# Patient Record
Sex: FEMALE | Race: WHITE | NOT HISPANIC OR LATINO | Employment: OTHER | URBAN - NONMETROPOLITAN AREA
[De-identification: names, ages, dates, MRNs, and addresses within clinical notes are randomized per-mention and may not be internally consistent; named-entity substitution may affect disease eponyms.]

---

## 2018-02-13 ENCOUNTER — OFFICE VISIT (OUTPATIENT)
Dept: OBSTETRICS AND GYNECOLOGY | Facility: CLINIC | Age: 73
End: 2018-02-13

## 2018-02-13 VITALS
HEIGHT: 64 IN | DIASTOLIC BLOOD PRESSURE: 62 MMHG | BODY MASS INDEX: 22.53 KG/M2 | WEIGHT: 132 LBS | SYSTOLIC BLOOD PRESSURE: 119 MMHG

## 2018-02-13 DIAGNOSIS — R10.31 RIGHT LOWER QUADRANT PAIN: ICD-10-CM

## 2018-02-13 DIAGNOSIS — Z85.3 HISTORY OF BREAST CANCER: ICD-10-CM

## 2018-02-13 DIAGNOSIS — R93.5 ABNORMAL ULTRASOUND OF ENDOMETRIUM: ICD-10-CM

## 2018-02-13 DIAGNOSIS — R10.2 PELVIC PAIN: Primary | ICD-10-CM

## 2018-02-13 PROCEDURE — 99204 OFFICE O/P NEW MOD 45 MIN: CPT | Performed by: OBSTETRICS & GYNECOLOGY

## 2018-02-13 RX ORDER — METOPROLOL SUCCINATE 50 MG/1
50 TABLET, EXTENDED RELEASE ORAL DAILY
COMMUNITY

## 2018-02-13 RX ORDER — ASPIRIN 325 MG
325 TABLET ORAL DAILY
COMMUNITY
End: 2022-02-18

## 2018-02-13 RX ORDER — IBUPROFEN 800 MG/1
800 TABLET ORAL EVERY 6 HOURS PRN
COMMUNITY
End: 2022-02-18

## 2018-02-13 RX ORDER — CETIRIZINE HYDROCHLORIDE 10 MG/1
10 TABLET ORAL DAILY
COMMUNITY
End: 2022-02-18

## 2018-02-13 RX ORDER — EZETIMIBE 10 MG/1
10 TABLET ORAL DAILY
COMMUNITY

## 2018-02-13 RX ORDER — TRAVOPROST OPHTHALMIC SOLUTION 0.04 MG/ML
1 SOLUTION OPHTHALMIC EVERY EVENING
COMMUNITY

## 2018-02-13 RX ORDER — SPIRONOLACTONE 25 MG/1
25 TABLET ORAL DAILY
COMMUNITY
End: 2022-02-18

## 2018-02-13 RX ORDER — CLOBETASOL PROPIONATE 0.5 MG/G
OINTMENT TOPICAL 2 TIMES DAILY
COMMUNITY
End: 2019-09-13 | Stop reason: SDUPTHER

## 2018-02-13 NOTE — PROGRESS NOTES
Chief Complaint   Patient presents with   • Pelvic Pain     PATIENT ADVISED HAVING PELVIC PAIN ON RIGHT SIDE X 1 MONTH.      Patient is 72 y.o.  here for evaluation of pelvic pain, right lower quadrant pain for the last month.  Pt reports a sharp, stabbing pain intermittently present over the last month.  Pt reports worse when raises leg up.  Pt reports no other association with the pain.  Pt denies any palpable masses.  Pt denies any nausea or emesis.  Pt with no fever or chills.  Pt reports pain is not present all the time; comes and goes but persistent over the last month.  Pt has not taken any medication;  Not aware of anything that helps the pain.  Pt with maternal aunt with ovarian cancer.  Pt has personal had breast ca x 2; first dx right breast ; took Tamoxifen.  Pt dx with 2nd breast ca  left breast.  Pt does report history of abnormal pap smears in past.  Pt has had CKC many years ago with D&C.  Pt reports last pap approximately 3 years ago and normal.  Pt denies any  bleeding but does report having an episode of spotting x 2 1 year ago but told from bladder; pt received IV antibiotics with resolution but blood was visible.  Pt with no further problems since then.    History  Past Medical History:   Diagnosis Date   • Breast cancer     RIGHT BREAST STAGE 3   • Diabetes mellitus    • History of cancer chemotherapy    • Lichen sclerosus    • Shingles      No current outpatient prescriptions on file prior to visit.     No current facility-administered medications on file prior to visit.      No Known Allergies  Past Surgical History:   Procedure Laterality Date   • CHOLECYSTECTOMY  2010   • DILATATION AND CURETTAGE     • MASTECTOMY Right 10/1998   • MASTECTOMY Left 2013     Family History   Problem Relation Age of Onset   • Breast cancer Mother    • Diabetes Mother    • Ovarian cancer Maternal Aunt      Social History     Social History   • Marital status:      Spouse name:  "N/A   • Number of children: N/A   • Years of education: N/A     Social History Main Topics   • Smoking status: Never Smoker   • Smokeless tobacco: Never Used   • Alcohol use No   • Drug use: No   • Sexual activity: No     Other Topics Concern   • None     Social History Narrative   • None     Review of Systems  All systems were reviewed and negative except for:  ENT:  positive for ear ringing and nasal congestion  Respiratory: positive for  cough, dry  Genitourinary: postivie for  pelvic pain     Objective  Vitals:    02/13/18 1545   BP: 119/62   Weight: 59.9 kg (132 lb)   Height: 161.3 cm (63.5\")     Physical Exam:  General Appearance: alert, appears stated age and cooperative  Head: normocephalic, without obvious abnormality and atraumatic  Eyes: lids and lashes normal, conjunctivae and sclerae normal, no icterus, no pallor, corneas clear and PERRLA  Ears: ears appear intact with no abnormalities noted  Nose: nares normal, septum midline, mucosa normal and no drainage  Neck: suppple, trachea midline and no thyromegaly  Lungs: clear to auscultation, respirations regular, respirations even and respirations unlabored  Heart: regular rhythm and normal rate, normal S1, S2, no murmur, gallop, or rubs and no click  Breasts: Not performed.  Abdomen: normal bowel sounds, no masses, no hepatomegaly, no splenomegaly, soft non-tender, no guarding and no rebound tenderness; no palpable masses noted with valsalva.  No evidence of hernia.  Pelvic: Not performed.  Extremities: moves extremities well, no edema, no cyanosis and no redness  Skin: no bleeding, bruising or rash and no lesions noted  Lymph Nodes: no palpable adenopathy  Psych: normal mood and affect, oriented to person, time and place, thought content organized and appropriate judgment    Lab Review   No data reviewed    Imaging  Pelvic ultrasound images independantly reviewed - TVS today shows uterus normal size; ET 10 mm with heterogenous changes; bilateral ovaries " normal with no free fluid seen.    Assessment/Plan    Problem List Items Addressed This Visit     None      Visit Diagnoses     Pelvic pain    -  Primary  Pt with pelvic pain/right lower quadrant pain.  TVS today shows no explanation for pain; ovaries normal in appearance.  Pt with thickened endometrium as noted but do not feel this is etiology for pain.  Pt may need CT scan for further evaluation.    Relevant Orders    US Non-ob Transvaginal    Right lower quadrant pain      See plan above.    History of breast cancer      See plan below.    Abnormal ultrasound of endometrium      TVS today shows thickened endometrium in patient with known breast cancer x 2.  Various options discussed with patient including repeat TVS, embx, or D&C for further evaluation.  Pt has not had recent pap.  Pt to return for pap with embx.  Plan pending results.  Instructions and precautions given.  All questions answered.            Follow up as scheduled    This note was electronically signed.  Antonietta Sauceda M.D.

## 2018-03-23 ENCOUNTER — OFFICE VISIT (OUTPATIENT)
Dept: OBSTETRICS AND GYNECOLOGY | Facility: CLINIC | Age: 73
End: 2018-03-23

## 2018-03-23 VITALS
DIASTOLIC BLOOD PRESSURE: 62 MMHG | SYSTOLIC BLOOD PRESSURE: 118 MMHG | HEIGHT: 64 IN | BODY MASS INDEX: 22.36 KG/M2 | WEIGHT: 131 LBS

## 2018-03-23 DIAGNOSIS — R93.5 ABNORMAL ULTRASOUND OF ENDOMETRIUM: Primary | ICD-10-CM

## 2018-03-23 DIAGNOSIS — R10.31 RIGHT LOWER QUADRANT PAIN: ICD-10-CM

## 2018-03-23 PROBLEM — R93.89 THICKENED ENDOMETRIUM: Status: ACTIVE | Noted: 2018-01-01

## 2018-03-23 PROCEDURE — 58100 BIOPSY OF UTERUS LINING: CPT | Performed by: OBSTETRICS & GYNECOLOGY

## 2018-03-23 PROCEDURE — 99212 OFFICE O/P EST SF 10 MIN: CPT | Performed by: OBSTETRICS & GYNECOLOGY

## 2018-03-23 NOTE — PROGRESS NOTES
Subjective  Chief Complaint   Patient presents with   • Procedure     ENDOMETRIAL BIOPSY, THICKENED ENDOMETRIUM     Patient is 72 y.o.  here for endometrial bx for recent thickening noted on TVS.  Pt was initial seen for right lower quadrant pain; pt reports the pain has persisted with no changes.  Pt denies any vaginal bleeding or spotting.  Pt does give history of previous CKC with D&C and reports having significant complications.  Pt had seen Dr. Bell in past and had procedure.  Pt also had D&C done in [place] years later and reports having complications and being in hospital x 1 week.    History  Past Medical History:   Diagnosis Date   • Breast cancer     RIGHT BREAST STAGE 3   • Diabetes mellitus    • History of cancer chemotherapy    • Lichen sclerosus    • Shingles    • Thickened endometrium      Current Outpatient Prescriptions on File Prior to Visit   Medication Sig Dispense Refill   • aspirin 325 MG tablet Take 325 mg by mouth Daily.     • cetirizine (zyrTEC) 10 MG tablet Take 10 mg by mouth Daily.     • clobetasol (TEMOVATE) 0.05 % ointment Apply  topically 2 (Two) Times a Day.     • ezetimibe (ZETIA) 10 MG tablet Take 10 mg by mouth Daily.     • ibuprofen (ADVIL,MOTRIN) 800 MG tablet Take 800 mg by mouth Every 6 (Six) Hours As Needed for Mild Pain .     • metoprolol succinate XL (TOPROL-XL) 50 MG 24 hr tablet Take 50 mg by mouth Daily.     • spironolactone (ALDACTONE) 25 MG tablet Take 25 mg by mouth Daily.     • travoprost, BAK free, (TRAVATAN) 0.004 % solution ophthalmic solution 1 drop Every Evening. in affected eye(s)       No current facility-administered medications on file prior to visit.      No Known Allergies  Past Surgical History:   Procedure Laterality Date   • CERVICAL CONE BIOPSY     • CHOLECYSTECTOMY  2010   • DILATATION AND CURETTAGE     • MASTECTOMY Right 10/1998   • MASTECTOMY Left 2013     Family History   Problem Relation Age of Onset   • Breast cancer Mother   "  • Diabetes Mother    • Ovarian cancer Maternal Aunt      Social History     Social History   • Marital status:      Social History Main Topics   • Smoking status: Never Smoker   • Smokeless tobacco: Never Used   • Alcohol use No   • Drug use: No   • Sexual activity: No     Other Topics Concern   • Not on file     Review of Systems  The following systems were reviewed and negative:  constitution, eyes, ENT, respiratory, cardiovascular, gastrointestinal, genitourinary, integument, breast, hematologic / lymphatic, musculoskeletal, neurological, behavioral/psych, endocrine and allergies / immunologic     Objective  Vitals:    03/23/18 1426   BP: 118/62   Weight: 59.4 kg (131 lb)   Height: 161.3 cm (63.5\")     Physical Exam:  General Appearance: alert, appears stated age and cooperative  Head: normocephalic, without obvious abnormality and atraumatic  Eyes: lids and lashes normal, conjunctivae and sclerae normal, no icterus, no pallor, corneas clear and PERRLA  Ears: ears appear intact with no abnormalities noted  Nose: nares normal, septum midline, mucosa normal and no drainage  Neck: suppple, trachea midline and no thyromegaly  Lungs: clear to auscultation, respirations regular, respirations even and respirations unlabored  Heart: regular rhythm and normal rate, normal S1, S2, no murmur, gallop, or rubs and no click  Breasts: Not performed.  Abdomen: normal bowel sounds, no masses, no hepatomegaly, no splenomegaly, soft non-tender, no guarding and no rebound tenderness  Pelvic: Clinical staff was present for exam  External genitalia:  normal appearance of the external genitalia including Bartholin's and [place]'s glands.  :  urethral meatus normal;  Vaginal:  atrophic mucosal changes are present;  Cervix:  stenotic; cervix stenotic and flushed with vagina  Uterus:  normal size, shape and consistency.  Adnexa:  normal bimanual exam of the adnexa.  Extremities: moves extremities well, no edema, no cyanosis and " no redness  Skin: no bleeding, bruising or rash and no lesions noted  Lymph Nodes: no palpable adenopathy  Neuro: CN II-X grossly intact; sensation intact  Psych: normal mood and affect, oriented to person, time and place, thought content organized and appropriate judgment    Endometrial Biopsy    Date of procedure:  3/24/2018    Indication:  abnormal endometrium on ultrasound             Informed Consent Obtained    Procedure documentation:    The patient was placed in the dorsal lithotomy position.  A speculum was placed in the vagina.  The cervix was prepped. The cervix was noted to be stenotic and flushed with vagina.  Unable to perform endometrial bx.  The patient tolerated the procedure without any complications.    Lab Review   No data reviewed    Imaging   No data reviewed    Assessment/Plan  Problem List Items Addressed This Visit     None      Visit Diagnoses     Abnormal ultrasound of endometrium    -  Primary  Attempt at endometrial bx today but unsuccessful secondary to stenotic, flushed cervix.  Need to obtain copy of previous medical records as noted.  Options discussed with patient.  Plan repeat TVS and if continued thickening then will need D&C.    Relevant Orders    US Non-ob Transvaginal    Right lower quadrant pain      Various options discussed with patient but patient informed no etiology noted on TVS or examination to account for the pain.  If continued pain the patient needs to consider CT scan and possible GI work up.        Follow up as scheduled   This note was electronically signed.  Antonietta Sauceda M.D.

## 2018-03-26 ENCOUNTER — TELEPHONE (OUTPATIENT)
Dept: OBSTETRICS AND GYNECOLOGY | Facility: CLINIC | Age: 73
End: 2018-03-26

## 2018-06-13 ENCOUNTER — APPOINTMENT (OUTPATIENT)
Dept: LAB | Facility: HOSPITAL | Age: 73
End: 2018-06-13
Attending: OBSTETRICS & GYNECOLOGY

## 2018-06-13 ENCOUNTER — OFFICE VISIT (OUTPATIENT)
Dept: OBSTETRICS AND GYNECOLOGY | Facility: CLINIC | Age: 73
End: 2018-06-13

## 2018-06-13 VITALS
SYSTOLIC BLOOD PRESSURE: 118 MMHG | WEIGHT: 131 LBS | HEIGHT: 64 IN | BODY MASS INDEX: 22.36 KG/M2 | DIASTOLIC BLOOD PRESSURE: 62 MMHG

## 2018-06-13 DIAGNOSIS — R35.0 URINARY FREQUENCY: Primary | ICD-10-CM

## 2018-06-13 DIAGNOSIS — Z85.3 HISTORY OF BREAST CANCER: ICD-10-CM

## 2018-06-13 DIAGNOSIS — R10.31 RIGHT LOWER QUADRANT PAIN: ICD-10-CM

## 2018-06-13 DIAGNOSIS — R93.89 THICKENED ENDOMETRIUM: ICD-10-CM

## 2018-06-13 DIAGNOSIS — N88.2 CERVICAL STENOSIS (UTERINE CERVIX): ICD-10-CM

## 2018-06-13 LAB
BACTERIA UR QL AUTO: ABNORMAL /HPF
BILIRUB UR QL STRIP: NEGATIVE
CLARITY UR: CLEAR
COLOR UR: YELLOW
GLUCOSE UR STRIP-MCNC: NEGATIVE MG/DL
HGB UR QL STRIP.AUTO: NEGATIVE
HYALINE CASTS UR QL AUTO: ABNORMAL /LPF
KETONES UR QL STRIP: NEGATIVE
LEUKOCYTE ESTERASE UR QL STRIP.AUTO: NEGATIVE
NITRITE UR QL STRIP: NEGATIVE
PH UR STRIP.AUTO: 5.5 [PH] (ref 5–8)
PROT UR QL STRIP: NEGATIVE
RBC # UR: ABNORMAL /HPF
REF LAB TEST METHOD: ABNORMAL
SP GR UR STRIP: 1.01 (ref 1–1.03)
SQUAMOUS #/AREA URNS HPF: ABNORMAL /HPF
UROBILINOGEN UR QL STRIP: NORMAL
WBC UR QL AUTO: ABNORMAL /HPF

## 2018-06-13 PROCEDURE — 99214 OFFICE O/P EST MOD 30 MIN: CPT | Performed by: OBSTETRICS & GYNECOLOGY

## 2018-06-13 PROCEDURE — 87086 URINE CULTURE/COLONY COUNT: CPT

## 2018-06-13 PROCEDURE — 81001 URINALYSIS AUTO W/SCOPE: CPT

## 2018-06-13 NOTE — PROGRESS NOTES
Subjective  Chief Complaint   Patient presents with   • Follow-up     TRANSVAGINAL ULTRASOUND, THICKENED ENDOMETRIUM.    • Urinary Frequency     Patient is 73 y.o.  here for f/u TVS which had previously showed thickened endometrium of 8.5-10 mm on .  Pt's main complaint is predominately right lower quadrant pain which has continued.  Pt denies any vaginal bleeding or spotting.  Pt has had multiple D&C's in the past as well as CKC.  Medical records have been obtained and reviewed which showed patient with cervical stenosis.  Pt had multiple D&Cs showing possible perforation of uterus.  Pt was observed for further observation at times being evaluated overnight.  Pt here for f/u TVS.  An endometrial bx was attempted here as well but unable to do secondary to cervical stenosis.  History  Past Medical History:   Diagnosis Date   • Breast cancer     RIGHT BREAST STAGE 3   • Diabetes mellitus    • History of cancer chemotherapy    • Lichen sclerosus    • Shingles    • Thickened endometrium      Current Outpatient Prescriptions on File Prior to Visit   Medication Sig Dispense Refill   • aspirin 325 MG tablet Take 325 mg by mouth Daily.     • cetirizine (zyrTEC) 10 MG tablet Take 10 mg by mouth Daily.     • clobetasol (TEMOVATE) 0.05 % ointment Apply  topically 2 (Two) Times a Day.     • ezetimibe (ZETIA) 10 MG tablet Take 10 mg by mouth Daily.     • ibuprofen (ADVIL,MOTRIN) 800 MG tablet Take 800 mg by mouth Every 6 (Six) Hours As Needed for Mild Pain .     • metoprolol succinate XL (TOPROL-XL) 50 MG 24 hr tablet Take 50 mg by mouth Daily.     • spironolactone (ALDACTONE) 25 MG tablet Take 25 mg by mouth Daily.     • travoprost, BAK free, (TRAVATAN) 0.004 % solution ophthalmic solution 1 drop Every Evening. in affected eye(s)       No current facility-administered medications on file prior to visit.      No Known Allergies  Past Surgical History:   Procedure Laterality Date   • CERVICAL CONE BIOPSY     •  "CHOLECYSTECTOMY  03/2010   • DILATATION AND CURETTAGE     • MASTECTOMY Right 10/1998   • MASTECTOMY Left 07/2013     Family History   Problem Relation Age of Onset   • Breast cancer Mother    • Diabetes Mother    • Ovarian cancer Maternal Aunt      Social History     Social History   • Marital status:      Social History Main Topics   • Smoking status: Never Smoker   • Smokeless tobacco: Never Used   • Alcohol use No   • Drug use: No   • Sexual activity: No     Other Topics Concern   • Not on file     Review of Systems  All systems were reviewed and negative except for:  Genitourinary: postivie for  frequency and urinary urgency     Objective  Vitals:    06/13/18 1425   BP: 118/62   Weight: 59.4 kg (131 lb)   Height: 161.3 cm (63.5\")     Physical Exam:  General Appearance: alert, appears stated age and cooperative  Head: normocephalic, without obvious abnormality and atraumatic  Eyes: lids and lashes normal, conjunctivae and sclerae normal, no icterus, no pallor, corneas clear and PERRLA  Ears: ears appear intact with no abnormalities noted  Nose: nares normal, septum midline, mucosa normal and no drainage  Neck: suppple, trachea midline and no thyromegaly  Lungs: clear to auscultation, respirations regular, respirations even and respirations unlabored  Heart: regular rhythm and normal rate, normal S1, S2, no murmur, gallop, or rubs and no click  Breasts: Not performed.  Abdomen: normal bowel sounds, no masses, no hepatomegaly, no splenomegaly, soft non-tender, no guarding and no rebound tenderness  Pelvic: Not performed.  Extremities: moves extremities well, no edema, no cyanosis and no redness  Skin: no bleeding, bruising or rash and no lesions noted  Lymph Nodes: no palpable adenopathy  Neuro: CN II-X grossly intact; sensation intact  Psych: normal mood and affect, oriented to person, time and place, thought content organized and appropriate judgment  Lab Review   No data reviewed    Imaging   Pelvic " ultrasound report  Pelvic ultrasound images independantly reviewed - TVS today shows RF uterus, ET 7.86 mm; No adnexal masses seen; no free fluid noted; +tenderness noted right adnexa.    Assessment/Plan  Problem List Items Addressed This Visit        Genitourinary    Thickened endometrium Unchanged  Long discussion with patient regarding findings.  Previous records obtained and reviewed as noted.  Pt with no bleeding.  Plan expectant management at present with repeat TVS in 3-4 months.  If patient with any bleeding and/or worsening of thickening then attempt D&C vs definitive, diagnostic hysterectomy.  All questions answered.  Pt in agreement with plan.      Other Visit Diagnoses     Urinary frequency    -  Primary  See plan below.    Right lower quadrant pain    Worsening  Pt with worsening of right lower quadrant exam and tenderness noted on exam.  CT as noted for further evaluation and treatment.  Plan pending results.    Relevant Orders    CT Abdomen Pelvis With Contrast    History of breast cancer      See plan above    Cervical stenosis (uterine cervix)      See plan above          Follow up as discussed  This note was electronically signed.  Antonietta Sauceda M.D.

## 2018-06-15 ENCOUNTER — HOSPITAL ENCOUNTER (OUTPATIENT)
Dept: CT IMAGING | Facility: HOSPITAL | Age: 73
Discharge: HOME | End: 2018-06-15
Attending: OBSTETRICS & GYNECOLOGY | Admitting: OBSTETRICS & GYNECOLOGY

## 2018-06-15 DIAGNOSIS — R10.31 RIGHT LOWER QUADRANT PAIN: ICD-10-CM

## 2018-06-15 LAB
APPEARANCE UR: CLEAR
BACTERIA #/AREA URNS HPF: ABNORMAL /HPF
BACTERIA SPEC AEROBE CULT: NO GROWTH
BACTERIA UR CULT: NORMAL
BACTERIA UR CULT: NORMAL
BILIRUB UR QL STRIP: NEGATIVE
COLOR UR: YELLOW
CREAT BLD-MCNC: 0.4 MG/DL (ref 0.6–1.3)
EPI CELLS #/AREA URNS HPF: ABNORMAL /HPF
GFR SERPL CREATININE-BSD FRML MDRD: >150 ML/MIN/1.73
GLUCOSE UR QL: NEGATIVE
HGB UR QL STRIP: NEGATIVE
KETONES UR QL STRIP: NEGATIVE
LEUKOCYTE ESTERASE UR QL STRIP: NEGATIVE
NITRITE UR QL STRIP: NEGATIVE
PH UR STRIP: 6 [PH] (ref 5–8)
PROT UR QL STRIP: NEGATIVE
RBC #/AREA URNS HPF: ABNORMAL /HPF
SP GR UR: 1.02 (ref 1–1.03)
UROBILINOGEN UR STRIP-MCNC: (no result) MG/DL
WBC #/AREA URNS HPF: ABNORMAL /HPF

## 2018-06-15 PROCEDURE — 82565 ASSAY OF CREATININE: CPT | Performed by: OBSTETRICS & GYNECOLOGY

## 2018-06-15 PROCEDURE — 25010000002 IOPAMIDOL 61 % SOLUTION: Performed by: OBSTETRICS & GYNECOLOGY

## 2018-06-15 PROCEDURE — 0 DIATRIZOATE MEGLUMINE & SODIUM PER 1 ML: Performed by: OBSTETRICS & GYNECOLOGY

## 2018-06-15 PROCEDURE — 74177 CT ABD & PELVIS W/CONTRAST: CPT

## 2018-06-15 RX ADMIN — DIATRIZOATE MEGLUMINE AND DIATRIZOATE SODIUM 30 ML: 660; 100 LIQUID ORAL; RECTAL at 11:15

## 2018-06-15 RX ADMIN — IOPAMIDOL 100 ML: 612 INJECTION, SOLUTION INTRAVENOUS at 11:15

## 2018-06-18 ENCOUNTER — TELEPHONE (OUTPATIENT)
Dept: OBSTETRICS AND GYNECOLOGY | Facility: CLINIC | Age: 73
End: 2018-06-18

## 2018-06-18 DIAGNOSIS — R10.31 RIGHT LOWER QUADRANT PAIN: Primary | ICD-10-CM

## 2018-06-18 NOTE — TELEPHONE ENCOUNTER
----- Message from Antonietta Sauceda MD sent at 6/18/2018  9:40 AM EDT -----  Okay to inform pt CT showed probable fatty liver.  Biliary ductal prominence.  Appendix was normal.  Pt also had moderate stool.  Recommend GI evaluation given above findings and patient's symptoms.  Pt can see GI or general surgeon.

## 2018-06-18 NOTE — TELEPHONE ENCOUNTER
SPOKE WITH PATIENT AND INFORMED OF RESULTS. PATIENT REQUESTED DR MINDA JAQUEZ PATEL FOR REFERRAL. ORDER CREATED.

## 2018-06-28 ENCOUNTER — OFFICE VISIT (OUTPATIENT)
Dept: SURGERY | Facility: CLINIC | Age: 73
End: 2018-06-28

## 2018-06-28 VITALS
HEIGHT: 64 IN | BODY MASS INDEX: 21.51 KG/M2 | RESPIRATION RATE: 17 BRPM | DIASTOLIC BLOOD PRESSURE: 91 MMHG | TEMPERATURE: 98.4 F | SYSTOLIC BLOOD PRESSURE: 161 MMHG | WEIGHT: 126 LBS | HEART RATE: 80 BPM

## 2018-06-28 DIAGNOSIS — K76.0 FATTY (CHANGE OF) LIVER, NOT ELSEWHERE CLASSIFIED: ICD-10-CM

## 2018-06-28 DIAGNOSIS — R10.31 RIGHT LOWER QUADRANT ABDOMINAL PAIN: Primary | ICD-10-CM

## 2018-06-28 PROCEDURE — 99203 OFFICE O/P NEW LOW 30 MIN: CPT | Performed by: SURGERY

## 2018-06-28 RX ORDER — LEVOCETIRIZINE DIHYDROCHLORIDE 5 MG/1
5 TABLET, FILM COATED ORAL EVERY EVENING
COMMUNITY
End: 2022-02-18

## 2018-06-28 RX ORDER — ONDANSETRON 4 MG/1
4 TABLET, FILM COATED ORAL DAILY PRN
Qty: 10 TABLET | Refills: 0 | Status: SHIPPED | OUTPATIENT
Start: 2018-06-28 | End: 2019-06-28

## 2018-06-28 RX ORDER — AZELASTINE HYDROCHLORIDE 137 UG/1
SPRAY, METERED NASAL
COMMUNITY
End: 2022-02-18

## 2018-06-28 NOTE — ADDENDUM NOTE
Addended by: HALEY JEREZ on: 6/28/2018 12:54 PM     Modules accepted: Orders     Skin normal color for race, warm, dry and intact. No evidence of rash.

## 2018-06-28 NOTE — PROGRESS NOTES
6/28/2018    Patient Information  Aster Craft  [address]  [place] [address]  1945  [phone] (home)     Chief Complaint   Patient presents with   • Consult     Referred for Fatty Liver       HPI  Patient is a 73-year-old white female referred because of abdominal pain and CT evidence of a fatty liver.  Patient is referred by Dr. jones, gynecologist in [place].  Pain patient having is located mostly in the right lower quadrant.  It's been going on for over a year but has recently gotten worse after Dr. Clay evaluators felt she should be evaluated with gastrointestinal point of view.  Patient has no real symptoms except discomfort and pain in the right lower quadrant.  Last colonoscopy was over 5 years ago.  I originally saw this patient in 1998 for breast cancer.  I have performed by a left and right mastectomy on her in the past.  Patient's gallbladder has been removed.  CT scan suggested fatty liver and dilated common bile duct thought to be related to her previous cholecystectomy.    Review of Systems:  The Past medical history, family history, social history, medication list, allergies, and Review of Systems has been reviewed and confirmed.    General: negative  Integumentary: negative  Eyes: eyesight problems, discharge from eyes  ENT: negative  Respiratory: negative  Gastrointestinal: negative  Cardiovascular: negative  Neurological: dizziness  Psychiatric: negative  Hematologic/Lymphatic: negative  Genitourinary: frequent urination  Musculoskeletal: back pain  Endocrine: negative  Breasts: negative      Patient Active Problem List   Diagnosis   • Thickened endometrium         Past Medical History:   Diagnosis Date   • Breast cancer     RIGHT BREAST STAGE 3   • Diabetes mellitus    • History of cancer chemotherapy    • Lichen sclerosus    • Shingles    • Thickened endometrium 2018         Past Surgical History:   Procedure Laterality Date   • CERVICAL CONE BIOPSY     • CHOLECYSTECTOMY  03/2010  "  • DILATATION AND CURETTAGE     • MASTECTOMY Right 10/1998   • MASTECTOMY Left 07/2013         Family History   Problem Relation Age of Onset   • Breast cancer Mother    • Diabetes Mother    • Cancer Mother    • Ovarian cancer Maternal Aunt    • Cancer Maternal Aunt          Social History   Substance Use Topics   • Smoking status: Never Smoker   • Smokeless tobacco: Never Used   • Alcohol use No       Current Outpatient Prescriptions   Medication Sig Dispense Refill   • Azelastine HCl 137 MCG/SPRAY solution into each nostril.     • levocetirizine (XYZAL) 5 MG tablet Take 5 mg by mouth Every Evening.     • aspirin 325 MG tablet Take 325 mg by mouth Daily.     • cetirizine (zyrTEC) 10 MG tablet Take 10 mg by mouth Daily.     • clobetasol (TEMOVATE) 0.05 % ointment Apply  topically 2 (Two) Times a Day.     • ezetimibe (ZETIA) 10 MG tablet Take 10 mg by mouth Daily.     • ibuprofen (ADVIL,MOTRIN) 800 MG tablet Take 800 mg by mouth Every 6 (Six) Hours As Needed for Mild Pain .     • metoprolol succinate XL (TOPROL-XL) 50 MG 24 hr tablet Take 50 mg by mouth Daily.     • spironolactone (ALDACTONE) 25 MG tablet Take 25 mg by mouth Daily.     • travoprost, BAK free, (TRAVATAN) 0.004 % solution ophthalmic solution 1 drop Every Evening. in affected eye(s)       No current facility-administered medications for this visit.          Allergies  Patient has no known allergies.    /91   Pulse 80   Temp 98.4 °F (36.9 °C)   Resp 17   Ht 161.3 cm (63.5\")   Wt 57.2 kg (126 lb)   BMI 21.97 kg/m²      Physical Exam   Constitutional: She is oriented to person, place, and time. She appears well-developed and well-nourished. No distress.   HENT:   Head: Normocephalic.   Right Ear: External ear normal.   Left Ear: External ear normal.   Nose: Nose normal.   Mouth/Throat: Oropharynx is clear and moist.   Eyes: Conjunctivae and EOM are normal. Right eye exhibits no discharge. Left eye exhibits no discharge.   Neck: Normal range of " motion. No JVD present. No tracheal deviation present. No thyromegaly present.   Cardiovascular: Normal rate, regular rhythm, normal heart sounds and intact distal pulses.  Exam reveals no gallop and no friction rub.    No murmur heard.  Pulmonary/Chest: Effort normal and breath sounds normal. No stridor. No respiratory distress. She has no wheezes. She has no rales. She exhibits no tenderness.   Abdominal: Soft. Bowel sounds are normal. She exhibits no distension and no mass. There is no tenderness. There is no rebound and no guarding. No hernia.   Genitourinary: Rectal exam shows guaiac negative stool.   Musculoskeletal: Normal range of motion. She exhibits no edema, tenderness or deformity.   Lymphadenopathy:     She has no cervical adenopathy.   Neurological: She is alert and oriented to person, place, and time. She has normal reflexes. She displays normal reflexes. No cranial nerve deficit. She exhibits normal muscle tone. Coordination normal.   Skin: Skin is warm and dry. No rash noted. She is not diaphoretic. No erythema. No pallor.   Psychiatric: She has a normal mood and affect. Her behavior is normal. Judgment and thought content normal.                 ASSESSMENT  Abdominal pain mostly in right lower quadrant and questionable fatty liver        PLAN    We'll get lab work and perform EGD and colonoscopy           Patient's Body mass index is 21.97 kg/m². BMI is within normal parameters. No follow-up required.           This document signed by Trenton Lyons MD June 28, 2018 12:21 PM

## 2018-06-29 PROBLEM — R10.31 RIGHT LOWER QUADRANT ABDOMINAL PAIN: Status: ACTIVE | Noted: 2018-06-29

## 2018-06-29 PROBLEM — K76.0 FATTY (CHANGE OF) LIVER, NOT ELSEWHERE CLASSIFIED: Status: ACTIVE | Noted: 2018-06-29

## 2018-07-06 ENCOUNTER — ANESTHESIA EVENT (OUTPATIENT)
Dept: PERIOP | Facility: HOSPITAL | Age: 73
End: 2018-07-06

## 2018-07-06 ENCOUNTER — HOSPITAL ENCOUNTER (OUTPATIENT)
Facility: HOSPITAL | Age: 73
Setting detail: HOSPITAL OUTPATIENT SURGERY
Discharge: HOME | End: 2018-07-06
Attending: SURGERY | Admitting: ANESTHESIOLOGY

## 2018-07-06 ENCOUNTER — ANESTHESIA (OUTPATIENT)
Dept: PERIOP | Facility: HOSPITAL | Age: 73
End: 2018-07-06

## 2018-07-06 VITALS
WEIGHT: 126 LBS | TEMPERATURE: 97 F | BODY MASS INDEX: 22.32 KG/M2 | HEIGHT: 63 IN | RESPIRATION RATE: 20 BRPM | DIASTOLIC BLOOD PRESSURE: 73 MMHG | HEART RATE: 73 BPM | OXYGEN SATURATION: 95 % | SYSTOLIC BLOOD PRESSURE: 133 MMHG

## 2018-07-06 DIAGNOSIS — K76.0 FATTY (CHANGE OF) LIVER, NOT ELSEWHERE CLASSIFIED: ICD-10-CM

## 2018-07-06 DIAGNOSIS — R10.31 RIGHT LOWER QUADRANT ABDOMINAL PAIN: ICD-10-CM

## 2018-07-06 PROCEDURE — 88342 IMHCHEM/IMCYTCHM 1ST ANTB: CPT | Performed by: SURGERY

## 2018-07-06 PROCEDURE — 45378 DIAGNOSTIC COLONOSCOPY: CPT | Performed by: SURGERY

## 2018-07-06 PROCEDURE — 25010000002 PROPOFOL 1000 MG/ML EMULSION: Performed by: NURSE ANESTHETIST, CERTIFIED REGISTERED

## 2018-07-06 PROCEDURE — 25010000002 PROPOFOL 10 MG/ML EMULSION: Performed by: NURSE ANESTHETIST, CERTIFIED REGISTERED

## 2018-07-06 PROCEDURE — 43239 EGD BIOPSY SINGLE/MULTIPLE: CPT | Performed by: SURGERY

## 2018-07-06 PROCEDURE — 88305 TISSUE EXAM BY PATHOLOGIST: CPT | Performed by: SURGERY

## 2018-07-06 RX ORDER — SODIUM CHLORIDE, SODIUM LACTATE, POTASSIUM CHLORIDE, CALCIUM CHLORIDE 600; 310; 30; 20 MG/100ML; MG/100ML; MG/100ML; MG/100ML
125 INJECTION, SOLUTION INTRAVENOUS CONTINUOUS
Status: DISCONTINUED | OUTPATIENT
Start: 2018-07-06 | End: 2018-07-06 | Stop reason: HOSPADM

## 2018-07-06 RX ORDER — IPRATROPIUM BROMIDE AND ALBUTEROL SULFATE 2.5; .5 MG/3ML; MG/3ML
3 SOLUTION RESPIRATORY (INHALATION) ONCE AS NEEDED
Status: DISCONTINUED | OUTPATIENT
Start: 2018-07-06 | End: 2018-07-06 | Stop reason: HOSPADM

## 2018-07-06 RX ORDER — ONDANSETRON 2 MG/ML
4 INJECTION INTRAMUSCULAR; INTRAVENOUS ONCE AS NEEDED
Status: DISCONTINUED | OUTPATIENT
Start: 2018-07-06 | End: 2018-07-06 | Stop reason: HOSPADM

## 2018-07-06 RX ORDER — LIDOCAINE HYDROCHLORIDE 20 MG/ML
INJECTION, SOLUTION INFILTRATION; PERINEURAL AS NEEDED
Status: DISCONTINUED | OUTPATIENT
Start: 2018-07-06 | End: 2018-07-06 | Stop reason: SURG

## 2018-07-06 RX ORDER — SODIUM CHLORIDE 0.9 % (FLUSH) 0.9 %
1-10 SYRINGE (ML) INJECTION AS NEEDED
Status: DISCONTINUED | OUTPATIENT
Start: 2018-07-06 | End: 2018-07-06 | Stop reason: HOSPADM

## 2018-07-06 RX ORDER — MEPERIDINE HYDROCHLORIDE 50 MG/ML
12.5 INJECTION INTRAMUSCULAR; INTRAVENOUS; SUBCUTANEOUS
Status: DISCONTINUED | OUTPATIENT
Start: 2018-07-06 | End: 2018-07-06 | Stop reason: HOSPADM

## 2018-07-06 RX ORDER — FENTANYL CITRATE 50 UG/ML
50 INJECTION, SOLUTION INTRAMUSCULAR; INTRAVENOUS
Status: DISCONTINUED | OUTPATIENT
Start: 2018-07-06 | End: 2018-07-06 | Stop reason: HOSPADM

## 2018-07-06 RX ORDER — MIDAZOLAM HYDROCHLORIDE 1 MG/ML
1 INJECTION INTRAMUSCULAR; INTRAVENOUS
Status: DISCONTINUED | OUTPATIENT
Start: 2018-07-06 | End: 2018-07-06 | Stop reason: HOSPADM

## 2018-07-06 RX ORDER — MIDAZOLAM HYDROCHLORIDE 1 MG/ML
2 INJECTION INTRAMUSCULAR; INTRAVENOUS
Status: DISCONTINUED | OUTPATIENT
Start: 2018-07-06 | End: 2018-07-06 | Stop reason: HOSPADM

## 2018-07-06 RX ORDER — PROPOFOL 10 MG/ML
VIAL (ML) INTRAVENOUS AS NEEDED
Status: DISCONTINUED | OUTPATIENT
Start: 2018-07-06 | End: 2018-07-06 | Stop reason: SURG

## 2018-07-06 RX ORDER — OXYCODONE HYDROCHLORIDE AND ACETAMINOPHEN 5; 325 MG/1; MG/1
1 TABLET ORAL ONCE AS NEEDED
Status: DISCONTINUED | OUTPATIENT
Start: 2018-07-06 | End: 2018-07-06 | Stop reason: HOSPADM

## 2018-07-06 RX ADMIN — PROPOFOL 20 MG: 10 INJECTION, EMULSION INTRAVENOUS at 07:38

## 2018-07-06 RX ADMIN — LIDOCAINE HYDROCHLORIDE 40 MG: 20 INJECTION, SOLUTION INFILTRATION; PERINEURAL at 07:38

## 2018-07-06 RX ADMIN — PROPOFOL 150 MCG/KG/MIN: 10 INJECTION, EMULSION INTRAVENOUS at 07:38

## 2018-07-06 RX ADMIN — SODIUM CHLORIDE, POTASSIUM CHLORIDE, SODIUM LACTATE AND CALCIUM CHLORIDE: 600; 310; 30; 20 INJECTION, SOLUTION INTRAVENOUS at 07:38

## 2018-07-06 NOTE — ANESTHESIA POSTPROCEDURE EVALUATION
Patient: Aster Craft    Procedure Summary     Date:  07/06/18 Room / Location:  [facility] OR  /  [facility] OR    Anesthesia Start:  0738 Anesthesia Stop:  0804    Procedures:       ESOPHAGOGASTRODUODENOSCOPY (N/A Esophagus)      COLONOSCOPY (N/A ) Diagnosis:       Right lower quadrant abdominal pain      Fatty (change of) liver, not elsewhere classified      (Right lower quadrant abdominal pain [R10.31])      (Fatty (change of) liver, not elsewhere classified [K76.0])    Surgeon:  Trenton Lyons MD Provider:  Yuan Lester MD    Anesthesia Type:  general ASA Status:  3          Anesthesia Type: general  Last vitals  BP   104/69 (07/06/18 0820)   Temp   97 °F (36.1 °C) (07/06/18 0805)   Pulse   74 (07/06/18 0820)   Resp   18 (07/06/18 0820)     SpO2   100 % (07/06/18 0820)     Post Anesthesia Care and Evaluation    Patient location during evaluation: PHASE II  Patient participation: complete - patient participated  Level of consciousness: awake and alert  Pain score: 1  Pain management: adequate  Airway patency: patent  Anesthetic complications: No anesthetic complications  PONV Status: controlled  Cardiovascular status: acceptable  Respiratory status: acceptable  Hydration status: acceptable

## 2018-07-06 NOTE — OP NOTE
Aster SINGH Venancio  7/6/2018      Operative Progress Note:    Surgeon and Assistant: Dr. Lyons    Pre-Operative Diagnosis: abdominal pain    Post-Operative Diagnosis: abdominal pain with normal EGD and internal and external hemorrhoids otherwise normal colonoscopy    Procedure(s):  EGD with biopsies and colonoscopy to    Type of Anesthesia Administered: IV general    Estimated Blood Loss: Minimal    Blood Products: None    Specimen Obtained/Removed: adrenal, antrum, esophageal biopsies    Complication(s):  None    Graft/Implant/Prosthetics/Implanted Device/Transplants: None    Indication: patient is a 73-year-old white female    Findings: duodenum normal, stomach normal, esophagus normal.  Colon normal in its entirety except for findings of internal and external hemorrhoids    Operative Report:  Patient was taken operating room and placed in a left lateral decubitus position.  IV sedation was given per anesthesia.  Gastroscope was introduced and passed into the duodenum.  The scope was slowly withdrawn.  I examined the duodenum, stomach and esophagus thoroughly.  Biopsies were taken as indicated above.  Findings are listed as above.    Patient was taken to the operating room and placed in a left lateral decubitus position.  IV sedation was given.  Colonoscope was introduced and passed all the way to cecum.  The scope was slowly withdrawn.  Cecum, ascending colon, hepatic flexure, transverse colon, splenic flexure, descending colon, sigmoid colon, and rectum were all examined.  Findings are as listed above Digital rectal exam was unremarkable.  The procedures and terminated.  Patient tolerated procedure very well and was returned recovery room in satisfactory condition    Patient will need a repeat colonoscopy in 10 years or less.    Patient was discharged home at recovery and be seen back in the office in one week.       Electronically Signed by: Trenton Lyons MD        Dictated Utilizing [employer]

## 2018-07-06 NOTE — H&P (VIEW-ONLY)
6/28/2018    Patient Information  Aster Craft  [address]  [place] [address]  1945  [phone] (home)     Chief Complaint   Patient presents with   • Consult     Referred for Fatty Liver       HPI  Patient is a 73-year-old white female referred because of abdominal pain and CT evidence of a fatty liver.  Patient is referred by Dr. jones, gynecologist in [place].  Pain patient having is located mostly in the right lower quadrant.  It's been going on for over a year but has recently gotten worse after Dr. Clay evaluators felt she should be evaluated with gastrointestinal point of view.  Patient has no real symptoms except discomfort and pain in the right lower quadrant.  Last colonoscopy was over 5 years ago.  I originally saw this patient in 1998 for breast cancer.  I have performed by a left and right mastectomy on her in the past.  Patient's gallbladder has been removed.  CT scan suggested fatty liver and dilated common bile duct thought to be related to her previous cholecystectomy.    Review of Systems:  The Past medical history, family history, social history, medication list, allergies, and Review of Systems has been reviewed and confirmed.    General: negative  Integumentary: negative  Eyes: eyesight problems, discharge from eyes  ENT: negative  Respiratory: negative  Gastrointestinal: negative  Cardiovascular: negative  Neurological: dizziness  Psychiatric: negative  Hematologic/Lymphatic: negative  Genitourinary: frequent urination  Musculoskeletal: back pain  Endocrine: negative  Breasts: negative      Patient Active Problem List   Diagnosis   • Thickened endometrium         Past Medical History:   Diagnosis Date   • Breast cancer     RIGHT BREAST STAGE 3   • Diabetes mellitus    • History of cancer chemotherapy    • Lichen sclerosus    • Shingles    • Thickened endometrium 2018         Past Surgical History:   Procedure Laterality Date   • CERVICAL CONE BIOPSY     • CHOLECYSTECTOMY  03/2010  "  • DILATATION AND CURETTAGE     • MASTECTOMY Right 10/1998   • MASTECTOMY Left 07/2013         Family History   Problem Relation Age of Onset   • Breast cancer Mother    • Diabetes Mother    • Cancer Mother    • Ovarian cancer Maternal Aunt    • Cancer Maternal Aunt          Social History   Substance Use Topics   • Smoking status: Never Smoker   • Smokeless tobacco: Never Used   • Alcohol use No       Current Outpatient Prescriptions   Medication Sig Dispense Refill   • Azelastine HCl 137 MCG/SPRAY solution into each nostril.     • levocetirizine (XYZAL) 5 MG tablet Take 5 mg by mouth Every Evening.     • aspirin 325 MG tablet Take 325 mg by mouth Daily.     • cetirizine (zyrTEC) 10 MG tablet Take 10 mg by mouth Daily.     • clobetasol (TEMOVATE) 0.05 % ointment Apply  topically 2 (Two) Times a Day.     • ezetimibe (ZETIA) 10 MG tablet Take 10 mg by mouth Daily.     • ibuprofen (ADVIL,MOTRIN) 800 MG tablet Take 800 mg by mouth Every 6 (Six) Hours As Needed for Mild Pain .     • metoprolol succinate XL (TOPROL-XL) 50 MG 24 hr tablet Take 50 mg by mouth Daily.     • spironolactone (ALDACTONE) 25 MG tablet Take 25 mg by mouth Daily.     • travoprost, BAK free, (TRAVATAN) 0.004 % solution ophthalmic solution 1 drop Every Evening. in affected eye(s)       No current facility-administered medications for this visit.          Allergies  Patient has no known allergies.    /91   Pulse 80   Temp 98.4 °F (36.9 °C)   Resp 17   Ht 161.3 cm (63.5\")   Wt 57.2 kg (126 lb)   BMI 21.97 kg/m²      Physical Exam   Constitutional: She is oriented to person, place, and time. She appears well-developed and well-nourished. No distress.   HENT:   Head: Normocephalic.   Right Ear: External ear normal.   Left Ear: External ear normal.   Nose: Nose normal.   Mouth/Throat: Oropharynx is clear and moist.   Eyes: Conjunctivae and EOM are normal. Right eye exhibits no discharge. Left eye exhibits no discharge.   Neck: Normal range of " motion. No JVD present. No tracheal deviation present. No thyromegaly present.   Cardiovascular: Normal rate, regular rhythm, normal heart sounds and intact distal pulses.  Exam reveals no gallop and no friction rub.    No murmur heard.  Pulmonary/Chest: Effort normal and breath sounds normal. No stridor. No respiratory distress. She has no wheezes. She has no rales. She exhibits no tenderness.   Abdominal: Soft. Bowel sounds are normal. She exhibits no distension and no mass. There is no tenderness. There is no rebound and no guarding. No hernia.   Genitourinary: Rectal exam shows guaiac negative stool.   Musculoskeletal: Normal range of motion. She exhibits no edema, tenderness or deformity.   Lymphadenopathy:     She has no cervical adenopathy.   Neurological: She is alert and oriented to person, place, and time. She has normal reflexes. She displays normal reflexes. No cranial nerve deficit. She exhibits normal muscle tone. Coordination normal.   Skin: Skin is warm and dry. No rash noted. She is not diaphoretic. No erythema. No pallor.   Psychiatric: She has a normal mood and affect. Her behavior is normal. Judgment and thought content normal.                 ASSESSMENT  Abdominal pain mostly in right lower quadrant and questionable fatty liver        PLAN    We'll get lab work and perform EGD and colonoscopy           Patient's Body mass index is 21.97 kg/m². BMI is within normal parameters. No follow-up required.           This document signed by Trenton Lyons MD June 28, 2018 12:21 PM

## 2018-07-06 NOTE — ANESTHESIA PREPROCEDURE EVALUATION
Anesthesia Evaluation     no history of anesthetic complications:  NPO Solid Status: > 8 hours  NPO Liquid Status: > 8 hours           Airway   Mallampati: II  TM distance: >3 FB  Neck ROM: full  No difficulty expected  Dental - normal exam     Pulmonary - normal exam   Cardiovascular - normal exam    (+) hypertension, hyperlipidemia,       Neuro/Psych  GI/Hepatic/Renal/Endo      Musculoskeletal     Abdominal  - normal exam   Substance History      OB/GYN          Other      history of cancer remission                    Anesthesia Plan    ASA 3     general     intravenous induction   Anesthetic plan and risks discussed with patient.

## 2018-07-10 LAB
LAB AP CASE REPORT: NORMAL
PATH REPORT.FINAL DX SPEC: NORMAL

## 2018-07-12 ENCOUNTER — OFFICE VISIT (OUTPATIENT)
Dept: SURGERY | Facility: CLINIC | Age: 73
End: 2018-07-12

## 2018-07-12 VITALS
HEART RATE: 80 BPM | DIASTOLIC BLOOD PRESSURE: 98 MMHG | OXYGEN SATURATION: 96 % | BODY MASS INDEX: 22.32 KG/M2 | WEIGHT: 126 LBS | HEIGHT: 63 IN | SYSTOLIC BLOOD PRESSURE: 146 MMHG | TEMPERATURE: 98.1 F | RESPIRATION RATE: 17 BRPM

## 2018-07-12 DIAGNOSIS — R10.31 RIGHT LOWER QUADRANT ABDOMINAL PAIN: Primary | ICD-10-CM

## 2018-07-12 DIAGNOSIS — K76.0 FATTY (CHANGE OF) LIVER, NOT ELSEWHERE CLASSIFIED: ICD-10-CM

## 2018-07-12 PROCEDURE — 99212 OFFICE O/P EST SF 10 MIN: CPT | Performed by: SURGERY

## 2018-07-12 NOTE — PROGRESS NOTES
7/12/2018    Patient Information  Aster Craft  [address]  [place] [address]  1945  [phone] (home)     Chief Complaint   Patient presents with   • Follow-up     F/U EGD/COLON       HPI  Patient is a 73-year-old white female here for follow-up on EGD and colonoscopy..  Colonoscopy was normal except for internal and external hemorrhoids.  Histology of the esophagus showed some mild inflammation with some pancreatic metaplasia which is of no clinical significance.  There is nothing found at endoscopy that would explain left lower quadrant pain and discomfort    Review of Systems  The Review of Systems has been reviewed and confirmed.    Patient Active Problem List   Diagnosis   • Thickened endometrium   • Right lower quadrant abdominal pain   • Fatty (change of) liver, not elsewhere classified         Past Medical History:   Diagnosis Date   • Arthritis    • Breast cancer (CMS/HCC)     RIGHT BREAST STAGE 3   • Diabetes mellitus (CMS/HCC)    • Elevated cholesterol    • History of cancer chemotherapy    • Hypertension    • Lichen sclerosus    • Shingles    • Thickened endometrium 2018         Past Surgical History:   Procedure Laterality Date   • CERVICAL CONE BIOPSY     • CHOLECYSTECTOMY  03/2010   • COLONOSCOPY N/A 7/6/2018    Procedure: COLONOSCOPY;  Surgeon: Trenton Lyons MD;  Location: [facility] OR;  Service: Gastroenterology   • DILATATION AND CURETTAGE     • ENDOSCOPY N/A 7/6/2018    Procedure: ESOPHAGOGASTRODUODENOSCOPY;  Surgeon: Trenton Lyons MD;  Location: [facility] OR;  Service: Gastroenterology   • MASTECTOMY Right 10/1998   • MASTECTOMY Left 07/2013         Family History   Problem Relation Age of Onset   • Breast cancer Mother    • Diabetes Mother    • Cancer Mother    • Ovarian cancer Maternal Aunt    • Cancer Maternal Aunt          Social History   Substance Use Topics   • Smoking status: Never Smoker   • Smokeless tobacco: Never Used   • Alcohol use No       Current Outpatient  "Prescriptions   Medication Sig Dispense Refill   • aspirin 325 MG tablet Take 325 mg by mouth Daily.     • Azelastine HCl 137 MCG/SPRAY solution into each nostril.     • cetirizine (zyrTEC) 10 MG tablet Take 10 mg by mouth Daily.     • clobetasol (TEMOVATE) 0.05 % ointment Apply  topically 2 (Two) Times a Day.     • ezetimibe (ZETIA) 10 MG tablet Take 10 mg by mouth Daily.     • ibuprofen (ADVIL,MOTRIN) 800 MG tablet Take 800 mg by mouth Every 6 (Six) Hours As Needed for Mild Pain .     • levocetirizine (XYZAL) 5 MG tablet Take 5 mg by mouth Every Evening.     • metoprolol succinate XL (TOPROL-XL) 50 MG 24 hr tablet Take 50 mg by mouth Daily.     • ondansetron (ZOFRAN) 4 MG tablet Take 1 tablet by mouth Daily As Needed for Nausea or Vomiting. 10 tablet 0   • spironolactone (ALDACTONE) 25 MG tablet Take 25 mg by mouth Daily.     • travoprost, BAK free, (TRAVATAN) 0.004 % solution ophthalmic solution 1 drop Every Evening. in affected eye(s)       No current facility-administered medications for this visit.          Allergies  Patient has no known allergies.    /98   Pulse 80   Temp 98.1 °F (36.7 °C)   Resp 17   Ht 160 cm (62.99\")   Wt 57.2 kg (126 lb)   SpO2 96%   BMI 22.33 kg/m²      Physical Exam  Gen. 73-year-old white female no distress  Abdomen soft        Assessment   Left lower quadrant discomfort, etiology unclear  Internal/external hemorrhoids        Plan     Return when necessary  Patient's Body mass index is 22.33 kg/m². BMI is within normal parameters. No follow-up required.      Trenton Lyons MD        "

## 2019-09-13 ENCOUNTER — OFFICE VISIT (OUTPATIENT)
Dept: OBSTETRICS AND GYNECOLOGY | Facility: CLINIC | Age: 74
End: 2019-09-13

## 2019-09-13 VITALS
BODY MASS INDEX: 29.39 KG/M2 | SYSTOLIC BLOOD PRESSURE: 132 MMHG | WEIGHT: 127 LBS | DIASTOLIC BLOOD PRESSURE: 70 MMHG | HEIGHT: 55 IN

## 2019-09-13 DIAGNOSIS — Z01.411 ENCOUNTER FOR GYNECOLOGICAL EXAMINATION WITH ABNORMAL FINDING: Primary | ICD-10-CM

## 2019-09-13 DIAGNOSIS — N76.0 ACUTE VAGINITIS: ICD-10-CM

## 2019-09-13 DIAGNOSIS — Z85.3 PERSONAL HISTORY OF BREAST CANCER: ICD-10-CM

## 2019-09-13 DIAGNOSIS — L90.0 LICHEN SCLEROSUS ET ATROPHICUS: ICD-10-CM

## 2019-09-13 DIAGNOSIS — R30.9 PAIN WITH URINATION: ICD-10-CM

## 2019-09-13 PROCEDURE — 99213 OFFICE O/P EST LOW 20 MIN: CPT | Performed by: OBSTETRICS & GYNECOLOGY

## 2019-09-13 PROCEDURE — G0101 CA SCREEN;PELVIC/BREAST EXAM: HCPCS | Performed by: OBSTETRICS & GYNECOLOGY

## 2019-09-13 RX ORDER — SULFAMETHOXAZOLE AND TRIMETHOPRIM 800; 160 MG/1; MG/1
1 TABLET ORAL 2 TIMES DAILY
Qty: 14 TABLET | Refills: 0 | Status: SHIPPED | OUTPATIENT
Start: 2019-09-13 | End: 2019-09-20

## 2019-09-13 RX ORDER — FLUCONAZOLE 150 MG/1
TABLET ORAL
Qty: 2 TABLET | Refills: 0 | Status: SHIPPED | OUTPATIENT
Start: 2019-09-13 | End: 2021-05-27

## 2019-09-13 RX ORDER — CLOBETASOL PROPIONATE 0.5 MG/G
OINTMENT TOPICAL 2 TIMES DAILY
Qty: 60 G | Refills: 6 | Status: SHIPPED | OUTPATIENT
Start: 2019-09-13 | End: 2022-02-18 | Stop reason: SDUPTHER

## 2019-09-13 NOTE — PROGRESS NOTES
Subjective  Chief Complaint   Patient presents with   • Gynecologic Exam     Patient complains of pressure with urination.      Patient is 74 y.o.  here for her annual examination.  Patient sees Dr. Brady for her primary care.  Patient has complaints today of pressure and pain with urination.  Patient reports pain and burning when she urinates.  Patient also has urinary frequency.  Patient has had urinary tract infections in the past and reports similar symptoms.  Patient denies any flank pain.  Patient denies any fever or chills.  Patient has not been aware of any vaginal discharge.  Patient does have a history of lichen sclerosis.  Patient is currently using her clobetasol ointment as needed.  Patient denies any recent flareups.  Patient has a history of breast cancer x2.  Patient does have bilateral mastectomies.  Patient no longer has mammograms.  Patient had a colonoscopy last year.    History  Past Medical History:   Diagnosis Date   • Arthritis    • Breast cancer (CMS/HCC)     RIGHT BREAST STAGE 3   • Diabetes mellitus (CMS/HCC)    • Elevated cholesterol    • History of cancer chemotherapy    • Hypertension    • Lichen sclerosus    • Shingles    • Thickened endometrium      Current Outpatient Medications on File Prior to Visit   Medication Sig Dispense Refill   • aspirin 325 MG tablet Take 325 mg by mouth Daily.     • Azelastine HCl 137 MCG/SPRAY solution into each nostril.     • cetirizine (zyrTEC) 10 MG tablet Take 10 mg by mouth Daily.     • ezetimibe (ZETIA) 10 MG tablet Take 10 mg by mouth Daily.     • ibuprofen (ADVIL,MOTRIN) 800 MG tablet Take 800 mg by mouth Every 6 (Six) Hours As Needed for Mild Pain .     • levocetirizine (XYZAL) 5 MG tablet Take 5 mg by mouth Every Evening.     • metoprolol succinate XL (TOPROL-XL) 50 MG 24 hr tablet Take 50 mg by mouth Daily.     • spironolactone (ALDACTONE) 25 MG tablet Take 25 mg by mouth Daily.     • travoprost, BAK free, (TRAVATAN) 0.004 % solution  "ophthalmic solution 1 drop Every Evening. in affected eye(s)       No current facility-administered medications on file prior to visit.      No Known Allergies  Past Surgical History:   Procedure Laterality Date   • CERVICAL CONE BIOPSY     • CHOLECYSTECTOMY  03/2010   • COLONOSCOPY N/A 7/6/2018    Procedure: COLONOSCOPY;  Surgeon: Trenton Lyons MD;  Location: [facility];  Service: Gastroenterology   • DILATATION AND CURETTAGE     • ENDOSCOPY N/A 7/6/2018    Procedure: ESOPHAGOGASTRODUODENOSCOPY;  Surgeon: Trenton Lyons MD;  Location: [facility];  Service: Gastroenterology   • MASTECTOMY Right 10/1998   • MASTECTOMY Left 07/2013     Family History   Problem Relation Age of Onset   • Breast cancer Mother    • Diabetes Mother    • Cancer Mother    • Ovarian cancer Maternal Aunt    • Cancer Maternal Aunt      Social History     Socioeconomic History   • Marital status:      Spouse name: Not on file   • Number of children: Not on file   • Years of education: Not on file   • Highest education level: Not on file   Tobacco Use   • Smoking status: Never Smoker   • Smokeless tobacco: Never Used   Substance and Sexual Activity   • Alcohol use: No   • Drug use: No   • Sexual activity: No     Review of Systems  All systems were reviewed and negative except for:  Genitourinary: postivie for  painful urination     Objective  Vitals:    09/13/19 1403   BP: 132/70   Weight: 57.6 kg (127 lb)   Height: 137.2 cm (54\")     Physical Exam:  General Appearance: alert, appears stated age and cooperative  Head: normocephalic, without obvious abnormality and atraumatic  Eyes: lids and lashes normal, conjunctivae and sclerae normal, no icterus, no pallor, corneas clear and PERRLA  Ears: ears appear intact with no abnormalities noted  Nose: nares normal, septum midline, mucosa normal and no drainage  Neck: suppple, trachea midline and no thyromegaly  Lungs: clear to auscultation, respirations regular, respirations even and " respirations unlabored  Heart: regular rhythm and normal rate, normal S1, S2, no murmur, gallop, or rubs and no click  Breasts: Examined in supine position  Mastectomy site well healed without palpable abnormalities  Abdomen: normal bowel sounds, no masses, no hepatomegaly, no splenomegaly, soft non-tender, no guarding and no rebound tenderness  Pelvic: Clinical staff was present for exam  External genitalia:  +mild parchment of the vulva  :  urethral meatus normal;  Vaginal:  atrophic mucosal changes are present; discharge present -  white and thick;  Cervix:  normal appearance.  Uterus:  normal size, shape and consistency.  Adnexa:  non palpable bilaterally.  Pap smear done and specimen sent using Thin-Prep technique  Extremities: moves extremities well, no edema, no cyanosis and no redness  Skin: no bleeding, bruising or rash and no lesions noted  Lymph Nodes: no palpable adenopathy  Neuro: CN II-X grossly intact; sensation intact  Psych: normal mood and affect, oriented to person, time and place, thought content organized and appropriate judgment  Lab Review   No data reviewed    Imaging   No data reviewed    Decision to Obtain Medical Records  No    Summary of Medical Records  No    Assessment/Plan  Problem List Items Addressed This Visit     None      Visit Diagnoses     Encounter for gynecological examination with abnormal finding    -  Primary  I explained to Aster that Pap smears are no longer recommended in patients after 65 years of age who have had adequate negative screening with three consecutive negative pap smears within the previous 10 years and the most recent test performed within the past 5 years. Women who have a history of TRACI 2, TRACI 3, or ACIS should continue routine screening for a total of 20 years after regression or treatment.   I stressed to Aster that she still should be seen yearly for a full physical including breast and pelvic exam.  I informed her that women aged 65 and older still  do get cervical cancer representing 14.1% of the [place] female population and 19.6% of new cases of cervical cancer.  I also informed the patient regarding medicare guidelines on coverage for pap smear screening.  For this reason, Aster has elected pap smear screening this year.    Relevant Orders    Pap IG, Rfx HPV ASCU    Pain with urination    New  Will send clean-catch UA culture and sensitivity today as noted.  Patient is to increase her p.o. fluids.  Prescription for Bactrim is given as noted.  Patient is to call for her urine results.    Relevant Medications    sulfamethoxazole-trimethoprim (BACTRIM DS) 800-160 MG per tablet    Other Relevant Orders    Urinalysis With Microscopic - Urine, Clean Catch (Completed)    Urine Culture - Urine, Urine, Clean Catch (Completed)    Lichen sclerosus et atrophicus    Unchanged  Patient with continued changes consistent with lichen sclerosus.  Patient is currently doing well with her clobetasol.  Prescriptions given as noted.  Instructions and precautions have been given.    Relevant Medications    clobetasol (TEMOVATE) 0.05 % ointment    Acute vaginitis    New  Patient with vaginal discharge as noted above.  Prescriptions given for Diflucan as noted.  Instructions and precautions have been given.    Relevant Medications    fluconazole (DIFLUCAN) 150 MG tablet    Personal history of breast cancer            Follow up as discussed/scheduled  Note: Speech recognition transcription software may have been used to dictate portions of this document.  An attempt at proofreading has been made though minor errors in transcription may still be present.  This note was electronically signed.  Antonietta Sauceda M.D.

## 2019-09-14 LAB
APPEARANCE UR: CLEAR
BACTERIA #/AREA URNS HPF: NORMAL /HPF
BACTERIA UR CULT: NORMAL
BACTERIA UR CULT: NORMAL
BILIRUB UR QL STRIP: NEGATIVE
COLOR UR: YELLOW
CRYSTALS URNS MICRO: NORMAL
EPI CELLS #/AREA URNS HPF: NORMAL /HPF
GLUCOSE UR QL: NEGATIVE
HGB UR QL STRIP: NEGATIVE
KETONES UR QL STRIP: NEGATIVE
LEUKOCYTE ESTERASE UR QL STRIP: NEGATIVE
NITRITE UR QL STRIP: NEGATIVE
PH UR STRIP: <=5 [PH] (ref 5–8)
PROT UR QL STRIP: NEGATIVE
RBC #/AREA URNS HPF: NORMAL /HPF
SP GR UR: 1.02 (ref 1–1.03)
UROBILINOGEN UR STRIP-MCNC: (no result) MG/DL
WBC #/AREA URNS HPF: NORMAL /HPF

## 2019-09-17 DIAGNOSIS — R30.9 PAIN WITH URINATION: Primary | ICD-10-CM

## 2019-09-19 LAB
APPEARANCE UR: CLEAR
BACTERIA #/AREA URNS HPF: NORMAL /HPF
BACTERIA UR CULT: NORMAL
BACTERIA UR CULT: NORMAL
BILIRUB UR QL STRIP: NEGATIVE
CASTS URNS MICRO: NORMAL
COLOR UR: YELLOW
EPI CELLS #/AREA URNS HPF: NORMAL /HPF
GLUCOSE UR QL: NEGATIVE
HGB UR QL STRIP: NEGATIVE
KETONES UR QL STRIP: NEGATIVE
LEUKOCYTE ESTERASE UR QL STRIP: (no result)
NITRITE UR QL STRIP: NEGATIVE
PH UR STRIP: 8 [PH] (ref 5–8)
PROT UR QL STRIP: NEGATIVE
RBC #/AREA URNS HPF: NORMAL /HPF
SP GR UR: 1.02 (ref 1–1.03)
UROBILINOGEN UR STRIP-MCNC: (no result) MG/DL
WBC #/AREA URNS HPF: NORMAL /HPF

## 2019-09-25 DIAGNOSIS — Z01.411 ENCOUNTER FOR GYNECOLOGICAL EXAMINATION WITH ABNORMAL FINDING: ICD-10-CM

## 2021-05-27 ENCOUNTER — OFFICE VISIT (OUTPATIENT)
Dept: OBSTETRICS AND GYNECOLOGY | Facility: CLINIC | Age: 76
End: 2021-05-27

## 2021-05-27 VITALS — BODY MASS INDEX: 22.33 KG/M2 | WEIGHT: 134 LBS | HEIGHT: 65 IN

## 2021-05-27 DIAGNOSIS — N76.0 ACUTE VAGINITIS: ICD-10-CM

## 2021-05-27 DIAGNOSIS — L90.0 LICHEN SCLEROSUS ET ATROPHICUS: ICD-10-CM

## 2021-05-27 DIAGNOSIS — N76.2 ACUTE VULVITIS: Primary | ICD-10-CM

## 2021-05-27 DIAGNOSIS — Z85.3 PERSONAL HISTORY OF BREAST CANCER: ICD-10-CM

## 2021-05-27 DIAGNOSIS — N95.2 POSTMENOPAUSAL ATROPHIC VAGINITIS: ICD-10-CM

## 2021-05-27 PROCEDURE — 99214 OFFICE O/P EST MOD 30 MIN: CPT | Performed by: OBSTETRICS & GYNECOLOGY

## 2021-05-27 RX ORDER — MELOXICAM 7.5 MG/1
TABLET ORAL
COMMUNITY
Start: 2021-05-11 | End: 2022-02-18

## 2021-05-27 RX ORDER — MONTELUKAST SODIUM 10 MG/1
TABLET ORAL
COMMUNITY
Start: 2021-05-03 | End: 2022-02-18

## 2021-05-27 RX ORDER — MECLIZINE HYDROCHLORIDE 25 MG/1
TABLET ORAL
COMMUNITY
Start: 2021-05-11 | End: 2022-02-18

## 2021-05-27 RX ORDER — ZAFIRLUKAST 20 MG/1
TABLET, FILM COATED ORAL
COMMUNITY
Start: 2021-05-03 | End: 2022-11-28 | Stop reason: ALTCHOICE

## 2021-05-27 RX ORDER — NETARSUDIL 0.2 MG/ML
1 SOLUTION/ DROPS OPHTHALMIC; TOPICAL NIGHTLY
COMMUNITY
Start: 2021-05-18

## 2021-05-27 RX ORDER — CLOTRIMAZOLE AND BETAMETHASONE DIPROPIONATE 10; .64 MG/G; MG/G
CREAM TOPICAL 2 TIMES DAILY
Qty: 45 G | Refills: 0 | Status: SHIPPED | OUTPATIENT
Start: 2021-05-27 | End: 2022-02-18 | Stop reason: SDUPTHER

## 2021-05-27 RX ORDER — IPRATROPIUM BROMIDE 42 UG/1
SPRAY, METERED NASAL
COMMUNITY
Start: 2021-04-13 | End: 2022-02-18

## 2021-05-27 RX ORDER — FLUTICASONE PROPIONATE 50 MCG
SPRAY, SUSPENSION (ML) NASAL
COMMUNITY
Start: 2021-05-11 | End: 2022-02-18

## 2021-05-27 RX ORDER — OMEPRAZOLE 40 MG/1
CAPSULE, DELAYED RELEASE ORAL
COMMUNITY
Start: 2021-05-03 | End: 2022-02-18

## 2021-05-27 RX ORDER — CLOBETASOL PROPIONATE 0.5 MG/G
OINTMENT TOPICAL
Qty: 60 G | Refills: 6 | Status: SHIPPED | OUTPATIENT
Start: 2021-05-27 | End: 2022-12-27 | Stop reason: SDUPTHER

## 2021-05-27 NOTE — PROGRESS NOTES
"Chief Complaint  Vaginal Itching (Patient complains of vaginal itching, swelling and odor x 2 months. Patient advised took two doses of Diflucan x 1 week ago. )     History of Present Illness:  Patient is 76 y.o.  who presents to [facility] here for evaluation of recent episode of intense vaginal and vulvar itching with swelling and discharge with odor.  Patient reports she recently took 2 doses of Diflucan with improvement in her symptoms.  Patient is a diabetic and is unsure of her glucose levels have been under control.  Patient sees Dr. Brady for primary care.  Patient previously had been diagnosed with lichen sclerosis.  She had been using clobetasol.  Patient reports she has been out of this now for several months.  She does have intense itching as well as swelling.  She denies any vaginal discharge at present.  Patient denies any dysuria.  She denies any other symptoms.  The patient does have a personal history of breast cancer.    Physical Examination:  Vital Signs: Ht 165.1 cm (65\")   Wt 60.8 kg (134 lb)   BMI 22.30 kg/m²     General Appearance: alert, appears stated age, and cooperative  Breasts: Not performed.  Abdomen: no masses, no hepatomegaly, no splenomegaly, soft non-tender, no guarding and no rebound tenderness  Pelvic: Clinical staff was present for exam  External genitalia:  Mild parchment of the vulva anteriorly.  Erythema is noted of the vulva bilaterally.  :  urethral meatus normal;  Vaginal:  atrophic mucosal changes are present;  Cervix:  stenotic;  Uterus:  normal size, shape and consistency.  Adnexa:  non palpable bilaterally.  Cultures obtained    Data Review:  The following data was reviewed by: Antonietta Sauceda MD on 2021:     Labs:    Imaging:    Medical Records:  None    Assessment and Plan   Problem List Items Addressed This Visit     None      Visit Diagnoses     Acute vulvitis    -  Primary  Prescription is given for Lotrisone.  Cultures have " been obtained as well.  Patient is instructed to then use the clobetasol as previously given.  Instructions precautions are given.  Patient is to follow-up as discussed.    Relevant Medications    clotrimazole-betamethasone (Lotrisone) 1-0.05 % cream    Other Relevant Orders    NuSwab VG+ - Swab, Vagina    Lichen sclerosus et atrophicus      Patient with lichen sclerosis as noted.  Prescription is given for clobetasol.  I have stressed to the patient the need for maintenance dosing of her clobetasol.  Instructions and precautions have been given.    Relevant Medications    clotrimazole-betamethasone (Lotrisone) 1-0.05 % cream    clobetasol (TEMOVATE) 0.05 % ointment    Personal history of breast cancer        Postmenopausal atrophic vaginitis      Patient with atrophic changes on examination.  Patient has a personal history of breast cancer.  I discussed with the patient the various treatment options.    Acute vaginitis      Vaginal cultures are obtained.  Instructions precautions are given.  Patient is to call for the results.  Plan pending results.  Patient may need suppression with Diflucan as discussed.  Patient also needs to follow-up with Dr. Brady regarding her glucose levels.    Relevant Orders    NuSwab VG+ - Swab, Vagina          Follow Up/Instructions:  Follow up as noted.  Patient was given instructions and counseling regarding her condition or for health maintenance advice. Please see specific information pulled into the AVS if appropriate.     Note: Speech recognition transcription software may have been used to dictate portions of this document.  An attempt at proofreading has been made though minor errors in transcription may still be present.    This note was electronically signed.  Antonietta Sauceda M.D.

## 2021-05-30 LAB
A VAGINAE DNA VAG QL NAA+PROBE: NORMAL SCORE
BVAB2 DNA VAG QL NAA+PROBE: NORMAL SCORE
C ALBICANS DNA VAG QL NAA+PROBE: NEGATIVE
C GLABRATA DNA VAG QL NAA+PROBE: NEGATIVE
C TRACH DNA VAG QL NAA+PROBE: NEGATIVE
MEGA1 DNA VAG QL NAA+PROBE: NORMAL SCORE
N GONORRHOEA DNA VAG QL NAA+PROBE: NEGATIVE
T VAGINALIS DNA VAG QL NAA+PROBE: NEGATIVE

## 2022-02-18 ENCOUNTER — OFFICE VISIT (OUTPATIENT)
Dept: OBSTETRICS AND GYNECOLOGY | Facility: CLINIC | Age: 77
End: 2022-02-18

## 2022-02-18 VITALS — WEIGHT: 123.2 LBS | BODY MASS INDEX: 20.53 KG/M2 | HEIGHT: 65 IN

## 2022-02-18 DIAGNOSIS — N94.9 VAGINAL BURNING: Primary | ICD-10-CM

## 2022-02-18 DIAGNOSIS — R30.9 URINARY PAIN: ICD-10-CM

## 2022-02-18 DIAGNOSIS — L90.0 LICHEN SCLEROSUS ET ATROPHICUS: ICD-10-CM

## 2022-02-18 LAB
BILIRUB BLD-MCNC: NEGATIVE MG/DL
CLARITY, POC: ABNORMAL
COLOR UR: YELLOW
EXPIRATION DATE: ABNORMAL
GLUCOSE UR STRIP-MCNC: NEGATIVE MG/DL
KETONES UR QL: NEGATIVE
LEUKOCYTE EST, POC: ABNORMAL
Lab: ABNORMAL
NITRITE UR-MCNC: POSITIVE MG/ML
PH UR: 6.5 [PH] (ref 5–8)
PROT UR STRIP-MCNC: ABNORMAL MG/DL
RBC # UR STRIP: NEGATIVE /UL
SP GR UR: 1.01 (ref 1–1.03)
UROBILINOGEN UR QL: NORMAL

## 2022-02-18 PROCEDURE — 81003 URINALYSIS AUTO W/O SCOPE: CPT | Performed by: PHYSICIAN ASSISTANT

## 2022-02-18 PROCEDURE — 99213 OFFICE O/P EST LOW 20 MIN: CPT | Performed by: PHYSICIAN ASSISTANT

## 2022-02-18 RX ORDER — NITROFURANTOIN 25; 75 MG/1; MG/1
100 CAPSULE ORAL 2 TIMES DAILY
Qty: 10 CAPSULE | Refills: 0 | Status: SHIPPED | OUTPATIENT
Start: 2022-02-18 | End: 2022-02-23

## 2022-02-18 RX ORDER — CHLORAL HYDRATE 500 MG
CAPSULE ORAL
COMMUNITY

## 2022-02-18 RX ORDER — PANTOPRAZOLE SODIUM 40 MG/1
40 TABLET, DELAYED RELEASE ORAL DAILY
COMMUNITY
Start: 2022-01-21

## 2022-02-18 NOTE — PROGRESS NOTES
Subjective   Chief Complaint   Patient presents with   • Vaginal Itching     Patient is C/O vaginal itching and irritation, pain with urination       Aster Craft is a 76 y.o. year old  presenting to be seen for vaginal and vulvar itching and irritation and pressure with urination  She is not experiencing dysuria.  Urinalysis today is noted to be positive for nitrites and leukocytes.  She has a history of lichen sclerosis.  She does use clobetasol cream once or twice weekly and reports that she continues to have flareups of intense itching on occasion.  When she uses clobetasol twice weekly and then tapers down to once weekly symptoms do resolve.  She is diabetic and last hemoglobin A1c was 6.8    Past Medical History:   Diagnosis Date   • Arthritis    • Breast cancer (HCC)     RIGHT BREAST STAGE 3   • Diabetes mellitus (HCC)    • Elevated cholesterol    • History of cancer chemotherapy    • Hypertension    • Lichen sclerosus    • Shingles    • Thickened endometrium         Current Outpatient Medications:   •  calcium carbonate-cholecalciferol 500-400 MG-UNIT tablet tablet, Take  by mouth Daily., Disp: , Rfl:   •  CINNAMON PO, Take  by mouth., Disp: , Rfl:   •  clobetasol (TEMOVATE) 0.05 % ointment, Apply to affected area BID x 2 wks then daily x 2 wks then 2-3x/wk, Disp: 60 g, Rfl: 6  •  ezetimibe (ZETIA) 10 MG tablet, Take 10 mg by mouth Daily., Disp: , Rfl:   •  metoprolol succinate XL (TOPROL-XL) 50 MG 24 hr tablet, Take 50 mg by mouth Daily., Disp: , Rfl:   •  Omega-3 Fatty Acids (fish oil) 1000 MG capsule capsule, Take  by mouth Daily With Breakfast., Disp: , Rfl:   •  pantoprazole (PROTONIX) 40 MG EC tablet, Take 40 mg by mouth Daily., Disp: , Rfl:   •  Rhopressa 0.02 % solution, , Disp: , Rfl:   •  travoprost, SHANNON free, (TRAVATAN) 0.004 % solution ophthalmic solution, 1 drop Every Evening. in affected eye(s), Disp: , Rfl:   •  zafirlukast (ACCOLATE) 20 MG tablet, , Disp: , Rfl:   •   "nitrofurantoin, macrocrystal-monohydrate, (Macrobid) 100 MG capsule, Take 1 capsule by mouth 2 (Two) Times a Day for 5 days., Disp: 10 capsule, Rfl: 0   No Known Allergies   Past Surgical History:   Procedure Laterality Date   • CERVICAL CONE BIOPSY     • CHOLECYSTECTOMY  03/2010   • COLONOSCOPY N/A 7/6/2018    Procedure: COLONOSCOPY;  Surgeon: Trenton Lyons MD;  Location: [facility] OR;  Service: Gastroenterology   • DILATATION AND CURETTAGE     • ENDOSCOPY N/A 7/6/2018    Procedure: ESOPHAGOGASTRODUODENOSCOPY;  Surgeon: Trenton Lyons MD;  Location: [facility] OR;  Service: Gastroenterology   • MASTECTOMY Right 10/1998   • MASTECTOMY Left 07/2013      Social History     Socioeconomic History   • Marital status:    Tobacco Use   • Smoking status: Never Smoker   • Smokeless tobacco: Never Used   Vaping Use   • Vaping Use: Never used   Substance and Sexual Activity   • Alcohol use: No   • Drug use: No   • Sexual activity: Not Currently      Family History   Problem Relation Age of Onset   • Breast cancer Mother    • Diabetes Mother    • Cancer Mother    • Ovarian cancer Maternal Aunt    • Cancer Maternal Aunt        Review of Systems   Constitutional: Negative for chills, diaphoresis and fever.   Gastrointestinal: Negative for constipation, diarrhea, nausea and vomiting.   Genitourinary: Positive for dysuria and vaginal pain. Negative for difficulty urinating, menstrual problem, pelvic pain, vaginal bleeding and vaginal discharge.           Objective   Ht 165.1 cm (65\")   Wt 55.9 kg (123 lb 3.2 oz)   Breastfeeding No   BMI 20.50 kg/m²     Physical Exam  Constitutional:       Appearance: Normal appearance. She is well-developed and well-groomed.   Eyes:      General: Lids are normal.      Extraocular Movements: Extraocular movements intact.      Conjunctiva/sclera: Conjunctivae normal.   Genitourinary:     Urethra: No prolapse, urethral pain, urethral swelling or urethral lesion.      Vagina: No vaginal " discharge, erythema, bleeding or lesions.      Comments: Posterior fourchette and perineum with parchment appearing tissue. No lesions  Tissue also very atrophic and vaginal tissue thin and atrophic   Neurological:      Mental Status: She is alert.   Psychiatric:         Behavior: Behavior is cooperative.            Result Review :                   Assessment and Plan  Diagnoses and all orders for this visit:    1. Vaginal burning (Primary)  -     POC Urinalysis Dipstick, Automated  -     Urine Culture - Urine, Urine, Clean Catch    2. Urinary pain   -     Urine Culture - Urine, Urine, Clean Catch    3. Lichen sclerosus et atrophicus    Other orders  -     nitrofurantoin, macrocrystal-monohydrate, (Macrobid) 100 MG capsule; Take 1 capsule by mouth 2 (Two) Times a Day for 5 days.  Dispense: 10 capsule; Refill: 0      Patient Instructions   Patient advised to repeat taper dose of clobetasol using bid for 1 week the q day for one week then 2-3 x weekly sparingly                This note was electronically signed.    Shameka Suarez PA-C   February 18, 2022

## 2022-02-18 NOTE — PATIENT INSTRUCTIONS
Patient advised to repeat taper dose of clobetasol using bid for 1 week the q day for one week then 2-3 x weekly sparingly

## 2022-02-21 LAB
C ALBICANS DNA VAG QL NAA+PROBE: NEGATIVE
C GLABRATA DNA VAG QL NAA+PROBE: NEGATIVE
C KRUSEI DNA VAG QL NAA+PROBE: NEGATIVE
C LUSITANIAE DNA VAG QL NAA+PROBE: NEGATIVE
CANDIDA DNA VAG QL NAA+PROBE: NEGATIVE

## 2022-02-23 LAB
BACTERIA UR CULT: ABNORMAL
BACTERIA UR CULT: ABNORMAL
OTHER ANTIBIOTIC SUSC ISLT: ABNORMAL

## 2022-02-24 NOTE — PROGRESS NOTES
Please inform patient her urine culture is positive but is susceptible to the Macrobid she was given at office visit.  She needs to finish all medication.

## 2022-02-25 ENCOUNTER — TELEPHONE (OUTPATIENT)
Dept: OBSTETRICS AND GYNECOLOGY | Facility: CLINIC | Age: 77
End: 2022-02-25

## 2022-02-25 NOTE — TELEPHONE ENCOUNTER
----- Message from Nataliia Ramos MA sent at 2/25/2022  2:17 PM EST -----  Patient is requesting a call from Ashlie. Would not give a reason.    Ashlie's Patient       Thank You

## 2022-02-28 NOTE — TELEPHONE ENCOUNTER
Returned call to patient.  She had been confused why she was initially told that her test was negative and then told it was positive.  I explained to her that her initial vaginal swab was negative however her urine culture returned positive a couple days later.  Patient voices understanding and has no other questions.  Reports she is taking her antibiotic as directed.

## 2022-10-25 ENCOUNTER — TELEPHONE (OUTPATIENT)
Dept: OBSTETRICS AND GYNECOLOGY | Facility: CLINIC | Age: 77
End: 2022-10-25

## 2022-10-26 RX ORDER — FLUCONAZOLE 150 MG/1
TABLET ORAL
Qty: 2 TABLET | Refills: 0 | Status: SHIPPED | OUTPATIENT
Start: 2022-10-26 | End: 2023-01-19

## 2022-10-31 ENCOUNTER — OFFICE VISIT (OUTPATIENT)
Dept: UROLOGY | Facility: CLINIC | Age: 77
End: 2022-10-31

## 2022-10-31 VITALS
BODY MASS INDEX: 20.49 KG/M2 | TEMPERATURE: 98.2 F | HEART RATE: 80 BPM | DIASTOLIC BLOOD PRESSURE: 72 MMHG | RESPIRATION RATE: 16 BRPM | WEIGHT: 123 LBS | HEIGHT: 65 IN | SYSTOLIC BLOOD PRESSURE: 110 MMHG

## 2022-10-31 DIAGNOSIS — N39.0 RECURRENT UTI: Primary | ICD-10-CM

## 2022-10-31 DIAGNOSIS — Z85.3 HISTORY OF BILATERAL BREAST CANCER: ICD-10-CM

## 2022-10-31 LAB
BILIRUB BLD-MCNC: NEGATIVE MG/DL
CLARITY, POC: CLEAR
COLOR UR: YELLOW
EXPIRATION DATE: NORMAL
GLUCOSE UR STRIP-MCNC: NEGATIVE MG/DL
KETONES UR QL: NEGATIVE
LEUKOCYTE EST, POC: NEGATIVE
Lab: NORMAL
NITRITE UR-MCNC: NEGATIVE MG/ML
PH UR: 6 [PH] (ref 5–8)
PROT UR STRIP-MCNC: NEGATIVE MG/DL
RBC # UR STRIP: NEGATIVE /UL
SP GR UR: 1.02 (ref 1–1.03)
UROBILINOGEN UR QL: NORMAL

## 2022-10-31 PROCEDURE — 51798 US URINE CAPACITY MEASURE: CPT | Performed by: PHYSICIAN ASSISTANT

## 2022-10-31 PROCEDURE — 99214 OFFICE O/P EST MOD 30 MIN: CPT | Performed by: PHYSICIAN ASSISTANT

## 2022-10-31 PROCEDURE — 81003 URINALYSIS AUTO W/O SCOPE: CPT | Performed by: PHYSICIAN ASSISTANT

## 2022-10-31 NOTE — PROGRESS NOTES
Chief Complaint  Frequent UTI    Referring Provider:  Shameka Pham DO    HPI  Ms. Craft is a 77 y.o. female with history of breast cancer s/p BL mastectomy who presents as a referral for multiple UTIs.      Not always: Her symptoms resolve promptly when antibiotics are initiated for an episode thought to be an infection.    No History of inpatient hospitalized for these infections?    Yes Records of positive urine cultures?  No Relationship with the onset of these infections and sexual activity?    No Ongoing problems with constipation?     Yes Urinary incontinence?    Mixed Incontinence   3 Pads per day               YES History of gross hematuria?       Past Medical History  Past Medical History:   Diagnosis Date   • Arthritis    • Breast cancer (HCC)     RIGHT BREAST STAGE 3   • Diabetes mellitus (HCC)    • Elevated cholesterol    • History of cancer chemotherapy    • Hypertension    • Lichen sclerosus    • Shingles    • Thickened endometrium 2018       Past Surgical History  Past Surgical History:   Procedure Laterality Date   • CERVICAL CONE BIOPSY     • CHOLECYSTECTOMY  03/2010   • COLONOSCOPY N/A 7/6/2018    Procedure: COLONOSCOPY;  Surgeon: Trenton Lyons MD;  Location: [facility];  Service: Gastroenterology   • DILATATION AND CURETTAGE     • ENDOSCOPY N/A 7/6/2018    Procedure: ESOPHAGOGASTRODUODENOSCOPY;  Surgeon: Trenton Lyons MD;  Location: [facility];  Service: Gastroenterology   • MASTECTOMY Right 10/1998   • MASTECTOMY Left 07/2013       Medications    Current Outpatient Medications:   •  calcium carbonate-cholecalciferol 500-400 MG-UNIT tablet tablet, Take  by mouth Daily., Disp: , Rfl:   •  CINNAMON PO, Take  by mouth., Disp: , Rfl:   •  clobetasol (TEMOVATE) 0.05 % ointment, Apply to affected area BID x 2 wks then daily x 2 wks then 2-3x/wk, Disp: 60 g, Rfl: 6  •  ezetimibe (ZETIA) 10 MG tablet, Take 10 mg by mouth Daily., Disp: , Rfl:   •  fluconazole (Diflucan) 150 MG tablet, 1 po now and  "repeat in 3 d., Disp: 2 tablet, Rfl: 0  •  metoprolol succinate XL (TOPROL-XL) 50 MG 24 hr tablet, Take 50 mg by mouth Daily., Disp: , Rfl:   •  Omega-3 Fatty Acids (fish oil) 1000 MG capsule capsule, Take  by mouth Daily With Breakfast., Disp: , Rfl:   •  pantoprazole (PROTONIX) 40 MG EC tablet, Take 40 mg by mouth Daily., Disp: , Rfl:   •  Rhopressa 0.02 % solution, , Disp: , Rfl:   •  travoprost, SHANNON free, (TRAVATAN) 0.004 % solution ophthalmic solution, 1 drop Every Evening. in affected eye(s), Disp: , Rfl:   •  zafirlukast (ACCOLATE) 20 MG tablet, , Disp: , Rfl:     Allergies  No Known Allergies    Social History  Social History     Socioeconomic History   • Marital status:    Tobacco Use   • Smoking status: Never   • Smokeless tobacco: Never   Vaping Use   • Vaping Use: Never used   Substance and Sexual Activity   • Alcohol use: No   • Drug use: No   • Sexual activity: Not Currently       Family History  Family History   Problem Relation Age of Onset   • Breast cancer Mother    • Diabetes Mother    • Cancer Mother    • Ovarian cancer Maternal Aunt    • Cancer Maternal Aunt          Physical Exam  Visit Vitals  /72   Pulse 80   Temp 98.2 °F (36.8 °C) (Temporal)   Resp 16   Ht 165.1 cm (65\")   Wt 55.8 kg (123 lb)   BMI 20.47 kg/m²         Labs  Urine Culture    Urine Culture 2/18/22   Urine Culture Final report (A)   (A) Abnormal value               Brief Urine Lab Results  (Last result in the past 365 days)      Color   Clarity   Blood   Leuk Est   Nitrite   Protein   CREAT   Urine HCG        10/31/22 1346 Yellow   Clear   Negative   Negative   Negative   Negative                   Post-Void Residual  A post-void residual was measured by ultrasonic bladder scanner by staff. 0cc      Assessment  Ms. Craft is a 77 y.o. female with history of breast cancer s/p bilateral mastectomy.     Medical records sent over for the patient and are reviewed. They are significant for growth of Klebsiella x2. Also, " "CT obtained in December of 2021 revealed \"bilateral nephrolithiasis\".     The patient's risk factors to developing recurrent UTIs include moderately low fluid intake, incontinence, postmenopausal state, nephrolithiasis.    Plan  1. Encourage fluid consumption with goal of 64oz daily  2. CT Stone   3. Start Gemtesa, samples given today   4. Discuss cysto at follow up given remote hx of gross hematuria (>5 years ago)        "

## 2022-11-02 LAB
BACTERIA UR CULT: NORMAL
BACTERIA UR CULT: NORMAL

## 2022-11-08 ENCOUNTER — HOSPITAL ENCOUNTER (OUTPATIENT)
Dept: CT IMAGING | Facility: HOSPITAL | Age: 77
Discharge: HOME | End: 2022-11-08
Admitting: PHYSICIAN ASSISTANT

## 2022-11-08 DIAGNOSIS — N39.0 RECURRENT UTI: ICD-10-CM

## 2022-11-08 PROCEDURE — 74176 CT ABD & PELVIS W/O CONTRAST: CPT

## 2022-11-28 ENCOUNTER — OFFICE VISIT (OUTPATIENT)
Dept: UROLOGY | Facility: CLINIC | Age: 77
End: 2022-11-28

## 2022-11-28 VITALS
RESPIRATION RATE: 12 BRPM | BODY MASS INDEX: 20.86 KG/M2 | WEIGHT: 125.2 LBS | DIASTOLIC BLOOD PRESSURE: 58 MMHG | HEIGHT: 65 IN | HEART RATE: 90 BPM | TEMPERATURE: 97.6 F | OXYGEN SATURATION: 97 % | SYSTOLIC BLOOD PRESSURE: 128 MMHG

## 2022-11-28 DIAGNOSIS — R39.9 LOWER URINARY TRACT SYMPTOMS (LUTS): ICD-10-CM

## 2022-11-28 DIAGNOSIS — N39.41 URGE INCONTINENCE: ICD-10-CM

## 2022-11-28 DIAGNOSIS — N39.0 RECURRENT UTI: Primary | ICD-10-CM

## 2022-11-28 LAB
BILIRUB BLD-MCNC: NEGATIVE MG/DL
CLARITY, POC: CLEAR
COLOR UR: YELLOW
EXPIRATION DATE: ABNORMAL
GLUCOSE UR STRIP-MCNC: ABNORMAL MG/DL
KETONES UR QL: NEGATIVE
LEUKOCYTE EST, POC: NEGATIVE
Lab: ABNORMAL
NITRITE UR-MCNC: NEGATIVE MG/ML
PH UR: 6 [PH] (ref 5–8)
PROT UR STRIP-MCNC: NEGATIVE MG/DL
RBC # UR STRIP: ABNORMAL /UL
SP GR UR: 1.03 (ref 1–1.03)
UROBILINOGEN UR QL: NORMAL

## 2022-11-28 PROCEDURE — 81003 URINALYSIS AUTO W/O SCOPE: CPT | Performed by: PHYSICIAN ASSISTANT

## 2022-11-28 PROCEDURE — 99214 OFFICE O/P EST MOD 30 MIN: CPT | Performed by: PHYSICIAN ASSISTANT

## 2022-11-28 RX ORDER — IPRATROPIUM BROMIDE 21 UG/1
SPRAY, METERED NASAL
COMMUNITY
Start: 2022-11-21

## 2022-11-28 RX ORDER — MONTELUKAST SODIUM 10 MG/1
10 TABLET ORAL EVERY EVENING
COMMUNITY
Start: 2022-11-21

## 2022-11-28 RX ORDER — BRIMONIDINE TARTRATE AND TIMOLOL MALEATE 2; 5 MG/ML; MG/ML
1 SOLUTION OPHTHALMIC EVERY 12 HOURS
COMMUNITY

## 2022-11-28 RX ORDER — VIBEGRON 75 MG/1
75 TABLET, FILM COATED ORAL DAILY
Qty: 30 TABLET | Refills: 11 | Status: SHIPPED | OUTPATIENT
Start: 2022-11-28

## 2022-11-28 RX ORDER — IBUPROFEN 800 MG/1
800 TABLET ORAL 3 TIMES DAILY PRN
COMMUNITY
Start: 2022-11-21 | End: 2023-03-14

## 2022-11-28 RX ORDER — AZELASTINE 1 MG/ML
SPRAY, METERED NASAL
COMMUNITY
Start: 2022-11-21

## 2022-11-28 RX ORDER — ALBUTEROL SULFATE 90 UG/1
AEROSOL, METERED RESPIRATORY (INHALATION)
COMMUNITY
Start: 2022-11-21

## 2022-11-28 NOTE — PROGRESS NOTES
Chief Complaint   Patient presents with   • recurrent uti   • luts   • Follow-up        HPI  Ms. Craft is a 77 y.o. female with history of breast cancer s/p BL mastectomy, recurrent UTI who presents for follow up.     At this visit, notes that Gemtesa has helped her urinary urgency and frequency quite a bit.  She is pleased with this treatment.  She is aware that her CT scan revealed stones in both kidneys and is curious about the need for treatment.    Past Medical History:   Diagnosis Date   • Arthritis    • Breast cancer (HCC)     RIGHT BREAST STAGE 3   • Diabetes mellitus (HCC)    • Elevated cholesterol    • History of cancer chemotherapy    • Hypertension    • Lichen sclerosus    • Shingles    • Thickened endometrium 2018       Past Surgical History:   Procedure Laterality Date   • CERVICAL CONE BIOPSY     • CHOLECYSTECTOMY  03/2010   • COLONOSCOPY N/A 7/6/2018    Procedure: COLONOSCOPY;  Surgeon: Trenton Lyons MD;  Location: [facility];  Service: Gastroenterology   • DILATATION AND CURETTAGE     • ENDOSCOPY N/A 7/6/2018    Procedure: ESOPHAGOGASTRODUODENOSCOPY;  Surgeon: Trenton Lyons MD;  Location: [facility];  Service: Gastroenterology   • MASTECTOMY Right 10/1998   • MASTECTOMY Left 07/2013         Current Outpatient Medications:   •  azelastine (ASTELIN) 0.1 % nasal spray, INHALE ONE SPRAY IN EACH NOSTRIL TWICE DAILY, Disp: , Rfl:   •  calcium carbonate-cholecalciferol 500-400 MG-UNIT tablet tablet, Take  by mouth Daily., Disp: , Rfl:   •  CINNAMON PO, Take  by mouth., Disp: , Rfl:   •  clobetasol (TEMOVATE) 0.05 % ointment, Apply to affected area BID x 2 wks then daily x 2 wks then 2-3x/wk, Disp: 60 g, Rfl: 6  •  ezetimibe (ZETIA) 10 MG tablet, Take 10 mg by mouth Daily., Disp: , Rfl:   •  ibuprofen (ADVIL,MOTRIN) 800 MG tablet, Take 800 mg by mouth 3 (Three) Times a Day As Needed., Disp: , Rfl:   •  ipratropium (ATROVENT) 0.03 % nasal spray, INHALE ONE SPRAY IN EACH NOSTRIL TWICE DAILY, Disp: , Rfl:  "  •  metoprolol succinate XL (TOPROL-XL) 50 MG 24 hr tablet, Take 50 mg by mouth Daily., Disp: , Rfl:   •  montelukast (SINGULAIR) 10 MG tablet, Take 10 mg by mouth Every Evening., Disp: , Rfl:   •  Omega-3 Fatty Acids (fish oil) 1000 MG capsule capsule, Take  by mouth Daily With Breakfast., Disp: , Rfl:   •  Rhopressa 0.02 % solution, Administer 1 drop to both eyes Every Night., Disp: , Rfl:   •  travoprost, BAK free, (TRAVATAN) 0.004 % solution ophthalmic solution, 1 drop Every Evening. in affected eye(s), Disp: , Rfl:   •  albuterol sulfate  (90 Base) MCG/ACT inhaler, TWO PUFFS EVERY SIX HOURS AS NEEDED, Disp: , Rfl:   •  brimonidine-timolol (COMBIGAN) 0.2-0.5 % ophthalmic solution, Administer 1 drop to both eyes Every 12 (Twelve) Hours., Disp: , Rfl:   •  fluconazole (Diflucan) 150 MG tablet, 1 po now and repeat in 3 d., Disp: 2 tablet, Rfl: 0  •  pantoprazole (PROTONIX) 40 MG EC tablet, Take 40 mg by mouth Daily., Disp: , Rfl:      Physical Exam  Visit Vitals  /58 (BP Location: Left leg, Patient Position: Sitting, Cuff Size: Adult)   Pulse 90   Temp 97.6 °F (36.4 °C) (Temporal)   Resp 12   Ht 165.1 cm (65\")   Wt 56.8 kg (125 lb 3.2 oz)   SpO2 97%   BMI 20.83 kg/m²       Labs  Brief Urine Lab Results  (Last result in the past 365 days)      Color   Clarity   Blood   Leuk Est   Nitrite   Protein   CREAT   Urine HCG        10/31/22 1346 Yellow   Clear   Negative   Negative   Negative   Negative                 Lab Results   Component Value Date    CALCIUM 9.3 05/18/2022     05/18/2022    K 3.9 05/18/2022    CO2 22 05/18/2022     05/18/2022    BUN 20 05/18/2022    CREATININE 0.53 (L) 05/18/2022    EGFRIFAFRI >60 05/18/2022    EGFRIFNONA >60 05/18/2022    BCR 38 05/18/2022    ANIONGAP 15 05/18/2022       Lab Results   Component Value Date    WBC 5.77 07/29/2022    HGB 12.8 07/29/2022    HCT 39.1 07/29/2022    MCV 97 07/29/2022     07/29/2022       Urine Culture    Urine Culture " 2/18/22 10/31/22   Urine Culture Final report (A) Final report   (A) Abnormal value               Radiographic Studies  CT Abdomen Pelvis Stone Protocol    Result Date: 11/9/2022   1. Multiple stones are seen within both renal collecting systems, more numerous and larger on the right. 2. No evidence of obstruction. 3. No definite evidence of acute inflammatory process.    This report was signed and finalized on 11/9/2022 9:07 AM by Leonardo Boogie MD.      I have reviewed the above labs and imaging.     Assessment  77 y.o. female with history of breast cancer s/p BL mastectomy, recurrent UTI presenting for follow-up.  Urgency incontinence is greatly improved on Gemtesa and she desires to continue this.  We discussed and review her CT together.  She has large right-sided stone burden, at least 10 mm in total.  She has very scant left-sided stone burden.  We discussed strict indications for elective stone surgery including pain, obstruction, recurrent infection.  She currently has only had 2 culture proven Klebsiella infections in the past 10 months.  Her urine is benign today aside from blood, no dysuria.  She has upcoming cataract surgery and prefers to continue observation of infections rather than pursue elective surgery at this time.    Plan  1.  Continue Gemtesa  2.  Obtain urine culture today    FU in spring, after cataract surgery; or sooner for UTI/stone concern

## 2022-12-01 LAB
BACTERIA UR CULT: ABNORMAL
BACTERIA UR CULT: ABNORMAL
OTHER ANTIBIOTIC SUSC ISLT: ABNORMAL

## 2022-12-05 ENCOUNTER — TELEPHONE (OUTPATIENT)
Dept: UROLOGY | Facility: CLINIC | Age: 77
End: 2022-12-05

## 2022-12-05 NOTE — TELEPHONE ENCOUNTER
Provider: JUAN A WILSON   Caller:  VANESSA AHUJA  Relationship to Patient: SELF  Pharmacy: [facility] - [address] [phone] [facility] [phone]   Phone Number: [phone]  Reason for Call: PT WAS CALLING TO CONFIRM HER NEXT APPT.     PT ALSO STATED [facility] URINE TEST RESULTS WERE IN AND IT SHOWED ECOLI. PT IS ASKING WHAT THE PLAN IS NOW.  When was the patient last seen: 11-28-22

## 2022-12-09 ENCOUNTER — TELEPHONE (OUTPATIENT)
Dept: UROLOGY | Facility: CLINIC | Age: 77
End: 2022-12-09

## 2022-12-09 ENCOUNTER — TELEPHONE (OUTPATIENT)
Dept: OBSTETRICS AND GYNECOLOGY | Facility: CLINIC | Age: 77
End: 2022-12-09

## 2022-12-09 ENCOUNTER — APPOINTMENT (OUTPATIENT)
Dept: CT IMAGING | Facility: HOSPITAL | Age: 77
End: 2022-12-09

## 2022-12-09 ENCOUNTER — HOSPITAL ENCOUNTER (EMERGENCY)
Facility: HOSPITAL | Age: 77
Discharge: HOME | End: 2022-12-09
Attending: EMERGENCY MEDICINE | Admitting: EMERGENCY MEDICINE

## 2022-12-09 VITALS
SYSTOLIC BLOOD PRESSURE: 141 MMHG | OXYGEN SATURATION: 100 % | TEMPERATURE: 98 F | RESPIRATION RATE: 16 BRPM | BODY MASS INDEX: 21.26 KG/M2 | HEIGHT: 63 IN | HEART RATE: 92 BPM | DIASTOLIC BLOOD PRESSURE: 74 MMHG | WEIGHT: 120 LBS

## 2022-12-09 DIAGNOSIS — N20.0 KIDNEY STONE: Primary | ICD-10-CM

## 2022-12-09 LAB
ALBUMIN SERPL-MCNC: 3.9 G/DL (ref 3.5–5.2)
ALBUMIN/GLOB SERPL: 1.4 G/DL
ALP SERPL-CCNC: 77 U/L (ref 39–117)
ALT SERPL W P-5'-P-CCNC: 14 U/L (ref 1–33)
ANION GAP SERPL CALCULATED.3IONS-SCNC: 10.8 MMOL/L (ref 5–15)
AST SERPL-CCNC: 18 U/L (ref 1–32)
BASOPHILS # BLD AUTO: 0.04 10*3/MM3 (ref 0–0.2)
BASOPHILS NFR BLD AUTO: 0.3 % (ref 0–1.5)
BILIRUB SERPL-MCNC: 0.3 MG/DL (ref 0–1.2)
BILIRUB UR QL STRIP: NEGATIVE
BUN SERPL-MCNC: 25 MG/DL (ref 8–23)
BUN/CREAT SERPL: 29.8 (ref 7–25)
CALCIUM SPEC-SCNC: 9.1 MG/DL (ref 8.6–10.5)
CHLORIDE SERPL-SCNC: 104 MMOL/L (ref 98–107)
CLARITY UR: CLEAR
CO2 SERPL-SCNC: 23.2 MMOL/L (ref 22–29)
COLOR UR: YELLOW
CREAT SERPL-MCNC: 0.84 MG/DL (ref 0.57–1)
D-LACTATE SERPL-SCNC: 1.9 MMOL/L (ref 0.5–2)
DEPRECATED RDW RBC AUTO: 42.5 FL (ref 37–54)
EGFRCR SERPLBLD CKD-EPI 2021: 72.6 ML/MIN/1.73
EOSINOPHIL # BLD AUTO: 0.02 10*3/MM3 (ref 0–0.4)
EOSINOPHIL NFR BLD AUTO: 0.1 % (ref 0.3–6.2)
ERYTHROCYTE [DISTWIDTH] IN BLOOD BY AUTOMATED COUNT: 12.6 % (ref 12.3–15.4)
GLOBULIN UR ELPH-MCNC: 2.8 GM/DL
GLUCOSE SERPL-MCNC: 158 MG/DL (ref 65–99)
GLUCOSE UR STRIP-MCNC: NEGATIVE MG/DL
HCT VFR BLD AUTO: 38.8 % (ref 34–46.6)
HGB BLD-MCNC: 13.1 G/DL (ref 12–15.9)
HGB UR QL STRIP.AUTO: NEGATIVE
HOLD SPECIMEN: NORMAL
HOLD SPECIMEN: NORMAL
IMM GRANULOCYTES # BLD AUTO: 0.06 10*3/MM3 (ref 0–0.05)
IMM GRANULOCYTES NFR BLD AUTO: 0.4 % (ref 0–0.5)
KETONES UR QL STRIP: NEGATIVE
LEUKOCYTE ESTERASE UR QL STRIP.AUTO: NEGATIVE
LIPASE SERPL-CCNC: 31 U/L (ref 13–60)
LYMPHOCYTES # BLD AUTO: 1.52 10*3/MM3 (ref 0.7–3.1)
LYMPHOCYTES NFR BLD AUTO: 10.8 % (ref 19.6–45.3)
MCH RBC QN AUTO: 31 PG (ref 26.6–33)
MCHC RBC AUTO-ENTMCNC: 33.8 G/DL (ref 31.5–35.7)
MCV RBC AUTO: 91.9 FL (ref 79–97)
MONOCYTES # BLD AUTO: 0.9 10*3/MM3 (ref 0.1–0.9)
MONOCYTES NFR BLD AUTO: 6.4 % (ref 5–12)
NEUTROPHILS NFR BLD AUTO: 11.55 10*3/MM3 (ref 1.7–7)
NEUTROPHILS NFR BLD AUTO: 82 % (ref 42.7–76)
NITRITE UR QL STRIP: NEGATIVE
NRBC BLD AUTO-RTO: 0 /100 WBC (ref 0–0.2)
PH UR STRIP.AUTO: <=5 [PH] (ref 5–8)
PLATELET # BLD AUTO: 237 10*3/MM3 (ref 140–450)
PMV BLD AUTO: 9.4 FL (ref 6–12)
POTASSIUM SERPL-SCNC: 4.4 MMOL/L (ref 3.5–5.2)
PROT SERPL-MCNC: 6.7 G/DL (ref 6–8.5)
PROT UR QL STRIP: NEGATIVE
RBC # BLD AUTO: 4.22 10*6/MM3 (ref 3.77–5.28)
SODIUM SERPL-SCNC: 138 MMOL/L (ref 136–145)
SP GR UR STRIP: 1.01 (ref 1–1.03)
UROBILINOGEN UR QL STRIP: NORMAL
WBC NRBC COR # BLD: 14.09 10*3/MM3 (ref 3.4–10.8)
WHOLE BLOOD HOLD COAG: NORMAL
WHOLE BLOOD HOLD SPECIMEN: NORMAL

## 2022-12-09 PROCEDURE — 96375 TX/PRO/DX INJ NEW DRUG ADDON: CPT

## 2022-12-09 PROCEDURE — 85025 COMPLETE CBC W/AUTO DIFF WBC: CPT | Performed by: EMERGENCY MEDICINE

## 2022-12-09 PROCEDURE — 83690 ASSAY OF LIPASE: CPT | Performed by: EMERGENCY MEDICINE

## 2022-12-09 PROCEDURE — 83605 ASSAY OF LACTIC ACID: CPT | Performed by: EMERGENCY MEDICINE

## 2022-12-09 PROCEDURE — 74176 CT ABD & PELVIS W/O CONTRAST: CPT

## 2022-12-09 PROCEDURE — 80053 COMPREHEN METABOLIC PANEL: CPT | Performed by: EMERGENCY MEDICINE

## 2022-12-09 PROCEDURE — 36415 COLL VENOUS BLD VENIPUNCTURE: CPT

## 2022-12-09 PROCEDURE — 25010000002 ONDANSETRON PER 1 MG: Performed by: EMERGENCY MEDICINE

## 2022-12-09 PROCEDURE — 81003 URINALYSIS AUTO W/O SCOPE: CPT | Performed by: EMERGENCY MEDICINE

## 2022-12-09 PROCEDURE — 96374 THER/PROPH/DIAG INJ IV PUSH: CPT

## 2022-12-09 PROCEDURE — 99284 EMERGENCY DEPT VISIT MOD MDM: CPT

## 2022-12-09 PROCEDURE — 25010000002 MORPHINE PER 10 MG: Performed by: EMERGENCY MEDICINE

## 2022-12-09 RX ORDER — TAMSULOSIN HYDROCHLORIDE 0.4 MG/1
1 CAPSULE ORAL DAILY
Qty: 30 CAPSULE | Refills: 0 | Status: SHIPPED | OUTPATIENT
Start: 2022-12-09 | End: 2023-03-14

## 2022-12-09 RX ORDER — ONDANSETRON 2 MG/ML
4 INJECTION INTRAMUSCULAR; INTRAVENOUS ONCE
Status: COMPLETED | OUTPATIENT
Start: 2022-12-09 | End: 2022-12-09

## 2022-12-09 RX ORDER — SODIUM CHLORIDE 0.9 % (FLUSH) 0.9 %
10 SYRINGE (ML) INJECTION AS NEEDED
Status: DISCONTINUED | OUTPATIENT
Start: 2022-12-09 | End: 2022-12-09 | Stop reason: HOSPADM

## 2022-12-09 RX ORDER — OXYCODONE HYDROCHLORIDE AND ACETAMINOPHEN 5; 325 MG/1; MG/1
1 TABLET ORAL ONCE
Status: COMPLETED | OUTPATIENT
Start: 2022-12-09 | End: 2022-12-09

## 2022-12-09 RX ORDER — ONDANSETRON 4 MG/1
4 TABLET, ORALLY DISINTEGRATING ORAL EVERY 6 HOURS PRN
Qty: 10 TABLET | Refills: 0 | Status: SHIPPED | OUTPATIENT
Start: 2022-12-09

## 2022-12-09 RX ORDER — OXYCODONE HYDROCHLORIDE AND ACETAMINOPHEN 5; 325 MG/1; MG/1
1 TABLET ORAL EVERY 6 HOURS PRN
Qty: 14 TABLET | Refills: 0 | Status: SHIPPED | OUTPATIENT
Start: 2022-12-09 | End: 2023-01-19

## 2022-12-09 RX ORDER — TAMSULOSIN HYDROCHLORIDE 0.4 MG/1
0.4 CAPSULE ORAL ONCE
Status: COMPLETED | OUTPATIENT
Start: 2022-12-09 | End: 2022-12-09

## 2022-12-09 RX ADMIN — OXYCODONE HYDROCHLORIDE AND ACETAMINOPHEN 1 TABLET: 5; 325 TABLET ORAL at 15:26

## 2022-12-09 RX ADMIN — SODIUM CHLORIDE 1000 ML: 9 INJECTION, SOLUTION INTRAVENOUS at 12:28

## 2022-12-09 RX ADMIN — ONDANSETRON 4 MG: 2 INJECTION INTRAMUSCULAR; INTRAVENOUS at 12:29

## 2022-12-09 RX ADMIN — MORPHINE SULFATE 4 MG: 4 INJECTION, SOLUTION INTRAMUSCULAR; INTRAVENOUS at 12:29

## 2022-12-09 RX ADMIN — TAMSULOSIN HYDROCHLORIDE 0.4 MG: 0.4 CAPSULE ORAL at 15:26

## 2022-12-09 NOTE — ED PROVIDER NOTES
Subjective   History of Present Illness  77-year-old female presenting with flank pain.  She states this morning she started having severe right-sided flank pain, radiates to her right lower quadrant.  Associated with some urinary difficulty.  No fevers, chills, nausea, vomiting.  She was recently diagnosed with kidney stones but none were obstructing at that time.        Review of Systems   Constitutional: Negative.    HENT: Negative.    Eyes: Negative.    Respiratory: Negative.    Cardiovascular: Negative.    Gastrointestinal: Positive for abdominal pain. Negative for nausea and vomiting.   Genitourinary: Positive for difficulty urinating and flank pain.   Skin: Negative.    Neurological: Negative.    Psychiatric/Behavioral: Negative.        Past Medical History:   Diagnosis Date   • Arthritis    • Breast cancer (HCC)     RIGHT BREAST STAGE 3   • Diabetes mellitus (HCC)    • Elevated cholesterol    • History of cancer chemotherapy    • Hypertension    • Lichen sclerosus    • Shingles    • Thickened endometrium 2018       No Known Allergies    Past Surgical History:   Procedure Laterality Date   • CERVICAL CONE BIOPSY     • CHOLECYSTECTOMY  03/2010   • COLONOSCOPY N/A 7/6/2018    Procedure: COLONOSCOPY;  Surgeon: Trenton Lyons MD;  Location: [facility];  Service: Gastroenterology   • DILATATION AND CURETTAGE     • ENDOSCOPY N/A 7/6/2018    Procedure: ESOPHAGOGASTRODUODENOSCOPY;  Surgeon: Trenton Lyons MD;  Location: [facility];  Service: Gastroenterology   • MASTECTOMY Right 10/1998   • MASTECTOMY Left 07/2013       Family History   Problem Relation Age of Onset   • Breast cancer Mother    • Diabetes Mother    • Cancer Mother    • Ovarian cancer Maternal Aunt    • Cancer Maternal Aunt        Social History     Socioeconomic History   • Marital status:    Tobacco Use   • Smoking status: Never   • Smokeless tobacco: Never   Vaping Use   • Vaping Use: Never used   Substance and Sexual Activity   • Alcohol  use: No   • Drug use: No   • Sexual activity: Not Currently           Objective   Physical Exam  Constitutional:       General: She is not in acute distress.     Appearance: She is not toxic-appearing or diaphoretic.      Comments: Uncomfortable appearing   HENT:      Head: Normocephalic and atraumatic.      Right Ear: External ear normal.      Left Ear: External ear normal.      Nose: Nose normal.   Eyes:      Extraocular Movements: Extraocular movements intact.   Cardiovascular:      Rate and Rhythm: Normal rate and regular rhythm.      Pulses: Normal pulses.      Heart sounds: Normal heart sounds.   Pulmonary:      Effort: Pulmonary effort is normal. No respiratory distress.      Breath sounds: Normal breath sounds.   Abdominal:      General: Bowel sounds are normal. There is no distension.      Tenderness: There is no abdominal tenderness.   Musculoskeletal:         General: No swelling, tenderness or deformity. Normal range of motion.      Cervical back: Normal range of motion.   Skin:     General: Skin is warm and dry.      Capillary Refill: Capillary refill takes less than 2 seconds.      Findings: No rash.   Neurological:      General: No focal deficit present.      Mental Status: She is alert and oriented to person, place, and time.   Psychiatric:         Mood and Affect: Mood normal.         Behavior: Behavior normal.         Procedures           ED Course                                           MDM  Number of Diagnoses or Management Options  Kidney stone  Diagnosis management comments: 77-year-old female with flank pain and abdominal pain.  Well-developed, well-nourished elderly lady who is in no distress but obviously uncomfortable.  Her exam is relatively benign.  Suspect kidney stone.  Will obtain labs, urinalysis and CT scan.  Will give symptomatic treatment.  Disposition pending.    DDx: UTI, kidney stone, musculoskeletal pain    Lab work is nonactionable.  Urinalysis is bland.  CT scan reveals a 5  mm UVJ stone with hydronephrosis.  Her pain is well controlled.  I do feel she is safe for discharge home at this time.  Advised close outpatient follow-up and return precautions.  She is happy with this plan.      Final diagnoses:   Kidney stone          Zheng Salazar MD  12/09/22 [id number]

## 2022-12-09 NOTE — TELEPHONE ENCOUNTER
Provider: DR MALDONADO  Caller: JULIETA PHOENIX  Relationship to Patient: GRANDDAUGHTER  Reason for Call: SAME DAY CANCEL - PT IN HOSPITAL

## 2022-12-09 NOTE — TELEPHONE ENCOUNTER
Patient called and states shes in severe pain on her right side on her back, sweating, and having trouble urinating. I advised patient that it sounded like a kidney stone and if it began to obstruct that could be causing her to having trouble urinating. I advised her to come here to the ER if she was able. Pt v/u.

## 2022-12-12 ENCOUNTER — OFFICE VISIT (OUTPATIENT)
Dept: UROLOGY | Facility: CLINIC | Age: 77
End: 2022-12-12

## 2022-12-12 VITALS
OXYGEN SATURATION: 96 % | DIASTOLIC BLOOD PRESSURE: 64 MMHG | HEART RATE: 67 BPM | WEIGHT: 120 LBS | SYSTOLIC BLOOD PRESSURE: 124 MMHG | BODY MASS INDEX: 21.26 KG/M2

## 2022-12-12 DIAGNOSIS — N20.0 KIDNEY STONE: Primary | ICD-10-CM

## 2022-12-12 DIAGNOSIS — R30.0 DYSURIA: ICD-10-CM

## 2022-12-12 LAB
BILIRUB BLD-MCNC: NEGATIVE MG/DL
CLARITY, POC: CLEAR
COLOR UR: YELLOW
EXPIRATION DATE: ABNORMAL
GLUCOSE UR STRIP-MCNC: NEGATIVE MG/DL
KETONES UR QL: NEGATIVE
LEUKOCYTE EST, POC: NEGATIVE
Lab: ABNORMAL
NITRITE UR-MCNC: NEGATIVE MG/ML
PH UR: 6 [PH] (ref 5–8)
PROT UR STRIP-MCNC: NEGATIVE MG/DL
RBC # UR STRIP: ABNORMAL /UL
SP GR UR: 1.02 (ref 1–1.03)
UROBILINOGEN UR QL: NORMAL

## 2022-12-12 PROCEDURE — 81003 URINALYSIS AUTO W/O SCOPE: CPT | Performed by: PHYSICIAN ASSISTANT

## 2022-12-12 PROCEDURE — 99214 OFFICE O/P EST MOD 30 MIN: CPT | Performed by: PHYSICIAN ASSISTANT

## 2022-12-12 RX ORDER — OMEPRAZOLE 20 MG/1
20 CAPSULE, DELAYED RELEASE ORAL DAILY
COMMUNITY
Start: 2022-11-21

## 2022-12-12 NOTE — PROGRESS NOTES
Chief Complaint   Patient presents with   • Flank Pain     Kidney stone with hydronephrosis        HPI  Ms. Craft is a 77 y.o. female with history of breast cancer s/p BL mastectomy, recurrent UTI, nephrolithiasis who presents for follow up.     At this visit, having right flank pain, urinary frequency, urinary urge on 12/09.  She went to [facility] emergency department and was diagnosed with a 5 mm right ureteral stone approximately at the UVJ.  Her pain has been manageable since she left the emergency department.  She is currently taking Flomax.      Past Medical History:   Diagnosis Date   • Arthritis    • Breast cancer (HCC)     RIGHT BREAST STAGE 3   • Diabetes mellitus (HCC)    • Elevated cholesterol    • History of cancer chemotherapy    • Hypertension    • Lichen sclerosus    • Shingles    • Thickened endometrium 2018       Past Surgical History:   Procedure Laterality Date   • CERVICAL CONE BIOPSY     • CHOLECYSTECTOMY  03/2010   • COLONOSCOPY N/A 7/6/2018    Procedure: COLONOSCOPY;  Surgeon: Trenton Lyons MD;  Location: [facility];  Service: Gastroenterology   • DILATATION AND CURETTAGE     • ENDOSCOPY N/A 7/6/2018    Procedure: ESOPHAGOGASTRODUODENOSCOPY;  Surgeon: Trenton Lyons MD;  Location: [facility];  Service: Gastroenterology   • MASTECTOMY Right 10/1998   • MASTECTOMY Left 07/2013         Current Outpatient Medications:   •  albuterol sulfate  (90 Base) MCG/ACT inhaler, TWO PUFFS EVERY SIX HOURS AS NEEDED, Disp: , Rfl:   •  azelastine (ASTELIN) 0.1 % nasal spray, INHALE ONE SPRAY IN EACH NOSTRIL TWICE DAILY, Disp: , Rfl:   •  brimonidine-timolol (COMBIGAN) 0.2-0.5 % ophthalmic solution, Administer 1 drop to both eyes Every 12 (Twelve) Hours., Disp: , Rfl:   •  calcium carbonate-cholecalciferol 500-400 MG-UNIT tablet tablet, Take  by mouth Daily., Disp: , Rfl:   •  clobetasol (TEMOVATE) 0.05 % ointment, Apply to affected area BID x 2 wks then daily x 2 wks then 2-3x/wk, Disp:  60 g, Rfl: 6  •  ezetimibe (ZETIA) 10 MG tablet, Take 10 mg by mouth Daily., Disp: , Rfl:   •  fluconazole (Diflucan) 150 MG tablet, 1 po now and repeat in 3 d., Disp: 2 tablet, Rfl: 0  •  ibuprofen (ADVIL,MOTRIN) 800 MG tablet, Take 800 mg by mouth 3 (Three) Times a Day As Needed., Disp: , Rfl:   •  ipratropium (ATROVENT) 0.03 % nasal spray, INHALE ONE SPRAY IN EACH NOSTRIL TWICE DAILY, Disp: , Rfl:   •  metoprolol succinate XL (TOPROL-XL) 50 MG 24 hr tablet, Take 50 mg by mouth Daily., Disp: , Rfl:   •  montelukast (SINGULAIR) 10 MG tablet, Take 10 mg by mouth Every Evening., Disp: , Rfl:   •  Omega-3 Fatty Acids (fish oil) 1000 MG capsule capsule, Take  by mouth Daily With Breakfast., Disp: , Rfl:   •  ondansetron ODT (ZOFRAN-ODT) 4 MG disintegrating tablet, Place 1 tablet under the tongue Every 6 (Six) Hours As Needed for Vomiting or Nausea., Disp: 10 tablet, Rfl: 0  •  oxyCODONE-acetaminophen (PERCOCET) 5-325 MG per tablet, Take 1 tablet by mouth Every 6 (Six) Hours As Needed for Severe Pain., Disp: 14 tablet, Rfl: 0  •  pantoprazole (PROTONIX) 40 MG EC tablet, Take 40 mg by mouth Daily., Disp: , Rfl:   •  Rhopressa 0.02 % solution, Administer 1 drop to both eyes Every Night., Disp: , Rfl:   •  tamsulosin (FLOMAX) 0.4 MG capsule 24 hr capsule, Take 1 capsule by mouth Daily., Disp: 30 capsule, Rfl: 0  •  travoprost, BAK free, (TRAVATAN) 0.004 % solution ophthalmic solution, 1 drop Every Evening. in affected eye(s), Disp: , Rfl:   •  Vibegron (Gemtesa) 75 MG tablet, Take 1 tablet by mouth Daily., Disp: 30 tablet, Rfl: 11  •  CINNAMON PO, Take  by mouth., Disp: , Rfl:   •  omeprazole (priLOSEC) 20 MG capsule, Take 20 mg by mouth Daily., Disp: , Rfl:      Physical Exam  Visit Vitals  /64   Pulse 67   Wt 54.4 kg (120 lb)   SpO2 96%   BMI 21.26 kg/m²       Labs  Brief Urine Lab Results  (Last result in the past 365 days)      Color   Clarity   Blood   Leuk Est   Nitrite   Protein   CREAT   Urine HCG         12/12/22 1407 Yellow   Clear   3+   Negative   Negative   Negative                 Lab Results   Component Value Date    GLUCOSE 158 (H) 12/09/2022    CALCIUM 9.1 12/09/2022     12/09/2022    K 4.4 12/09/2022    CO2 23.2 12/09/2022     12/09/2022    BUN 25 (H) 12/09/2022    CREATININE 0.84 12/09/2022    EGFRIFAFRI >60 05/18/2022    EGFRIFNONA >60 05/18/2022    BCR 29.8 (H) 12/09/2022    ANIONGAP 10.8 12/09/2022       Lab Results   Component Value Date    WBC 14.09 (H) 12/09/2022    HGB 13.1 12/09/2022    HCT 38.8 12/09/2022    MCV 91.9 12/09/2022     12/09/2022       Urine Culture    Urine Culture 2/18/22 10/31/22 11/28/22   Urine Culture Final report (A) Final report Final report (A)   (A) Abnormal value               Radiographic Studies  CT Abdomen Pelvis Without Contrast  Result Date: 12/9/2022  1. 5 mm right UVJ stone causing moderate right hydronephrosis. 2. Bilateral nephrolithiasis, right greater than left.  This study was performed with techniques to keep radiation doses as low as reasonably achievable (ALARA). Individualized dose reduction techniques using automated exposure control or adjustment of vA and/or kV according to the patient size were employed.  This report was signed and finalized on 12/9/2022 2:20 PM by Isaiah Abbott MD.    CT Abdomen Pelvis Stone Protocol  Result Date: 11/9/2022   1. Multiple stones are seen within both renal collecting systems, more numerous and larger on the right. 2. No evidence of obstruction. 3. No definite evidence of acute inflammatory process.    This report was signed and finalized on 11/9/2022 9:07 AM by Leonardo Boogie MD.      I have reviewed the above labs and imaging.     Assessment  77 y.o. female with history of breast cancer s/p BL mastectomy, recurrent UTI, nephrolithiasis who presents following a symptomatic episode of renal colic with a 5 mm right UVJ stone.  She continues to prefer trial of passage and her pain is currently managed.  She  has cataract surgery coming up within the month so I have advised that she stop Flomax.    We discussed strict indications for return to the emergency department for surgery including signs of infection such as fever, chills, pain with urination.  Also, intractable pain, nausea and vomiting.  She states understanding.    Plan  1. STOP flomax; has right cataract extraction with Dr. Arthur in [place] on January 10th  2.  Trial of passage, reevaluate with renal ultrasound in 6 weeks

## 2022-12-20 ENCOUNTER — TELEPHONE (OUTPATIENT)
Dept: UROLOGY | Facility: CLINIC | Age: 77
End: 2022-12-20

## 2022-12-20 DIAGNOSIS — N39.0 RECURRENT UTI: Primary | ICD-10-CM

## 2022-12-20 RX ORDER — NITROFURANTOIN 25; 75 MG/1; MG/1
100 CAPSULE ORAL 2 TIMES DAILY
Qty: 14 CAPSULE | Refills: 0 | Status: SHIPPED | OUTPATIENT
Start: 2022-12-20 | End: 2023-01-19

## 2022-12-20 NOTE — TELEPHONE ENCOUNTER
Caller: VANESSA AHUJA     Relationship: SELF     Best call back number: [phone]    What is the best time to reach you: ANYTIME     INCOMING CALL FROM PT. PT SAID SHE IS HAVING PAINFUL URINATION. PT BELIEVES SHE HAS A UTI. PT SAID SHE WOULD PREFER NOT TO COME IN FOR AN APPT BECAUSE OF THE FLU GOING AROUND. INQUIRING ABOUT MEDICATION BEING CALLED IN FOR HER.

## 2022-12-20 NOTE — TELEPHONE ENCOUNTER
Attempted to reach the patient to discuss her symptoms. She is currently passing a 5mm kidney stone, so I need to speak with her about symptoms before starting an antibiotic.

## 2022-12-20 NOTE — PROGRESS NOTES
Patient is not sure that she has passed a kidney stone yet.  She is having chills and dysuria, similar to previous UTI.  Based on her previous culture, we will treat her with Macrobid and reassess for stone passage.

## 2022-12-27 ENCOUNTER — OFFICE VISIT (OUTPATIENT)
Dept: OBSTETRICS AND GYNECOLOGY | Facility: CLINIC | Age: 77
End: 2022-12-27

## 2022-12-27 VITALS — HEIGHT: 65 IN | WEIGHT: 122 LBS | BODY MASS INDEX: 20.33 KG/M2

## 2022-12-27 DIAGNOSIS — N20.0 NEPHROLITHIASIS: ICD-10-CM

## 2022-12-27 DIAGNOSIS — L90.0 LICHEN SCLEROSUS ET ATROPHICUS: ICD-10-CM

## 2022-12-27 DIAGNOSIS — Z01.411 ENCOUNTER FOR GYNECOLOGICAL EXAMINATION WITH ABNORMAL FINDING: Primary | ICD-10-CM

## 2022-12-27 DIAGNOSIS — N95.2 POSTMENOPAUSAL ATROPHIC VAGINITIS: ICD-10-CM

## 2022-12-27 DIAGNOSIS — N39.46 MIXED STRESS AND URGE URINARY INCONTINENCE: ICD-10-CM

## 2022-12-27 DIAGNOSIS — Z12.39 ENCOUNTER FOR BREAST CANCER SCREENING OTHER THAN MAMMOGRAM: ICD-10-CM

## 2022-12-27 DIAGNOSIS — Z85.3 PERSONAL HISTORY OF BREAST CANCER: ICD-10-CM

## 2022-12-27 PROCEDURE — 99214 OFFICE O/P EST MOD 30 MIN: CPT | Performed by: OBSTETRICS & GYNECOLOGY

## 2022-12-27 PROCEDURE — 99397 PER PM REEVAL EST PAT 65+ YR: CPT | Performed by: OBSTETRICS & GYNECOLOGY

## 2022-12-27 PROCEDURE — 3015F CERV CANCER SCREEN DOCD: CPT | Performed by: OBSTETRICS & GYNECOLOGY

## 2022-12-27 RX ORDER — CLOBETASOL PROPIONATE 0.5 MG/G
OINTMENT TOPICAL
Qty: 60 G | Refills: 6 | Status: SHIPPED | OUTPATIENT
Start: 2022-12-27

## 2022-12-27 NOTE — PROGRESS NOTES
Chief Complaint  Gynecologic Exam     History of Present Illness:  Patient is 77 y.o.  who presents to [facility] here for annual examination.  Patient had her last Pap smear in 2019.  Patient does have complaints of vulvar itching and irritation.  Patient has previously been diagnosed with lichen sclerosis.  She reports she has been out of her clobetasol ointment now for over 1 month.  She reports having worsening of her symptoms.  Patient had been using her ointment 2-3 times per week.  Patient also reports being recently diagnosed with kidney stones.  Patient was seen in the emergency room.  She has had 2 CT scans.  Her last one did show moderate hydronephrosis.  She is being followed by urology.  She reports she is to have surgery but first has to have her cataract surgery which is scheduled for next month.  Patient does have cough today.  She reports having urinary incontinence.  Patient has both stress urinary incontinence as well as urgency and urge incontinence.  Patient continues to see Dr. Brady for her primary care.  Patient has had previous bilateral mastectomy secondary to her breast cancer.  Patient is not on any estrogen cream.  She denies any vaginal bleeding or spotting.    History  Past Medical History:   Diagnosis Date   • Arthritis    • Breast cancer (HCC)     RIGHT BREAST STAGE 3   • Diabetes mellitus (HCC)    • Elevated cholesterol    • History of cancer chemotherapy    • Hypertension    • Lichen sclerosus    • Shingles    • Thickened endometrium      Current Outpatient Medications on File Prior to Visit   Medication Sig Dispense Refill   • albuterol sulfate  (90 Base) MCG/ACT inhaler TWO PUFFS EVERY SIX HOURS AS NEEDED     • azelastine (ASTELIN) 0.1 % nasal spray INHALE ONE SPRAY IN EACH NOSTRIL TWICE DAILY     • brimonidine-timolol (COMBIGAN) 0.2-0.5 % ophthalmic solution Administer 1 drop to both eyes Every 12 (Twelve) Hours.     • calcium  carbonate-cholecalciferol 500-400 MG-UNIT tablet tablet Take  by mouth Daily.     • CINNAMON PO Take  by mouth.     • ezetimibe (ZETIA) 10 MG tablet Take 10 mg by mouth Daily.     • fluconazole (Diflucan) 150 MG tablet 1 po now and repeat in 3 d. 2 tablet 0   • ibuprofen (ADVIL,MOTRIN) 800 MG tablet Take 800 mg by mouth 3 (Three) Times a Day As Needed.     • ipratropium (ATROVENT) 0.03 % nasal spray INHALE ONE SPRAY IN EACH NOSTRIL TWICE DAILY     • metoprolol succinate XL (TOPROL-XL) 50 MG 24 hr tablet Take 50 mg by mouth Daily.     • montelukast (SINGULAIR) 10 MG tablet Take 10 mg by mouth Every Evening.     • nitrofurantoin, macrocrystal-monohydrate, (Macrobid) 100 MG capsule Take 1 capsule by mouth 2 (Two) Times a Day. 14 capsule 0   • Omega-3 Fatty Acids (fish oil) 1000 MG capsule capsule Take  by mouth Daily With Breakfast.     • omeprazole (priLOSEC) 20 MG capsule Take 20 mg by mouth Daily.     • ondansetron ODT (ZOFRAN-ODT) 4 MG disintegrating tablet Place 1 tablet under the tongue Every 6 (Six) Hours As Needed for Vomiting or Nausea. 10 tablet 0   • oxyCODONE-acetaminophen (PERCOCET) 5-325 MG per tablet Take 1 tablet by mouth Every 6 (Six) Hours As Needed for Severe Pain. 14 tablet 0   • pantoprazole (PROTONIX) 40 MG EC tablet Take 40 mg by mouth Daily.     • Rhopressa 0.02 % solution Administer 1 drop to both eyes Every Night.     • tamsulosin (FLOMAX) 0.4 MG capsule 24 hr capsule Take 1 capsule by mouth Daily. 30 capsule 0   • travoprost, BAK free, (TRAVATAN) 0.004 % solution ophthalmic solution 1 drop Every Evening. in affected eye(s)     • Vibegron (Gemtesa) 75 MG tablet Take 1 tablet by mouth Daily. 30 tablet 11   • [DISCONTINUED] clobetasol (TEMOVATE) 0.05 % ointment Apply to affected area BID x 2 wks then daily x 2 wks then 2-3x/wk 60 g 6     No current facility-administered medications on file prior to visit.     No Known Allergies  Past Surgical History:   Procedure Laterality Date   • CERVICAL  "CONE BIOPSY     • CHOLECYSTECTOMY  03/2010   • COLONOSCOPY N/A 7/6/2018    Procedure: COLONOSCOPY;  Surgeon: Trenton Lyons MD;  Location: [facility];  Service: Gastroenterology   • DILATATION AND CURETTAGE     • ENDOSCOPY N/A 7/6/2018    Procedure: ESOPHAGOGASTRODUODENOSCOPY;  Surgeon: Trenton yLons MD;  Location: [facility];  Service: Gastroenterology   • MASTECTOMY Right 10/1998   • MASTECTOMY Left 07/2013     Family History   Problem Relation Age of Onset   • Breast cancer Mother    • Diabetes Mother    • Cancer Mother    • Ovarian cancer Maternal Aunt    • Cancer Maternal Aunt      Social History     Socioeconomic History   • Marital status:    Tobacco Use   • Smoking status: Never   • Smokeless tobacco: Never   Vaping Use   • Vaping Use: Never used   Substance and Sexual Activity   • Alcohol use: No   • Drug use: No   • Sexual activity: Not Currently       Physical Examination:  Vital Signs: Ht 165.1 cm (65\")   Wt 55.3 kg (122 lb)   BMI 20.30 kg/m²     General Appearance: alert, appears stated age, and cooperative  Breasts: Examined in supine position  Bilateral mastectomy scars noted  There are no palpable axillary nodes  Abdomen: no masses, no hepatomegaly, no splenomegaly, soft non-tender, no guarding, and no rebound tenderness  Pelvic: Clinical staff was present for exam  External genitalia: Positive parchment of the vulva in an hourglass fashion  :  urethral meatus normal;  Vaginal: Marked vaginal atrophy noted  Cervix: Grossly normal  Uterus:  Normal size, shape, position  Adnexa:  non palpable bilaterally.  Pap smear done and specimen sent using Thin-Prep technique    Data Review:  The following data was reviewed by: Antonietta Sauceda MD on 12/27/2022:     Labs:  Urinalysis With Microscopic If Indicated (No Culture) - Urine, Clean Catch (12/09/2022 11:44)  CBC & Differential (12/09/2022 12:13)  Comprehensive Metabolic Panel (12/09/2022 12:54)  Lipase (12/09/2022 12:54)  Lactic Acid, Plasma " (12/09/2022 12:54)  POC Urinalysis Dipstick, Automated (12/12/2022 14:07)    Imaging:  CT Abdomen Pelvis Stone Protocol (11/08/2022 15:46)  CT Abdomen Pelvis Without Contrast (12/09/2022 13:03)    Medical Records:  None    Assessment and Plan   1. Encounter for gynecological examination with abnormal finding  I explained to Aster that Pap smears are no longer recommended in patients after 65 years of age who have had adequate negative screening with three consecutive negative pap smears within the previous 10 years and the most recent test performed within the past 5 years. Women who have a history of TRACI 2, TRACI 3, or ACIS should continue routine screening for a total of 20 years after regression or treatment.   I stressed to Aster that she still should be seen yearly for a full physical including breast and pelvic exam.  I informed her that women aged 65 and older still do get cervical cancer representing 14.1% of the [place] female population and 19.6% of new cases of cervical cancer.  I also informed the patient regarding medicare guidelines on coverage for pap smear screening.  For this reason, Aster has elected pap smear screening this year.  - LIQUID-BASED PAP SMEAR, [facility] ([facility])    2. Encounter for breast cancer screening other than mammogram  Patient's status post bilateral mastectomies.  No palpable masses noted.  Patient is to follow-up with her oncologist.    3. Lichen sclerosus et atrophicus  Patient with worsening of her lichen sclerosis.  Patient has been off of her ointment for the last month.  Prescription is given as noted.  Patient is to start back at twice daily and taper down as discussed.  - clobetasol (TEMOVATE) 0.05 % ointment; Apply to affected area BID x 2 wks then daily x 2 wks then 2-3x/wk  Dispense: 60 g; Refill: 6    4. Personal history of breast cancer  Patient with known history of breast cancer.  She is status post bilateral mastectomies.    5. Postmenopausal atrophic  vaginitis  Discussed various options for relief of atrophic vaginal symptoms related to menopause. Discussed local therapy for treatment of vaginal symptoms only.  Discussed the different formulation options including cream, ring, and tablets.  Discussed the low risk of systemic absorption in postmenopausal women with atrophy using 25 mcgs of estradiol on a daily basis.  Recommend low dose use 2-3x/wk for maintenance of treatment.  Other treatment options were discussed including the use of water-based and silicone-based vaginal lubricants and moisturizers.  Also discussed was the FDA approved treatment option of ospemifene for moderate to severe dyspareunia.    6. Nephrolithiasis  Patient with nephrolithiasis and hydronephrosis is noted.  She is to keep her follow-up appointment with urology as discussed.  Patient is to call or go to the emergency room if worsening and/or changes in her symptoms as discussed.    7. Mixed stress and urge urinary incontinence  Aster FRANCISCO Craft was informed that urinary incontinence is the involuntary leaking of urine caused by a wide variety of factors.  It is a common condition in women that increases with age.  The patient was informed that there are different types of urinary incontinence to include stress urinary incontinence, urgency urinary incontinence, and mixed urinary incontinence as well as other subtypes.  The patient was informed that the correct diagnosis is important in the evaluation and treatment of her leaking.  The basic evaluation includes patient's history and physical examination.  A urinary tract infection needs to be ruled out as well as urinary retention and overflow incontinence.  Sometimes additional specialized testing needs to be performed such as urodynamic testing and cystoscopy.  The various treatment options were discussed ranging from conservative  treatment to include pelvic floor exercises and modifications in lifestyle, pessary, pharmacologic, to  surgical.  Locally administered estrogen may be of some benefit in women with vaginal atrophy. Because treatment options vary by incontinence type and effectiveness, it is important to determine the etiology and severity of symptoms.    Follow Up/Instructions:  Follow up as noted.  Patient was given instructions and counseling regarding her condition or for health maintenance advice. Please see specific information pulled into the AVS if appropriate.     Note: Speech recognition transcription software may have been used to dictate portions of this document.  An attempt at proofreading has been made though minor errors in transcription may still be present.    This note was electronically signed.  Antonietta Sauceda M.D.

## 2023-01-03 LAB — REF LAB TEST METHOD: NORMAL

## 2023-01-19 ENCOUNTER — OFFICE VISIT (OUTPATIENT)
Dept: UROLOGY | Facility: CLINIC | Age: 78
End: 2023-01-19
Payer: MEDICARE

## 2023-01-19 VITALS
SYSTOLIC BLOOD PRESSURE: 140 MMHG | DIASTOLIC BLOOD PRESSURE: 70 MMHG | WEIGHT: 120 LBS | RESPIRATION RATE: 12 BRPM | TEMPERATURE: 97.3 F | BODY MASS INDEX: 19.97 KG/M2 | HEART RATE: 87 BPM | OXYGEN SATURATION: 97 %

## 2023-01-19 DIAGNOSIS — R30.0 DYSURIA: ICD-10-CM

## 2023-01-19 DIAGNOSIS — N20.0 KIDNEY STONE: ICD-10-CM

## 2023-01-19 DIAGNOSIS — N39.0 RECURRENT UTI: Primary | ICD-10-CM

## 2023-01-19 LAB
BILIRUB BLD-MCNC: NEGATIVE MG/DL
CLARITY, POC: ABNORMAL
COLOR UR: YELLOW
EXPIRATION DATE: ABNORMAL
GLUCOSE UR STRIP-MCNC: ABNORMAL MG/DL
KETONES UR QL: NEGATIVE
LEUKOCYTE EST, POC: NEGATIVE
Lab: ABNORMAL
NITRITE UR-MCNC: NEGATIVE MG/ML
PH UR: 7 [PH] (ref 5–8)
PROT UR STRIP-MCNC: NEGATIVE MG/DL
RBC # UR STRIP: NEGATIVE /UL
SP GR UR: 1.01 (ref 1–1.03)
UROBILINOGEN UR QL: NORMAL

## 2023-01-19 PROCEDURE — 99213 OFFICE O/P EST LOW 20 MIN: CPT | Performed by: PHYSICIAN ASSISTANT

## 2023-01-19 PROCEDURE — 81003 URINALYSIS AUTO W/O SCOPE: CPT | Performed by: PHYSICIAN ASSISTANT

## 2023-01-19 RX ORDER — KETOROLAC TROMETHAMINE 5 MG/ML
SOLUTION OPHTHALMIC
COMMUNITY
Start: 2023-01-09 | End: 2023-03-14

## 2023-01-19 RX ORDER — DORZOLAMIDE HCL 20 MG/ML
SOLUTION/ DROPS OPHTHALMIC
COMMUNITY
Start: 2023-01-10

## 2023-01-19 RX ORDER — PREDNISOLONE ACETATE 10 MG/ML
SUSPENSION/ DROPS OPHTHALMIC
COMMUNITY
Start: 2023-01-09 | End: 2023-03-14

## 2023-01-19 RX ORDER — OFLOXACIN 3 MG/ML
SOLUTION/ DROPS OPHTHALMIC
COMMUNITY
Start: 2023-01-09 | End: 2023-03-14

## 2023-01-19 NOTE — PROGRESS NOTES
Chief Complaint   Patient presents with   • recurrent uti   • Follow-up     6 week follow up appointment    • history kidney stone        HPI  Ms. Craft is a 77 y.o. female with history of breast cancer s/p BL mastectomy, recurrent UTI, nephrolithiasis who presents for follow up.     At this visit, continues to have no flank pain, but has not seen her kidney stone pass. She had her first of 2 cataract surgeries 9 days ago and her vision is still recovering. She denies any dysuria today. Continues to be happy with Gemtesa, admits to mild AWA with coughing spells.    Past Medical History:   Diagnosis Date   • Arthritis    • Breast cancer (HCC)     RIGHT BREAST STAGE 3   • Diabetes mellitus (HCC)    • Elevated cholesterol    • History of cancer chemotherapy    • Hypertension    • Kidney stone    • Lichen sclerosus    • Shingles    • Thickened endometrium 2018   • Urinary incontinence    • Urinary tract infection        Past Surgical History:   Procedure Laterality Date   • CATARACT EXTRACTION Right 01/10/2023   • CERVICAL CONE BIOPSY     • CHOLECYSTECTOMY  03/2010   • COLONOSCOPY N/A 07/06/2018    Procedure: COLONOSCOPY;  Surgeon: Trenton Lyons MD;  Location: [facility];  Service: Gastroenterology   • DILATATION AND CURETTAGE     • ENDOSCOPY N/A 07/06/2018    Procedure: ESOPHAGOGASTRODUODENOSCOPY;  Surgeon: Trenton Lyons MD;  Location: [facility] OR;  Service: Gastroenterology   • MASTECTOMY Right 10/1998   • MASTECTOMY Left 07/2013   • TUBAL ABDOMINAL LIGATION           Current Outpatient Medications:   •  albuterol sulfate  (90 Base) MCG/ACT inhaler, TWO PUFFS EVERY SIX HOURS AS NEEDED, Disp: , Rfl:   •  azelastine (ASTELIN) 0.1 % nasal spray, INHALE ONE SPRAY IN EACH NOSTRIL TWICE DAILY, Disp: , Rfl:   •  brimonidine-timolol (COMBIGAN) 0.2-0.5 % ophthalmic solution, Administer 1 drop to both eyes Every 12 (Twelve) Hours., Disp: , Rfl:   •  calcium carbonate-cholecalciferol 500-400 MG-UNIT tablet tablet, Take   by mouth Daily., Disp: , Rfl:   •  CINNAMON PO, Take  by mouth., Disp: , Rfl:   •  clobetasol (TEMOVATE) 0.05 % ointment, Apply to affected area BID x 2 wks then daily x 2 wks then 2-3x/wk, Disp: 60 g, Rfl: 6  •  dorzolamide (TRUSOPT) 2 % ophthalmic solution, INSTILL ONE DROP IN EACH EYE TWICE DAILY, Disp: , Rfl:   •  ezetimibe (ZETIA) 10 MG tablet, Take 10 mg by mouth Daily., Disp: , Rfl:   •  ibuprofen (ADVIL,MOTRIN) 800 MG tablet, Take 800 mg by mouth 3 (Three) Times a Day As Needed., Disp: , Rfl:   •  ipratropium (ATROVENT) 0.03 % nasal spray, INHALE ONE SPRAY IN EACH NOSTRIL TWICE DAILY, Disp: , Rfl:   •  ketorolac (ACULAR) 0.5 % ophthalmic solution, INSTILL ONE DROP IN THE AFFECTED EYE TWICE A DAY THE DAY BEFORE SURGERY, THEN ONE DROP TWICE A DAY FOR 14 DAYS, Disp: , Rfl:   •  metoprolol succinate XL (TOPROL-XL) 50 MG 24 hr tablet, Take 50 mg by mouth Daily., Disp: , Rfl:   •  montelukast (SINGULAIR) 10 MG tablet, Take 10 mg by mouth Every Evening., Disp: , Rfl:   •  ofloxacin (OCUFLOX) 0.3 % ophthalmic solution, ONE DROP IN THE AFFECTED EYE STARTING AT NOON THE DAY BEFORE SURGERY, THEN EVERY TWO HOURS TILL BED, ONE DROP AFTER SURGERY AT BED, THEN ONE DROP FOUR TIMES A DAY FOR SEVEN DAYS, Disp: , Rfl:   •  Omega-3 Fatty Acids (fish oil) 1000 MG capsule capsule, Take  by mouth Daily With Breakfast., Disp: , Rfl:   •  omeprazole (priLOSEC) 20 MG capsule, Take 20 mg by mouth Daily., Disp: , Rfl:   •  ondansetron ODT (ZOFRAN-ODT) 4 MG disintegrating tablet, Place 1 tablet under the tongue Every 6 (Six) Hours As Needed for Vomiting or Nausea., Disp: 10 tablet, Rfl: 0  •  pantoprazole (PROTONIX) 40 MG EC tablet, Take 40 mg by mouth Daily., Disp: , Rfl:   •  prednisoLONE acetate (PRED FORTE) 1 % ophthalmic suspension, INSTILL ONE DROP IN THE AFFECTED EYE EVERY TWO HOURS TIL BED AFTER SURGERY, THEN ONE DROP FOUR TIMES A DAY FOR 14 DAYS, THEN TWICE A DAY FOR 14 DAYS, Disp: , Rfl:   •  Rhopressa 0.02 % solution,  Administer 1 drop to both eyes Every Night., Disp: , Rfl:   •  tamsulosin (FLOMAX) 0.4 MG capsule 24 hr capsule, Take 1 capsule by mouth Daily., Disp: 30 capsule, Rfl: 0  •  travoprost, BAK free, (TRAVATAN) 0.004 % solution ophthalmic solution, 1 drop Every Evening. in affected eye(s), Disp: , Rfl:   •  Vibegron (Gemtesa) 75 MG tablet, Take 1 tablet by mouth Daily., Disp: 30 tablet, Rfl: 11  •  fluconazole (Diflucan) 150 MG tablet, 1 po now and repeat in 3 d., Disp: 2 tablet, Rfl: 0  •  nitrofurantoin, macrocrystal-monohydrate, (Macrobid) 100 MG capsule, Take 1 capsule by mouth 2 (Two) Times a Day., Disp: 14 capsule, Rfl: 0  •  oxyCODONE-acetaminophen (PERCOCET) 5-325 MG per tablet, Take 1 tablet by mouth Every 6 (Six) Hours As Needed for Severe Pain., Disp: 14 tablet, Rfl: 0     Physical Exam  Visit Vitals  /70 (BP Location: Left leg, Patient Position: Sitting, Cuff Size: Adult)   Pulse 87   Temp 97.3 °F (36.3 °C) (Temporal)   Resp 12   Wt 54.4 kg (120 lb)   SpO2 97%   BMI 19.97 kg/m²       Labs  Brief Urine Lab Results  (Last result in the past 365 days)      Color   Clarity   Blood   Leuk Est   Nitrite   Protein   CREAT   Urine HCG        01/19/23 1314 Yellow   Slightly Cloudy   Negative   Negative   Negative   Negative                 Lab Results   Component Value Date    GLUCOSE 158 (H) 12/09/2022    CALCIUM 9.1 12/09/2022     12/09/2022    K 4.4 12/09/2022    CO2 23.2 12/09/2022     12/09/2022    BUN 25 (H) 12/09/2022    CREATININE 0.84 12/09/2022    EGFRIFAFRI >60 05/18/2022    EGFRIFNONA >60 05/18/2022    BCR 29.8 (H) 12/09/2022    ANIONGAP 10.8 12/09/2022       Lab Results   Component Value Date    WBC 14.09 (H) 12/09/2022    HGB 13.1 12/09/2022    HCT 38.8 12/09/2022    MCV 91.9 12/09/2022     12/09/2022       Urine Culture    Urine Culture 2/18/22 10/31/22 11/28/22   Urine Culture Final report (A) Final report Final report (A)   (A) Abnormal value               Radiographic  Studies  CT Abdomen Pelvis Without Contrast  Result Date: 12/9/2022  1. 5 mm right UVJ stone causing moderate right hydronephrosis. 2. Bilateral nephrolithiasis, right greater than left.  This study was performed with techniques to keep radiation doses as low as reasonably achievable (ALARA). Individualized dose reduction techniques using automated exposure control or adjustment of vA and/or kV according to the patient size were employed.  This report was signed and finalized on 12/9/2022 2:20 PM by Isaiah Abbott MD.    CT Abdomen Pelvis Stone Protocol  Result Date: 11/9/2022   1. Multiple stones are seen within both renal collecting systems, more numerous and larger on the right. 2. No evidence of obstruction. 3. No definite evidence of acute inflammatory process.    This report was signed and finalized on 11/9/2022 9:07 AM by Leonardo Boogie MD.      I have reviewed the above labs and imaging.     Assessment  77 y.o. female with history of breast cancer s/p BL mastectomy, recurrent UTI, nephrolithiasis who presents for follow up.     She has no complaints or concerns today, but is still uncertain if she passed her kidney stone.  He has been pain-free for several weeks and her urine today reveals no blood.  I suspect she may have passed the stone but will reevaluate with definitive imaging after her second cataract surgery coming up in the next few weeks.      Plan  1. FU in 8 weeks with CT stone prior  2. Continue Gemtesa daily

## 2023-02-02 ENCOUNTER — OFFICE VISIT (OUTPATIENT)
Dept: OBSTETRICS AND GYNECOLOGY | Facility: CLINIC | Age: 78
End: 2023-02-02
Payer: MEDICARE

## 2023-02-02 VITALS — BODY MASS INDEX: 20.66 KG/M2 | WEIGHT: 124 LBS | HEIGHT: 65 IN

## 2023-02-02 DIAGNOSIS — R87.615 PAP SMEAR OF CERVIX UNSATISFACTORY: Primary | ICD-10-CM

## 2023-02-02 DIAGNOSIS — L90.0 LICHEN SCLEROSUS ET ATROPHICUS: ICD-10-CM

## 2023-02-02 DIAGNOSIS — N95.2 POSTMENOPAUSAL ATROPHIC VAGINITIS: ICD-10-CM

## 2023-02-02 DIAGNOSIS — Z85.3 PERSONAL HISTORY OF BREAST CANCER: ICD-10-CM

## 2023-02-02 PROCEDURE — 99214 OFFICE O/P EST MOD 30 MIN: CPT | Performed by: OBSTETRICS & GYNECOLOGY

## 2023-02-02 RX ORDER — ESTRADIOL 0.1 MG/G
CREAM VAGINAL
Qty: 1 EACH | Refills: 11 | Status: SHIPPED | OUTPATIENT
Start: 2023-02-02

## 2023-02-03 NOTE — PROGRESS NOTES
Chief Complaint  Follow-up (Repeat pap smear, non diagnostic specimen on 2022)     History of Present Illness:  Patient is 77 y.o.  who presents to [facility] here for follow-up evaluation of her nondiagnostic Pap smear.  Patient does report having vaginal dryness and irritation.  She has not been on any topical estrogen cream.  Patient has had bilateral mastectomies.  Patient does report she continues to have intermittent episodes of vulvar itching and irritation.  She has been using her clobetasol ointment.    History  Past Medical History:   Diagnosis Date   • Arthritis    • Breast cancer (HCC)     RIGHT BREAST STAGE 3   • Diabetes mellitus (HCC)    • Elevated cholesterol    • History of cancer chemotherapy    • Hypertension    • Kidney stone    • Lichen sclerosus    • Shingles    • Thickened endometrium 2018   • Urinary incontinence    • Urinary tract infection      Current Outpatient Medications on File Prior to Visit   Medication Sig Dispense Refill   • albuterol sulfate  (90 Base) MCG/ACT inhaler TWO PUFFS EVERY SIX HOURS AS NEEDED     • azelastine (ASTELIN) 0.1 % nasal spray INHALE ONE SPRAY IN EACH NOSTRIL TWICE DAILY     • brimonidine-timolol (COMBIGAN) 0.2-0.5 % ophthalmic solution Administer 1 drop to both eyes Every 12 (Twelve) Hours.     • calcium carbonate-cholecalciferol 500-400 MG-UNIT tablet tablet Take  by mouth Daily.     • CINNAMON PO Take  by mouth.     • clobetasol (TEMOVATE) 0.05 % ointment Apply to affected area BID x 2 wks then daily x 2 wks then 2-3x/wk 60 g 6   • dorzolamide (TRUSOPT) 2 % ophthalmic solution INSTILL ONE DROP IN EACH EYE TWICE DAILY     • ezetimibe (ZETIA) 10 MG tablet Take 10 mg by mouth Daily.     • ibuprofen (ADVIL,MOTRIN) 800 MG tablet Take 800 mg by mouth 3 (Three) Times a Day As Needed.     • ipratropium (ATROVENT) 0.03 % nasal spray INHALE ONE SPRAY IN EACH NOSTRIL TWICE DAILY     • ketorolac (ACULAR) 0.5 % ophthalmic  solution INSTILL ONE DROP IN THE AFFECTED EYE TWICE A DAY THE DAY BEFORE SURGERY, THEN ONE DROP TWICE A DAY FOR 14 DAYS     • metoprolol succinate XL (TOPROL-XL) 50 MG 24 hr tablet Take 50 mg by mouth Daily.     • montelukast (SINGULAIR) 10 MG tablet Take 10 mg by mouth Every Evening.     • ofloxacin (OCUFLOX) 0.3 % ophthalmic solution ONE DROP IN THE AFFECTED EYE STARTING AT NOON THE DAY BEFORE SURGERY, THEN EVERY TWO HOURS TILL BED, ONE DROP AFTER SURGERY AT BED, THEN ONE DROP FOUR TIMES A DAY FOR SEVEN DAYS     • Omega-3 Fatty Acids (fish oil) 1000 MG capsule capsule Take  by mouth Daily With Breakfast.     • omeprazole (priLOSEC) 20 MG capsule Take 20 mg by mouth Daily.     • ondansetron ODT (ZOFRAN-ODT) 4 MG disintegrating tablet Place 1 tablet under the tongue Every 6 (Six) Hours As Needed for Vomiting or Nausea. 10 tablet 0   • pantoprazole (PROTONIX) 40 MG EC tablet Take 40 mg by mouth Daily.     • prednisoLONE acetate (PRED FORTE) 1 % ophthalmic suspension INSTILL ONE DROP IN THE AFFECTED EYE EVERY TWO HOURS TIL BED AFTER SURGERY, THEN ONE DROP FOUR TIMES A DAY FOR 14 DAYS, THEN TWICE A DAY FOR 14 DAYS     • Rhopressa 0.02 % solution Administer 1 drop to both eyes Every Night.     • tamsulosin (FLOMAX) 0.4 MG capsule 24 hr capsule Take 1 capsule by mouth Daily. 30 capsule 0   • travoprost, BAK free, (TRAVATAN) 0.004 % solution ophthalmic solution 1 drop Every Evening. in affected eye(s)     • Vibegron (Gemtesa) 75 MG tablet Take 1 tablet by mouth Daily. 30 tablet 11     No current facility-administered medications on file prior to visit.     No Known Allergies  Past Surgical History:   Procedure Laterality Date   • CATARACT EXTRACTION Right 01/10/2023   • CERVICAL CONE BIOPSY     • CHOLECYSTECTOMY  03/2010   • COLONOSCOPY N/A 07/06/2018    Procedure: COLONOSCOPY;  Surgeon: Trenton Lyons MD;  Location: [facility];  Service: Gastroenterology   • DILATATION AND CURETTAGE     • ENDOSCOPY N/A 07/06/2018     "Procedure: ESOPHAGOGASTRODUODENOSCOPY;  Surgeon: Trenton Lyons MD;  Location: [facility];  Service: Gastroenterology   • MASTECTOMY Right 10/1998   • MASTECTOMY Left 07/2013   • TUBAL ABDOMINAL LIGATION       Family History   Problem Relation Age of Onset   • Breast cancer Mother    • Diabetes Mother    • Cancer Mother    • Ovarian cancer Maternal Aunt    • Cancer Maternal Aunt      Social History     Socioeconomic History   • Marital status:    Tobacco Use   • Smoking status: Never   • Smokeless tobacco: Never   Vaping Use   • Vaping Use: Never used   Substance and Sexual Activity   • Alcohol use: No   • Drug use: No   • Sexual activity: Not Currently       Physical Examination:  Vital Signs: Ht 165.1 cm (65\")   Wt 56.2 kg (124 lb)   BMI 20.63 kg/m²     General Appearance: alert, appears stated age, and cooperative  Breasts: Not performed  Abdomen: no masses, no hepatomegaly, no splenomegaly, soft non-tender, no guarding, and no rebound tenderness  Pelvic: Clinical staff was present for exam  External genitalia:  normal appearance of the external genitalia including Bartholin's and [place]'s glands.  :  urethral meatus normal;  Vaginal:  atrophic mucosal changes are present;   Cervix: Grossly normal in appearance  Uterus: Normal size and no palpable masses, nl shape  Adnexa:  non palpable bilaterally.    Data Review:  The following data was reviewed by: Antonietta Sauceda MD on 02/02/2023:     Labs:  LIQUID-BASED PAP SMEAR, [facility] ([facility]) (12/27/2022 14:15)    Imaging:    Medical Records:  None    Assessment and Plan   1. Pap smear of cervix unsatisfactory  Repeat Pap smear obtained.  Plan pending results.  - LIQUID-BASED PAP SMEAR, [facility] ([facility])    2. Lichen sclerosus et atrophicus  I have discussed with the patient the continued use of her maintenance dose of her clobetasol ointment.  Instructions and precautions are given.  Patient is to follow-up as discussed.    3. Personal history of " breast cancer  Patient is status post bilateral mastectomies.    4. Postmenopausal atrophic vaginitis  Prescription is given for estrogen cream.  Patient is instructed to use sparingly as discussed.  Instructions and precautions are given.  Patient is to follow-up as noted.  - estradiol (ESTRACE VAGINAL) 0.1 MG/GM vaginal cream; Use 0.5 grams intravaginally twice weekly to control symptoms.  Dispense: 1 each; Refill: 11    Follow Up/Instructions:  Follow up as noted.  Patient was given instructions and counseling regarding her condition or for health maintenance advice. Please see specific information pulled into the AVS if appropriate.     Note: Speech recognition transcription software may have been used to dictate portions of this document.  An attempt at proofreading has been made though minor errors in transcription may still be present.    This note was electronically signed.  Antonietta Sauceda M.D.

## 2023-02-06 LAB — REF LAB TEST METHOD: NORMAL

## 2023-02-28 ENCOUNTER — HOSPITAL ENCOUNTER (OUTPATIENT)
Dept: CT IMAGING | Facility: HOSPITAL | Age: 78
Discharge: HOME | End: 2023-02-28
Admitting: PHYSICIAN ASSISTANT
Payer: MEDICARE

## 2023-02-28 DIAGNOSIS — N20.0 KIDNEY STONE: ICD-10-CM

## 2023-02-28 PROCEDURE — 74176 CT ABD & PELVIS W/O CONTRAST: CPT

## 2023-03-14 ENCOUNTER — OFFICE VISIT (OUTPATIENT)
Dept: UROLOGY | Facility: CLINIC | Age: 78
End: 2023-03-14
Payer: MEDICARE

## 2023-03-14 VITALS
HEIGHT: 65 IN | TEMPERATURE: 97.5 F | BODY MASS INDEX: 20.64 KG/M2 | WEIGHT: 123.9 LBS | DIASTOLIC BLOOD PRESSURE: 50 MMHG | HEART RATE: 86 BPM | OXYGEN SATURATION: 97 % | SYSTOLIC BLOOD PRESSURE: 138 MMHG

## 2023-03-14 DIAGNOSIS — N39.0 RECURRENT UTI: Primary | ICD-10-CM

## 2023-03-14 LAB
BILIRUB BLD-MCNC: NEGATIVE MG/DL
CLARITY, POC: CLEAR
COLOR UR: YELLOW
EXPIRATION DATE: ABNORMAL
GLUCOSE UR STRIP-MCNC: ABNORMAL MG/DL
KETONES UR QL: NEGATIVE
LEUKOCYTE EST, POC: NEGATIVE
Lab: ABNORMAL
NITRITE UR-MCNC: NEGATIVE MG/ML
PH UR: 5.5 [PH] (ref 5–8)
PROT UR STRIP-MCNC: ABNORMAL MG/DL
RBC # UR STRIP: ABNORMAL /UL
SP GR UR: 1.03 (ref 1–1.03)
UROBILINOGEN UR QL: NORMAL

## 2023-03-14 PROCEDURE — 81003 URINALYSIS AUTO W/O SCOPE: CPT | Performed by: PHYSICIAN ASSISTANT

## 2023-03-14 PROCEDURE — 99213 OFFICE O/P EST LOW 20 MIN: CPT | Performed by: PHYSICIAN ASSISTANT

## 2023-03-14 PROCEDURE — 1160F RVW MEDS BY RX/DR IN RCRD: CPT | Performed by: PHYSICIAN ASSISTANT

## 2023-03-14 PROCEDURE — 1159F MED LIST DOCD IN RCRD: CPT | Performed by: PHYSICIAN ASSISTANT

## 2023-03-14 RX ORDER — LANCETS 33 GAUGE
EACH MISCELLANEOUS
COMMUNITY
Start: 2023-02-02

## 2023-03-14 RX ORDER — IPRATROPIUM BROMIDE 42 UG/1
SPRAY, METERED NASAL
COMMUNITY
Start: 2023-02-21

## 2023-03-14 RX ORDER — DEXAMETHASONE 4 MG/1
2 TABLET ORAL 2 TIMES DAILY
COMMUNITY
Start: 2023-02-21

## 2023-03-14 RX ORDER — BLOOD SUGAR DIAGNOSTIC
STRIP MISCELLANEOUS DAILY
COMMUNITY
Start: 2023-02-02

## 2023-03-14 RX ORDER — BLOOD-GLUCOSE METER
EACH MISCELLANEOUS SEE ADMIN INSTRUCTIONS
COMMUNITY
Start: 2023-02-02

## 2023-03-14 NOTE — PROGRESS NOTES
Chief Complaint   Patient presents with   • Urinary Tract Infection     8 week follow up with ct scan        HPI  Ms. Craft is a 77 y.o. female with history of nephrolithiasis, Bladimir who presents for follow up.     At this visit, has been pain free from a kidney stone for weeks. Leakage is much improved on Gemtesa. Has also changed her drinking habits, getting away from decaf drinks.    Past Medical History:   Diagnosis Date   • Arthritis    • Breast cancer (HCC)     RIGHT BREAST STAGE 3   • Diabetes mellitus (HCC)    • Elevated cholesterol    • History of cancer chemotherapy    • Hypertension    • Kidney stone    • Lichen sclerosus    • Shingles    • Thickened endometrium 2018   • Urinary incontinence    • Urinary tract infection        Past Surgical History:   Procedure Laterality Date   • CATARACT EXTRACTION Right 01/10/2023   • CERVICAL CONE BIOPSY     • CHOLECYSTECTOMY  03/2010   • COLONOSCOPY N/A 07/06/2018    Procedure: COLONOSCOPY;  Surgeon: Trenton Lyons MD;  Location: [facility];  Service: Gastroenterology   • DILATATION AND CURETTAGE     • ENDOSCOPY N/A 07/06/2018    Procedure: ESOPHAGOGASTRODUODENOSCOPY;  Surgeon: Trenton Lyons MD;  Location: [facility];  Service: Gastroenterology   • MASTECTOMY Right 10/1998   • MASTECTOMY Left 07/2013   • TUBAL ABDOMINAL LIGATION           Current Outpatient Medications:   •  albuterol sulfate  (90 Base) MCG/ACT inhaler, TWO PUFFS EVERY SIX HOURS AS NEEDED, Disp: , Rfl:   •  azelastine (ASTELIN) 0.1 % nasal spray, INHALE ONE SPRAY IN EACH NOSTRIL TWICE DAILY, Disp: , Rfl:   •  Blood Glucose Monitoring Suppl (OneTouch Verio Flex System) w/Device kit, See Admin Instructions. for testing, Disp: , Rfl:   •  brimonidine-timolol (COMBIGAN) 0.2-0.5 % ophthalmic solution, Administer 1 drop to both eyes Every 12 (Twelve) Hours., Disp: , Rfl:   •  calcium carbonate-cholecalciferol 500-400 MG-UNIT tablet tablet, Take  by mouth Daily., Disp: , Rfl:   •  CINNAMON PO, Take   by mouth., Disp: , Rfl:   •  clobetasol (TEMOVATE) 0.05 % ointment, Apply to affected area BID x 2 wks then daily x 2 wks then 2-3x/wk, Disp: 60 g, Rfl: 6  •  dorzolamide (TRUSOPT) 2 % ophthalmic solution, INSTILL ONE DROP IN EACH EYE TWICE DAILY, Disp: , Rfl:   •  ezetimibe (ZETIA) 10 MG tablet, Take 1 tablet by mouth Daily., Disp: , Rfl:   •  Flovent  MCG/ACT inhaler, Inhale 2 puffs 2 (Two) Times a Day., Disp: , Rfl:   •  ipratropium (ATROVENT) 0.03 % nasal spray, INHALE ONE SPRAY IN EACH NOSTRIL TWICE DAILY, Disp: , Rfl:   •  ipratropium (ATROVENT) 0.06 % nasal spray, INHALE 1-2 SPRAYS IN EACH NOSTRIL TWICE DAILY, Disp: , Rfl:   •  Lancets (OneTouch Delica Plus [id number]) misc, USE AS DIRECTED EVERY DAY, Disp: , Rfl:   •  metoprolol succinate XL (TOPROL-XL) 50 MG 24 hr tablet, Take 1 tablet by mouth Daily., Disp: , Rfl:   •  montelukast (SINGULAIR) 10 MG tablet, Take 1 tablet by mouth Every Evening., Disp: , Rfl:   •  Omega-3 Fatty Acids (fish oil) 1000 MG capsule capsule, Take  by mouth Daily With Breakfast., Disp: , Rfl:   •  omeprazole (priLOSEC) 20 MG capsule, Take 1 capsule by mouth Daily., Disp: , Rfl:   •  ondansetron ODT (ZOFRAN-ODT) 4 MG disintegrating tablet, Place 1 tablet under the tongue Every 6 (Six) Hours As Needed for Vomiting or Nausea., Disp: 10 tablet, Rfl: 0  •  OneTouch Verio test strip, Daily. for testing, Disp: , Rfl:   •  pantoprazole (PROTONIX) 40 MG EC tablet, Take 1 tablet by mouth Daily., Disp: , Rfl:   •  Rhopressa 0.02 % solution, Administer 1 drop to both eyes Every Night., Disp: , Rfl:   •  travoprost, BAK free, (TRAVATAN) 0.004 % solution ophthalmic solution, 1 drop Every Evening. in affected eye(s), Disp: , Rfl:   •  Vibegron (Gemtesa) 75 MG tablet, Take 1 tablet by mouth Daily., Disp: 30 tablet, Rfl: 11  •  estradiol (ESTRACE VAGINAL) 0.1 MG/GM vaginal cream, Use 0.5 grams intravaginally twice weekly to control symptoms., Disp: 1 each, Rfl: 11  •  ketorolac (ACULAR) 0.5  "% ophthalmic solution, INSTILL ONE DROP IN THE AFFECTED EYE TWICE A DAY THE DAY BEFORE SURGERY, THEN ONE DROP TWICE A DAY FOR 14 DAYS, Disp: , Rfl:   •  ofloxacin (OCUFLOX) 0.3 % ophthalmic solution, ONE DROP IN THE AFFECTED EYE STARTING AT NOON THE DAY BEFORE SURGERY, THEN EVERY TWO HOURS TILL BED, ONE DROP AFTER SURGERY AT BED, THEN ONE DROP FOUR TIMES A DAY FOR SEVEN DAYS, Disp: , Rfl:   •  prednisoLONE acetate (PRED FORTE) 1 % ophthalmic suspension, INSTILL ONE DROP IN THE AFFECTED EYE EVERY TWO HOURS TIL BED AFTER SURGERY, THEN ONE DROP FOUR TIMES A DAY FOR 14 DAYS, THEN TWICE A DAY FOR 14 DAYS, Disp: , Rfl:      Physical Exam  Visit Vitals  /50 (BP Location: Left leg, Patient Position: Sitting, Cuff Size: Adult)   Pulse 86   Temp 97.5 °F (36.4 °C) (Temporal)   Ht 165.1 cm (65\")   Wt 56.2 kg (123 lb 14.4 oz)   SpO2 97%   BMI 20.62 kg/m²       Labs  Brief Urine Lab Results  (Last result in the past 365 days)      Color   Clarity   Blood   Leuk Est   Nitrite   Protein   CREAT   Urine HCG        03/14/23 1336 Yellow   Clear   1+   Negative   Negative   Trace                 Lab Results   Component Value Date    GLUCOSE 158 (H) 12/09/2022    CALCIUM 9.1 12/09/2022     12/09/2022    K 4.4 12/09/2022    CO2 23.2 12/09/2022     12/09/2022    BUN 25 (H) 12/09/2022    CREATININE 0.84 12/09/2022    EGFRIFAFRI >60 05/18/2022    EGFRIFNONA >60 05/18/2022    BCR 29.8 (H) 12/09/2022    ANIONGAP 10.8 12/09/2022       Lab Results   Component Value Date    WBC 14.09 (H) 12/09/2022    HGB 13.1 12/09/2022    HCT 38.8 12/09/2022    MCV 91.9 12/09/2022     12/09/2022       Urine Culture    Urine Culture 10/31/22 11/28/22   Urine Culture Final report Final report (A)   (A) Abnormal value               Radiographic Studies  CT Abdomen Pelvis Without Contrast  Result Date: 12/9/2022  1. 5 mm right UVJ stone causing moderate right hydronephrosis. 2. Bilateral nephrolithiasis, right greater than left.  This " study was performed with techniques to keep radiation doses as low as reasonably achievable (ALARA). Individualized dose reduction techniques using automated exposure control or adjustment of vA and/or kV according to the patient size were employed.  This report was signed and finalized on 12/9/2022 2:20 PM by Isaiah Abbott MD.    CT Abdomen Pelvis Stone Protocol  Result Date: 2/28/2023  Bilateral nephrolithiasis without hydronephrosis.     317.97 mGy.cm 6.62 mGy  This study was performed with techniques to keep radiation doses as low as reasonably achievable (ALARA). Individualized dose reduction techniques using automated exposure control or adjustment of mA and/or kV according to the patient size were employed.     Images were reviewed, interpreted, and dictated by Dr. Iraida Kruse MD Transcribed by Maryuri Enrique PA-C.  This report was signed and finalized on 2/28/2023 2:17 PM by Iraida Kruse MD.      I have reviewed the above labs and imaging.     Assessment  77 y.o. female with hx of Bladimir and UUI.     Her symptoms are much improved since starting Gemtesa.  She underwent a trial of passage for a 5 mm right UVJ stone which has fortunately passed on repeat CT imaging.  She is aware that she has bilateral stone burden.  She denies any symptoms.  She was originally going to undergo staged cataract surgery, but has been referred to a retina specialist regarding her right eye.  She has therefore possible retina surgery upcoming, as well as still her left cataract surgery.    For now, she is pleased with her urinary symptoms and would like to continue focusing on her eye surgeries and eye health.  Her urine today is benign in the setting of no dysuria, minimal LUTS, and known bilateral stone burden.    Plan  1.  Continue Gemtesa  2.  Continue Estrace  3.  Follow-up in 3 months with repeat UA

## 2023-06-13 ENCOUNTER — OFFICE VISIT (OUTPATIENT)
Dept: UROLOGY | Facility: CLINIC | Age: 78
End: 2023-06-13
Payer: MEDICARE

## 2023-06-13 VITALS
WEIGHT: 126.2 LBS | HEART RATE: 75 BPM | HEIGHT: 65 IN | OXYGEN SATURATION: 98 % | BODY MASS INDEX: 21.02 KG/M2 | DIASTOLIC BLOOD PRESSURE: 62 MMHG | TEMPERATURE: 97.9 F | SYSTOLIC BLOOD PRESSURE: 128 MMHG

## 2023-06-13 DIAGNOSIS — N20.0 KIDNEY STONE: Primary | ICD-10-CM

## 2023-06-13 RX ORDER — SITAGLIPTIN 100 MG/1
1 TABLET, FILM COATED ORAL DAILY
COMMUNITY
Start: 2023-04-26

## 2023-06-13 NOTE — PROGRESS NOTES
Chief Complaint   Patient presents with    Urinary Tract Infection     Recurrent/ 3 mth fu with ua        HPI  Ms. Craft is a 78 y.o. female with history of nephrolithiasis, [place] who presents for follow up.     At this visit, has no urinary symptoms. She remains pleased with Gemtesa.     Past Medical History:   Diagnosis Date    Arthritis     Breast cancer     RIGHT BREAST STAGE 3    Diabetes mellitus     Elevated cholesterol     History of cancer chemotherapy     Hypertension     Kidney stone     Lichen sclerosus     Shingles     Thickened endometrium 2018    Urinary incontinence     Urinary tract infection        Past Surgical History:   Procedure Laterality Date    CATARACT EXTRACTION Right 01/10/2023    CERVICAL CONE BIOPSY      CHOLECYSTECTOMY  03/2010    COLONOSCOPY N/A 07/06/2018    Procedure: COLONOSCOPY;  Surgeon: Trenton Lyons MD;  Location: [facility];  Service: Gastroenterology    DILATATION AND CURETTAGE      ENDOSCOPY N/A 07/06/2018    Procedure: ESOPHAGOGASTRODUODENOSCOPY;  Surgeon: Trenton Lyons MD;  Location: [facility];  Service: Gastroenterology    MASTECTOMY Right 10/1998    MASTECTOMY Left 07/2013    TUBAL ABDOMINAL LIGATION           Current Outpatient Medications:     albuterol sulfate  (90 Base) MCG/ACT inhaler, TWO PUFFS EVERY SIX HOURS AS NEEDED, Disp: , Rfl:     azelastine (ASTELIN) 0.1 % nasal spray, INHALE ONE SPRAY IN EACH NOSTRIL TWICE DAILY, Disp: , Rfl:     Blood Glucose Monitoring Suppl (OneTouch Verio Flex System) w/Device kit, See Admin Instructions. for testing, Disp: , Rfl:     brimonidine-timolol (COMBIGAN) 0.2-0.5 % ophthalmic solution, Administer 1 drop to both eyes Every 12 (Twelve) Hours., Disp: , Rfl:     calcium carbonate-cholecalciferol 500-400 MG-UNIT tablet tablet, Take  by mouth Daily., Disp: , Rfl:     CINNAMON PO, Take  by mouth., Disp: , Rfl:     clobetasol (TEMOVATE) 0.05 % ointment, Apply to affected area BID x 2 wks then daily x 2 wks then 2-3x/wk,  "Disp: 60 g, Rfl: 6    dorzolamide (TRUSOPT) 2 % ophthalmic solution, INSTILL ONE DROP IN EACH EYE TWICE DAILY, Disp: , Rfl:     ezetimibe (ZETIA) 10 MG tablet, Take 1 tablet by mouth Daily., Disp: , Rfl:     Flovent  MCG/ACT inhaler, Inhale 2 puffs 2 (Two) Times a Day., Disp: , Rfl:     ipratropium (ATROVENT) 0.03 % nasal spray, INHALE ONE SPRAY IN EACH NOSTRIL TWICE DAILY, Disp: , Rfl:     ipratropium (ATROVENT) 0.06 % nasal spray, INHALE 1-2 SPRAYS IN EACH NOSTRIL TWICE DAILY, Disp: , Rfl:     Januvia 100 MG tablet, Take 1 tablet by mouth Daily., Disp: , Rfl:     Lancets (OneTouch Delica Plus [id number]) misc, USE AS DIRECTED EVERY DAY, Disp: , Rfl:     metoprolol succinate XL (TOPROL-XL) 50 MG 24 hr tablet, Take 1 tablet by mouth Daily., Disp: , Rfl:     montelukast (SINGULAIR) 10 MG tablet, Take 1 tablet by mouth Every Evening., Disp: , Rfl:     Omega-3 Fatty Acids (fish oil) 1000 MG capsule capsule, Take  by mouth Daily With Breakfast., Disp: , Rfl:     omeprazole (priLOSEC) 20 MG capsule, Take 1 capsule by mouth Daily., Disp: , Rfl:     ondansetron ODT (ZOFRAN-ODT) 4 MG disintegrating tablet, Place 1 tablet under the tongue Every 6 (Six) Hours As Needed for Vomiting or Nausea., Disp: 10 tablet, Rfl: 0    OneTouch Verio test strip, Daily. for testing, Disp: , Rfl:     pantoprazole (PROTONIX) 40 MG EC tablet, Take 1 tablet by mouth Daily., Disp: , Rfl:     Rhopressa 0.02 % solution, Administer 1 drop to both eyes Every Night., Disp: , Rfl:     travoprost, BAK free, (TRAVATAN) 0.004 % solution ophthalmic solution, 1 drop Every Evening. in affected eye(s), Disp: , Rfl:     Vibegron (Gemtesa) 75 MG tablet, Take 1 tablet by mouth Daily., Disp: 30 tablet, Rfl: 11     Physical Exam  Visit Vitals  /62   Pulse 75   Temp 97.9 °F (36.6 °C)   Ht 165.1 cm (65\")   Wt 57.2 kg (126 lb 3.2 oz)   SpO2 98%   BMI 21.00 kg/m²       Labs  Brief Urine Lab Results  (Last result in the past 365 days)        Color   Clarity  "  Blood   Leuk Est   Nitrite   Protein   CREAT   Urine HCG        03/14/23 1336 Yellow   Clear   1+   Negative   Negative   Trace                   Lab Results   Component Value Date    GLUCOSE 158 (H) 12/09/2022    CALCIUM 9.1 12/09/2022     12/09/2022    K 4.4 12/09/2022    CO2 23.2 12/09/2022     12/09/2022    BUN 25 (H) 12/09/2022    CREATININE 0.84 12/09/2022    EGFRIFAFRI >60 05/18/2022    EGFRIFNONA >60 05/18/2022    BCR 29.8 (H) 12/09/2022    ANIONGAP 10.8 12/09/2022       Lab Results   Component Value Date    WBC 14.09 (H) 12/09/2022    HGB 13.1 12/09/2022    HCT 38.8 12/09/2022    MCV 91.9 12/09/2022     12/09/2022       Urine Culture          10/31/2022    14:29 11/28/2022    12:56   Urine Culture   Urine Culture Final report  Final report         Radiographic Studies  CT Abdomen Pelvis Stone Protocol    Result Date: 2/28/2023  Bilateral nephrolithiasis without hydronephrosis.     317.97 mGy.cm 6.62 mGy  This study was performed with techniques to keep radiation doses as low as reasonably achievable (ALARA). Individualized dose reduction techniques using automated exposure control or adjustment of mA and/or kV according to the patient size were employed.     Images were reviewed, interpreted, and dictated by Dr. Iraida Kruse MD Transcribed by Maryuri Enrique PA-C.  This report was signed and finalized on 2/28/2023 2:17 PM by Iraida Kruse MD.      I have reviewed the above labs and imaging.     Assessment  78 y.o. female with history of breast cancer s/p BL mastectomy, recurrent UTI, nephrolithiasis.    Given her personal history of breast cancer and prominent family history with mother having breast cancer and aunt having ovarian cancer.  The patient does not want to take any even theoretical risks of increasing her risk of female cancers.  She does have intact ovary and uterus.  I support her in this decision but did inform her that so far studies have shown periurethral Estrace to be  quite safe with the benefit of prevention of UTI outweighing the risk of possible endometrial cancer.  She appreciates the information but understandably does not feel comfortable resuming therapy.    Her urge incontinence remains well controlled on Gemtesa.  We review her current stone burden which is known 7 mm right intrarenal stone with punctate stone on the left.  She has no current symptoms and her UA is without abnormalities.    Plan  1. Has stopped estrace due to history of breast cancer  2.  Follow-up in February to establish annual CT, evaluate for increase in stone burden  3.  Continue Gemtesa, patient acquire samples at our office

## 2024-01-10 ENCOUNTER — OFFICE VISIT (OUTPATIENT)
Dept: OBSTETRICS AND GYNECOLOGY | Facility: CLINIC | Age: 79
End: 2024-01-10
Payer: MEDICARE

## 2024-01-10 VITALS — WEIGHT: 130 LBS | BODY MASS INDEX: 21.66 KG/M2 | HEIGHT: 65 IN

## 2024-01-10 DIAGNOSIS — N95.2 POSTMENOPAUSAL ATROPHIC VAGINITIS: ICD-10-CM

## 2024-01-10 DIAGNOSIS — N89.8 VAGINAL ODOR: ICD-10-CM

## 2024-01-10 DIAGNOSIS — L90.0 LICHEN SCLEROSUS ET ATROPHICUS: ICD-10-CM

## 2024-01-10 DIAGNOSIS — R39.9 URINARY TRACT INFECTION SYMPTOMS: ICD-10-CM

## 2024-01-10 DIAGNOSIS — N76.0 ACUTE VAGINITIS: Primary | ICD-10-CM

## 2024-01-10 DIAGNOSIS — N94.9 VAGINAL BURNING: ICD-10-CM

## 2024-01-10 RX ORDER — CLOBETASOL PROPIONATE 0.5 MG/G
OINTMENT TOPICAL
Qty: 60 G | Refills: 6 | Status: SHIPPED | OUTPATIENT
Start: 2024-01-10 | End: 2024-01-11 | Stop reason: SDUPTHER

## 2024-01-10 RX ORDER — METRONIDAZOLE 500 MG/1
500 TABLET ORAL 2 TIMES DAILY
Qty: 14 TABLET | Refills: 0 | Status: SHIPPED | OUTPATIENT
Start: 2024-01-10 | End: 2024-01-17

## 2024-01-11 RX ORDER — CLOBETASOL PROPIONATE 0.5 MG/G
OINTMENT TOPICAL
Qty: 60 G | Refills: 6 | Status: SHIPPED | OUTPATIENT
Start: 2024-01-11

## 2024-01-11 NOTE — PROGRESS NOTES
Chief Complaint  Vaginal Itching (Patient complains of vaginal itching and burning. )     History of Present Illness:  Patient is 78 y.o.  who presents to [facility] here with complaints of intense vaginal itching as well as burning.  Patient continues to have itching and irritation externally as well.  She also reports having symptoms internally as well.  She has not been using her clobetasol ointment recently.  She does report receiving numerous antibiotics for strep infection.  She had been seen by her primary care provider as well as at [facility].  She does report having a vaginal odor as well.  She denies any vaginal bleeding or spotting.  She denies any fever or chills.  Patient does report having urinary frequency and urgency as well.    History  Past Medical History:   Diagnosis Date    Arthritis     Breast cancer     RIGHT BREAST STAGE 3    Diabetes mellitus     Elevated cholesterol     History of cancer chemotherapy     Hypertension     Kidney stone     Lichen sclerosus     Shingles     Thickened endometrium 2018    Urinary incontinence     Urinary tract infection      Current Outpatient Medications on File Prior to Visit   Medication Sig Dispense Refill    albuterol sulfate  (90 Base) MCG/ACT inhaler TWO PUFFS EVERY SIX HOURS AS NEEDED      azelastine (ASTELIN) 0.1 % nasal spray INHALE ONE SPRAY IN EACH NOSTRIL TWICE DAILY      Blood Glucose Monitoring Suppl (OneTouch Verio Flex System) w/Device kit See Admin Instructions. for testing      brimonidine-timolol (COMBIGAN) 0.2-0.5 % ophthalmic solution Administer 1 drop to both eyes Every 12 (Twelve) Hours.      calcium carbonate-cholecalciferol 500-400 MG-UNIT tablet tablet Take  by mouth Daily.      CINNAMON PO Take  by mouth.      dorzolamide (TRUSOPT) 2 % ophthalmic solution INSTILL ONE DROP IN EACH EYE TWICE DAILY      ezetimibe (ZETIA) 10 MG tablet Take 1 tablet by mouth Daily.      Flovent  MCG/ACT inhaler  Inhale 2 puffs 2 (Two) Times a Day.      ipratropium (ATROVENT) 0.03 % nasal spray INHALE ONE SPRAY IN EACH NOSTRIL TWICE DAILY      ipratropium (ATROVENT) 0.06 % nasal spray INHALE 1-2 SPRAYS IN EACH NOSTRIL TWICE DAILY      Januvia 100 MG tablet Take 1 tablet by mouth Daily.      Lancets (OneTouch Delica Plus [id number]) misc USE AS DIRECTED EVERY DAY      metoprolol succinate XL (TOPROL-XL) 50 MG 24 hr tablet Take 1 tablet by mouth Daily.      montelukast (SINGULAIR) 10 MG tablet Take 1 tablet by mouth Every Evening.      Omega-3 Fatty Acids (fish oil) 1000 MG capsule capsule Take  by mouth Daily With Breakfast.      omeprazole (priLOSEC) 20 MG capsule Take 1 capsule by mouth Daily.      ondansetron ODT (ZOFRAN-ODT) 4 MG disintegrating tablet Place 1 tablet under the tongue Every 6 (Six) Hours As Needed for Vomiting or Nausea. 10 tablet 0    OneTouch Verio test strip Daily. for testing      pantoprazole (PROTONIX) 40 MG EC tablet Take 1 tablet by mouth Daily.      Rhopressa 0.02 % solution Administer 1 drop to both eyes Every Night.      travoprost, BAK free, (TRAVATAN) 0.004 % solution ophthalmic solution 1 drop Every Evening. in affected eye(s)      Vibegron (Gemtesa) 75 MG tablet Take 1 tablet by mouth Daily. 30 tablet 11     No current facility-administered medications on file prior to visit.     No Known Allergies  Past Surgical History:   Procedure Laterality Date    CATARACT EXTRACTION Right 01/10/2023    CERVICAL CONE BIOPSY      CHOLECYSTECTOMY  03/2010    COLONOSCOPY N/A 07/06/2018    Procedure: COLONOSCOPY;  Surgeon: Trenton Lyons MD;  Location: [facility];  Service: Gastroenterology    DILATATION AND CURETTAGE      ENDOSCOPY N/A 07/06/2018    Procedure: ESOPHAGOGASTRODUODENOSCOPY;  Surgeon: Trenton Lyons MD;  Location: [facility];  Service: Gastroenterology    MASTECTOMY Right 10/1998    MASTECTOMY Left 07/2013    TUBAL ABDOMINAL LIGATION       Family History   Problem Relation Age of Onset    Breast  "cancer Mother     Diabetes Mother     Cancer Mother     Ovarian cancer Maternal Aunt     Cancer Maternal Aunt      Social History     Socioeconomic History    Marital status:    Tobacco Use    Smoking status: Never    Smokeless tobacco: Never   Vaping Use    Vaping Use: Never used   Substance and Sexual Activity    Alcohol use: No    Drug use: No    Sexual activity: Not Currently       Physical Examination:  Vital Signs: Ht 165.1 cm (65\")   Wt 59 kg (130 lb)   BMI 21.63 kg/m²     General Appearance: alert, appears stated age, and cooperative  Breasts: Not performed  Abdomen: no masses, no hepatomegaly, no splenomegaly, soft non-tender, no guarding, and no rebound tenderness  Pelvic: Clinical staff was present for exam  External genitalia: Positive mild parchment of the vulva anteriorly  :  urethral meatus normal;  Vaginal: Marked atrophic changes noted with narrowing of the vaginal introitus.  Thin white discharge noted.  Positive odor.  Cultures obtained.  Cervix:  normal appearance.  Uterus:  normal size, shape and consistency.  Adnexa:  normal bimanual exam of the adnexa.    Data Review:  The following data was reviewed by: Antonietta Sauceda MD on 01/10/2024:     Labs:  T4, FREE (03/13/2023 16:22)  TSH (03/13/2023 16:22)  POC Urinalysis Dipstick, Automated (03/14/2023 13:36)  POC Urinalysis Dipstick, Automated (06/13/2023 13:54)  Imaging:    Medical Records:  None    Assessment and Plan   1. Lichen sclerosus et atrophicus  Prescription is given for clobetasol as noted.  Have stressed to the patient the need for maintenance therapy.  Instructions and precautions have been given.  Patient is to follow-up as discussed.  - clobetasol (TEMOVATE) 0.05 % ointment; Apply to affected area BID x 2 wks then daily x 2 wks then 2-3x/wk  Dispense: 60 g; Refill: 6    2. Urinary tract infection symptoms  Will send urine for clean-catch UA culture and sensitivity.  Patient is to call for results.  - Urine Culture - Urine, " Urine, Clean Catch  - Urinalysis With Microscopic - Urine, Clean Catch    3. Acute vaginitis  Prescription is given for Flagyl as noted.  Instructions and precautions have been given.  Patient is to call for culture results.  - NuSwab VG+ - Swab, Vagina  - metroNIDAZOLE (Flagyl) 500 MG tablet; Take 1 tablet by mouth 2 (Two) Times a Day for 7 days.  Dispense: 14 tablet; Refill: 0    4. Vaginal odor  Prescription given as noted.  Patient is to call if no improvement in her symptoms as discussed.  - NuSwab VG+ - Swab, Vagina  - metroNIDAZOLE (Flagyl) 500 MG tablet; Take 1 tablet by mouth 2 (Two) Times a Day for 7 days.  Dispense: 14 tablet; Refill: 0    5. Vaginal burning  Prescription is given as noted.  Instructions and precautions have been given.  - NuSwab VG+ - Swab, Vagina  - metroNIDAZOLE (Flagyl) 500 MG tablet; Take 1 tablet by mouth 2 (Two) Times a Day for 7 days.  Dispense: 14 tablet; Refill: 0    6. Postmenopausal atrophic vaginitis  Discussed various options for relief of atrophic vaginal symptoms related to menopause. Discussed local therapy for treatment of vaginal symptoms only.  Discussed the different formulation options including cream, ring, and tablets.  Discussed the low risk of systemic absorption in postmenopausal women with atrophy using 25 mcgs of estradiol on a daily basis.  Recommend low dose use 2-3x/wk for maintenance of treatment.  Other treatment options were discussed including the use of water-based and silicone-based vaginal lubricants and moisturizers.  Also discussed was the FDA approved treatment option of ospemifene for moderate to severe dyspareunia.   Patient may need to consider topical estrogen cream if continued symptoms and no improvement is noted.    Follow Up/Instructions:  Follow up as noted.  Patient was given instructions and counseling regarding her condition or for health maintenance advice. Please see specific information pulled into the AVS if appropriate.     Note:  Speech recognition transcription software may have been used to dictate portions of this document.  An attempt at proofreading has been made though minor errors in transcription may still be present.    This note was electronically signed.  Antonietta Sauceda M.D.

## 2024-01-15 LAB
APPEARANCE UR: CLEAR
BACTERIA #/AREA URNS HPF: NORMAL /HPF
BACTERIA UR CULT: ABNORMAL
BACTERIA UR CULT: ABNORMAL
BILIRUB UR QL STRIP: NEGATIVE
CASTS URNS MICRO: NORMAL
COLOR UR: YELLOW
EPI CELLS #/AREA URNS HPF: NORMAL /HPF
GLUCOSE UR QL STRIP: NEGATIVE
HGB UR QL STRIP: NEGATIVE
KETONES UR QL STRIP: NEGATIVE
LEUKOCYTE ESTERASE UR QL STRIP: NEGATIVE
NITRITE UR QL STRIP: NEGATIVE
OTHER ANTIBIOTIC SUSC ISLT: ABNORMAL
PH UR STRIP: 5.5 [PH] (ref 5–8)
PROT UR QL STRIP: NEGATIVE
RBC #/AREA URNS HPF: NORMAL /HPF
SP GR UR STRIP: 1.02 (ref 1–1.03)
UROBILINOGEN UR STRIP-MCNC: NORMAL MG/DL
WBC #/AREA URNS HPF: NORMAL /HPF

## 2024-01-15 RX ORDER — SULFAMETHOXAZOLE AND TRIMETHOPRIM 800; 160 MG/1; MG/1
1 TABLET ORAL 2 TIMES DAILY
Qty: 14 TABLET | Refills: 0 | Status: SHIPPED | OUTPATIENT
Start: 2024-01-15 | End: 2024-01-22

## 2024-02-14 ENCOUNTER — HOSPITAL ENCOUNTER (OUTPATIENT)
Dept: GENERAL RADIOLOGY | Facility: HOSPITAL | Age: 79
Discharge: HOME | End: 2024-02-14
Admitting: NURSE PRACTITIONER
Payer: MEDICARE

## 2024-02-14 ENCOUNTER — OFFICE VISIT (OUTPATIENT)
Dept: UROLOGY | Facility: CLINIC | Age: 79
End: 2024-02-14
Payer: MEDICARE

## 2024-02-14 VITALS — WEIGHT: 130 LBS | BODY MASS INDEX: 21.66 KG/M2 | HEIGHT: 65 IN

## 2024-02-14 DIAGNOSIS — N20.0 KIDNEY STONE: ICD-10-CM

## 2024-02-14 DIAGNOSIS — N20.0 KIDNEY STONE: Primary | ICD-10-CM

## 2024-02-14 DIAGNOSIS — R39.9 LOWER URINARY TRACT SYMPTOMS (LUTS): ICD-10-CM

## 2024-02-14 PROBLEM — E05.90 HYPERTHYROIDISM: Status: ACTIVE | Noted: 2022-05-19

## 2024-02-14 PROBLEM — C50.919 BREAST CA: Status: ACTIVE | Noted: 2024-02-14

## 2024-02-14 PROBLEM — K21.9 GERD (GASTROESOPHAGEAL REFLUX DISEASE): Status: ACTIVE | Noted: 2021-08-23

## 2024-02-14 PROBLEM — I48.91 A-FIB: Status: ACTIVE | Noted: 2021-08-23

## 2024-02-14 PROBLEM — I10 ESSENTIAL HYPERTENSION: Status: ACTIVE | Noted: 2021-08-23

## 2024-02-14 LAB
BILIRUB BLD-MCNC: NEGATIVE MG/DL
CLARITY, POC: CLEAR
COLOR UR: YELLOW
EXPIRATION DATE: ABNORMAL
GLUCOSE UR STRIP-MCNC: ABNORMAL MG/DL
KETONES UR QL: ABNORMAL
LEUKOCYTE EST, POC: NEGATIVE
Lab: ABNORMAL
NITRITE UR-MCNC: NEGATIVE MG/ML
PH UR: 5.5 [PH] (ref 5–8)
PROT UR STRIP-MCNC: ABNORMAL MG/DL
RBC # UR STRIP: ABNORMAL /UL
SP GR UR: 1.02 (ref 1–1.03)
UROBILINOGEN UR QL: NORMAL

## 2024-02-14 PROCEDURE — 74018 RADEX ABDOMEN 1 VIEW: CPT

## 2024-02-14 RX ORDER — PREDNISONE 20 MG/1
40 TABLET ORAL DAILY
COMMUNITY
Start: 2023-12-28

## 2024-02-14 RX ORDER — GUAIFENESIN AND DEXTROMETHORPHAN HYDROBROMIDE 100; 10 MG/5ML; MG/5ML
SOLUTION ORAL
COMMUNITY
Start: 2024-02-01

## 2024-02-14 RX ORDER — METHYLPREDNISOLONE 4 MG/1
TABLET ORAL
COMMUNITY
Start: 2024-02-01

## 2024-02-14 RX ORDER — VIBEGRON 75 MG/1
75 TABLET, FILM COATED ORAL DAILY
Qty: 30 TABLET | Refills: 11 | Status: SHIPPED | OUTPATIENT
Start: 2024-02-14

## 2024-02-14 RX ORDER — BENZONATATE 100 MG/1
CAPSULE ORAL
COMMUNITY
Start: 2024-02-01

## 2024-02-14 RX ORDER — CETIRIZINE HYDROCHLORIDE 10 MG/1
1 TABLET ORAL DAILY
COMMUNITY
Start: 2024-01-26

## 2024-02-14 RX ORDER — VALACYCLOVIR HYDROCHLORIDE 1 G/1
1 TABLET, FILM COATED ORAL 3 TIMES DAILY
COMMUNITY
Start: 2024-02-11

## 2024-02-15 ENCOUNTER — TELEPHONE (OUTPATIENT)
Dept: UROLOGY | Facility: CLINIC | Age: 79
End: 2024-02-15
Payer: MEDICARE

## 2024-02-16 ENCOUNTER — APPOINTMENT (OUTPATIENT)
Dept: CT IMAGING | Facility: HOSPITAL | Age: 79
End: 2024-02-16
Payer: MEDICARE

## 2024-02-16 ENCOUNTER — APPOINTMENT (OUTPATIENT)
Dept: GENERAL RADIOLOGY | Facility: HOSPITAL | Age: 79
End: 2024-02-16
Payer: MEDICARE

## 2024-02-16 ENCOUNTER — HOSPITAL ENCOUNTER (EMERGENCY)
Facility: HOSPITAL | Age: 79
Discharge: HOME | End: 2024-02-16
Attending: STUDENT IN AN ORGANIZED HEALTH CARE EDUCATION/TRAINING PROGRAM
Payer: MEDICARE

## 2024-02-16 VITALS
BODY MASS INDEX: 22.5 KG/M2 | DIASTOLIC BLOOD PRESSURE: 85 MMHG | HEART RATE: 92 BPM | TEMPERATURE: 97.9 F | SYSTOLIC BLOOD PRESSURE: 166 MMHG | RESPIRATION RATE: 16 BRPM | OXYGEN SATURATION: 94 % | HEIGHT: 63 IN | WEIGHT: 127 LBS

## 2024-02-16 DIAGNOSIS — M54.6 ACUTE RIGHT-SIDED THORACIC BACK PAIN: Primary | ICD-10-CM

## 2024-02-16 LAB
ALBUMIN SERPL-MCNC: 4 G/DL (ref 3.5–5.2)
ALBUMIN/GLOB SERPL: 1.3 G/DL
ALP SERPL-CCNC: 80 U/L (ref 39–117)
ALT SERPL W P-5'-P-CCNC: 19 U/L (ref 1–33)
ANION GAP SERPL CALCULATED.3IONS-SCNC: 10.1 MMOL/L (ref 5–15)
AST SERPL-CCNC: 21 U/L (ref 1–32)
BASOPHILS # BLD AUTO: 0.03 10*3/MM3 (ref 0–0.2)
BASOPHILS NFR BLD AUTO: 0.5 % (ref 0–1.5)
BILIRUB SERPL-MCNC: 0.4 MG/DL (ref 0–1.2)
BUN SERPL-MCNC: 18 MG/DL (ref 8–23)
BUN/CREAT SERPL: 26.5 (ref 7–25)
CALCIUM SPEC-SCNC: 8.7 MG/DL (ref 8.6–10.5)
CHLORIDE SERPL-SCNC: 103 MMOL/L (ref 98–107)
CO2 SERPL-SCNC: 22.9 MMOL/L (ref 22–29)
CREAT SERPL-MCNC: 0.68 MG/DL (ref 0.57–1)
DEPRECATED RDW RBC AUTO: 43.6 FL (ref 37–54)
EGFRCR SERPLBLD CKD-EPI 2021: 90.4 ML/MIN/1.73
EOSINOPHIL # BLD AUTO: 0.08 10*3/MM3 (ref 0–0.4)
EOSINOPHIL NFR BLD AUTO: 1.4 % (ref 0.3–6.2)
ERYTHROCYTE [DISTWIDTH] IN BLOOD BY AUTOMATED COUNT: 12.7 % (ref 12.3–15.4)
GEN 5 2HR TROPONIN T REFLEX: 8 NG/L
GLOBULIN UR ELPH-MCNC: 3 GM/DL
GLUCOSE SERPL-MCNC: 199 MG/DL (ref 65–99)
HCT VFR BLD AUTO: 38.4 % (ref 34–46.6)
HGB BLD-MCNC: 12.9 G/DL (ref 12–15.9)
IMM GRANULOCYTES # BLD AUTO: 0.01 10*3/MM3 (ref 0–0.05)
IMM GRANULOCYTES NFR BLD AUTO: 0.2 % (ref 0–0.5)
LYMPHOCYTES # BLD AUTO: 1.98 10*3/MM3 (ref 0.7–3.1)
LYMPHOCYTES NFR BLD AUTO: 35.4 % (ref 19.6–45.3)
MCH RBC QN AUTO: 31.7 PG (ref 26.6–33)
MCHC RBC AUTO-ENTMCNC: 33.6 G/DL (ref 31.5–35.7)
MCV RBC AUTO: 94.3 FL (ref 79–97)
MONOCYTES # BLD AUTO: 0.49 10*3/MM3 (ref 0.1–0.9)
MONOCYTES NFR BLD AUTO: 8.8 % (ref 5–12)
NEUTROPHILS NFR BLD AUTO: 3.01 10*3/MM3 (ref 1.7–7)
NEUTROPHILS NFR BLD AUTO: 53.7 % (ref 42.7–76)
NRBC BLD AUTO-RTO: 0 /100 WBC (ref 0–0.2)
NT-PROBNP SERPL-MCNC: 97.3 PG/ML (ref 0–1800)
PLATELET # BLD AUTO: 220 10*3/MM3 (ref 140–450)
PMV BLD AUTO: 9.6 FL (ref 6–12)
POTASSIUM SERPL-SCNC: 4.1 MMOL/L (ref 3.5–5.2)
PROT SERPL-MCNC: 7 G/DL (ref 6–8.5)
RBC # BLD AUTO: 4.07 10*6/MM3 (ref 3.77–5.28)
SODIUM SERPL-SCNC: 136 MMOL/L (ref 136–145)
TROPONIN T DELTA: -1 NG/L
TROPONIN T SERPL HS-MCNC: 9 NG/L
WBC NRBC COR # BLD AUTO: 5.6 10*3/MM3 (ref 3.4–10.8)

## 2024-02-16 PROCEDURE — 74177 CT ABD & PELVIS W/CONTRAST: CPT

## 2024-02-16 PROCEDURE — 25510000001 IOPAMIDOL 61 % SOLUTION: Performed by: STUDENT IN AN ORGANIZED HEALTH CARE EDUCATION/TRAINING PROGRAM

## 2024-02-16 PROCEDURE — 83880 ASSAY OF NATRIURETIC PEPTIDE: CPT | Performed by: STUDENT IN AN ORGANIZED HEALTH CARE EDUCATION/TRAINING PROGRAM

## 2024-02-16 PROCEDURE — 71275 CT ANGIOGRAPHY CHEST: CPT

## 2024-02-16 PROCEDURE — 36415 COLL VENOUS BLD VENIPUNCTURE: CPT

## 2024-02-16 PROCEDURE — 99285 EMERGENCY DEPT VISIT HI MDM: CPT

## 2024-02-16 PROCEDURE — 84484 ASSAY OF TROPONIN QUANT: CPT | Performed by: STUDENT IN AN ORGANIZED HEALTH CARE EDUCATION/TRAINING PROGRAM

## 2024-02-16 PROCEDURE — 80053 COMPREHEN METABOLIC PANEL: CPT | Performed by: NURSE PRACTITIONER

## 2024-02-16 PROCEDURE — 85025 COMPLETE CBC W/AUTO DIFF WBC: CPT | Performed by: NURSE PRACTITIONER

## 2024-02-16 PROCEDURE — 71045 X-RAY EXAM CHEST 1 VIEW: CPT

## 2024-02-16 RX ORDER — SODIUM CHLORIDE 0.9 % (FLUSH) 0.9 %
10 SYRINGE (ML) INJECTION AS NEEDED
Status: DISCONTINUED | OUTPATIENT
Start: 2024-02-16 | End: 2024-02-16 | Stop reason: HOSPADM

## 2024-02-16 RX ADMIN — IOPAMIDOL 100 ML: 612 INJECTION, SOLUTION INTRAVENOUS at 15:18

## 2024-02-16 NOTE — ED PROVIDER NOTES
Pt Name: Aster Craft  MRN: [id number]  : 1945  Date of Encounter: 2024    PCP: Sarah Brady MD      Subjective    History of Present Illness:    Chief Complaint: Right-sided back pain    History of Present Illness: Aster Craft is a 78 y.o. female who presents to the ER complaining of right-sided back pain.  Patient states she has had right upper back pain that has been ongoing for the last 3 days that is progressively worsened over the interval.  Patient did state that she was seen by Dr. Frias had a x-ray performed of her abdomen to see if her kidney stones have moved.  Patient states she is had bilateral mastectomies due to cancer that was greater than 10 years ago.  Patient denies any cough, congestion shortness of breath.  Patient rates her pain as 7 out of 10 states it is sharp in nature and is underneath her scapula and her right side upper back.        Nurses Notes reviewed and agree, including vitals, allergies, social history and prior medical history.       Allergies:    Patient has no known allergies.    Past Medical History:   Diagnosis Date    Arthritis     Breast cancer     RIGHT BREAST STAGE 3    Diabetes mellitus     Elevated cholesterol     GERD (gastroesophageal reflux disease) 2021    History of cancer chemotherapy     Hypertension     Hyperthyroidism 2022    Kidney stone     Lichen sclerosus     Shingles     Thickened endometrium 2018    Urinary incontinence     Urinary tract infection        Past Surgical History:   Procedure Laterality Date    CATARACT EXTRACTION Right 01/10/2023    CERVICAL CONE BIOPSY      CHOLECYSTECTOMY  2010    COLONOSCOPY N/A 2018    Procedure: COLONOSCOPY;  Surgeon: Trenton Lyons MD;  Location: [facility] OR;  Service: Gastroenterology    DILATATION AND CURETTAGE      ENDOSCOPY N/A 2018    Procedure: ESOPHAGOGASTRODUODENOSCOPY;  Surgeon: Trenton Lyons MD;  Location: [facility] OR;  Service: Gastroenterology    MASTECTOMY Right  10/1998    MASTECTOMY Left 07/2013    TUBAL ABDOMINAL LIGATION         Social History     Socioeconomic History    Marital status:    Tobacco Use    Smoking status: Never     Passive exposure: Never    Smokeless tobacco: Never   Vaping Use    Vaping Use: Never used   Substance and Sexual Activity    Alcohol use: No    Drug use: No    Sexual activity: Not Currently       Family History   Problem Relation Age of Onset    Breast cancer Mother     Diabetes Mother     Cancer Mother     Ovarian cancer Maternal Aunt     Cancer Maternal Aunt        REVIEW OF SYSTEMS:     All systems reviewed and not pertinent unless noted.    Review of Systems   Musculoskeletal:  Positive for back pain.       Objective    Physical Exam  Vitals and nursing note reviewed.   Constitutional:       Appearance: Normal appearance.   HENT:      Head: Normocephalic and atraumatic.   Eyes:      Extraocular Movements: Extraocular movements intact.      Pupils: Pupils are equal, round, and reactive to light.   Cardiovascular:      Rate and Rhythm: Normal rate and regular rhythm.      Pulses: Normal pulses.      Heart sounds: Normal heart sounds.   Pulmonary:      Effort: Pulmonary effort is normal.      Breath sounds: Normal breath sounds.   Abdominal:      General: Abdomen is flat. Bowel sounds are normal.      Palpations: Abdomen is soft.   Musculoskeletal:         General: Tenderness present.      Cervical back: Normal range of motion and neck supple.      Comments: Mild tenderness palpation right upper back directly posterior to the scapula   Skin:     Capillary Refill: Capillary refill takes less than 2 seconds.   Neurological:      General: No focal deficit present.      Mental Status: She is alert and oriented to person, place, and time. Mental status is at baseline.      GCS: GCS eye subscore is 4. GCS verbal subscore is 5. GCS motor subscore is 6.      Sensory: Sensation is intact.      Motor: Motor function is intact.      Gait: Gait  is intact.   Psychiatric:         Attention and Perception: Attention and perception normal.         Mood and Affect: Mood and affect normal.         Speech: Speech normal.         Behavior: Behavior normal. Behavior is cooperative.                          Procedures    ED Course:         LAB Results:    Lab Results (last 24 hours)       Procedure Component Value Units Date/Time    CBC & Differential [[id number]]  (Normal) Collected: 02/16/24 1326    Specimen: Blood Updated: 02/16/24 1332    Narrative:      The following orders were created for panel order CBC & Differential.  Procedure                               Abnormality         Status                     ---------                               -----------         ------                     CBC Auto Differential[[id number]]        Normal              Final result                 Please view results for these tests on the individual orders.    Comprehensive Metabolic Panel [[id number]]  (Abnormal) Collected: 02/16/24 1326    Specimen: Blood Updated: 02/16/24 1356     Glucose 199 mg/dL      Comment: Glucose >180, Hemoglobin A1C recommended.        BUN 18 mg/dL      Creatinine 0.68 mg/dL      Sodium 136 mmol/L      Potassium 4.1 mmol/L      Comment: Slight hemolysis detected by analyzer. Result may be falsely elevated.        Chloride 103 mmol/L      CO2 22.9 mmol/L      Calcium 8.7 mg/dL      Total Protein 7.0 g/dL      Albumin 4.0 g/dL      ALT (SGPT) 19 U/L      AST (SGOT) 21 U/L      Alkaline Phosphatase 80 U/L      Total Bilirubin 0.4 mg/dL      Globulin 3.0 gm/dL      A/G Ratio 1.3 g/dL      BUN/Creatinine Ratio 26.5     Anion Gap 10.1 mmol/L      eGFR 90.4 mL/min/1.73     Narrative:      GFR Normal >60  Chronic Kidney Disease <60  Kidney Failure <15    The GFR formula is only valid for adults with stable renal function between ages 18 and 70.    CBC Auto Differential [[id number]]  (Normal) Collected: 02/16/24 1326    Specimen: Blood Updated: 02/16/24 1332      WBC 5.60 10*3/mm3      RBC 4.07 10*6/mm3      Hemoglobin 12.9 g/dL      Hematocrit 38.4 %      MCV 94.3 fL      MCH 31.7 pg      MCHC 33.6 g/dL      RDW 12.7 %      RDW-SD 43.6 fl      MPV 9.6 fL      Platelets 220 10*3/mm3      Neutrophil % 53.7 %      Lymphocyte % 35.4 %      Monocyte % 8.8 %      Eosinophil % 1.4 %      Basophil % 0.5 %      Immature Grans % 0.2 %      Neutrophils, Absolute 3.01 10*3/mm3      Lymphocytes, Absolute 1.98 10*3/mm3      Monocytes, Absolute 0.49 10*3/mm3      Eosinophils, Absolute 0.08 10*3/mm3      Basophils, Absolute 0.03 10*3/mm3      Immature Grans, Absolute 0.01 10*3/mm3      nRBC 0.0 /100 WBC     BNP [[id number]]  (Normal) Collected: 02/16/24 1326    Specimen: Blood Updated: 02/16/24 1444     proBNP 97.3 pg/mL     Narrative:      This assay is used as an aid in the diagnosis of individuals suspected of having heart failure. It can be used as an aid in the diagnosis of acute decompensated heart failure (ADHF) in patients presenting with signs and symptoms of ADHF to the emergency department (ED). In addition, NT-proBNP of <300 pg/mL indicates ADHF is not likely.    Age Range Result Interpretation  NT-proBNP Concentration (pg/mL:      <50             Positive            >450                   Gray                 300-450                    Negative             <300    50-75           Positive            >900                  Gray                300-900                  Negative            <300      >75             Positive            >1800                  Gray                300-1800                  Negative            <300    High Sensitivity Troponin T [[id number]]  (Normal) Collected: 02/16/24 1326    Specimen: Blood Updated: 02/16/24 1442     HS Troponin T 9 ng/L     Narrative:      High Sensitive Troponin T Reference Range:  <14.0 ng/L- Negative Female for AMI  <22.0 ng/L- Negative Male for AMI  >=14 - Abnormal Female indicating possible myocardial injury.  >=22 - Abnormal  Male indicating possible myocardial injury.   Clinicians would have to utilize clinical acumen, EKG, Troponin, and serial changes to determine if it is an Acute Myocardial Infarction or myocardial injury due to an underlying chronic condition.         High Sensitivity Troponin T 2Hr [[id number]] Collected: 02/16/24 1547    Specimen: Blood Updated: 02/16/24 1549             If labs were ordered, I have independently reviewed the results and considered them in the diagnosis and treatment plan for the patient    RADIOLOGY    CT Abdomen Pelvis With Contrast    Result Date: 2/16/2024  PROCEDURE: CT ABDOMEN PELVIS W CONTRAST-  TECHNIQUE: IV contrast enhanced exam  HISTORY: History of kidney stones, flank pain  COMPARISON: 02/28/2023.  FINDINGS:  ABDOMEN: Bilateral nephrolithiasis is noted, stable from prior exam, greater on right side. Remaining solid organs are normal. Patient is status post cholecystectomy. There is no bowel obstruction or bowel wall thickening. There is no free fluid.  PELVIS: Appendix is not visualized. However, no secondary findings of appendicitis are seen. Uterus and ovaries are unremarkable. There is no pelvic mass.      Impression: 1. Stable bilateral nephrolithiasis greater on the right side. 2. No evidence of hydronephrosis or obstructing stone disease.   This study was performed with techniques to keep radiation doses as low as reasonably achievable (ALARA). Individualized dose reduction techniques using automated exposure control or adjustment of vA and/or kV according to the patient size were employed.  This report was signed and finalized on 2/16/2024 3:46 PM by Isaiah Abbott MD.      CT Angiogram Chest    Result Date: 2/16/2024  PROCEDURE: CT ANGIOGRAM CHEST-  HISTORY: Right upper chest pain  TECHNIQUE: Thin section axial CT with IV contrast supplemented with 3D reconstructed MIP images.  FINDINGS:  Pulmonary vessels enhance in a normal fashion without evidence of embolic disease. Thoracic  aorta is normal in caliber without evidence of aneurysm or dissection.  No acute lung disease is present. Left lower lobe scarring is present. No pleural or pericardial effusion is seen. No adenopathy or mass lesion is present. Left thyroid mass is noted, similar from prior exam.      Impression: 1. No evidence of pulmonary embolism. 2. No acute lung disease 3. Left thyroid mass. Nonemergent thyroid ultrasound should be considered.    This study was performed with techniques to keep radiation doses as low as reasonably achievable (ALARA). Individualized dose reduction techniques using automated exposure control or adjustment of vA and/or kV according to the patient size were employed.  This report was signed and finalized on 2/16/2024 3:38 PM by Isaiah Abbott MD.      XR Chest 1 View    Result Date: 2/16/2024  PROCEDURE: XR CHEST 1 VW-  HISTORY: Cough congestion shortness of breath  COMPARISON:  None  FINDINGS:  Portable view of the chest demonstrates the lungs to be grossly clear. There is no evidence of effusion, pneumothorax or other significant pleural disease. The mediastinum is unremarkable.  The heart size is normal.      Impression: Unremarkable portable chest.    This report was signed and finalized on 2/16/2024 1:10 PM by Isaiah Abbott MD.        If I have ordered, I have independently reviewed the above noted radiographic studies.  Please see the radiologist dictation for the official interpretation    Medications given to patient in the ER    Medications   sodium chloride 0.9 % flush 10 mL (has no administration in time range)   iopamidol (ISOVUE-300) 61 % injection 100 mL (100 mL Intravenous Given 2/16/24 1518)           I have discussed all the test results with patient and available family and the plan for disposition is discharged home and request patient follow-up with primary care provider in the next 7 days for reevaluation.  Strict return precautions have been given to the patient and she is  verbalized understanding.      Medical Decision Making  Aster Craft is a 78 y.o. female who presents to the ER complaining of right-sided back pain.  Patient states she has had right upper back pain that has been ongoing for the last 3 days that is progressively worsened over the interval.  Patient did state that she was seen by Dr. Frias had a x-ray performed of her abdomen to see if her kidney stones have moved.  Patient states she is had bilateral mastectomies due to cancer that was greater than 10 years ago.  Patient denies any cough, congestion shortness of breath.  Patient rates her pain as 7 out of 10 states it is sharp in nature and is underneath her scapula and her right side upper back.    DDX: includes but is not limited to: Back pain, pneumonia, NSTEMI, STEMI, chest pain unspecified, pulmonary emboli, aortic aneurysm, other    Amount and/or Complexity of Data Reviewed  External Data Reviewed:      Details: I have personally reviewed labs, radiology EKG and notes from patient's chart  Labs: ordered. Decision-making details documented in ED Course.     Details: I have personally reviewed and documented all results  Radiology: ordered.     Details: I have personally reviewed and documented all results  Discussion of management or test interpretation with external provider(s): Discussed assessment, treatment and plan with ER attending    Risk  Prescription drug management.  Risk Details: I have discussed with patient the finding of the test preformed today. Patient has been diagnosed with thoracic back pain and will be discharged home.  Patient requested to follow-up with primary care provider within the next 7 days for reevaluation. Strict return precautions have been given and patient verbalizes understanding          Final diagnoses:   Acute right-sided thoracic back pain         Please note that portions of this document were completed using voice recognition dictation software.       Iman  Javier, MAIKEL  02/16/24 1558       Javier Valerio, MAIKEL  02/16/24 2102

## 2024-03-11 ENCOUNTER — TELEPHONE (OUTPATIENT)
Dept: UROLOGY | Facility: CLINIC | Age: 79
End: 2024-03-11
Payer: MEDICARE

## 2024-03-11 NOTE — TELEPHONE ENCOUNTER
Left pt voicemail let her know she has stable stones and she can continue yearly or discuss surgical intervention I asked her to give us a call back.    Asha,CMA

## 2024-06-07 ENCOUNTER — TELEPHONE (OUTPATIENT)
Dept: SURGERY | Facility: CLINIC | Age: 79
End: 2024-06-07
Payer: MEDICARE

## 2024-06-07 NOTE — TELEPHONE ENCOUNTER
PRESCREENING FOR OPEN ACCESS SCHEDULING    Aster Craft, 1945  [id number]    06/07/24    If, the patient answers yes to any of the following questions the provider will be informed prior to scheduling open access for approval and documented in the chart.    [x]  Yes  [] No    1. Have you ever had a colonoscopy in the past?      When:  more than 5 yrs      Where: thinks [facility]       Polyps or other:     []  Yes  [x] No    2. Family history of colon cancer?      Relation:       Age of onset:       Do you currently have any of the following?    []  Yes  [x] No  Rectal bleeding, if so, how long?     []  Yes  [x] No  Abdominal pain, if so, how long?    [x]  Yes  [] No  Constipation, if so, how long?     []  Yes  [x] No  Diarrhea, if so, how long?    []  Yes  [x] No  Weight loss, is so, how much?    [] Yes  [x] No  Small caliber stool, if so, how long?    []Yes  [x] No  Do you have Hemorroids?    []Yes  [x] No  Have you been diagnosed with Anemia?    []Yes  [x] No  Do you have difficulty swallowing?    [x]Yes  [] No  Do you have acid reflux? Prilosec    Have you ever had any of the following conditions?    [] Yes  [] No  Heart attack?      When?       Last cardiac workup?     Blood thinners?    [] Yes  [] No   Lung problems, asthma or COPD?  [] Yes  [] No  Oxygen required?       [] Yes  [] No  Stroke?     [] Yes  [] No  Have you ever had a reaction to anesthesia?

## 2024-06-13 ENCOUNTER — TELEPHONE (OUTPATIENT)
Dept: SURGERY | Facility: CLINIC | Age: 79
End: 2024-06-13

## 2024-06-13 NOTE — TELEPHONE ENCOUNTER
CALLED FOR MISSED APPOINTMENT, PATIENT HAD A DEATH IN THE FAMILY AND WILL CALL ON FRIDAY TO RESCHEDULE. MAILED NO SHOW LETTER

## 2024-06-19 NOTE — PROGRESS NOTES
"Patient: Aster Craft    YOB: 1945    Date: 06/20/2024    Primary Care Provider: Sarah Brady MD    Chief Complaint   Patient presents with    Heartburn    Constipation       SUBJECTIVE:    History of present illness:  I saw the patient in the office today as a consultation for evaluation and treatment of \"acid reflux\" and constipation. She states that she had a positive Cologuard on 4/7/24. Last colonoscopy was over 10 years ago. She states that onset of acid reflux was many years ago.     She does have epigastric abdominal discomfort and fairly excessive reflux, she has multiple episodes at night and has had such bad reflux that she has difficulty swallowing in the past.  She had also has a chronic cough.    The following portions of the patient's history were reviewed and updated as appropriate: allergies, current medications, past family history, past medical history, past social history, past surgical history and problem list.    Review of Systems      Review of Systems:  Constitutional:  Negative for chills, fever, and unexpected weight change.  HENT: Negative for trouble swallowing and voice change.  Eyes:  Negative for visual disturbance.  Respiratory:  Negative for apnea, cough, chest tightness, shortness of breath, and wheezing.  Cardiovascular:  Negative for chest pain, palpitations, and leg swelling.  Gastrointestinal:  Negative for abdominal distention, abdominal pain, anal bleeding, blood in stool, constipation, diarrhea, nausea, rectal pain, and vomiting.  Musculoskeletal:  Negative for back pain, gait problem, and joint swelling.  Skin:  Negative for color change, rash, and wound  Neurological:  Negative for dizziness, syncope, speech difficulty, weakness, numbness, and headaches.  Hematological:  Negative for adenopathy.  Does not bruise/bleed easily.  Psychiatric/Behavioral:  Negative for confusion.  The patient is not nervous/anxious.          History:  Past Medical History: "   Diagnosis Date    Arthritis     Breast cancer     RIGHT BREAST STAGE 3    Diabetes mellitus     Elevated cholesterol     GERD (gastroesophageal reflux disease) 08/23/2021    History of cancer chemotherapy     Hypertension     Hyperthyroidism 05/19/2022    Kidney stone     Lichen sclerosus     Shingles     Thickened endometrium 2018    Urinary incontinence     Urinary tract infection        Past Surgical History:   Procedure Laterality Date    CATARACT EXTRACTION Right 01/10/2023    CERVICAL CONE BIOPSY      CHOLECYSTECTOMY  03/2010    COLONOSCOPY N/A 07/06/2018    Procedure: COLONOSCOPY;  Surgeon: Trenton Lyons MD;  Location:  [facility];  Service: Gastroenterology    DILATATION AND CURETTAGE      ENDOSCOPY N/A 07/06/2018    Procedure: ESOPHAGOGASTRODUODENOSCOPY;  Surgeon: Trenton Lyons MD;  Location: [facility];  Service: Gastroenterology    MASTECTOMY Right 10/1998    MASTECTOMY Left 07/2013    TUBAL ABDOMINAL LIGATION         Family History   Problem Relation Age of Onset    Breast cancer Mother     Diabetes Mother     Cancer Mother     Ovarian cancer Maternal Aunt     Cancer Maternal Aunt        Social History     Tobacco Use    Smoking status: Never     Passive exposure: Never    Smokeless tobacco: Never   Vaping Use    Vaping status: Never Used   Substance Use Topics    Alcohol use: No    Drug use: No       Allergies:  No Known Allergies    Medications:    Current Outpatient Medications:     albuterol sulfate  (90 Base) MCG/ACT inhaler, TWO PUFFS EVERY SIX HOURS AS NEEDED, Disp: , Rfl:     amoxicillin (AMOXIL) 500 MG capsule, , Disp: , Rfl:     azelastine (ASTELIN) 0.1 % nasal spray, INHALE ONE SPRAY IN EACH NOSTRIL TWICE DAILY, Disp: , Rfl:     benzonatate (TESSALON) 200 MG capsule, Take 1 capsule by mouth., Disp: , Rfl:     Blood Glucose Monitoring Suppl (OneTouch Verio Flex System) w/Device kit, See Admin Instructions. for testing, Disp: , Rfl:     brimonidine-timolol (COMBIGAN) 0.2-0.5 %  ophthalmic solution, Administer 1 drop to both eyes Every 12 (Twelve) Hours., Disp: , Rfl:     calcium carbonate-cholecalciferol 500-400 MG-UNIT tablet tablet, Take  by mouth Daily., Disp: , Rfl:     cetirizine (zyrTEC) 10 MG tablet, Take 1 tablet by mouth Daily., Disp: , Rfl:     Cholecalciferol 250 MCG (90746 UT) capsule, Take 5,000 Units by mouth Daily., Disp: , Rfl:     CINNAMON PO, Take  by mouth., Disp: , Rfl:     clobetasol (TEMOVATE) 0.05 % ointment, Apply to affected area BID x 2 wks then daily x 2 wks then 2-3x/wk, Disp: 60 g, Rfl: 6    Continuous Blood Gluc Sensor (FreeStyle Malina 2 Sensor) misc, CHANGE SENSOR EVERY 14 DAYS, Disp: , Rfl:     desloratadine (CLARINEX) 5 MG tablet, Take 1 tablet by mouth Daily., Disp: , Rfl:     dextromethorphan-guaifenesin (ROBITUSSIN-DM)  MG/5ML syrup, TAKE 10ML EVERY 4 HOURS AS NEEDED, Disp: , Rfl:     diazePAM (VALIUM) 5 MG tablet, TAKE 1 TABLET one hour prior TO procedure, Disp: , Rfl:     dorzolamide (TRUSOPT) 2 % ophthalmic solution, INSTILL ONE DROP IN EACH EYE TWICE DAILY, Disp: , Rfl:     ezetimibe (ZETIA) 10 MG tablet, Take 1 tablet by mouth Daily., Disp: , Rfl:     Flovent  MCG/ACT inhaler, Inhale 2 puffs 2 (Two) Times a Day., Disp: , Rfl:     glipizide (GLUCOTROL XL) 5 MG ER tablet, , Disp: , Rfl:     ipratropium (ATROVENT) 0.03 % nasal spray, INHALE ONE SPRAY IN EACH NOSTRIL TWICE DAILY, Disp: , Rfl:     ipratropium (ATROVENT) 0.06 % nasal spray, INHALE 1-2 SPRAYS IN EACH NOSTRIL TWICE DAILY, Disp: , Rfl:     Januvia 100 MG tablet, Take 1 tablet by mouth Daily., Disp: , Rfl:     Lancets (OneTouch Delica Plus [id number]) misc, USE AS DIRECTED EVERY DAY, Disp: , Rfl:     meclizine (ANTIVERT) 12.5 MG tablet, Take 1 tablet by mouth., Disp: , Rfl:     metoprolol succinate XL (TOPROL-XL) 50 MG 24 hr tablet, Take 1 tablet by mouth Daily., Disp: , Rfl:     nitroglycerin (NITROSTAT) 0.4 MG SL tablet, Place 1 tablet under the tongue., Disp: , Rfl:      "OneTouch Verio test strip, Daily. for testing, Disp: , Rfl:     pantoprazole (PROTONIX) 40 MG EC tablet, Take 1 tablet by mouth., Disp: , Rfl:     Rhopressa 0.02 % solution, Administer 1 drop to both eyes Every Night., Disp: , Rfl:     simethicone (MYLICON) 80 MG chewable tablet, Chew 1 tablet Every 6 (Six) Hours As Needed for Flatulence., Disp: , Rfl:     Triamcinolone Acetonide (NASACORT) 55 MCG/ACT nasal inhaler, 2 sprays into the nostril(s) as directed by provider Daily., Disp: , Rfl:     Vibegron (Gemtesa) 75 MG tablet, Take 1 tablet by mouth Daily., Disp: 30 tablet, Rfl: 11    methylPREDNISolone (MEDROL) 4 MG dose pack, TAKE SIX TABLETS ON DAY ONE, DECREASE BY ONE TABLET DAILY UNTIL FINISHED (Patient not taking: Reported on 6/20/2024), Disp: , Rfl:     montelukast (SINGULAIR) 10 MG tablet, Take 1 tablet by mouth Every Evening. (Patient not taking: Reported on 6/20/2024), Disp: , Rfl:     Omega-3 Fatty Acids (fish oil) 1000 MG capsule capsule, Take  by mouth Daily With Breakfast. (Patient not taking: Reported on 6/20/2024), Disp: , Rfl:     ondansetron ODT (ZOFRAN-ODT) 4 MG disintegrating tablet, Place 1 tablet under the tongue Every 6 (Six) Hours As Needed for Vomiting or Nausea. (Patient not taking: Reported on 6/20/2024), Disp: 10 tablet, Rfl: 0    predniSONE (DELTASONE) 20 MG tablet, Take 2 tablets by mouth Daily. (Patient not taking: Reported on 6/20/2024), Disp: , Rfl:     travoprost, BAK free, (TRAVATAN) 0.004 % solution ophthalmic solution, 1 drop Every Evening. in affected eye(s) (Patient not taking: Reported on 6/20/2024), Disp: , Rfl:     valACYclovir (VALTREX) 1000 MG tablet, Take 1 tablet by mouth 3 times a day. (Patient not taking: Reported on 6/20/2024), Disp: , Rfl:     OBJECTIVE:    Vital Signs:   Vitals:    06/20/24 1518   BP: 120/64   Pulse: 63   Resp: 18   Temp: 97.7 °F (36.5 °C)   TempSrc: Temporal   SpO2: 98%   Weight: 57.7 kg (127 lb 3.2 oz)   Height: 160 cm (63\")       Physical " Exam:     General Appearance:    Alert, cooperative, in no acute distress   Head:    Normocephalic, without obvious abnormality, atraumatic   Eyes:            Lids and lashes normal, conjunctivae and sclerae normal, no   icterus, no pallor, corneas clear, PERRLA   Ears:    Ears appear intact with no abnormalities noted   Throat:   No oral lesions, no thrush, oral mucosa moist   Neck:   No adenopathy, supple, trachea midline, no thyromegaly, no   carotid bruit, no JVD   Back:     No kyphosis present, no scoliosis present, no skin lesions,      erythema or scars, no tenderness to percussion or                   palpation,   range of motion normal   Lungs:     Clear to auscultation,respirations regular, even and                  unlabored    Heart:    Regular rhythm and normal rate, normal S1 and S2, no            murmur, no gallop, no rub, no click   Chest Wall:    No abnormalities observed   Abdomen:     Normal bowel sounds, no masses, no organomegaly, soft        non-tender, non-distended, no guarding, there is evidence of right upper quadrant tenderness   Extremities:   Moves all extremities well, no edema, no cyanosis, no             redness   Pulses:   Pulses palpable and equal bilaterally   Skin:   No bleeding, bruising or rash   Lymph nodes:   No palpable adenopathy   Neurologic:   Cranial nerves 2 - 12 grossly intact, sensation intact, DTR       present and equal bilaterally       Results Review:   I reviewed the patient's new clinical results.  I reviewed the patient's new imaging results and agree with the interpretation.  I reviewed the patient's other test results and agree with the interpretation    Review of Systems was reviewed and confirmed as accurate as documented by the MA.    ASSESSMENT/PLAN:    1. Epigastric pain    2. Gastroesophageal reflux disease with esophagitis without hemorrhage        I did have a detailed and extensive discussion with the patient in the office today.  The full risks and  benefits of operative versus nonoperative intervention were discussed with the paient, they understand, agree, and wish to proceed with the surgical treatment plan of EGD.    Because of her recent positive Cologuard she will need to undergo future colonoscopy.    Electronically signed by Brennan Cheng MD  06/20/24 10:47 EDT

## 2024-06-20 ENCOUNTER — OFFICE VISIT (OUTPATIENT)
Dept: SURGERY | Facility: CLINIC | Age: 79
End: 2024-06-20
Payer: MEDICARE

## 2024-06-20 VITALS
SYSTOLIC BLOOD PRESSURE: 120 MMHG | TEMPERATURE: 97.7 F | HEART RATE: 63 BPM | WEIGHT: 127.2 LBS | BODY MASS INDEX: 22.54 KG/M2 | HEIGHT: 63 IN | DIASTOLIC BLOOD PRESSURE: 64 MMHG | OXYGEN SATURATION: 98 % | RESPIRATION RATE: 18 BRPM

## 2024-06-20 DIAGNOSIS — R10.13 EPIGASTRIC PAIN: Primary | ICD-10-CM

## 2024-06-20 DIAGNOSIS — K21.00 GASTROESOPHAGEAL REFLUX DISEASE WITH ESOPHAGITIS WITHOUT HEMORRHAGE: ICD-10-CM

## 2024-06-20 RX ORDER — BENZONATATE 200 MG/1
200 CAPSULE ORAL
COMMUNITY

## 2024-06-20 RX ORDER — AMOXICILLIN 500 MG/1
CAPSULE ORAL
COMMUNITY
Start: 2024-06-19

## 2024-06-20 RX ORDER — DIAZEPAM 5 MG/1
TABLET ORAL
COMMUNITY
Start: 2024-05-15

## 2024-06-20 RX ORDER — NITROGLYCERIN 0.4 MG/1
0.4 TABLET SUBLINGUAL
COMMUNITY

## 2024-06-20 RX ORDER — GLIPIZIDE 5 MG/1
TABLET, FILM COATED, EXTENDED RELEASE ORAL
COMMUNITY
Start: 2024-06-19

## 2024-06-20 RX ORDER — PANTOPRAZOLE SODIUM 40 MG/1
40 TABLET, DELAYED RELEASE ORAL
COMMUNITY

## 2024-06-20 RX ORDER — MECLIZINE HCL 12.5 MG/1
12.5 TABLET ORAL
COMMUNITY

## 2024-06-20 RX ORDER — TRIAMCINOLONE ACETONIDE 55 UG/1
2 SPRAY, METERED NASAL DAILY
COMMUNITY

## 2024-06-20 RX ORDER — SIMETHICONE 80 MG
80 TABLET,CHEWABLE ORAL EVERY 6 HOURS PRN
COMMUNITY

## 2024-06-20 RX ORDER — DESLORATADINE 5 MG/1
5 TABLET ORAL DAILY
COMMUNITY

## 2024-06-29 ENCOUNTER — HOSPITAL ENCOUNTER (INPATIENT)
Facility: HOSPITAL | Age: 79
LOS: 1 days | Discharge: HOME | End: 2024-07-02
Attending: EMERGENCY MEDICINE | Admitting: INTERNAL MEDICINE
Payer: MEDICARE

## 2024-06-29 ENCOUNTER — APPOINTMENT (OUTPATIENT)
Dept: GENERAL RADIOLOGY | Facility: HOSPITAL | Age: 79
End: 2024-06-29
Payer: MEDICARE

## 2024-06-29 DIAGNOSIS — I50.33 ACUTE ON CHRONIC HEART FAILURE WITH PRESERVED EJECTION FRACTION: Chronic | ICD-10-CM

## 2024-06-29 DIAGNOSIS — J96.01 ACUTE HYPOXIC RESPIRATORY FAILURE: Primary | ICD-10-CM

## 2024-06-29 DIAGNOSIS — I50.32 CHRONIC HEART FAILURE WITH PRESERVED EJECTION FRACTION: Chronic | ICD-10-CM

## 2024-06-29 DIAGNOSIS — R07.9 CHEST PAIN, UNSPECIFIED TYPE: ICD-10-CM

## 2024-06-29 LAB
A-A DO2: 21.9 MMHG
ALBUMIN SERPL-MCNC: 4.2 G/DL (ref 3.5–5.2)
ALBUMIN/GLOB SERPL: 1.4 G/DL
ALP SERPL-CCNC: 98 U/L (ref 39–117)
ALT SERPL W P-5'-P-CCNC: 22 U/L (ref 1–33)
ANION GAP SERPL CALCULATED.3IONS-SCNC: 11.4 MMOL/L (ref 5–15)
ARTERIAL PATENCY WRIST A: POSITIVE
AST SERPL-CCNC: 20 U/L (ref 1–32)
ATMOSPHERIC PRESS: 734 MMHG
B PARAPERT DNA SPEC QL NAA+PROBE: NOT DETECTED
B PERT DNA SPEC QL NAA+PROBE: NOT DETECTED
BASE EXCESS BLDA CALC-SCNC: 1.6 MMOL/L (ref 0–2)
BASOPHILS # BLD AUTO: 0.02 10*3/MM3 (ref 0–0.2)
BASOPHILS NFR BLD AUTO: 0.2 % (ref 0–1.5)
BDY SITE: ABNORMAL
BILIRUB SERPL-MCNC: 0.8 MG/DL (ref 0–1.2)
BUN SERPL-MCNC: 11 MG/DL (ref 8–23)
BUN/CREAT SERPL: 20.4 (ref 7–25)
C PNEUM DNA NPH QL NAA+NON-PROBE: NOT DETECTED
CALCIUM SPEC-SCNC: 9.2 MG/DL (ref 8.6–10.5)
CHLORIDE SERPL-SCNC: 103 MMOL/L (ref 98–107)
CO2 SERPL-SCNC: 25.6 MMOL/L (ref 22–29)
COHGB MFR BLD: 0.6 % (ref 0–2)
CREAT SERPL-MCNC: 0.54 MG/DL (ref 0.57–1)
D DIMER PPP FEU-MCNC: 0.31 MCGFEU/ML (ref 0–0.77)
DEPRECATED RDW RBC AUTO: 43.3 FL (ref 37–54)
EGFRCR SERPLBLD CKD-EPI 2021: 95 ML/MIN/1.73
EOSINOPHIL # BLD AUTO: 0.14 10*3/MM3 (ref 0–0.4)
EOSINOPHIL NFR BLD AUTO: 1.3 % (ref 0.3–6.2)
ERYTHROCYTE [DISTWIDTH] IN BLOOD BY AUTOMATED COUNT: 12.9 % (ref 12.3–15.4)
FLUAV SUBTYP SPEC NAA+PROBE: NOT DETECTED
FLUBV RNA ISLT QL NAA+PROBE: NOT DETECTED
GAS FLOW AIRWAY: 2 LPM
GEN 5 2HR TROPONIN T REFLEX: 8 NG/L
GLOBULIN UR ELPH-MCNC: 3 GM/DL
GLUCOSE SERPL-MCNC: 167 MG/DL (ref 65–99)
HADV DNA SPEC NAA+PROBE: NOT DETECTED
HCO3 BLDA-SCNC: 26.9 MMOL/L (ref 22–28)
HCOV 229E RNA SPEC QL NAA+PROBE: NOT DETECTED
HCOV HKU1 RNA SPEC QL NAA+PROBE: NOT DETECTED
HCOV NL63 RNA SPEC QL NAA+PROBE: NOT DETECTED
HCOV OC43 RNA SPEC QL NAA+PROBE: NOT DETECTED
HCT VFR BLD AUTO: 39.1 % (ref 34–46.6)
HCT VFR BLD CALC: 39.9 %
HGB BLD-MCNC: 13.3 G/DL (ref 12–15.9)
HMPV RNA NPH QL NAA+NON-PROBE: NOT DETECTED
HOLD SPECIMEN: NORMAL
HOLD SPECIMEN: NORMAL
HPIV1 RNA ISLT QL NAA+PROBE: NOT DETECTED
HPIV2 RNA SPEC QL NAA+PROBE: NOT DETECTED
HPIV3 RNA NPH QL NAA+PROBE: NOT DETECTED
HPIV4 P GENE NPH QL NAA+PROBE: NOT DETECTED
IMM GRANULOCYTES # BLD AUTO: 0.03 10*3/MM3 (ref 0–0.05)
IMM GRANULOCYTES NFR BLD AUTO: 0.3 % (ref 0–0.5)
LYMPHOCYTES # BLD AUTO: 1.36 10*3/MM3 (ref 0.7–3.1)
LYMPHOCYTES NFR BLD AUTO: 12.8 % (ref 19.6–45.3)
Lab: ABNORMAL
M PNEUMO IGG SER IA-ACNC: NOT DETECTED
MCH RBC QN AUTO: 31.3 PG (ref 26.6–33)
MCHC RBC AUTO-ENTMCNC: 34 G/DL (ref 31.5–35.7)
MCV RBC AUTO: 92 FL (ref 79–97)
METHGB BLD QL: 0.7 % (ref 0–1.5)
MODALITY: ABNORMAL
MONOCYTES # BLD AUTO: 0.39 10*3/MM3 (ref 0.1–0.9)
MONOCYTES NFR BLD AUTO: 3.7 % (ref 5–12)
NEUTROPHILS NFR BLD AUTO: 8.65 10*3/MM3 (ref 1.7–7)
NEUTROPHILS NFR BLD AUTO: 81.7 % (ref 42.7–76)
NRBC BLD AUTO-RTO: 0 /100 WBC (ref 0–0.2)
NT-PROBNP SERPL-MCNC: 233.4 PG/ML (ref 0–1800)
OXYHGB MFR BLDV: 92.3 % (ref 94–99)
PCO2 BLDA: 44.1 MM HG (ref 35–45)
PCO2 TEMP ADJ BLD: ABNORMAL MM[HG]
PH BLDA: 7.39 PH UNITS (ref 7.3–7.5)
PH, TEMP CORRECTED: ABNORMAL
PLATELET # BLD AUTO: 219 10*3/MM3 (ref 140–450)
PMV BLD AUTO: 9.2 FL (ref 6–12)
PO2 BLDA: 72.5 MM HG (ref 75–100)
PO2 TEMP ADJ BLD: ABNORMAL MM[HG]
POTASSIUM SERPL-SCNC: 4.1 MMOL/L (ref 3.5–5.2)
PROCALCITONIN SERPL-MCNC: 0.12 NG/ML (ref 0–0.25)
PROT SERPL-MCNC: 7.2 G/DL (ref 6–8.5)
RBC # BLD AUTO: 4.25 10*6/MM3 (ref 3.77–5.28)
RHINOVIRUS RNA SPEC NAA+PROBE: NOT DETECTED
RSV RNA NPH QL NAA+NON-PROBE: NOT DETECTED
SAO2 % BLDCOA: 93.5 % (ref 94–100)
SARS-COV-2 RNA NPH QL NAA+NON-PROBE: NOT DETECTED
SODIUM SERPL-SCNC: 140 MMOL/L (ref 136–145)
TROPONIN T DELTA: -2 NG/L
TROPONIN T SERPL HS-MCNC: 10 NG/L
TROPONIN T SERPL HS-MCNC: 10 NG/L
VENTILATOR MODE: ABNORMAL
WBC NRBC COR # BLD AUTO: 10.59 10*3/MM3 (ref 3.4–10.8)
WHOLE BLOOD HOLD COAG: NORMAL
WHOLE BLOOD HOLD SPECIMEN: NORMAL

## 2024-06-29 PROCEDURE — 82805 BLOOD GASES W/O2 SATURATION: CPT

## 2024-06-29 PROCEDURE — G0378 HOSPITAL OBSERVATION PER HR: HCPCS

## 2024-06-29 PROCEDURE — 83050 HGB METHEMOGLOBIN QUAN: CPT

## 2024-06-29 PROCEDURE — 0202U NFCT DS 22 TRGT SARS-COV-2: CPT

## 2024-06-29 PROCEDURE — 85025 COMPLETE CBC W/AUTO DIFF WBC: CPT

## 2024-06-29 PROCEDURE — 80053 COMPREHEN METABOLIC PANEL: CPT | Performed by: EMERGENCY MEDICINE

## 2024-06-29 PROCEDURE — 25810000003 SODIUM CHLORIDE 0.9 % SOLUTION

## 2024-06-29 PROCEDURE — 25010000002 METHYLPREDNISOLONE PER 125 MG

## 2024-06-29 PROCEDURE — 36600 WITHDRAWAL OF ARTERIAL BLOOD: CPT

## 2024-06-29 PROCEDURE — 93005 ELECTROCARDIOGRAM TRACING: CPT

## 2024-06-29 PROCEDURE — 93005 ELECTROCARDIOGRAM TRACING: CPT | Performed by: EMERGENCY MEDICINE

## 2024-06-29 PROCEDURE — 71045 X-RAY EXAM CHEST 1 VIEW: CPT

## 2024-06-29 PROCEDURE — 85379 FIBRIN DEGRADATION QUANT: CPT

## 2024-06-29 PROCEDURE — 94640 AIRWAY INHALATION TREATMENT: CPT

## 2024-06-29 PROCEDURE — 84145 PROCALCITONIN (PCT): CPT

## 2024-06-29 PROCEDURE — 83880 ASSAY OF NATRIURETIC PEPTIDE: CPT | Performed by: EMERGENCY MEDICINE

## 2024-06-29 PROCEDURE — 25010000002 ENOXAPARIN PER 10 MG: Performed by: INTERNAL MEDICINE

## 2024-06-29 PROCEDURE — 94761 N-INVAS EAR/PLS OXIMETRY MLT: CPT

## 2024-06-29 PROCEDURE — 99285 EMERGENCY DEPT VISIT HI MDM: CPT

## 2024-06-29 PROCEDURE — 25010000002 KETOROLAC TROMETHAMINE PER 15 MG

## 2024-06-29 PROCEDURE — 36415 COLL VENOUS BLD VENIPUNCTURE: CPT

## 2024-06-29 PROCEDURE — 84484 ASSAY OF TROPONIN QUANT: CPT | Performed by: EMERGENCY MEDICINE

## 2024-06-29 PROCEDURE — 25010000002 ONDANSETRON PER 1 MG

## 2024-06-29 PROCEDURE — 82375 ASSAY CARBOXYHB QUANT: CPT

## 2024-06-29 RX ORDER — SODIUM CHLORIDE 0.9 % (FLUSH) 0.9 %
10 SYRINGE (ML) INJECTION EVERY 12 HOURS SCHEDULED
Status: DISCONTINUED | OUTPATIENT
Start: 2024-06-29 | End: 2024-07-02 | Stop reason: HOSPADM

## 2024-06-29 RX ORDER — PANTOPRAZOLE SODIUM 40 MG/1
40 TABLET, DELAYED RELEASE ORAL
Status: DISCONTINUED | OUTPATIENT
Start: 2024-06-30 | End: 2024-07-02 | Stop reason: HOSPADM

## 2024-06-29 RX ORDER — NITROFURANTOIN 25; 75 MG/1; MG/1
100 CAPSULE ORAL 2 TIMES DAILY
Status: COMPLETED | OUTPATIENT
Start: 2024-06-29 | End: 2024-07-01

## 2024-06-29 RX ORDER — NITROFURANTOIN 25; 75 MG/1; MG/1
100 CAPSULE ORAL 2 TIMES DAILY
COMMUNITY
End: 2024-07-02 | Stop reason: HOSPADM

## 2024-06-29 RX ORDER — OXYBUTYNIN CHLORIDE 5 MG/1
10 TABLET, EXTENDED RELEASE ORAL DAILY
Status: DISCONTINUED | OUTPATIENT
Start: 2024-06-30 | End: 2024-07-02 | Stop reason: HOSPADM

## 2024-06-29 RX ORDER — SODIUM CHLORIDE 9 MG/ML
40 INJECTION, SOLUTION INTRAVENOUS AS NEEDED
Status: DISCONTINUED | OUTPATIENT
Start: 2024-06-29 | End: 2024-07-02 | Stop reason: HOSPADM

## 2024-06-29 RX ORDER — GUAIFENESIN/DEXTROMETHORPHAN 100-10MG/5
5 SYRUP ORAL EVERY 6 HOURS PRN
Status: DISCONTINUED | OUTPATIENT
Start: 2024-06-29 | End: 2024-07-02 | Stop reason: HOSPADM

## 2024-06-29 RX ORDER — SODIUM CHLORIDE 0.9 % (FLUSH) 0.9 %
10 SYRINGE (ML) INJECTION AS NEEDED
Status: DISCONTINUED | OUTPATIENT
Start: 2024-06-29 | End: 2024-07-02 | Stop reason: HOSPADM

## 2024-06-29 RX ORDER — IPRATROPIUM BROMIDE AND ALBUTEROL SULFATE 2.5; .5 MG/3ML; MG/3ML
3 SOLUTION RESPIRATORY (INHALATION) ONCE
Status: COMPLETED | OUTPATIENT
Start: 2024-06-29 | End: 2024-06-29

## 2024-06-29 RX ORDER — METOPROLOL SUCCINATE 25 MG/1
50 TABLET, EXTENDED RELEASE ORAL NIGHTLY
Status: DISCONTINUED | OUTPATIENT
Start: 2024-06-29 | End: 2024-07-02 | Stop reason: HOSPADM

## 2024-06-29 RX ORDER — ONDANSETRON 2 MG/ML
4 INJECTION INTRAMUSCULAR; INTRAVENOUS ONCE
Status: COMPLETED | OUTPATIENT
Start: 2024-06-29 | End: 2024-06-29

## 2024-06-29 RX ORDER — POLYETHYLENE GLYCOL 3350 17 G/17G
17 POWDER, FOR SOLUTION ORAL DAILY PRN
Status: DISCONTINUED | OUTPATIENT
Start: 2024-06-29 | End: 2024-07-02 | Stop reason: HOSPADM

## 2024-06-29 RX ORDER — BRIMONIDINE TARTRATE 2 MG/ML
1 SOLUTION/ DROPS OPHTHALMIC EVERY 12 HOURS
Status: DISCONTINUED | OUTPATIENT
Start: 2024-06-29 | End: 2024-07-02 | Stop reason: HOSPADM

## 2024-06-29 RX ORDER — TIMOLOL MALEATE 5 MG/ML
1 SOLUTION/ DROPS OPHTHALMIC EVERY 12 HOURS
Status: DISCONTINUED | OUTPATIENT
Start: 2024-06-29 | End: 2024-07-02 | Stop reason: HOSPADM

## 2024-06-29 RX ORDER — BISACODYL 5 MG/1
5 TABLET, DELAYED RELEASE ORAL DAILY PRN
Status: DISCONTINUED | OUTPATIENT
Start: 2024-06-29 | End: 2024-07-02 | Stop reason: HOSPADM

## 2024-06-29 RX ORDER — DORZOLAMIDE HCL 20 MG/ML
1 SOLUTION/ DROPS OPHTHALMIC 2 TIMES DAILY
Status: DISCONTINUED | OUTPATIENT
Start: 2024-06-29 | End: 2024-07-02 | Stop reason: HOSPADM

## 2024-06-29 RX ORDER — ONDANSETRON 2 MG/ML
4 INJECTION INTRAMUSCULAR; INTRAVENOUS EVERY 6 HOURS PRN
Status: DISCONTINUED | OUTPATIENT
Start: 2024-06-29 | End: 2024-07-02 | Stop reason: HOSPADM

## 2024-06-29 RX ORDER — AMOXICILLIN 250 MG
2 CAPSULE ORAL 2 TIMES DAILY PRN
Status: DISCONTINUED | OUTPATIENT
Start: 2024-06-29 | End: 2024-07-02 | Stop reason: HOSPADM

## 2024-06-29 RX ORDER — METHYLPREDNISOLONE SODIUM SUCCINATE 125 MG/2ML
125 INJECTION, POWDER, LYOPHILIZED, FOR SOLUTION INTRAMUSCULAR; INTRAVENOUS ONCE
Status: COMPLETED | OUTPATIENT
Start: 2024-06-29 | End: 2024-06-29

## 2024-06-29 RX ORDER — BISACODYL 10 MG
10 SUPPOSITORY, RECTAL RECTAL DAILY PRN
Status: DISCONTINUED | OUTPATIENT
Start: 2024-06-29 | End: 2024-07-02 | Stop reason: HOSPADM

## 2024-06-29 RX ORDER — ACETAMINOPHEN 325 MG/1
650 TABLET ORAL EVERY 6 HOURS PRN
Status: DISCONTINUED | OUTPATIENT
Start: 2024-06-29 | End: 2024-07-02 | Stop reason: HOSPADM

## 2024-06-29 RX ORDER — NITROGLYCERIN 0.4 MG/1
0.4 TABLET SUBLINGUAL
Status: DISCONTINUED | OUTPATIENT
Start: 2024-06-29 | End: 2024-07-02 | Stop reason: HOSPADM

## 2024-06-29 RX ORDER — BUDESONIDE 0.5 MG/2ML
0.5 INHALANT ORAL
Status: DISCONTINUED | OUTPATIENT
Start: 2024-06-29 | End: 2024-07-02 | Stop reason: HOSPADM

## 2024-06-29 RX ORDER — ENOXAPARIN SODIUM 100 MG/ML
40 INJECTION SUBCUTANEOUS EVERY 24 HOURS
Status: DISCONTINUED | OUTPATIENT
Start: 2024-06-29 | End: 2024-07-02 | Stop reason: HOSPADM

## 2024-06-29 RX ORDER — IPRATROPIUM BROMIDE AND ALBUTEROL SULFATE 2.5; .5 MG/3ML; MG/3ML
3 SOLUTION RESPIRATORY (INHALATION) EVERY 4 HOURS PRN
Status: DISCONTINUED | OUTPATIENT
Start: 2024-06-29 | End: 2024-07-02 | Stop reason: HOSPADM

## 2024-06-29 RX ORDER — METOPROLOL SUCCINATE 25 MG/1
50 TABLET, EXTENDED RELEASE ORAL DAILY
Status: DISCONTINUED | OUTPATIENT
Start: 2024-06-30 | End: 2024-06-29

## 2024-06-29 RX ORDER — UREA 10 %
5 LOTION (ML) TOPICAL NIGHTLY PRN
Status: DISCONTINUED | OUTPATIENT
Start: 2024-06-29 | End: 2024-07-02 | Stop reason: HOSPADM

## 2024-06-29 RX ORDER — PREDNISONE 20 MG/1
40 TABLET ORAL
Status: DISCONTINUED | OUTPATIENT
Start: 2024-06-30 | End: 2024-07-02 | Stop reason: HOSPADM

## 2024-06-29 RX ORDER — KETOROLAC TROMETHAMINE 30 MG/ML
15 INJECTION, SOLUTION INTRAMUSCULAR; INTRAVENOUS ONCE
Status: COMPLETED | OUTPATIENT
Start: 2024-06-29 | End: 2024-06-29

## 2024-06-29 RX ADMIN — Medication 10 ML: at 21:16

## 2024-06-29 RX ADMIN — METOPROLOL SUCCINATE 50 MG: 25 TABLET, EXTENDED RELEASE ORAL at 21:13

## 2024-06-29 RX ADMIN — ENOXAPARIN SODIUM 40 MG: 100 INJECTION SUBCUTANEOUS at 21:13

## 2024-06-29 RX ADMIN — SODIUM CHLORIDE 1000 ML: 9 INJECTION, SOLUTION INTRAVENOUS at 17:14

## 2024-06-29 RX ADMIN — METHYLPREDNISOLONE SODIUM SUCCINATE 125 MG: 125 INJECTION, POWDER, FOR SOLUTION INTRAMUSCULAR; INTRAVENOUS at 17:25

## 2024-06-29 RX ADMIN — NITROFURANTOIN MONOHYDRATE/MACROCRYSTALLINE 100 MG: 25; 75 CAPSULE ORAL at 21:48

## 2024-06-29 RX ADMIN — KETOROLAC TROMETHAMINE 15 MG: 30 INJECTION, SOLUTION INTRAMUSCULAR; INTRAVENOUS at 17:15

## 2024-06-29 RX ADMIN — ACETAMINOPHEN 650 MG: 325 TABLET, FILM COATED ORAL at 21:21

## 2024-06-29 RX ADMIN — IPRATROPIUM BROMIDE AND ALBUTEROL SULFATE 3 ML: .5; 3 SOLUTION RESPIRATORY (INHALATION) at 17:38

## 2024-06-29 RX ADMIN — ONDANSETRON 4 MG: 2 INJECTION INTRAMUSCULAR; INTRAVENOUS at 17:15

## 2024-06-30 ENCOUNTER — APPOINTMENT (OUTPATIENT)
Dept: CARDIOLOGY | Facility: HOSPITAL | Age: 79
End: 2024-06-30
Payer: MEDICARE

## 2024-06-30 LAB
ANION GAP SERPL CALCULATED.3IONS-SCNC: 9.4 MMOL/L (ref 5–15)
BASOPHILS # BLD AUTO: 0.01 10*3/MM3 (ref 0–0.2)
BASOPHILS NFR BLD AUTO: 0.1 % (ref 0–1.5)
BILIRUB UR QL STRIP: NEGATIVE
BUN SERPL-MCNC: 18 MG/DL (ref 8–23)
BUN/CREAT SERPL: 37.5 (ref 7–25)
CALCIUM SPEC-SCNC: 8.8 MG/DL (ref 8.6–10.5)
CHLORIDE SERPL-SCNC: 105 MMOL/L (ref 98–107)
CLARITY UR: CLEAR
CO2 SERPL-SCNC: 24.6 MMOL/L (ref 22–29)
COLOR UR: YELLOW
CREAT SERPL-MCNC: 0.48 MG/DL (ref 0.57–1)
DEPRECATED RDW RBC AUTO: 45 FL (ref 37–54)
EGFRCR SERPLBLD CKD-EPI 2021: 97.7 ML/MIN/1.73
EOSINOPHIL # BLD AUTO: 0 10*3/MM3 (ref 0–0.4)
EOSINOPHIL NFR BLD AUTO: 0 % (ref 0.3–6.2)
ERYTHROCYTE [DISTWIDTH] IN BLOOD BY AUTOMATED COUNT: 12.9 % (ref 12.3–15.4)
GLUCOSE SERPL-MCNC: 221 MG/DL (ref 65–99)
GLUCOSE UR STRIP-MCNC: ABNORMAL MG/DL
HCT VFR BLD AUTO: 39 % (ref 34–46.6)
HGB BLD-MCNC: 12.7 G/DL (ref 12–15.9)
HGB UR QL STRIP.AUTO: NEGATIVE
IMM GRANULOCYTES # BLD AUTO: 0.05 10*3/MM3 (ref 0–0.05)
IMM GRANULOCYTES NFR BLD AUTO: 0.5 % (ref 0–0.5)
KETONES UR QL STRIP: ABNORMAL
LEUKOCYTE ESTERASE UR QL STRIP.AUTO: NEGATIVE
LYMPHOCYTES # BLD AUTO: 0.58 10*3/MM3 (ref 0.7–3.1)
LYMPHOCYTES NFR BLD AUTO: 5.8 % (ref 19.6–45.3)
MCH RBC QN AUTO: 31 PG (ref 26.6–33)
MCHC RBC AUTO-ENTMCNC: 32.6 G/DL (ref 31.5–35.7)
MCV RBC AUTO: 95.1 FL (ref 79–97)
MONOCYTES # BLD AUTO: 0.14 10*3/MM3 (ref 0.1–0.9)
MONOCYTES NFR BLD AUTO: 1.4 % (ref 5–12)
NEUTROPHILS NFR BLD AUTO: 9.22 10*3/MM3 (ref 1.7–7)
NEUTROPHILS NFR BLD AUTO: 92.2 % (ref 42.7–76)
NITRITE UR QL STRIP: NEGATIVE
NRBC BLD AUTO-RTO: 0 /100 WBC (ref 0–0.2)
PH UR STRIP.AUTO: 6 [PH] (ref 5–8)
PLATELET # BLD AUTO: 227 10*3/MM3 (ref 140–450)
PMV BLD AUTO: 10 FL (ref 6–12)
POTASSIUM SERPL-SCNC: 4.5 MMOL/L (ref 3.5–5.2)
PROT UR QL STRIP: NEGATIVE
RBC # BLD AUTO: 4.1 10*6/MM3 (ref 3.77–5.28)
SODIUM SERPL-SCNC: 139 MMOL/L (ref 136–145)
SP GR UR STRIP: >=1.03 (ref 1–1.03)
UROBILINOGEN UR QL STRIP: ABNORMAL
WBC NRBC COR # BLD AUTO: 10 10*3/MM3 (ref 3.4–10.8)

## 2024-06-30 PROCEDURE — G0378 HOSPITAL OBSERVATION PER HR: HCPCS

## 2024-06-30 PROCEDURE — 94799 UNLISTED PULMONARY SVC/PX: CPT

## 2024-06-30 PROCEDURE — 99232 SBSQ HOSP IP/OBS MODERATE 35: CPT | Performed by: STUDENT IN AN ORGANIZED HEALTH CARE EDUCATION/TRAINING PROGRAM

## 2024-06-30 PROCEDURE — 99222 1ST HOSP IP/OBS MODERATE 55: CPT | Performed by: INTERNAL MEDICINE

## 2024-06-30 PROCEDURE — 93356 MYOCRD STRAIN IMG SPCKL TRCK: CPT

## 2024-06-30 PROCEDURE — 63710000001 PREDNISONE PER 1 MG: Performed by: INTERNAL MEDICINE

## 2024-06-30 PROCEDURE — 93306 TTE W/DOPPLER COMPLETE: CPT | Performed by: INTERNAL MEDICINE

## 2024-06-30 PROCEDURE — 85025 COMPLETE CBC W/AUTO DIFF WBC: CPT | Performed by: INTERNAL MEDICINE

## 2024-06-30 PROCEDURE — 94761 N-INVAS EAR/PLS OXIMETRY MLT: CPT

## 2024-06-30 PROCEDURE — 80048 BASIC METABOLIC PNL TOTAL CA: CPT | Performed by: INTERNAL MEDICINE

## 2024-06-30 PROCEDURE — 93306 TTE W/DOPPLER COMPLETE: CPT

## 2024-06-30 PROCEDURE — 97161 PT EVAL LOW COMPLEX 20 MIN: CPT

## 2024-06-30 PROCEDURE — 93356 MYOCRD STRAIN IMG SPCKL TRCK: CPT | Performed by: INTERNAL MEDICINE

## 2024-06-30 PROCEDURE — 25010000002 ENOXAPARIN PER 10 MG: Performed by: INTERNAL MEDICINE

## 2024-06-30 PROCEDURE — 94664 DEMO&/EVAL PT USE INHALER: CPT

## 2024-06-30 PROCEDURE — 81003 URINALYSIS AUTO W/O SCOPE: CPT | Performed by: STUDENT IN AN ORGANIZED HEALTH CARE EDUCATION/TRAINING PROGRAM

## 2024-06-30 RX ADMIN — BUDESONIDE 0.5 MG: 0.5 INHALANT RESPIRATORY (INHALATION) at 07:24

## 2024-06-30 RX ADMIN — NITROFURANTOIN MONOHYDRATE/MACROCRYSTALLINE 100 MG: 25; 75 CAPSULE ORAL at 08:03

## 2024-06-30 RX ADMIN — PREDNISONE 40 MG: 20 TABLET ORAL at 08:06

## 2024-06-30 RX ADMIN — PANTOPRAZOLE SODIUM 40 MG: 40 TABLET, DELAYED RELEASE ORAL at 06:30

## 2024-06-30 RX ADMIN — GUAIFENESIN AND DEXTROMETHORPHAN 5 ML: 100; 10 SYRUP ORAL at 16:42

## 2024-06-30 RX ADMIN — METOPROLOL SUCCINATE 50 MG: 25 TABLET, EXTENDED RELEASE ORAL at 21:23

## 2024-06-30 RX ADMIN — OXYBUTYNIN CHLORIDE 10 MG: 5 TABLET, EXTENDED RELEASE ORAL at 08:03

## 2024-06-30 RX ADMIN — BUDESONIDE 0.5 MG: 0.5 INHALANT RESPIRATORY (INHALATION) at 20:11

## 2024-06-30 RX ADMIN — NITROFURANTOIN MONOHYDRATE/MACROCRYSTALLINE 100 MG: 25; 75 CAPSULE ORAL at 21:23

## 2024-06-30 RX ADMIN — Medication 10 ML: at 21:24

## 2024-06-30 RX ADMIN — GUAIFENESIN AND DEXTROMETHORPHAN 5 ML: 100; 10 SYRUP ORAL at 10:45

## 2024-06-30 RX ADMIN — Medication 10 ML: at 08:11

## 2024-06-30 RX ADMIN — ACETAMINOPHEN 650 MG: 325 TABLET, FILM COATED ORAL at 06:31

## 2024-06-30 RX ADMIN — ENOXAPARIN SODIUM 40 MG: 100 INJECTION SUBCUTANEOUS at 21:23

## 2024-06-30 RX ADMIN — ACETAMINOPHEN 650 MG: 325 TABLET, FILM COATED ORAL at 12:39

## 2024-06-30 NOTE — PLAN OF CARE
Problem: Adult Inpatient Plan of Care  Goal: Plan of Care Review  Outcome: Ongoing, Progressing  Goal: Patient-Specific Goal (Individualized)  Outcome: Ongoing, Progressing  Goal: Absence of Hospital-Acquired Illness or Injury  Outcome: Ongoing, Progressing  Intervention: Identify and Manage Fall Risk  Recent Flowsheet Documentation  Taken 6/30/2024 0000 by Chloe Olivarez RN  Safety Promotion/Fall Prevention:   activity supervised   assistive device/personal items within reach   clutter free environment maintained   safety round/check completed  Taken 6/29/2024 2200 by Chloe Olivarez RN  Safety Promotion/Fall Prevention:   activity supervised   assistive device/personal items within reach   clutter free environment maintained   safety round/check completed  Taken 6/29/2024 2100 by Chloe Olivarez RN  Safety Promotion/Fall Prevention:   activity supervised   assistive device/personal items within reach   clutter free environment maintained   safety round/check completed  Intervention: Prevent Skin Injury  Recent Flowsheet Documentation  Taken 6/30/2024 0000 by Chloe Olivarez RN  Body Position:   position changed independently   tilted   right  Taken 6/29/2024 2200 by Chloe Olivarez RN  Body Position:   position changed independently   supine, legs elevated  Taken 6/29/2024 2100 by Chloe Olivarez RN  Body Position:   position changed independently   sitting up in bed  Skin Protection:   adhesive use limited   tubing/devices free from skin contact   incontinence pads utilized  Intervention: Prevent and Manage VTE (Venous Thromboembolism) Risk  Recent Flowsheet Documentation  Taken 6/30/2024 0000 by Chloe Olivarez RN  Activity Management: activity minimized  Taken 6/29/2024 2200 by Chloe Olivarez RN  Activity Management: activity minimized  Taken 6/29/2024 2100 by Chloe Olivarez RN  Activity Management: activity encouraged  Intervention: Prevent Infection  Recent Flowsheet  Documentation  Taken 6/30/2024 0000 by Chloe Olivarez RN  Infection Prevention:   environmental surveillance performed   rest/sleep promoted  Taken 6/29/2024 2200 by Chloe Olivarez RN  Infection Prevention:   environmental surveillance performed   rest/sleep promoted  Taken 6/29/2024 2100 by Chloe Olivarez RN  Infection Prevention: environmental surveillance performed  Goal: Optimal Comfort and Wellbeing  Outcome: Ongoing, Progressing     Problem: Diabetes Comorbidity  Goal: Blood Glucose Level Within Targeted Range  Outcome: Ongoing, Progressing       Problem: Hypertension Comorbidity  Goal: Blood Pressure in Desired Range  Outcome: Ongoing, Progressing  Intervention: Maintain Blood Pressure Management  Recent Flowsheet Documentation  Taken 6/29/2024 2100 by Chloe Olivarez RN  Medication Review/Management: medications reviewed       Problem: Gas Exchange Impaired  Goal: Optimal Gas Exchange  Outcome: Ongoing, Progressing  Intervention: Optimize Oxygenation and Ventilation  Recent Flowsheet Documentation  Taken 6/30/2024 0000 by Chloe Olivarez RN  Head of Bed (HOB) Positioning: HOB elevated  Taken 6/29/2024 2200 by Chloe Olivarez RN  Head of Bed (HOB) Positioning: HOB elevated  Taken 6/29/2024 2100 by Chloe Olivarez RN  Head of Bed (HOB) Positioning: HOB elevated     Problem: Pain Acute  Goal: Acceptable Pain Control and Functional Ability  Outcome: Ongoing, Progressing  Intervention: Prevent or Manage Pain  Recent Flowsheet Documentation  Taken 6/29/2024 2100 by Chloe Olivarez RN  Medication Review/Management: medications reviewed  Intervention: Develop Pain Management Plan  Recent Flowsheet Documentation  Taken 6/29/2024 2200 by Chloe Olivarez RN  Pain Management Interventions: quiet environment facilitated  Taken 6/29/2024 2121 by Chloe Olivarez RN  Pain Management Interventions:   relaxation techniques promoted   quiet environment facilitated   see MAR  Taken 6/29/2024  2100 by Chloe Olivarez RN  Pain Management Interventions: quiet environment facilitated   Goal Outcome Evaluation:      Plan of care reviewed with patient. Patient admitted for hypoxia this shift. Patient has been on 1.5 L NC since arrival to unit. O2 saturation has remained 95% or greater. Patient does not wear oxygen at baseline. Will attempt to titrate patient off oxygent his AM. No significant events this shift.

## 2024-06-30 NOTE — CASE MANAGEMENT/SOCIAL WORK
Discharge Planning Assessment   Lu     Patient Name: Aster Craft  MRN: [id number]  Today's Date: 6/30/2024    Admit Date: 6/29/2024    Plan: Home with Family   Discharge Needs Assessment       Row Name 06/30/24 0838       Living Environment    People in Home child(popeye), adult    Current Living Arrangements home    Potentially Unsafe Housing Conditions none    In the past 12 months has the electric, gas, oil, or water company threatened to shut off services in your home? No    Primary Care Provided by self    Provides Primary Care For no one    Family Caregiver if Needed child(popeye), adult    Quality of Family Relationships involved    Able to Return to Prior Arrangements yes       Resource/Environmental Concerns    Resource/Environmental Concerns none    Transportation Concerns none       Transportation Needs    In the past 12 months, has lack of transportation kept you from medical appointments or from getting medications? no    In the past 12 months, has lack of transportation kept you from meetings, work, or from getting things needed for daily living? No       Food Insecurity    Within the past 12 months, you worried that your food would run out before you got the money to buy more. Never true    Within the past 12 months, the food you bought just didn't last and you didn't have money to get more. Never true       Transition Planning    Patient/Family Anticipates Transition to home with family    Patient/Family Anticipated Services at Transition none    Transportation Anticipated car, drives self;family or friend will provide       Discharge Needs Assessment    Readmission Within the Last 30 Days no previous admission in last 30 days    Equipment Currently Used at Home none    Concerns to be Addressed no discharge needs identified    Anticipated Changes Related to Illness none                   Discharge Plan       Row Name 06/30/24 0839       Plan    Plan Home with Family    Plan Comments Confirmed  address ,phone number Primary Physician as being correct on face sheet . Her daughter lives with her She is independent with ADLS   Does not have a Health Surrogate and is declining information regarding this .Plan is to return nicole denies any needs at this time                  Continued Care and Services - Admitted Since 6/29/2024    No active coordination exists for this encounter.          Demographic Summary       Row Name 06/30/24 0837       General Information    Admission Type observation    Arrived From emergency department    Required Notices Provided Observation Status Notice    Referral Source admission list    Reason for Consult discharge planning    Preferred Language English                   Functional Status       Row Name 06/30/24 0837       Functional Status    Usual Activity Tolerance good       Physical Activity    On average, how many days per week do you engage in moderate to strenuous exercise (like a brisk walk)? 0 days    On average, how many minutes do you engage in exercise at this level? 0 min    Number of minutes of exercise per week 0       Functional Status, IADL    Medications independent    Meal Preparation independent    Housekeeping independent    Laundry independent    Shopping independent       Mental Status    General Appearance WDL WDL                   Psychosocial    No documentation.                  Abuse/Neglect    No documentation.                  Legal    No documentation.                  Substance Abuse    No documentation.                  Patient Forms    No documentation.                     Aster Farris RN

## 2024-06-30 NOTE — PROGRESS NOTES
[facility]    Name:  Aster Craft   Age:  79 y.o.  Sex:  female  :  1945  MRN:  [id number]   Visit Number:  [id number]  Admission Date:  2024  Date Of Service:  24  Primary Care Physician:  Yolie Cotto MD     LOS: 0 days :    Chief Complaint:      Follow-up; Shortness of air    Subjective:    Still does not feel well overnight, generally weak, did have some tightness in her chest before she came in, does feel little better.  Has been on Macrobid for UTI.    Hospital Course:    Aster Craft is a 79-year-old female with past medical history significant for hypertension, hyperlipidemia, type 2 diabetes, remote history of breast cancer presents from home to the emergency room with 2 days of shortness of breath and nonproductive cough.  Patient reports audible wheezing.  States that she will have coughing episodes where she feels like she cannot catch her breath.  Is a lifelong non-smoker.  Is not on supplemental oxygen at baseline.  No known sick contacts.  Denies chest pain, nausea/vomiting or diarrhea.     ED summary: Patient afebrile, hemodynamically stable and hypoxic 88% on room air.  AB.39/44/72/27/94% on 2 L nasal cannula.  Negative troponins x 2.  Normal proBNP.  CMP unremarkable.  D-dimer procalcitonin within normal limits.  CBC unremarkable.  Respiratory PCR panel negative.  Chest x-ray personally reviewed, unremarkable.      Observation general floor admission early a.m. 2024 with acute hypoxic respiratory failure, unclear etiology.    Review of Systems:     All systems were reviewed and negative except as mentioned in subjective, assessment and plan.    Vital Signs:    Temp:  [97.5 °F (36.4 °C)-98.6 °F (37 °C)] 97.6 °F (36.4 °C)  Heart Rate:  [] 93  Resp:  [13-20] 18  BP: (104-137)/(63-84) 119/76    Intake and output:    I/O last 3 completed shifts:  In: 1000 [IV Piggyback:1000]  Out: -   I/O this shift:  In: 480  [P.O.:480]  Out: -     Physical Examination:    General Appearance:  Alert and cooperative.    Head:  Atraumatic and normocephalic.   Eyes: Conjunctivae and sclerae normal, no icterus. No pallor.   Throat: No oral lesions, no thrush, oral mucosa moist.   Neck: Supple, trachea midline, no thyromegaly.   Lungs:   Breath sounds heard bilaterally equally.  No wheezing or crackles. No Pleural rub or bronchial breathing.   Heart:  Normal S1 and S2, no murmur, no gallop, no rub. No JVD.   Abdomen:   Normal bowel sounds, no masses, no organomegaly. Soft, nontender, nondistended, no rebound tenderness.   Extremities: Supple, no edema, no cyanosis, no clubbing.   Skin: Warm.   Neurologic: Alert and oriented x 3. No facial asymmetry. Moves all four limbs. No tremors.      Laboratory results:    Results from last 7 days   Lab Units 06/30/24  0634 06/29/24  1634   SODIUM mmol/L 139 140   POTASSIUM mmol/L 4.5 4.1   CHLORIDE mmol/L 105 103   CO2 mmol/L 24.6 25.6   BUN mg/dL 18 11   CREATININE mg/dL 0.48* 0.54*   CALCIUM mg/dL 8.8 9.2   BILIRUBIN mg/dL  --  0.8   ALK PHOS U/L  --  98   ALT (SGPT) U/L  --  22   AST (SGOT) U/L  --  20   GLUCOSE mg/dL 221* 167*     Results from last 7 days   Lab Units 06/30/24  0634 06/29/24  1634   WBC 10*3/mm3 10.00 10.59   HEMOGLOBIN g/dL 12.7 13.3   HEMATOCRIT % 39.0 39.1   PLATELETS 10*3/mm3 227 219         Results from last 7 days   Lab Units 06/29/24  2036 06/29/24  1832 06/29/24  1634   HSTROP T ng/L 8 10 10         Recent Labs     06/29/24  1924   PHART 7.394   TDD3OAK 44.1   PO2ART 72.5*   BOK3CRI 26.9   BASEEXCESS 1.6      I have reviewed the patient's laboratory results.    Radiology results:    XR Chest 1 View    Result Date: 6/29/2024  PROCEDURE: XR CHEST 1 VW-  HISTORY: [facility] Triage Protocol  COMPARISON: 2/16/2024  FINDINGS:  Portable view of the chest demonstrates the lungs to be grossly clear. There is no evidence of effusion, pneumothorax or other significant pleural disease. The  mediastinum is unremarkable.  The heart size is normal.      Impression: Unremarkable portable chest.    This report was signed and finalized on 6/29/2024 6:11 PM by Isaiah Abbott MD.     I have reviewed the patient's radiology reports.    Medication Review:     I have reviewed the patient's active and prn medications.     Problem List:      Acute respiratory failure with hypoxia      Assessment:     Acute hypoxic respiratory failure POA        Plan:     Resting in bed, appears comfortably ill.  No respiratory distress.    Acute hypoxic respiratory failure, resolved  -Unclear etiology.  Bacterial and viral pneumonia ruled out.  -Patient does not have formal diagnosis of COPD  -Does have history significant for environmental allergies.  Possible exacerbation?  -Continue supplemental oxygen as necessary to maintain saturation greater than 88%.  Stable on room air at rest but required 1 to 2 L nasal cannula with ambulation in the ER.    UTI  Checking urinalysis and culture.  She was on Macrobid.  Possible that she may have something resistant to Macrobid and is feeling systemically ill from UTI.     Continue home medications as warranted.  Further orders as clinical course dictates.     Risk Assessment: Moderate  DVT Prophylaxis: Lovenox  Code Status: Full  Diet: As tolerated  Discharge Plan: Home; as early as 7/1 depending on clinical status    Brian Joseph Kerley, DO  06/30/24  14:54 EDT    Dictated utilizing Dragon dictation.

## 2024-06-30 NOTE — PLAN OF CARE
Goal Outcome Evaluation:  Plan of Care Reviewed With: patient           Outcome Evaluation: PT evaluation completed this date with pt presenting in fowlers position in bed, O x 4, c/o chest pain 4/10 from frequent coughing, on room air (O2 sat 94%). Pt was modified independent with bed mobility with extended time to complete task. While sitting on EOB independently pt was noted to have upper thoracic kyphosis with pt needing to hyper ext neck to look up. Pt needed CGA/HHA for transers and gait 68 ft. Pt was short of breath during gait with O2 sat 92%. Once pt sitting her O2 sat continued to decrease to 88%. Pt cued on breathing technique and had quick return to 94%. No change in pain with mobility. Pt presents with deficits in endurance, balance, and strength. She is expected to improve her functional mobility with continued PT services prior to d/c. Pt would benefit from rollator upon d/c.      Anticipated Discharge Disposition (PT): home with assist

## 2024-06-30 NOTE — H&P
[facility]      Name:  Aster Craft   Age:  79 y.o.  Sex:  female  :  1945  MRN:  [id number]   Visit Number:  [id number]  Admission Date:  2024  Date Of Service:  24  Primary Care Physician:  Yolie Cotto MD    Chief Complaint:     Shortness of breath    History Of Presenting Illness:      Patient is a pleasant 79-year-old female with history significant for hypertension, hyperlipidemia, type 2 diabetes, remote history of breast cancer presents from home to the emergency room with 2 days of shortness of breath and nonproductive cough.  Patient reports audible wheezing.  States that she will have coughing episodes where she feels like she cannot catch her breath.  Is a lifelong non-smoker.  Is not on supplemental oxygen at baseline.  No known sick contacts.  Denies chest pain, nausea/vomiting or diarrhea.    ED summary: Patient afebrile, hemodynamically stable and hypoxic 88% on room air.  AB.39/44/72/27/94% on 2 L nasal cannula.  Negative troponins x 2.  Normal proBNP.  CMP unremarkable.  D-dimer procalcitonin within normal limits.  CBC unremarkable.  Respiratory PCR panel negative.  Chest x-ray personally reviewed, unremarkable.  With new oxygen requirement, hospitalist asked to admit.    Review Of Systems:    All systems were reviewed and negative except as mentioned in history of presenting illness, assessment and plan.    Past Medical History: Patient  has a past medical history of Arthritis, Breast cancer, Diabetes mellitus, Elevated cholesterol, GERD (gastroesophageal reflux disease) (2021), History of cancer chemotherapy, Hypertension, Hyperthyroidism (2022), Kidney stone, Lichen sclerosus, Shingles, Thickened endometrium (), Urinary incontinence, and Urinary tract infection.    Past Surgical History: Patient  has a past surgical history that includes Mastectomy (Right, 10/1998); Mastectomy (Left, 2013);  Cholecystectomy (03/2010); Dilation and curettage of uterus; Cervical cone biopsy; Esophagogastroduodenoscopy (N/A, 07/06/2018); Colonoscopy (N/A, 07/06/2018); Tubal ligation; and Cataract Extraction (Right, 01/10/2023).    Social History: Patient  reports that she has never smoked. She has never been exposed to tobacco smoke. She has never used smokeless tobacco. She reports that she does not drink alcohol and does not use drugs.    Family History:  Patient's family history has been reviewed and found to be noncontributory.     Allergies:      No doz [caffeine]    Home Medications:    Prior to Admission Medications       Prescriptions Last Dose Informant Patient Reported? Taking?    metoprolol succinate XL (TOPROL-XL) 50 MG 24 hr tablet 6/28/2024  Yes Yes    Take 1 tablet by mouth Daily.    nitrofurantoin, macrocrystal-monohydrate, (MACROBID) 100 MG capsule 6/29/2024  Yes Yes    Take 1 capsule by mouth 2 (Two) Times a Day.    pantoprazole (PROTONIX) 40 MG EC tablet 6/29/2024  Yes Yes    Take 1 tablet by mouth.    albuterol sulfate  (90 Base) MCG/ACT inhaler   Yes No    TWO PUFFS EVERY SIX HOURS AS NEEDED    amoxicillin (AMOXIL) 500 MG capsule   Yes No    azelastine (ASTELIN) 0.1 % nasal spray   Yes No    INHALE ONE SPRAY IN EACH NOSTRIL TWICE DAILY    benzonatate (TESSALON) 200 MG capsule   Yes No    Take 1 capsule by mouth.    Blood Glucose Monitoring Suppl (OneTouch Verio Flex System) w/Device kit   Yes No    See Admin Instructions. for testing    brimonidine-timolol (COMBIGAN) 0.2-0.5 % ophthalmic solution   Yes No    Administer 1 drop to both eyes Every 12 (Twelve) Hours.    calcium carbonate-cholecalciferol 500-400 MG-UNIT tablet tablet   Yes No    Take  by mouth Daily.    cetirizine (zyrTEC) 10 MG tablet   Yes No    Take 1 tablet by mouth Daily.    Cholecalciferol 250 MCG (39983 UT) capsule   Yes No    Take 5,000 Units by mouth Daily.    CINNAMON PO   Yes No    Take  by mouth.    clobetasol (TEMOVATE)  0.05 % ointment   No No    Apply to affected area BID x 2 wks then daily x 2 wks then 2-3x/wk    Continuous Blood Gluc Sensor (FreeStyle Malina 2 Sensor) misc   Yes No    CHANGE SENSOR EVERY 14 DAYS    desloratadine (CLARINEX) 5 MG tablet   Yes No    Take 1 tablet by mouth Daily.    dextromethorphan-guaifenesin (ROBITUSSIN-DM)  MG/5ML syrup   Yes No    TAKE 10ML EVERY 4 HOURS AS NEEDED    diazePAM (VALIUM) 5 MG tablet   Yes No    TAKE 1 TABLET one hour prior TO procedure    dorzolamide (TRUSOPT) 2 % ophthalmic solution   Yes No    INSTILL ONE DROP IN EACH EYE TWICE DAILY    ezetimibe (ZETIA) 10 MG tablet   Yes No    Take 1 tablet by mouth Daily.    Flovent  MCG/ACT inhaler   Yes No    Inhale 2 puffs 2 (Two) Times a Day.    glipizide (GLUCOTROL XL) 5 MG ER tablet   Yes No    ipratropium (ATROVENT) 0.03 % nasal spray   Yes No    INHALE ONE SPRAY IN EACH NOSTRIL TWICE DAILY    ipratropium (ATROVENT) 0.06 % nasal spray   Yes No    INHALE 1-2 SPRAYS IN EACH NOSTRIL TWICE DAILY    Januvia 100 MG tablet   Yes No    Take 1 tablet by mouth Daily.    Lancets (OneTouch Delica Plus [id number]) misc   Yes No    USE AS DIRECTED EVERY DAY    meclizine (ANTIVERT) 12.5 MG tablet   Yes No    Take 1 tablet by mouth.    methylPREDNISolone (MEDROL) 4 MG dose pack   Yes No    TAKE SIX TABLETS ON DAY ONE, DECREASE BY ONE TABLET DAILY UNTIL FINISHED    Patient not taking:  Reported on 6/20/2024    montelukast (SINGULAIR) 10 MG tablet   Yes No    Take 1 tablet by mouth Every Evening.    Patient not taking:  Reported on 6/20/2024    nitroglycerin (NITROSTAT) 0.4 MG SL tablet   Yes No    Place 1 tablet under the tongue.    Omega-3 Fatty Acids (fish oil) 1000 MG capsule capsule   Yes No    Take  by mouth Daily With Breakfast.    Patient not taking:  Reported on 6/20/2024    ondansetron ODT (ZOFRAN-ODT) 4 MG disintegrating tablet   No No    Place 1 tablet under the tongue Every 6 (Six) Hours As Needed for Vomiting or Nausea.     Patient not taking:  Reported on 6/20/2024    OneTouch Verio test strip   Yes No    Daily. for testing    predniSONE (DELTASONE) 20 MG tablet   Yes No    Take 2 tablets by mouth Daily.    Patient not taking:  Reported on 6/20/2024    Rhopressa 0.02 % solution   Yes No    Administer 1 drop to both eyes Every Night.    simethicone (MYLICON) 80 MG chewable tablet   Yes No    Chew 1 tablet Every 6 (Six) Hours As Needed for Flatulence.    travoprost, BAK free, (TRAVATAN) 0.004 % solution ophthalmic solution   Yes No    1 drop Every Evening. in affected eye(s)    Patient not taking:  Reported on 6/20/2024    Triamcinolone Acetonide (NASACORT) 55 MCG/ACT nasal inhaler   Yes No    2 sprays into the nostril(s) as directed by provider Daily.    valACYclovir (VALTREX) 1000 MG tablet   Yes No    Take 1 tablet by mouth 3 times a day.    Patient not taking:  Reported on 6/20/2024    Vibegron (Gemtesa) 75 MG tablet   No No    Take 1 tablet by mouth Daily.          ED Medications:    Medications   sodium chloride 0.9 % flush 10 mL (has no administration in time range)   brimonidine (ALPHAGAN) 0.2 % ophthalmic solution 1 drop (1 drop Both Eyes Not Given 6/29/24 2122)     And   timolol (TIMOPTIC) 0.5 % ophthalmic solution 1 drop (1 drop Both Eyes Not Given 6/29/24 2122)   guaiFENesin-dextromethorphan (ROBITUSSIN DM) 100-10 MG/5ML syrup 5 mL (has no administration in time range)   dorzolamide (TRUSOPT) 2 % ophthalmic solution 1 drop (1 drop Both Eyes Not Given 6/29/24 2122)   budesonide (PULMICORT) nebulizer solution 0.5 mg (0.5 mg Nebulization Not Given 6/29/24 2229)   pantoprazole (PROTONIX) EC tablet 40 mg (has no administration in time range)   oxybutynin XL (DITROPAN-XL) 24 hr tablet 10 mg (has no administration in time range)   sodium chloride 0.9 % flush 10 mL (10 mL Intravenous Given 6/29/24 2116)   sodium chloride 0.9 % flush 10 mL (has no administration in time range)   sodium chloride 0.9 % infusion 40 mL (has no  administration in time range)   ondansetron (ZOFRAN) injection 4 mg (has no administration in time range)   Pharmacy to Dose enoxaparin (LOVENOX) (has no administration in time range)   nitroglycerin (NITROSTAT) SL tablet 0.4 mg (has no administration in time range)   Potassium Replacement - Follow Nurse / BPA Driven Protocol (has no administration in time range)   Magnesium Standard Dose Replacement - Follow Nurse / BPA Driven Protocol (has no administration in time range)   Phosphorus Replacement - Follow Nurse / BPA Driven Protocol (has no administration in time range)   Calcium Replacement - Follow Nurse / BPA Driven Protocol (has no administration in time range)   sennosides-docusate (PERICOLACE) 8.6-50 MG per tablet 2 tablet (has no administration in time range)     And   polyethylene glycol (MIRALAX) packet 17 g (has no administration in time range)     And   bisacodyl (DULCOLAX) EC tablet 5 mg (has no administration in time range)     And   bisacodyl (DULCOLAX) suppository 10 mg (has no administration in time range)   melatonin tablet 5 mg (has no administration in time range)   predniSONE (DELTASONE) tablet 40 mg (has no administration in time range)   ipratropium-albuterol (DUO-NEB) nebulizer solution 3 mL (has no administration in time range)   Enoxaparin Sodium (LOVENOX) syringe 40 mg (40 mg Subcutaneous Given 6/29/24 2113)   metoprolol succinate XL (TOPROL-XL) 24 hr tablet 50 mg (50 mg Oral Given 6/29/24 2113)   acetaminophen (TYLENOL) tablet 650 mg (650 mg Oral Given 6/29/24 2121)     Or   acetaminophen (TYLENOL) suppository 650 mg ( Rectal Not Given:  See Alt 6/29/24 2121)   nitrofurantoin (macrocrystal-monohydrate) (MACROBID) capsule 100 mg (100 mg Oral Given 6/29/24 2148)   ketorolac (TORADOL) injection 15 mg (15 mg Intravenous Given 6/29/24 1715)   ondansetron (ZOFRAN) injection 4 mg (4 mg Intravenous Given 6/29/24 1715)   sodium chloride 0.9 % bolus 1,000 mL (0 mL Intravenous Stopped 6/29/24 1954)  "  ipratropium-albuterol (DUO-NEB) nebulizer solution 3 mL (3 mL Nebulization Given 6/29/24 1738)   methylPREDNISolone sodium succinate (SOLU-Medrol) injection 125 mg (125 mg Intravenous Given 6/29/24 1725)     Vital Signs:  Temp:  [98 °F (36.7 °C)-98.6 °F (37 °C)] 98 °F (36.7 °C)  Heart Rate:  [] 86  Resp:  [16-20] 16  BP: (114-137)/(68-84) 114/70        06/29/24  1626 06/29/24 2048   Weight: 57.7 kg (127 lb 3.2 oz) (In stretcher)     Body mass index is 22.53 kg/m².    Physical Exam:     Most recent vital Signs: /70 (BP Location: Right arm, Patient Position: Lying)   Pulse 86   Temp 98 °F (36.7 °C) (Oral)   Resp 16   Ht 160 cm (63\")   Wt 57.7 kg (127 lb 3.2 oz)   SpO2 96%   BMI 22.53 kg/m²     Physical Exam    Laboratory data:    I have reviewed the labs done in the emergency room.    Results from last 7 days   Lab Units 06/29/24  1634   SODIUM mmol/L 140   POTASSIUM mmol/L 4.1   CHLORIDE mmol/L 103   CO2 mmol/L 25.6   BUN mg/dL 11   CREATININE mg/dL 0.54*   CALCIUM mg/dL 9.2   BILIRUBIN mg/dL 0.8   ALK PHOS U/L 98   ALT (SGPT) U/L 22   AST (SGOT) U/L 20   GLUCOSE mg/dL 167*     Results from last 7 days   Lab Units 06/29/24  1634   WBC 10*3/mm3 10.59   HEMOGLOBIN g/dL 13.3   HEMATOCRIT % 39.1   PLATELETS 10*3/mm3 219         Results from last 7 days   Lab Units 06/29/24 2036 06/29/24  1832 06/29/24  1634   HSTROP T ng/L 8 10 10     Results from last 7 days   Lab Units 06/29/24  1634   PROBNP pg/mL 233.4             Results from last 7 days   Lab Units 06/29/24  1924   PH, ARTERIAL pH units 7.394   PO2 ART mm Hg 72.5*   PCO2, ARTERIAL mm Hg 44.1   HCO3 ART mmol/L 26.9           Invalid input(s): \"USDES\", \"NITRITITE\", \"BACT\", \"EP\"    Pain Management Panel           No data to display                EKG:      EKG personally reviewed, sinus rhythm right bundle branch block.  Rate 88 bpm.    Radiology:    XR Chest 1 View    Result Date: 6/29/2024  PROCEDURE: XR CHEST 1 VW-  HISTORY: SOA Triage " Protocol  COMPARISON: 2/16/2024  FINDINGS:  Portable view of the chest demonstrates the lungs to be grossly clear. There is no evidence of effusion, pneumothorax or other significant pleural disease. The mediastinum is unremarkable.  The heart size is normal.      Unremarkable portable chest.    This report was signed and finalized on 6/29/2024 6:11 PM by Isaiah Abbott MD.       Assessment:    Acute hypoxic respiratory failure POA        Plan:    Acute hypoxic respiratory failure  -Unclear etiology.  Bacterial and viral pneumonia ruled out.  -Patient does not have formal diagnosis of COPD  -Does have history significant for environmental allergies.  Possible exacerbation?  -Continue supplemental oxygen as necessary to maintain saturation greater than 88%.  Stable on room air at rest but required 1 to 2 L nasal cannula with ambulation in the ER.    Continue home medications as warranted.  Further orders as clinical course dictates.    Risk Assessment: Moderate  DVT Prophylaxis: Lovenox  Code Status: Full  Diet: As tolerated             Trae See DO  06/30/24  00:44 EDT    Dictated utilizing Dragon dictation.

## 2024-06-30 NOTE — THERAPY EVALUATION
Patient Name: Aster Craft  : 1945    MRN: [id number]                              Today's Date: 2024       Admit Date: 2024    Visit Dx:     ICD-10-CM ICD-9-CM   1. Acute hypoxic respiratory failure  J96.01 518.81   2. Chest pain, unspecified type  R07.9 786.50     Patient Active Problem List   Diagnosis    Thickened endometrium    Right lower quadrant abdominal pain    Fatty (change of) liver, not elsewhere classified    A-fib    Breast CA    Essential hypertension    GERD (gastroesophageal reflux disease)    Hyperthyroidism    Acute respiratory failure with hypoxia     Past Medical History:   Diagnosis Date    Arthritis     Breast cancer     RIGHT BREAST STAGE 3    Diabetes mellitus     Elevated cholesterol     GERD (gastroesophageal reflux disease) 2021    History of cancer chemotherapy     Hypertension     Hyperthyroidism 2022    Kidney stone     Lichen sclerosus     Shingles     Thickened endometrium     Urinary incontinence     Urinary tract infection      Past Surgical History:   Procedure Laterality Date    CATARACT EXTRACTION Right 01/10/2023    CERVICAL CONE BIOPSY      CHOLECYSTECTOMY  2010    COLONOSCOPY N/A 2018    Procedure: COLONOSCOPY;  Surgeon: Trenton Lyons MD;  Location: [facility];  Service: Gastroenterology    DILATATION AND CURETTAGE      ENDOSCOPY N/A 2018    Procedure: ESOPHAGOGASTRODUODENOSCOPY;  Surgeon: Tretnon Lyons MD;  Location: [facility];  Service: Gastroenterology    MASTECTOMY Right 10/1998    MASTECTOMY Left 2013    TUBAL ABDOMINAL LIGATION        General Information       Row Name 24 1113          Physical Therapy Time and Intention    Document Type evaluation  -TW     Mode of Treatment physical therapy  -TW       Row Name 24 1113          General Information    Patient Profile Reviewed yes  -TW     Prior Level of Function independent:;all household mobility  pt does not use any assistive device at home for  ambulation. Pt has noted decreased endurance with gait and ADLs.  -TW     Existing Precautions/Restrictions fall  -TW     Barriers to Rehab medically complex  -TW       Row Name 06/30/24 1113          Living Environment    People in Home child(popeye), adult  -TW       Row Name 06/30/24 1113          Home Main Entrance    Number of Stairs, Main Entrance none  -TW       Row Name 06/30/24 1113          Stairs Within Home, Primary    Stairs, Within Home, Primary ramp entry  -TW     Number of Stairs, Within Home, Primary none  -TW     Stair Railings, Within Home, Primary other (see comments)  -TW     Stairs Comment, Within Home, Primary 2 levels of home but stays on main level of home.  -TW       Row Name 06/30/24 1113          Cognition    Orientation Status (Cognition) oriented x 4  -TW       Row Name 06/30/24 1113          Safety Issues, Functional Mobility    Safety Issues Affecting Function (Mobility) safety precaution awareness;safety precautions follow-through/compliance  -TW     Impairments Affecting Function (Mobility) shortness of breath;endurance/activity tolerance;balance;strength;pain  -TW               User Key  (r) = Recorded By, (t) = Taken By, (c) = Cosigned By      Initials Name Provider Type    TW Poppy Savage, PT Physical Therapist                   Mobility       Row Name 06/30/24 1113          Bed Mobility    Bed Mobility supine-sit;sit-supine  -TW     Supine-Sit [place] (Bed Mobility) modified independence  -TW     Sit-Supine [place] (Bed Mobility) modified independence  -TW     Assistive Device (Bed Mobility) head of bed elevated  -TW     Comment, (Bed Mobility) pt did need extended time to get BLE back up onto bed but was able to do so without assist.  -TW       Row Name 06/30/24 1113          Transfers    Comment, (Transfers) gt belt in use for all mobility.  -TW       Row Name 06/30/24 1113          Bed-Chair Transfer    Bed-Chair [place] (Transfers) contact guard  -TW      Assistive Device (Bed-Chair Transfers) other (see comments)  -TW     Comment, (Bed-Chair Transfer) HHA/CGA  -TW       Row Name 06/30/24 1113          Sit-Stand Transfer    Sit-Stand [place] (Transfers) standby assist  -TW       Row Name 06/30/24 1113          Gait/Stairs (Locomotion)    [place] Level (Gait) contact guard  -TW     Assistive Device (Gait) other (see comments)  -TW     Patient was able to Ambulate yes  -TW     Distance in Feet (Gait) 68  -TW     Deviations/Abnormal Patterns (Gait) stride length decreased;festinating/shuffling;gait speed decreased  -TW     Bilateral Gait Deviations forward flexed posture;heel strike decreased  -TW     Comment, (Gait/Stairs) pt does have upper thoracic kyphosis with the need to hyper extend her head to look up with ambulation.  -TW               User Key  (r) = Recorded By, (t) = Taken By, (c) = Cosigned By      Initials Name Provider Type    TW HusseinPoppy, PT Physical Therapist                   Obj/Interventions       Row Name 06/30/24 1147          Range of Motion Comprehensive    Comment, General Range of Motion BLE grossly WFLS  -TW       Row Name 06/30/24 1147          Strength Comprehensive (MMT)    Comment, General Manual Muscle Testing (MMT) Assessment BLE grossly 3/5 ot 4/5  -TW       Row Name 06/30/24 1147          Balance    Balance Assessment sitting static balance;standing dynamic balance;sitting dynamic balance;sit to stand dynamic balance;standing static balance  -TW     Static Sitting Balance modified independence  -TW     Dynamic Sitting Balance modified independence  -TW     Position, Sitting Balance unsupported;sitting edge of bed  -TW     Sit to Stand Dynamic Balance standby assist;contact guard  -TW     Static Standing Balance contact guard  -TW     Dynamic Standing Balance contact guard  -TW     Position/Device Used, Standing Balance supported  -TW               User Key  (r) = Recorded By, (t) = Taken By, (c) = Cosigned By       Initials Name Provider Type    TW Poppy Savage, PT Physical Therapist                   Goals/Plan       Row Name 06/30/24 1113          Transfer Goal 1 (PT)    Activity/Assistive Device (Transfer Goal 1, PT) sit-to-stand/stand-to-sit;bed-to-chair/chair-to-bed;toilet  -TW     [place] Level/Cues Needed (Transfer Goal 1, PT) modified independence  -TW     Time Frame (Transfer Goal 1, PT) short term goal (STG);3 days  -TW     Strategies/Barriers (Transfers Goal 1, PT) without shortness of breath  -TW     Progress/Outcome (Transfer Goal 1, PT) goal ongoing  -TW       Row Name 06/30/24 1113          Gait Training Goal 1 (PT)    Activity/Assistive Device (Gait Training Goal 1, PT) gait (walking locomotion);assistive device use;increase endurance/gait distance;increase energy conservation  -TW     [place] Level (Gait Training Goal 1, PT) supervision required  -TW     Distance (Gait Training Goal 1, PT) 100 ft  -TW     Time Frame (Gait Training Goal 1, PT) long term goal (LTG);1 week  -TW     Strategies/Barriers (Gait Training Goal 1, PT) with O2 sat above 90%  -TW     Progress/Outcome (Gait Training Goal 1, PT) goal ongoing  -TW       Row Name 06/30/24 1113          Patient Education Goal (PT)    Activity (Patient Education Goal, PT) BLE ther ex 1 x 10  -TW     [place]/Cues/Accuracy (Memory Goal 2, PT) demonstrates adequately  -TW     Time Frame (Patient Education Goal, PT) long term goal (LTG);1 week  -TW     Progress/Outcome (Patient Education Goal, PT) goal ongoing  -TW       Row Name 06/30/24 1113          Therapy Assessment/Plan (PT)    Planned Therapy Interventions (PT) balance training;patient/family education;strengthening;transfer training;gait training  -TW               User Key  (r) = Recorded By, (t) = Taken By, (c) = Cosigned By      Initials Name Provider Type    TW Poppy Savage, PT Physical Therapist                   Clinical Impression       Row Name 06/30/24 1147          Pain     Pretreatment Pain Rating 4/10  -TW     Posttreatment Pain Rating 4/10  -TW     Pain Location - Side/Orientation Bilateral  -TW     Pain Location - chest  -TW     Pre/Posttreatment Pain Comment pain is from frequent coughing.  -TW       Row Name 06/30/24 1147          Plan of Care Review    Plan of Care Reviewed With patient  -TW     Outcome Evaluation PT evaluation completed this date with pt presenting in fowlers position in bed, O x 4, c/o chest pain 4/10 from frequent coughing, on room air (O2 sat 94%). Pt was modified independent with bed mobility with extended time to complete task. While sitting on EOB independently pt was noted to have upper thoracic kyphosis with pt needing to hyper ext neck to look up. Pt needed CGA/HHA for transers and gait 68 ft. Pt was short of breath during gait with O2 sat 92%. Once pt sitting her O2 sat continued to decrease to 88%. Pt cued on breathing technique and had quick return to 94%. No change in pain with mobility. Pt presents with deficits in endurance, balance, and strength. She is expected to improve her functional mobility with continued PT services prior to d/c. Pt would benefit from rollator upon d/c.  -TW       Row Name 06/30/24 1140          Therapy Assessment/Plan (PT)    Patient/Family Therapy Goals Statement (PT) to return home with daughter  -TW     Rehab Potential (PT) good, to achieve stated therapy goals  -TW     Criteria for Skilled Interventions Met (PT) yes;meets criteria  -TW     Therapy Frequency (PT) 5 times/wk  -TW     Predicted Duration of Therapy Intervention (PT) 1 wk  -TW       Row Name 06/30/24 1145          Vital Signs    Pretreatment Heart Rate (beats/min) 93  -TW     Posttreatment Heart Rate (beats/min) 94  -TW     Pre SpO2 (%) 94  -TW     O2 Delivery Pre Treatment room air  -TW     Intra SpO2 (%) 88  -TW     O2 Delivery Intra Treatment room air  -TW     Post SpO2 (%) 94  -TW     O2 Delivery Post Treatment room air  -TW     Pre Patient Position  Supine  -TW     Intra Patient Position Standing  -TW     Post Patient Position Supine  -TW       Row Name 06/30/24 1147          Positioning and Restraints    Pre-Treatment Position in bed  -TW     Post Treatment Position bed  -TW     In Bed notified nsg;fowlers;side rails up x1;call light within reach;encouraged to call for assist  -TW               User Key  (r) = Recorded By, (t) = Taken By, (c) = Cosigned By      Initials Name Provider Type    TW Poppy Savage, BRAULIO Physical Therapist                   Outcome Measures       Row Name 06/30/24 1113 06/30/24 0815       How much help from another person do you currently need...    Turning from your back to your side while in flat bed without using bedrails? 4  -TW 4  -CG    Moving from lying on back to sitting on the side of a flat bed without bedrails? 4  -TW 4  -CG    Moving to and from a bed to a chair (including a wheelchair)? 3  -TW 4  -CG    Standing up from a chair using your arms (e.g., wheelchair, bedside chair)? 4  -TW 4  -CG    Climbing 3-5 steps with a railing? 3  -TW 4  -CG    To walk in hospital room? 3  -TW 4  -CG    AM-PAC 6 Clicks Score (PT) 21  -TW 24  -CG    Highest Level of Mobility Goal 6 --> Walk 10 steps or more  -TW 8 --> Walked 250 feet or more  -CG      Row Name 06/30/24 1113          Functional Assessment    Outcome Measure Options AM-PAC 6 Clicks Basic Mobility (PT)  -TW               User Key  (r) = Recorded By, (t) = Taken By, (c) = Cosigned By      Initials Name Provider Type    Celena Krishnan, RN Registered Nurse    Poppy Ruff, PT Physical Therapist                                 Physical Therapy Education       Title: PT OT SLP Therapies (In Progress)       Topic: Physical Therapy (In Progress)       Point: Mobility training (Done)       Learning Progress Summary             Patient Acceptance, E, VU by NEGIN at 6/30/2024 1113    Comment: Pt education for purpose of PT evaluation and PT POC                         Point: Home  exercise program (Not Started)       Learner Progress:  Not documented in this visit.              Point: Body mechanics (Not Started)       Learner Progress:  Not documented in this visit.              Point: Precautions (Not Started)       Learner Progress:  Not documented in this visit.                              User Key       Initials Effective Dates Name Provider Type Discipline     06/16/21 -  Poppy Savage, PT Physical Therapist PT                  PT Recommendation and Plan  Planned Therapy Interventions (PT): balance training, patient/family education, strengthening, transfer training, gait training  Plan of Care Reviewed With: patient  Outcome Evaluation: PT evaluation completed this date with pt presenting in fowlers position in bed, O x 4, c/o chest pain 4/10 from frequent coughing, on room air (O2 sat 94%). Pt was modified independent with bed mobility with extended time to complete task. While sitting on EOB independently pt was noted to have upper thoracic kyphosis with pt needing to hyper ext neck to look up. Pt needed CGA/HHA for transers and gait 68 ft. Pt was short of breath during gait with O2 sat 92%. Once pt sitting her O2 sat continued to decrease to 88%. Pt cued on breathing technique and had quick return to 94%. No change in pain with mobility. Pt presents with deficits in endurance, balance, and strength. She is expected to improve her functional mobility with continued PT services prior to d/c. Pt would benefit from rollator upon d/c.     Time Calculation:   PT Evaluation Complexity  History, PT Evaluation Complexity: 3 or more personal factors and/or comorbidities  Examination of Body Systems (PT Eval Complexity): total of 3 or more elements  Clinical Presentation (PT Evaluation Complexity): unstable  Clinical Decision Making (PT Evaluation Complexity): low complexity  Overall Complexity (PT Evaluation Complexity): low complexity     PT Charges       Row Name 06/30/24 1468              Time Calculation    Stop Time 1113  -TW      PT Received On 06/30/24 -TW      PT Goal Re-Cert Due Date 07/07/24 -TW                User Key  (r) = Recorded By, (t) = Taken By, (c) = Cosigned By      Initials Name Provider Type    Poppy Ruff PT Physical Therapist                  Therapy Charges for Today       Code Description Service Date Service Provider Modifiers Qty    57920155172 HC PT EVAL LOW COMPLEXITY 3 6/30/2024 Poppy Savage PT GP 1            PT G-Codes  Outcome Measure Options: AM-PAC 6 Clicks Basic Mobility (PT)  AM-PAC 6 Clicks Score (PT): 21  PT Discharge Summary  Anticipated Discharge Disposition (PT): home with assist    Poppy Savage PT  6/30/2024

## 2024-06-30 NOTE — PLAN OF CARE
Goal Outcome Evaluation:  Plan of Care Reviewed With: patient        Progress: improving  Outcome Evaluation: VSS. Pt complains of headache treated with prn meds (see mar). Coughing is less frequent. Oxygen saturation at 96% on room air. Plan of care ongoing.

## 2024-07-01 ENCOUNTER — APPOINTMENT (OUTPATIENT)
Dept: MRI IMAGING | Facility: HOSPITAL | Age: 79
End: 2024-07-01
Payer: MEDICARE

## 2024-07-01 ENCOUNTER — APPOINTMENT (OUTPATIENT)
Dept: CT IMAGING | Facility: HOSPITAL | Age: 79
End: 2024-07-01
Payer: MEDICARE

## 2024-07-01 LAB
BH CV ECHO LEFT VENTRICLE GLOBAL LONGITUDINAL STRAIN: -13.2 %
BH CV ECHO MEAS - AI P1/2T: 438.7 MSEC
BH CV ECHO MEAS - AO MAX PG: 6 MMHG
BH CV ECHO MEAS - AO MEAN PG: 3 MMHG
BH CV ECHO MEAS - AO ROOT DIAM: 3 CM
BH CV ECHO MEAS - AO V2 MAX: 122 CM/SEC
BH CV ECHO MEAS - AO V2 VTI: 22.8 CM
BH CV ECHO MEAS - AVA(I,D): 1.88 CM2
BH CV ECHO MEAS - EDV(CUBED): 115.5 ML
BH CV ECHO MEAS - EDV(MOD-SP2): 70.2 ML
BH CV ECHO MEAS - EDV(MOD-SP4): 80.1 ML
BH CV ECHO MEAS - EF(MOD-BP): 45.6 %
BH CV ECHO MEAS - EF(MOD-SP2): 46.4 %
BH CV ECHO MEAS - EF(MOD-SP4): 45.3 %
BH CV ECHO MEAS - EF_3D-VOL: 46 %
BH CV ECHO MEAS - ESV(CUBED): 64.5 ML
BH CV ECHO MEAS - ESV(MOD-SP2): 37.6 ML
BH CV ECHO MEAS - ESV(MOD-SP4): 43.8 ML
BH CV ECHO MEAS - FS: 17.7 %
BH CV ECHO MEAS - IVS/LVPW: 0.84 CM
BH CV ECHO MEAS - IVSD: 0.89 CM
BH CV ECHO MEAS - LA DIMENSION: 3.7 CM
BH CV ECHO MEAS - LAT PEAK E' VEL: 8.4 CM/SEC
BH CV ECHO MEAS - LV DIASTOLIC VOL/BSA (35-75): 50.2 CM2
BH CV ECHO MEAS - LV MASS(C)D: 168.4 GRAMS
BH CV ECHO MEAS - LV MAX PG: 3.5 MMHG
BH CV ECHO MEAS - LV MEAN PG: 2 MMHG
BH CV ECHO MEAS - LV SYSTOLIC VOL/BSA (12-30): 27.5 CM2
BH CV ECHO MEAS - LV V1 MAX: 92.9 CM/SEC
BH CV ECHO MEAS - LV V1 VTI: 17.2 CM
BH CV ECHO MEAS - LVIDD: 4.9 CM
BH CV ECHO MEAS - LVIDS: 4 CM
BH CV ECHO MEAS - LVOT AREA: 2.49 CM2
BH CV ECHO MEAS - LVOT DIAM: 1.78 CM
BH CV ECHO MEAS - LVPWD: 1.06 CM
BH CV ECHO MEAS - MED PEAK E' VEL: 5.2 CM/SEC
BH CV ECHO MEAS - MR MAX PG: 104 MMHG
BH CV ECHO MEAS - MR MAX VEL: 510 CM/SEC
BH CV ECHO MEAS - MR MEAN PG: 57 MMHG
BH CV ECHO MEAS - MR MEAN VEL: 347 CM/SEC
BH CV ECHO MEAS - MR VTI: 160 CM
BH CV ECHO MEAS - MV A MAX VEL: 124 CM/SEC
BH CV ECHO MEAS - MV DEC SLOPE: 630 CM/SEC2
BH CV ECHO MEAS - MV DEC TIME: 0.14 SEC
BH CV ECHO MEAS - MV E MAX VEL: 90.3 CM/SEC
BH CV ECHO MEAS - MV E/A: 0.73
BH CV ECHO MEAS - MV MAX PG: 5.5 MMHG
BH CV ECHO MEAS - MV MEAN PG: 3 MMHG
BH CV ECHO MEAS - MV V2 VTI: 27.3 CM
BH CV ECHO MEAS - MVA(VTI): 1.57 CM2
BH CV ECHO MEAS - PA ACC TIME: 0.1 SEC
BH CV ECHO MEAS - PA V2 MAX: 81.5 CM/SEC
BH CV ECHO MEAS - RV MAX PG: 2.25 MMHG
BH CV ECHO MEAS - RV V1 MAX: 75 CM/SEC
BH CV ECHO MEAS - RV V1 VTI: 13.9 CM
BH CV ECHO MEAS - SV(LVOT): 42.8 ML
BH CV ECHO MEAS - SV(MOD-SP2): 32.6 ML
BH CV ECHO MEAS - SV(MOD-SP4): 36.3 ML
BH CV ECHO MEAS - SVI(LVOT): 26.8 ML/M2
BH CV ECHO MEAS - SVI(MOD-SP2): 20.4 ML/M2
BH CV ECHO MEAS - SVI(MOD-SP4): 22.8 ML/M2
BH CV ECHO MEAS - TAPSE (>1.6): 2.01 CM
BH CV ECHO MEASUREMENTS AVERAGE E/E' RATIO: 13.28
BH CV XLRA - RV BASE: 3.4 CM
BH CV XLRA - RV LENGTH: 6.9 CM
BH CV XLRA - RV MID: 3.2 CM
BH CV XLRA - TDI S': 10.8 CM/SEC
GEN 5 2HR TROPONIN T REFLEX: 11 NG/L
HBA1C MFR BLD: 8.9 % (ref 4.8–5.6)
TROPONIN T DELTA: 0 NG/L
TROPONIN T SERPL HS-MCNC: 11 NG/L

## 2024-07-01 PROCEDURE — 97166 OT EVAL MOD COMPLEX 45 MIN: CPT

## 2024-07-01 PROCEDURE — 71275 CT ANGIOGRAPHY CHEST: CPT

## 2024-07-01 PROCEDURE — 63710000001 PREDNISONE PER 1 MG: Performed by: INTERNAL MEDICINE

## 2024-07-01 PROCEDURE — 94761 N-INVAS EAR/PLS OXIMETRY MLT: CPT

## 2024-07-01 PROCEDURE — 83036 HEMOGLOBIN GLYCOSYLATED A1C: CPT | Performed by: STUDENT IN AN ORGANIZED HEALTH CARE EDUCATION/TRAINING PROGRAM

## 2024-07-01 PROCEDURE — 70551 MRI BRAIN STEM W/O DYE: CPT

## 2024-07-01 PROCEDURE — 25510000001 IOPAMIDOL 61 % SOLUTION: Performed by: STUDENT IN AN ORGANIZED HEALTH CARE EDUCATION/TRAINING PROGRAM

## 2024-07-01 PROCEDURE — 25010000002 FUROSEMIDE PER 20 MG: Performed by: STUDENT IN AN ORGANIZED HEALTH CARE EDUCATION/TRAINING PROGRAM

## 2024-07-01 PROCEDURE — 94799 UNLISTED PULMONARY SVC/PX: CPT

## 2024-07-01 PROCEDURE — 25010000002 LORAZEPAM PER 2 MG: Performed by: STUDENT IN AN ORGANIZED HEALTH CARE EDUCATION/TRAINING PROGRAM

## 2024-07-01 PROCEDURE — 84484 ASSAY OF TROPONIN QUANT: CPT | Performed by: STUDENT IN AN ORGANIZED HEALTH CARE EDUCATION/TRAINING PROGRAM

## 2024-07-01 PROCEDURE — 99232 SBSQ HOSP IP/OBS MODERATE 35: CPT | Performed by: STUDENT IN AN ORGANIZED HEALTH CARE EDUCATION/TRAINING PROGRAM

## 2024-07-01 PROCEDURE — 25010000002 ENOXAPARIN PER 10 MG: Performed by: INTERNAL MEDICINE

## 2024-07-01 PROCEDURE — 94664 DEMO&/EVAL PT USE INHALER: CPT

## 2024-07-01 RX ORDER — LORAZEPAM 2 MG/ML
1 INJECTION INTRAMUSCULAR ONCE AS NEEDED
Status: COMPLETED | OUTPATIENT
Start: 2024-07-01 | End: 2024-07-01

## 2024-07-01 RX ORDER — FUROSEMIDE 10 MG/ML
20 INJECTION INTRAMUSCULAR; INTRAVENOUS ONCE
Status: COMPLETED | OUTPATIENT
Start: 2024-07-01 | End: 2024-07-01

## 2024-07-01 RX ADMIN — NITROFURANTOIN MONOHYDRATE/MACROCRYSTALLINE 100 MG: 25; 75 CAPSULE ORAL at 08:44

## 2024-07-01 RX ADMIN — PREDNISONE 40 MG: 20 TABLET ORAL at 07:52

## 2024-07-01 RX ADMIN — Medication 10 ML: at 21:03

## 2024-07-01 RX ADMIN — IOPAMIDOL 100 ML: 612 INJECTION, SOLUTION INTRAVENOUS at 11:38

## 2024-07-01 RX ADMIN — LORAZEPAM 1 MG: 2 INJECTION INTRAMUSCULAR at 11:50

## 2024-07-01 RX ADMIN — OXYBUTYNIN CHLORIDE 10 MG: 5 TABLET, EXTENDED RELEASE ORAL at 08:44

## 2024-07-01 RX ADMIN — FUROSEMIDE 20 MG: 10 INJECTION, SOLUTION INTRAMUSCULAR; INTRAVENOUS at 17:02

## 2024-07-01 RX ADMIN — ACETAMINOPHEN 650 MG: 325 TABLET, FILM COATED ORAL at 06:09

## 2024-07-01 RX ADMIN — METOPROLOL SUCCINATE 50 MG: 25 TABLET, EXTENDED RELEASE ORAL at 21:02

## 2024-07-01 RX ADMIN — Medication 5 MG: at 21:03

## 2024-07-01 RX ADMIN — ENOXAPARIN SODIUM 40 MG: 100 INJECTION SUBCUTANEOUS at 21:03

## 2024-07-01 RX ADMIN — BUDESONIDE 0.5 MG: 0.5 INHALANT RESPIRATORY (INHALATION) at 07:05

## 2024-07-01 RX ADMIN — PANTOPRAZOLE SODIUM 40 MG: 40 TABLET, DELAYED RELEASE ORAL at 07:21

## 2024-07-01 RX ADMIN — GUAIFENESIN AND DEXTROMETHORPHAN 5 ML: 100; 10 SYRUP ORAL at 07:51

## 2024-07-01 NOTE — CASE MANAGEMENT/SOCIAL WORK
Case Management/Social Work    Patient Name:  Aster Craft  YOB: 1945  MRN: [id number]  Admit Date:  6/29/2024        10:05 EDT  Met with patient at bedside. Patient plans to discharge home with her daughter once medically ready. States no needs at this time. CM will continue to follow.      Electronically signed by:  Aung Zarate RN  07/01/24 10:05 EDT

## 2024-07-01 NOTE — PLAN OF CARE
Goal Outcome Evaluation:  Plan of Care Reviewed With: patient        Progress: no change  Outcome Evaluation: OT eval completed. Pt A&O x4. Pt agreeable to eval and tx. Pt reports she lives in two story home but only uses one floor and lives with daughter. Pt reports (I) with ADLs as PLOF. Pt able to complete supine to sit EOB with MI. Pt able to complete STS with SBA. Pt completes fxl mobility into bathroom with HHA and CGA. Pt performed toileting tasks and perineal hygiene with supervision. Pt able to stand at sink to complete grooming tasks with (I). Pt able to complete fxl mobility x25ft with HHA and CGA. Pt returned supine in bed with MI. Pt left with needs in reach. Pt is at baseline function and does not require skilled OT services at this time. OT to sign off.

## 2024-07-01 NOTE — PROGRESS NOTES
[facility]    Name:  Aster Craft   Age:  79 y.o.  Sex:  female  :  1945  MRN:  [id number]   Visit Number:  [id number]  Admission Date:  2024  Date Of Service:  24  Primary Care Physician:  Yolie Cotto MD     LOS: 0 days :    Chief Complaint:      Follow-up; Shortness of air    Subjective:    Little better but still some shortness of air with intermittent tightness in her chest.  Feels like the tightness in her chest radiates up into her ears that time.  Still feels a little unsteady with walking.  Did need some help to go to the restroom.    Hospital Course:    sAter Craft is a 79-year-old female with past medical history significant for hypertension, hyperlipidemia, type 2 diabetes, remote history of breast cancer presents from home to the emergency room with 2 days of shortness of breath and nonproductive cough.  Patient reports audible wheezing.  States that she will have coughing episodes where she feels like she cannot catch her breath.  Is a lifelong non-smoker.  Is not on supplemental oxygen at baseline.  No known sick contacts.  Denies chest pain, nausea/vomiting or diarrhea.     ED summary: Patient afebrile, hemodynamically stable and hypoxic 88% on room air.  AB.39/44/72/27/94% on 2 L nasal cannula.  Negative troponins x 2.  Normal proBNP.  CMP unremarkable.  D-dimer procalcitonin within normal limits.  CBC unremarkable.  Respiratory PCR panel negative.  Chest x-ray personally reviewed, unremarkable.      Observation general floor admission early a.m. 2024 with acute hypoxic respiratory failure, unclear etiology.    Review of Systems:     All systems were reviewed and negative except as mentioned in subjective, assessment and plan.    Vital Signs:    Temp:  [97.6 °F (36.4 °C)-98.1 °F (36.7 °C)] 97.9 °F (36.6 °C)  Heart Rate:  [67-93] 67  Resp:  [16-18] 18  BP: (114-120)/(67-89) 114/89    Intake and output:    I/O last 3  completed shifts:  In: 1660 [P.O.:660; IV Piggyback:1000]  Out: 200 [Urine:200]  No intake/output data recorded.    Physical Examination:    General Appearance:  Alert and cooperative.    Head:  Atraumatic and normocephalic.   Eyes: Conjunctivae and sclerae normal, no icterus. No pallor.   Throat: No oral lesions, no thrush, oral mucosa moist.   Neck: Supple, trachea midline, no thyromegaly.   Lungs:   Breath sounds heard bilaterally equally.  No wheezing or crackles. No Pleural rub or bronchial breathing.   Heart:  Normal S1 and S2, no murmur, no gallop, no rub. No JVD.   Abdomen:   Normal bowel sounds, no masses, no organomegaly. Soft, nontender, nondistended, no rebound tenderness.   Extremities: Supple, no edema, no cyanosis, no clubbing.   Skin: Warm.   Neurologic: Alert and oriented x 3. No facial asymmetry. Moves all four limbs. No tremors.      Laboratory results:    Results from last 7 days   Lab Units 06/30/24  0634 06/29/24  1634   SODIUM mmol/L 139 140   POTASSIUM mmol/L 4.5 4.1   CHLORIDE mmol/L 105 103   CO2 mmol/L 24.6 25.6   BUN mg/dL 18 11   CREATININE mg/dL 0.48* 0.54*   CALCIUM mg/dL 8.8 9.2   BILIRUBIN mg/dL  --  0.8   ALK PHOS U/L  --  98   ALT (SGPT) U/L  --  22   AST (SGOT) U/L  --  20   GLUCOSE mg/dL 221* 167*     Results from last 7 days   Lab Units 06/30/24  0634 06/29/24  1634   WBC 10*3/mm3 10.00 10.59   HEMOGLOBIN g/dL 12.7 13.3   HEMATOCRIT % 39.0 39.1   PLATELETS 10*3/mm3 227 219         Results from last 7 days   Lab Units 06/29/24 2036 06/29/24  1832 06/29/24  1634   HSTROP T ng/L 8 10 10         Recent Labs     06/29/24  1924   PHART 7.394   AIT8QCT 44.1   PO2ART 72.5*   IJF6DQP 26.9   BASEEXCESS 1.6      I have reviewed the patient's laboratory results.    Radiology results:    Adult Transthoracic Echo Complete W/ Cont if Necessary Per Protocol    Result Date: 7/1/2024    Left ventricular systolic function is low normal. Calculated left ventricular 3D EF = 46% Left ventricular  ejection fraction appears to be 46 - 50%.   The left ventricular cavity is borderline dilated.   Left ventricular diastolic function is consistent with (grade I) impaired relaxation.     XR Chest 1 View    Result Date: 6/29/2024  PROCEDURE: XR CHEST 1 VW-  HISTORY: SOA Triage Protocol  COMPARISON: 2/16/2024  FINDINGS:  Portable view of the chest demonstrates the lungs to be grossly clear. There is no evidence of effusion, pneumothorax or other significant pleural disease. The mediastinum is unremarkable.  The heart size is normal.      Impression: Unremarkable portable chest.    This report was signed and finalized on 6/29/2024 6:11 PM by Isaiah Abbott MD.     I have reviewed the patient's radiology reports.    Medication Review:     I have reviewed the patient's active and prn medications.     Problem List:      Acute respiratory failure with hypoxia      Assessment:     Acute hypoxic respiratory failure POA        Plan:     Resting in bed, appears comfortably ill although better than yesterday.  No respiratory distress.    Updated granddaughter Guillermina Nichols    Acute hypoxic respiratory failure, resolved  Chest tightness  History of vertigo  Generalized weakness  -MRI brain, CT angio chest.  With history of vertigo in the past years would like to rule out CVA.  With cough and initially intermittent hypoxia would like to rule out PE.  -Unclear etiology.  Bacterial and viral pneumonia ruled out.  -Patient does not have formal diagnosis of COPD  -Does have history significant for environmental allergies.  Possible exacerbation?  -Continue supplemental oxygen as necessary to maintain saturation greater than 88%.  Stable on room air at rest but required 1 to 2 L nasal cannula with ambulation in the ER.    UTI  Recheck urinalysis negative.  She finished her course of Macrobid.     Continue home medications as warranted.  Further orders as clinical course dictates.     Risk Assessment: Moderate  DVT Prophylaxis:  Lovenox  Code Status: Full  Diet: As tolerated  Discharge Plan: Home; as early as 7/2 depending on clinical status    Brian Joseph Kerley,   07/01/24  10:40 EDT    Dictated utilizing Dragon dictation.     Applied

## 2024-07-01 NOTE — PLAN OF CARE
Problem: Adult Inpatient Plan of Care  Goal: Plan of Care Review  Outcome: Ongoing, Progressing  Flowsheets (Taken 7/1/2024 0439)  Progress: improving  Plan of Care Reviewed With: patient  Outcome Evaluation: VSS on RA. No acute events noted overnight. Pt on room air, plan of care continued.  Goal: Patient-Specific Goal (Individualized)  Outcome: Ongoing, Progressing  Goal: Absence of Hospital-Acquired Illness or Injury  Outcome: Ongoing, Progressing  Intervention: Identify and Manage Fall Risk  Recent Flowsheet Documentation  Taken 7/1/2024 0400 by Soo Aponte RN  Safety Promotion/Fall Prevention: safety round/check completed  Taken 7/1/2024 0200 by Soo Aponte, RN  Safety Promotion/Fall Prevention:   safety round/check completed   assistive device/personal items within reach   clutter free environment maintained   fall prevention program maintained   nonskid shoes/slippers when out of bed  Taken 7/1/2024 0000 by Soo Aponte, RN  Safety Promotion/Fall Prevention:   safety round/check completed   activity supervised   assistive device/personal items within reach   clutter free environment maintained   fall prevention program maintained   nonskid shoes/slippers when out of bed  Taken 6/30/2024 2200 by Soo Aponte, RN  Safety Promotion/Fall Prevention:   safety round/check completed   assistive device/personal items within reach   clutter free environment maintained   fall prevention program maintained   nonskid shoes/slippers when out of bed  Taken 6/30/2024 2123 by Soo Aponte, RN  Safety Promotion/Fall Prevention:   safety round/check completed   activity supervised   assistive device/personal items within reach   clutter free environment maintained   fall prevention program maintained   nonskid shoes/slippers when out of bed  Taken 6/30/2024 2000 by Soo Aponte, RN  Safety Promotion/Fall Prevention:   safety round/check completed   activity supervised   assistive  device/personal items within reach   clutter free environment maintained   fall prevention program maintained   nonskid shoes/slippers when out of bed   room organization consistent  Taken 6/30/2024 1900 by Soo Aponte RN  Safety Promotion/Fall Prevention:   safety round/check completed   activity supervised   assistive device/personal items within reach   clutter free environment maintained   fall prevention program maintained  Intervention: Prevent Skin Injury  Recent Flowsheet Documentation  Taken 7/1/2024 0400 by Soo Aponte RN  Body Position: sitting up in bed  Taken 7/1/2024 0200 by Soo Aponte RN  Body Position: supine, legs elevated  Taken 7/1/2024 0000 by Soo Aponte RN  Body Position:   position changed independently   supine  Taken 6/30/2024 2200 by Soo Aponte RN  Body Position: supine, legs elevated  Taken 6/30/2024 2123 by Soo Aponte RN  Body Position: sitting up in bed  Taken 6/30/2024 2000 by Soo Aponte RN  Body Position: sitting up in bed  Taken 6/30/2024 1900 by Soo Aponte RN  Body Position: sitting up in bed  Intervention: Prevent and Manage VTE (Venous Thromboembolism) Risk  Recent Flowsheet Documentation  Taken 7/1/2024 0400 by Soo Aponte RN  Activity Management: activity encouraged  Taken 7/1/2024 0200 by Soo Aponte RN  Activity Management: activity encouraged  Taken 7/1/2024 0000 by Soo Aponte RN  Activity Management: activity minimized  Taken 6/30/2024 2200 by Soo Aponte RN  Activity Management: activity minimized  Taken 6/30/2024 2123 by Soo Aponte RN  Activity Management: activity minimized  Taken 6/30/2024 2000 by Soo Aponte RN  Activity Management: activity encouraged  Taken 6/30/2024 1900 by Soo Aponte RN  Activity Management: ambulated to bathroom  Intervention: Prevent Infection  Recent Flowsheet Documentation  Taken 7/1/2024 0000 by Soo Aponte RN  Infection  Prevention:   environmental surveillance performed   rest/sleep promoted  Taken 6/30/2024 2123 by Soo Aponte RN  Infection Prevention:   environmental surveillance performed   rest/sleep promoted   single patient room provided  Taken 6/30/2024 2000 by Soo Aponte RN  Infection Prevention:   equipment surfaces disinfected   environmental surveillance performed   single patient room provided   hand hygiene promoted  Goal: Optimal Comfort and Wellbeing  Outcome: Ongoing, Progressing  Intervention: Provide Person-Centered Care  Recent Flowsheet Documentation  Taken 6/30/2024 2123 by Soo Aponte RN  Trust Relationship/Rapport:   care explained   choices provided   reassurance provided   thoughts/feelings acknowledged  Goal: Readiness for Transition of Care  Outcome: Ongoing, Progressing   Goal Outcome Evaluation:  Plan of Care Reviewed With: patient        Progress: improving  Outcome Evaluation: VSS on RA. No acute events noted overnight. Pt on room air, plan of care continued.

## 2024-07-01 NOTE — THERAPY DISCHARGE NOTE
Acute Care - Occupational Therapy Discharge  [facility]    Patient Name: Aster Craft  : 1945    MRN: [id number]                              Today's Date: 2024       Admit Date: 2024    Visit Dx:     ICD-10-CM ICD-9-CM   1. Acute hypoxic respiratory failure  J96.01 518.81   2. Chest pain, unspecified type  R07.9 786.50     Patient Active Problem List   Diagnosis    Thickened endometrium    Right lower quadrant abdominal pain    Fatty (change of) liver, not elsewhere classified    A-fib    Breast CA    Essential hypertension    GERD (gastroesophageal reflux disease)    Hyperthyroidism    Acute respiratory failure with hypoxia     Past Medical History:   Diagnosis Date    Arthritis     Breast cancer     RIGHT BREAST STAGE 3    Diabetes mellitus     Elevated cholesterol     GERD (gastroesophageal reflux disease) 2021    History of cancer chemotherapy     Hypertension     Hyperthyroidism 2022    Kidney stone     Lichen sclerosus     Shingles     Thickened endometrium     Urinary incontinence     Urinary tract infection      Past Surgical History:   Procedure Laterality Date    CATARACT EXTRACTION Right 01/10/2023    CERVICAL CONE BIOPSY      CHOLECYSTECTOMY  2010    COLONOSCOPY N/A 2018    Procedure: COLONOSCOPY;  Surgeon: Trenton Lyons MD;  Location: [facility];  Service: Gastroenterology    DILATATION AND CURETTAGE      ENDOSCOPY N/A 2018    Procedure: ESOPHAGOGASTRODUODENOSCOPY;  Surgeon: Trenton Lyons MD;  Location: [facility];  Service: Gastroenterology    MASTECTOMY Right 10/1998    MASTECTOMY Left 2013    TUBAL ABDOMINAL LIGATION        General Information       Row Name 24 1243          OT Time and Intention    Document Type evaluation  -DB     Mode of Treatment occupational therapy  -DB       Row Name 24 1243          General Information    Patient Profile Reviewed yes  -DB     Prior Level of Function independent:;ADL's  -DB     Existing  Precautions/Restrictions fall  -DB       Row Name 07/01/24 1243          Living Environment    People in Home child(popeye), adult  -DB       Row Name 07/01/24 1243          Home Main Entrance    Number of Stairs, Main Entrance none  -DB       Row Name 07/01/24 1243          Stairs Within Home, Primary    Number of Stairs, Within Home, Primary none  -DB     Stairs Comment, Within Home, Primary Pt has two story home but does not use upstairs  -DB       Row Name 07/01/24 1243          Cognition    Orientation Status (Cognition) oriented x 4  -DB       Row Name 07/01/24 1243          Safety Issues, Functional Mobility    Safety Issues Affecting Function (Mobility) safety precaution awareness;safety precautions follow-through/compliance  -DB     Impairments Affecting Function (Mobility) shortness of breath;endurance/activity tolerance;balance;strength;pain  -DB               User Key  (r) = Recorded By, (t) = Taken By, (c) = Cosigned By      Initials Name Provider Type    DB Nickie Hinton OT Occupational Therapist                   Mobility/ADL's       Row Name 07/01/24 1243          Bed Mobility    Bed Mobility supine-sit;sit-supine  -DB     Supine-Sit [place] (Bed Mobility) modified independence  -DB     Sit-Supine [place] (Bed Mobility) modified independence  -DB     Assistive Device (Bed Mobility) head of bed elevated  -DB       Row Name 07/01/24 1243          Sit-Stand Transfer    Sit-Stand [place] (Transfers) standby assist  -DB       Row Name 07/01/24 1243          Functional Mobility    Functional Mobility- Ind. Level standby assist  -DB     Functional Mobility-Distance (Feet) 25  -DB     Patient was able to Ambulate yes  -DB       Row Name 07/01/24 1243          Activities of Daily Living    BADL Assessment/Intervention bathing;upper body dressing;lower body dressing;grooming;feeding;toileting  -DB       Row Name 07/01/24 1243          Bathing Assessment/Intervention    [place] Level  (Bathing) bathing skills;contact guard assist  -DB       Row Name 07/01/24 1243          Upper Body Dressing Assessment/Training    [place] Level (Upper Body Dressing) upper body dressing skills;independent  -DB       Row Name 07/01/24 1243          Lower Body Dressing Assessment/Training    [place] Level (Lower Body Dressing) lower body dressing skills;modified independence  -DB       Row Name 07/01/24 1243          Grooming Assessment/Training    [place] Level (Grooming) grooming skills;independent  -DB       Row Name 07/01/24 1243          Self-Feeding Assessment/Training    [place] Level (Feeding) feeding skills;independent  -DB       Row Name 07/01/24 1243          Toileting Assessment/Training    [place] Level (Toileting) toileting skills;supervision  -DB               User Key  (r) = Recorded By, (t) = Taken By, (c) = Cosigned By      Initials Name Provider Type    Nickie Sparrow OT Occupational Therapist                   Obj/Interventions       Row Name 07/01/24 1246          Range of Motion Comprehensive    General Range of Motion bilateral upper extremity ROM WFL  -DB       Row Name 07/01/24 1246          Strength Comprehensive (MMT)    General Manual Muscle Testing (MMT) Assessment no strength deficits identified  -DB               User Key  (r) = Recorded By, (t) = Taken By, (c) = Cosigned By      Initials Name Provider Type    Nickie Sparrow OT Occupational Therapist                   Goals/Plan    No documentation.                  Clinical Impression       Row Name 07/01/24 1246          Pain Assessment    Pretreatment Pain Rating 0/10 - no pain  -DB     Posttreatment Pain Rating 0/10 - no pain  -DB       Row Name 07/01/24 1246          Plan of Care Review    Plan of Care Reviewed With patient  -DB     Progress no change  -DB     Outcome Evaluation OT chanel completed. Pt A&O x4. Pt agreeable to eval and tx. Pt reports she lives in two [place] home but only  uses one floor and lives with daughter. Pt reports (I) with ADLs as PLOF. Pt able to complete supine to sit EOB with MI. Pt able to complete STS with SBA. Pt completes fxl mobility into bathroom with HHA and CGA. Pt performed toileting tasks and perineal hygiene with supervision. Pt able to stand at sink to complete grooming tasks with (I). Pt able to complete fxl mobility x25ft with HHA and CGA. Pt returned supine in bed with MI. Pt left with needs in reach. Pt is at baseline function and does not require skilled OT services at this time. OT to sign off.  -DB       Row Name 07/01/24 1246          Therapy Assessment/Plan (OT)    Criteria for Skilled Therapeutic Interventions Met (OT) no problems identified which require skilled intervention  -DB       Row Name 07/01/24 1246          Vital Signs    Pre SpO2 (%) 96  -DB     O2 Delivery Pre Treatment room air  -DB     Post SpO2 (%) 94  -DB     O2 Delivery Post Treatment room air  -DB     Pre Patient Position Supine  -DB     Intra Patient Position Standing  -DB     Post Patient Position Supine  -DB       Row Name 07/01/24 1246          Positioning and Restraints    Pre-Treatment Position in bed  -DB     Post Treatment Position bed  -DB     In Bed supine;call light within reach;encouraged to call for assist  -DB               User Key  (r) = Recorded By, (t) = Taken By, (c) = Cosigned By      Initials Name Provider Type    Nickie Sparrow, OT Occupational Therapist                   Outcome Measures       Row Name 07/01/24 1250          How much help from another is currently needed...    Putting on and taking off regular lower body clothing? 4  -DB     Bathing (including washing, rinsing, and drying) 4  -DB     Toileting (which includes using toilet bed pan or urinal) 4  -DB     Putting on and taking off regular upper body clothing 4  -DB     Taking care of personal grooming (such as brushing teeth) 4  -DB     Eating meals 4  -DB     AM-PAC 6 Clicks Score (OT)  24  -DB       Row Name 07/01/24 0814          How much help from another person do you currently need...    Turning from your back to your side while in flat bed without using bedrails? 4  -AG     Moving from lying on back to sitting on the side of a flat bed without bedrails? 4  -AG     Moving to and from a bed to a chair (including a wheelchair)? 3  -AG     Standing up from a chair using your arms (e.g., wheelchair, bedside chair)? 4  -AG     Climbing 3-5 steps with a railing? 3  -AG     To walk in hospital room? 3  -AG     AM-PAC 6 Clicks Score (PT) 21  -AG     Highest Level of Mobility Goal 6 --> Walk 10 steps or more  -AG       Row Name 07/01/24 1250          Functional Assessment    Outcome Measure Options AM-PAC 6 Clicks Daily Activity (OT)  -DB               User Key  (r) = Recorded By, (t) = Taken By, (c) = Cosigned By      Initials Name Provider Type    Shameka Foster, RN Registered Nurse    Nickie Sparrow OT Occupational Therapist                  Occupational Therapy Education       Title: PT OT SLP Therapies (In Progress)       Topic: Occupational Therapy (In Progress)       Point: ADL training (Done)       Description:   Instruct learner(s) on proper safety adaptation and remediation techniques during self care or transfers.   Instruct in proper use of assistive devices.                  Learning Progress Summary             Patient Acceptance, E,TB, VU by DB at 7/1/2024 1250    Comment: Pt educated on ADL retraining and receptive to instruction this date.                         Point: Home exercise program (Not Started)       Description:   Instruct learner(s) on appropriate technique for monitoring, assisting and/or progressing therapeutic exercises/activities.                  Learner Progress:  Not documented in this visit.              Point: Precautions (Not Started)       Description:   Instruct learner(s) on prescribed precautions during self-care and functional transfers.                   Learner Progress:  Not documented in this visit.              Point: Body mechanics (Not Started)       Description:   Instruct learner(s) on proper positioning and spine alignment during self-care, functional mobility activities and/or exercises.                  Learner Progress:  Not documented in this visit.                              User Key       Initials Effective Dates Name Provider Type Discipline    DB 07/06/23 -  Nickie Hinton OT Occupational Therapist OT                  OT Recommendation and Plan     Plan of Care Review  Plan of Care Reviewed With: patient  Progress: no change  Outcome Evaluation: OT eval completed. Pt A&O x4. Pt agreeable to eval and tx. Pt reports she lives in two story home but only uses one floor and lives with daughter. Pt reports (I) with ADLs as PLOF. Pt able to complete supine to sit EOB with MI. Pt able to complete STS with SBA. Pt completes fxl mobility into bathroom with HHA and CGA. Pt performed toileting tasks and perineal hygiene with supervision. Pt able to stand at sink to complete grooming tasks with (I). Pt able to complete fxl mobility x25ft with HHA and CGA. Pt returned supine in bed with MI. Pt left with needs in reach. Pt is at baseline function and does not require skilled OT services at this time. OT to sign off.  Plan of Care Reviewed With: patient  Outcome Evaluation: OT eval completed. Pt A&O x4. Pt agreeable to eval and tx. Pt reports she lives in two story home but only uses one floor and lives with daughter. Pt reports (I) with ADLs as PLOF. Pt able to complete supine to sit EOB with MI. Pt able to complete STS with SBA. Pt completes fxl mobility into bathroom with HHA and CGA. Pt performed toileting tasks and perineal hygiene with supervision. Pt able to stand at sink to complete grooming tasks with (I). Pt able to complete fxl mobility x25ft with HHA and CGA. Pt returned supine in bed with MI. Pt left with needs in reach. Pt is at  baseline function and does not require skilled OT services at this time. OT to sign off.     Time Calculation:   Evaluation Complexity (OT)  Review Occupational Profile/Medical/Therapy History Complexity: expanded/moderate complexity  Assessment, Occupational Performance/Identification of Deficit Complexity: 3-5 performance deficits  Clinical Decision Making Complexity (OT): detailed assessment/moderate complexity  Overall Complexity of Evaluation (OT): moderate complexity     Time Calculation- OT       Row Name 07/01/24 1251             Time Calculation- OT    OT Start Time 1055  -DB      OT Received On 07/01/24  -DB      OT Goal Re-Cert Due Date 07/11/24  -DB         Untimed Charges    OT Eval/Re-eval Minutes 40  -DB         Total Minutes    Untimed Charges Total Minutes 40  -DB       Total Minutes 40  -DB                User Key  (r) = Recorded By, (t) = Taken By, (c) = Cosigned By      Initials Name Provider Type    DB Nickie Hinton OT Occupational Therapist                  Therapy Charges for Today       Code Description Service Date Service Provider Modifiers Qty    [id number] HC OT EVAL MOD COMPLEXITY 3 7/1/2024 Nickie Hinton OT GO 1               OT Discharge Summary  Anticipated Discharge Disposition (OT): home    Nickie Hinton OT  7/1/2024

## 2024-07-01 NOTE — PAYOR COMM NOTE
"TO:BC  FROM:SHANIQUE WAGNER, RN PHONE [phone]  IN CLINICALS REF# [id number]    Vanessa Craft (79 y.o. Female)       Date of Birth   1945    Social Security Number       Address   [address] [facility] [address]    Home Phone   [phone]    MRN   [id number]       [employer]    Marital Status                               Admission Date   6/29/24    Admission Type   Emergency    Admitting Provider   Trae See DO    Attending Provider   Kerley, Brian Joseph, DO    Department, Room/Bed   [facility] TELEMETRY SDS OVERFLOW, T01/01       Discharge Date       Discharge Disposition       Discharge Destination                                 Attending Provider: Kerley, Brian Joseph, DO    Allergies: Caffeine    Isolation: None   Infection: None   Code Status: CPR    Ht: 160 cm (62.99\")   Wt: 57.7 kg (127 lb 3.3 oz)    Admission Cmt: None   Principal Problem: Acute respiratory failure with hypoxia [J96.01]                   Active Insurance as of 6/29/2024       Primary Coverage       Payor Plan Insurance Group Employer/Plan Group    ANTHEM MEDICARE REPLACEMENT ANTHEM MEDICARE ADVANTAGE [id number]       Payor Plan Address Payor Plan Phone Number Payor Plan Fax Number Effective Dates    [address] [phone]  9/1/2020 - None Entered    [facility] [id number]         Subscriber Name Subscriber Birth Date Member ID       VANESSA CRAFT 1945 [id number]               Secondary Coverage       Payor Plan Insurance Group Employer/Plan Group    [place] [id number]       Payor Plan Address Payor Plan Phone Number Payor Plan Fax Number Effective Dates    [employer] [address] [address] [phone]  11/1/2022 - None Entered    [facility] [id number]         Subscriber Name Subscriber Birth Date Member ID       VANESSA CRAFT 1945 [id number]                     Emergency Contacts        (Rel.) Home Phone " Work Phone Mobile Phone    Guillermina Gar (Grandchild) [phone]    Pedro Pablo Alonso (Brother) -- -- [phone]    Chilo Craft (Son) -- -- [phone]    ROMI CRAFT (Son) [phone] -- --    PATRICIATERRIE (Grandchild) [phone] -- --                 History & Physical        Trae See DO at 24 0044            [facility]   HISTORY AND PHYSICAL      Name:  Aster Craft   Age:  79 y.o.  Sex:  female  :  1945  MRN:  [id number]   Visit Number:  [id number]  Admission Date:  2024  Date Of Service:  24  Primary Care Physician:  Yolie Cotto MD    Chief Complaint:     Shortness of breath    History Of Presenting Illness:      Patient is a pleasant 79-year-old female with history significant for hypertension, hyperlipidemia, type 2 diabetes, remote history of breast cancer presents from home to the emergency room with 2 days of shortness of breath and nonproductive cough.  Patient reports audible wheezing.  States that she will have coughing episodes where she feels like she cannot catch her breath.  Is a lifelong non-smoker.  Is not on supplemental oxygen at baseline.  No known sick contacts.  Denies chest pain, nausea/vomiting or diarrhea.    ED summary: Patient afebrile, hemodynamically stable and hypoxic 88% on room air.  AB.39/44/72/27/94% on 2 L nasal cannula.  Negative troponins x 2.  Normal proBNP.  CMP unremarkable.  D-dimer procalcitonin within normal limits.  CBC unremarkable.  Respiratory PCR panel negative.  Chest x-ray personally reviewed, unremarkable.  With new oxygen requirement, hospitalist asked to admit.    Review Of Systems:    All systems were reviewed and negative except as mentioned in history of presenting illness, assessment and plan.    Past Medical History: Patient  has a past medical history of Arthritis, Breast cancer, Diabetes mellitus, Elevated cholesterol, GERD (gastroesophageal reflux  disease) (08/23/2021), History of cancer chemotherapy, Hypertension, Hyperthyroidism (05/19/2022), Kidney stone, Lichen sclerosus, Shingles, Thickened endometrium (2018), Urinary incontinence, and Urinary tract infection.    Past Surgical History: Patient  has a past surgical history that includes Mastectomy (Right, 10/1998); Mastectomy (Left, 07/2013); Cholecystectomy (03/2010); Dilation and curettage of uterus; Cervical cone biopsy; Esophagogastroduodenoscopy (N/A, 07/06/2018); Colonoscopy (N/A, 07/06/2018); Tubal ligation; and Cataract Extraction (Right, 01/10/2023).    Social History: Patient  reports that she has never smoked. She has never been exposed to tobacco smoke. She has never used smokeless tobacco. She reports that she does not drink alcohol and does not use drugs.    Family History:  Patient's family history has been reviewed and found to be noncontributory.     Allergies:      No doz [caffeine]    Home Medications:    Prior to Admission Medications       Prescriptions Last Dose Informant Patient Reported? Taking?    metoprolol succinate XL (TOPROL-XL) 50 MG 24 hr tablet 6/28/2024  Yes Yes    Take 1 tablet by mouth Daily.    nitrofurantoin, macrocrystal-monohydrate, (MACROBID) 100 MG capsule 6/29/2024  Yes Yes    Take 1 capsule by mouth 2 (Two) Times a Day.    pantoprazole (PROTONIX) 40 MG EC tablet 6/29/2024  Yes Yes    Take 1 tablet by mouth.    albuterol sulfate  (90 Base) MCG/ACT inhaler   Yes No    TWO PUFFS EVERY SIX HOURS AS NEEDED    amoxicillin (AMOXIL) 500 MG capsule   Yes No    azelastine (ASTELIN) 0.1 % nasal spray   Yes No    INHALE ONE SPRAY IN EACH NOSTRIL TWICE DAILY    benzonatate (TESSALON) 200 MG capsule   Yes No    Take 1 capsule by mouth.    Blood Glucose Monitoring Suppl (OneTouch Verio Flex System) w/Device kit   Yes No    See Admin Instructions. for testing    brimonidine-timolol (COMBIGAN) 0.2-0.5 % ophthalmic solution   Yes No    Administer 1 drop to both eyes Every  12 (Twelve) Hours.    calcium carbonate-cholecalciferol 500-400 MG-UNIT tablet tablet   Yes No    Take  by mouth Daily.    cetirizine (zyrTEC) 10 MG tablet   Yes No    Take 1 tablet by mouth Daily.    Cholecalciferol 250 MCG (76987 UT) capsule   Yes No    Take 5,000 Units by mouth Daily.    CINNAMON PO   Yes No    Take  by mouth.    clobetasol (TEMOVATE) 0.05 % ointment   No No    Apply to affected area BID x 2 wks then daily x 2 wks then 2-3x/wk    Continuous Blood Gluc Sensor (FreeStyle Malina 2 Sensor) misc   Yes No    CHANGE SENSOR EVERY 14 DAYS    desloratadine (CLARINEX) 5 MG tablet   Yes No    Take 1 tablet by mouth Daily.    dextromethorphan-guaifenesin (ROBITUSSIN-DM)  MG/5ML syrup   Yes No    TAKE 10ML EVERY 4 HOURS AS NEEDED    diazePAM (VALIUM) 5 MG tablet   Yes No    TAKE 1 TABLET one hour prior TO procedure    dorzolamide (TRUSOPT) 2 % ophthalmic solution   Yes No    INSTILL ONE DROP IN EACH EYE TWICE DAILY    ezetimibe (ZETIA) 10 MG tablet   Yes No    Take 1 tablet by mouth Daily.    Flovent  MCG/ACT inhaler   Yes No    Inhale 2 puffs 2 (Two) Times a Day.    glipizide (GLUCOTROL XL) 5 MG ER tablet   Yes No    ipratropium (ATROVENT) 0.03 % nasal spray   Yes No    INHALE ONE SPRAY IN EACH NOSTRIL TWICE DAILY    ipratropium (ATROVENT) 0.06 % nasal spray   Yes No    INHALE 1-2 SPRAYS IN EACH NOSTRIL TWICE DAILY    Januvia 100 MG tablet   Yes No    Take 1 tablet by mouth Daily.    Lancets (OneTouch Delica Plus [id number]) misc   Yes No    USE AS DIRECTED EVERY DAY    meclizine (ANTIVERT) 12.5 MG tablet   Yes No    Take 1 tablet by mouth.    methylPREDNISolone (MEDROL) 4 MG dose pack   Yes No    TAKE SIX TABLETS ON DAY ONE, DECREASE BY ONE TABLET DAILY UNTIL FINISHED    Patient not taking:  Reported on 6/20/2024    montelukast (SINGULAIR) 10 MG tablet   Yes No    Take 1 tablet by mouth Every Evening.    Patient not taking:  Reported on 6/20/2024    nitroglycerin (NITROSTAT) 0.4 MG SL tablet   Yes  No    Place 1 tablet under the tongue.    Omega-3 Fatty Acids (fish oil) 1000 MG capsule capsule   Yes No    Take  by mouth Daily With Breakfast.    Patient not taking:  Reported on 6/20/2024    ondansetron ODT (ZOFRAN-ODT) 4 MG disintegrating tablet   No No    Place 1 tablet under the tongue Every 6 (Six) Hours As Needed for Vomiting or Nausea.    Patient not taking:  Reported on 6/20/2024    OneTouch Verio test strip   Yes No    Daily. for testing    predniSONE (DELTASONE) 20 MG tablet   Yes No    Take 2 tablets by mouth Daily.    Patient not taking:  Reported on 6/20/2024    Rhopressa 0.02 % solution   Yes No    Administer 1 drop to both eyes Every Night.    simethicone (MYLICON) 80 MG chewable tablet   Yes No    Chew 1 tablet Every 6 (Six) Hours As Needed for Flatulence.    travoprost, BAK free, (TRAVATAN) 0.004 % solution ophthalmic solution   Yes No    1 drop Every Evening. in affected eye(s)    Patient not taking:  Reported on 6/20/2024    Triamcinolone Acetonide (NASACORT) 55 MCG/ACT nasal inhaler   Yes No    2 sprays into the nostril(s) as directed by provider Daily.    valACYclovir (VALTREX) 1000 MG tablet   Yes No    Take 1 tablet by mouth 3 times a day.    Patient not taking:  Reported on 6/20/2024    Vibegron (Gemtesa) 75 MG tablet   No No    Take 1 tablet by mouth Daily.          ED Medications:    Medications   sodium chloride 0.9 % flush 10 mL (has no administration in time range)   brimonidine (ALPHAGAN) 0.2 % ophthalmic solution 1 drop (1 drop Both Eyes Not Given 6/29/24 2122)     And   timolol (TIMOPTIC) 0.5 % ophthalmic solution 1 drop (1 drop Both Eyes Not Given 6/29/24 2122)   guaiFENesin-dextromethorphan (ROBITUSSIN DM) 100-10 MG/5ML syrup 5 mL (has no administration in time range)   dorzolamide (TRUSOPT) 2 % ophthalmic solution 1 drop (1 drop Both Eyes Not Given 6/29/24 2122)   budesonide (PULMICORT) nebulizer solution 0.5 mg (0.5 mg Nebulization Not Given 6/29/24 2229)   pantoprazole  (PROTONIX) EC tablet 40 mg (has no administration in time range)   oxybutynin XL (DITROPAN-XL) 24 hr tablet 10 mg (has no administration in time range)   sodium chloride 0.9 % flush 10 mL (10 mL Intravenous Given 6/29/24 2116)   sodium chloride 0.9 % flush 10 mL (has no administration in time range)   sodium chloride 0.9 % infusion 40 mL (has no administration in time range)   ondansetron (ZOFRAN) injection 4 mg (has no administration in time range)   Pharmacy to Dose enoxaparin (LOVENOX) (has no administration in time range)   nitroglycerin (NITROSTAT) SL tablet 0.4 mg (has no administration in time range)   Potassium Replacement - Follow Nurse / BPA Driven Protocol (has no administration in time range)   Magnesium Standard Dose Replacement - Follow Nurse / BPA Driven Protocol (has no administration in time range)   Phosphorus Replacement - Follow Nurse / BPA Driven Protocol (has no administration in time range)   Calcium Replacement - Follow Nurse / BPA Driven Protocol (has no administration in time range)   sennosides-docusate (PERICOLACE) 8.6-50 MG per tablet 2 tablet (has no administration in time range)     And   polyethylene glycol (MIRALAX) packet 17 g (has no administration in time range)     And   bisacodyl (DULCOLAX) EC tablet 5 mg (has no administration in time range)     And   bisacodyl (DULCOLAX) suppository 10 mg (has no administration in time range)   melatonin tablet 5 mg (has no administration in time range)   predniSONE (DELTASONE) tablet 40 mg (has no administration in time range)   ipratropium-albuterol (DUO-NEB) nebulizer solution 3 mL (has no administration in time range)   Enoxaparin Sodium (LOVENOX) syringe 40 mg (40 mg Subcutaneous Given 6/29/24 2113)   metoprolol succinate XL (TOPROL-XL) 24 hr tablet 50 mg (50 mg Oral Given 6/29/24 2113)   acetaminophen (TYLENOL) tablet 650 mg (650 mg Oral Given 6/29/24 2121)     Or   acetaminophen (TYLENOL) suppository 650 mg ( Rectal Not Given:  See Alt  "6/29/24 2121)   nitrofurantoin (macrocrystal-monohydrate) (MACROBID) capsule 100 mg (100 mg Oral Given 6/29/24 2148)   ketorolac (TORADOL) injection 15 mg (15 mg Intravenous Given 6/29/24 1715)   ondansetron (ZOFRAN) injection 4 mg (4 mg Intravenous Given 6/29/24 1715)   sodium chloride 0.9 % bolus 1,000 mL (0 mL Intravenous Stopped 6/29/24 1954)   ipratropium-albuterol (DUO-NEB) nebulizer solution 3 mL (3 mL Nebulization Given 6/29/24 1738)   methylPREDNISolone sodium succinate (SOLU-Medrol) injection 125 mg (125 mg Intravenous Given 6/29/24 1725)     Vital Signs:  Temp:  [98 °F (36.7 °C)-98.6 °F (37 °C)] 98 °F (36.7 °C)  Heart Rate:  [] 86  Resp:  [16-20] 16  BP: (114-137)/(68-84) 114/70        06/29/24  1626 06/29/24 2048   Weight: 57.7 kg (127 lb 3.2 oz) (In stretcher)     Body mass index is 22.53 kg/m².    Physical Exam:     Most recent vital Signs: /70 (BP Location: Right arm, Patient Position: Lying)   Pulse 86   Temp 98 °F (36.7 °C) (Oral)   Resp 16   Ht 160 cm (63\")   Wt 57.7 kg (127 lb 3.2 oz)   SpO2 96%   BMI 22.53 kg/m²     Physical Exam    Laboratory data:    I have reviewed the labs done in the emergency room.    Results from last 7 days   Lab Units 06/29/24  1634   SODIUM mmol/L 140   POTASSIUM mmol/L 4.1   CHLORIDE mmol/L 103   CO2 mmol/L 25.6   BUN mg/dL 11   CREATININE mg/dL 0.54*   CALCIUM mg/dL 9.2   BILIRUBIN mg/dL 0.8   ALK PHOS U/L 98   ALT (SGPT) U/L 22   AST (SGOT) U/L 20   GLUCOSE mg/dL 167*     Results from last 7 days   Lab Units 06/29/24  1634   WBC 10*3/mm3 10.59   HEMOGLOBIN g/dL 13.3   HEMATOCRIT % 39.1   PLATELETS 10*3/mm3 219         Results from last 7 days   Lab Units 06/29/24  2036 06/29/24  1832 06/29/24  1634   HSTROP T ng/L 8 10 10     Results from last 7 days   Lab Units 06/29/24  1634   PROBNP pg/mL 233.4             Results from last 7 days   Lab Units 06/29/24  1924   PH, ARTERIAL pH units 7.394   PO2 ART mm Hg 72.5*   PCO2, ARTERIAL mm Hg 44.1   HCO3 " "ART mmol/L 26.9           Invalid input(s): \"USDES\", \"NITRITITE\", \"BACT\", \"EP\"    Pain Management Panel           No data to display                EKG:      EKG personally reviewed, sinus rhythm right bundle branch block.  Rate 88 bpm.    Radiology:    XR Chest 1 View    Result Date: 6/29/2024  PROCEDURE: XR CHEST 1 VW-  HISTORY: SOA Triage Protocol  COMPARISON: 2/16/2024  FINDINGS:  Portable view of the chest demonstrates the lungs to be grossly clear. There is no evidence of effusion, pneumothorax or other significant pleural disease. The mediastinum is unremarkable.  The heart size is normal.      Unremarkable portable chest.    This report was signed and finalized on 6/29/2024 6:11 PM by Isaiah Abbott MD.       Assessment:    Acute hypoxic respiratory failure POA        Plan:    Acute hypoxic respiratory failure  -Unclear etiology.  Bacterial and viral pneumonia ruled out.  -Patient does not have formal diagnosis of COPD  -Does have history significant for environmental allergies.  Possible exacerbation?  -Continue supplemental oxygen as necessary to maintain saturation greater than 88%.  Stable on room air at rest but required 1 to 2 L nasal cannula with ambulation in the ER.    Continue home medications as warranted.  Further orders as clinical course dictates.    Risk Assessment: Moderate  DVT Prophylaxis: Lovenox  Code Status: Full  Diet: As tolerated             Trae See DO  06/30/24  00:44 EDT    Dictated utilizing Dragon dictation.      Electronically signed by Trae See DO at 06/30/24 0054          Emergency Department Notes        Joanie Singh RN at 06/29/24 2029          Report given to MAGALIS Rogel    Electronically signed by Joanie Singh RN at 06/29/24 2029       Andrew Guerrier PA-C at 06/29/24 1657       Attestation signed by Wenceslao Bauman DO at 06/29/24 2217    eMERGENCY dEPARTMENT eNCOUnter   Independent Attestation     Pt Name: Aster Craft  MRN: " [id number]  Birthdate 1945  Date of evaluation: 2024     Aster Craft is a 79 y.o. female with CC: Shortness of Breath (Pt reports worsening SOA and CP and headache since yesterday. Also reports cough and chills)         EKG Interpretation    Evaluated and interpreted by emergency department physician    Rhythm: Normal Sinus Rhythm  Rate: Normal  Axis: Kelley  Ectopy: none  Conduction: Right bundle branch block  ST Segments: Normal  T Waves: Flattening aVL and V2, normal inversions in aVR and V1  Q Waves: None    Clinical Impression: Normal Sinus Rhythm with a right bundle branch block and nonspecific T wave flattening    Wenceslao Bauman DO        I was present in the ED during this patient's evaluation and management by the [facility] Provider and was available to address any concerns about their medical management.  This patient's care was not discussed with me during their ER visit.  I performed all aspects of the MDM as documented, see ED COURSE for additional documentation.      Electronically signed by Wenceslao Bauman DO, 24, 10:16 PM EDT.    Attending Emergency Physician                    EMERGENCY DEPARTMENT ENCOUNTER    Pt Name: Aster Craft  MRN: [id number]  Pt :   1945  Room Number:    Date of encounter:  2024  PCP: Yolie Cotto MD  ED Provider: Andrew Guerrier PA-C    Historian: Patient, nursing notes      HPI:  Chief Complaint: Shortness of breath, chest pain        Context: Aster Craft is a 79 y.o. female who presents to the ED c/o chest pain since yesterday.  Patient also reports shortness of breath and headache.  Patient states she is currently being treated with Macrobid for a UTI diagnosed over days ago.  Patient denies cough, fever, dizziness, syncope, or any other complaint today.      PAST MEDICAL HISTORY  Past Medical History:   Diagnosis Date    Arthritis     Breast cancer     RIGHT BREAST STAGE 3    Diabetes mellitus      Elevated cholesterol     GERD (gastroesophageal reflux disease) 08/23/2021    History of cancer chemotherapy     Hypertension     Hyperthyroidism 05/19/2022    Kidney stone     Lichen sclerosus     Shingles     Thickened endometrium 2018    Urinary incontinence     Urinary tract infection          PAST SURGICAL HISTORY  Past Surgical History:   Procedure Laterality Date    CATARACT EXTRACTION Right 01/10/2023    CERVICAL CONE BIOPSY      CHOLECYSTECTOMY  03/2010    COLONOSCOPY N/A 07/06/2018    Procedure: COLONOSCOPY;  Surgeon: Trenton Lyons MD;  Location: [facility] OR;  Service: Gastroenterology    DILATATION AND CURETTAGE      ENDOSCOPY N/A 07/06/2018    Procedure: ESOPHAGOGASTRODUODENOSCOPY;  Surgeon: Trenton Lyons MD;  Location: [facility] OR;  Service: Gastroenterology    MASTECTOMY Right 10/1998    MASTECTOMY Left 07/2013    TUBAL ABDOMINAL LIGATION           FAMILY HISTORY  Family History   Problem Relation Age of Onset    Breast cancer Mother     Diabetes Mother     Cancer Mother     Ovarian cancer Maternal Aunt     Cancer Maternal Aunt          SOCIAL HISTORY  Social History     Socioeconomic History    Marital status:    Tobacco Use    Smoking status: Never     Passive exposure: Never    Smokeless tobacco: Never   Vaping Use    Vaping status: Never Used   Substance and Sexual Activity    Alcohol use: No    Drug use: No    Sexual activity: Not Currently         ALLERGIES  Patient has no known allergies.        REVIEW OF SYSTEMS  Review of Systems   Constitutional:  Negative for chills and fever.   HENT:  Negative for congestion and sore throat.    Respiratory:  Positive for shortness of breath. Negative for cough, choking, chest tightness and wheezing.    Cardiovascular:  Positive for chest pain.   Gastrointestinal:  Negative for abdominal pain, nausea and vomiting.   Genitourinary:  Negative for dysuria.   Musculoskeletal:  Negative for back pain.   Skin:  Negative for wound.   Neurological:  Negative  for dizziness and headaches.   Psychiatric/Behavioral:  Negative for confusion.    All other systems reviewed and are negative.         All systems reviewed and negative except for those discussed in HPI.       PHYSICAL EXAM    I have reviewed the triage vital signs and nursing notes.    ED Triage Vitals [06/29/24 1626]   Temp Heart Rate Resp BP SpO2   98.4 °F (36.9 °C) 92 20 128/70 91 %      Temp src Heart Rate Source Patient Position BP Location FiO2 (%)   Oral Monitor Sitting Left arm --       Physical Exam  Vitals and nursing note reviewed.   Constitutional:       General: She is not in acute distress.     Appearance: She is not ill-appearing, toxic-appearing or diaphoretic.   HENT:      Head: Normocephalic and atraumatic.      Mouth/Throat:      Mouth: Mucous membranes are moist.      Pharynx: Oropharynx is clear.   Eyes:      Extraocular Movements: Extraocular movements intact.   Cardiovascular:      Rate and Rhythm: Normal rate.      Heart sounds: Normal heart sounds.   Pulmonary:      Effort: Pulmonary effort is normal. No respiratory distress.      Breath sounds: Normal breath sounds. No decreased breath sounds, wheezing, rhonchi or rales.   Chest:      Chest wall: No tenderness or crepitus.   Abdominal:      Tenderness: There is no abdominal tenderness.   Musculoskeletal:      Right lower leg: No edema.      Left lower leg: No edema.   Skin:     General: Skin is warm and dry.      Findings: No rash.   Neurological:      Mental Status: She is alert.             LAB RESULTS  Recent Results (from the past 24 hour(s))   Comprehensive Metabolic Panel    Collection Time: 06/29/24  4:34 PM    Specimen: Blood   Result Value Ref Range    Glucose 167 (H) 65 - 99 mg/dL    BUN 11 8 - 23 mg/dL    Creatinine 0.54 (L) 0.57 - 1.00 mg/dL    Sodium 140 136 - 145 mmol/L    Potassium 4.1 3.5 - 5.2 mmol/L    Chloride 103 98 - 107 mmol/L    CO2 25.6 22.0 - 29.0 mmol/L    Calcium 9.2 8.6 - 10.5 mg/dL    Total Protein 7.2 6.0 -  8.5 g/dL    Albumin 4.2 3.5 - 5.2 g/dL    ALT (SGPT) 22 1 - 33 U/L    AST (SGOT) 20 1 - 32 U/L    Alkaline Phosphatase 98 39 - 117 U/L    Total Bilirubin 0.8 0.0 - 1.2 mg/dL    Globulin 3.0 gm/dL    A/G Ratio 1.4 g/dL    BUN/Creatinine Ratio 20.4 7.0 - 25.0    Anion Gap 11.4 5.0 - 15.0 mmol/L    eGFR 95.0 >60.0 mL/min/1.73   BNP    Collection Time: 06/29/24  4:34 PM    Specimen: Blood   Result Value Ref Range    proBNP 233.4 0.0 - 1,800.0 pg/mL   Single High Sensitivity Troponin T    Collection Time: 06/29/24  4:34 PM    Specimen: Blood   Result Value Ref Range    HS Troponin T 10 <14 ng/L   Green Top (Gel)    Collection Time: 06/29/24  4:34 PM   Result Value Ref Range    Extra Tube Hold for add-ons.    Lavender Top    Collection Time: 06/29/24  4:34 PM   Result Value Ref Range    Extra Tube hold for add-on    Gold Top - SST    Collection Time: 06/29/24  4:34 PM   Result Value Ref Range    Extra Tube Hold for add-ons.    Light Blue Top    Collection Time: 06/29/24  4:34 PM   Result Value Ref Range    Extra Tube Hold for add-ons.    CBC Auto Differential    Collection Time: 06/29/24  4:34 PM    Specimen: Blood   Result Value Ref Range    WBC 10.59 3.40 - 10.80 10*3/mm3    RBC 4.25 3.77 - 5.28 10*6/mm3    Hemoglobin 13.3 12.0 - 15.9 g/dL    Hematocrit 39.1 34.0 - 46.6 %    MCV 92.0 79.0 - 97.0 fL    MCH 31.3 26.6 - 33.0 pg    MCHC 34.0 31.5 - 35.7 g/dL    RDW 12.9 12.3 - 15.4 %    RDW-SD 43.3 37.0 - 54.0 fl    MPV 9.2 6.0 - 12.0 fL    Platelets 219 140 - 450 10*3/mm3    Neutrophil % 81.7 (H) 42.7 - 76.0 %    Lymphocyte % 12.8 (L) 19.6 - 45.3 %    Monocyte % 3.7 (L) 5.0 - 12.0 %    Eosinophil % 1.3 0.3 - 6.2 %    Basophil % 0.2 0.0 - 1.5 %    Immature Grans % 0.3 0.0 - 0.5 %    Neutrophils, Absolute 8.65 (H) 1.70 - 7.00 10*3/mm3    Lymphocytes, Absolute 1.36 0.70 - 3.10 10*3/mm3    Monocytes, Absolute 0.39 0.10 - 0.90 10*3/mm3    Eosinophils, Absolute 0.14 0.00 - 0.40 10*3/mm3    Basophils, Absolute 0.02 0.00 - 0.20  10*3/mm3    Immature Grans, Absolute 0.03 0.00 - 0.05 10*3/mm3    nRBC 0.0 0.0 - 0.2 /100 WBC   Procalcitonin    Collection Time: 06/29/24  4:34 PM    Specimen: Blood   Result Value Ref Range    Procalcitonin 0.12 0.00 - 0.25 ng/mL   D-dimer, Quantitative    Collection Time: 06/29/24  4:34 PM    Specimen: Blood   Result Value Ref Range    D-Dimer, Quantitative 0.31 0.00 - 0.77 MCGFEU/mL   Respiratory Panel PCR w/COVID-19(SARS-CoV-2) [facility] In-House, NP Swab in UTM/VTM, 2 HR TAT - Swab, Nasopharynx    Collection Time: 06/29/24  5:15 PM    Specimen: Nasopharynx; Swab   Result Value Ref Range    ADENOVIRUS, PCR Not Detected Not Detected    Coronavirus 229E Not Detected Not Detected    Coronavirus HKU1 Not Detected Not Detected    Coronavirus NL63 Not Detected Not Detected    Coronavirus OC43 Not Detected Not Detected    COVID19 Not Detected Not Detected - Ref. Range    Human Metapneumovirus Not Detected Not Detected    Human Rhinovirus/Enterovirus Not Detected Not Detected    Influenza A PCR Not Detected Not Detected    Influenza B PCR Not Detected Not Detected    Parainfluenza Virus 1 Not Detected Not Detected    Parainfluenza Virus 2 Not Detected Not Detected    Parainfluenza Virus 3 Not Detected Not Detected    Parainfluenza Virus 4 Not Detected Not Detected    RSV, PCR Not Detected Not Detected    Bordetella pertussis pcr Not Detected Not Detected    Bordetella parapertussis PCR Not Detected Not Detected    Chlamydophila pneumoniae PCR Not Detected Not Detected    Mycoplasma pneumo by PCR Not Detected Not Detected   High Sensitivity Troponin T    Collection Time: 06/29/24  6:32 PM    Specimen: Blood   Result Value Ref Range    HS Troponin T 10 <14 ng/L   Blood Gas, Arterial With Co-Ox    Collection Time: 06/29/24  7:24 PM    Specimen: Arterial Blood   Result Value Ref Range    Site Right Radial     Alex's Test Positive     pH, Arterial 7.394 7.300 - 7.500 pH units    pCO2, Arterial 44.1 35.0 -  45.0 mm Hg    pO2, Arterial 72.5 (L) 75.0 - 100.0 mm Hg    HCO3, Arterial 26.9 22.0 - 28.0 mmol/L    Base Excess, Arterial 1.6 0.0 - 2.0 mmol/L    O2 Saturation, Arterial 93.5 (L) 94.0 - 100.0 %    Hematocrit, Blood Gas 39.9 %    Oxyhemoglobin 92.3 (L) 94 - 99 %    Methemoglobin 0.70 0.00 - 1.50 %    Carboxyhemoglobin 0.6 0 - 2 %    A-a DO2 21.9 mmHg    Barometric Pressure for Blood Gas 734 mmHg    Modality Nasal Cannula     Flow Rate 2.0 lpm    Ventilator Mode NA     Collected by [id number]     pH, Temp Corrected      pCO2, Temperature Corrected      pO2, Temperature Corrected         If labs were ordered, I independently reviewed the results and considered them in treating the patient.        RADIOLOGY  XR Chest 1 View    Result Date: 6/29/2024  PROCEDURE: XR CHEST 1 VW-  HISTORY: SOA Triage Protocol  COMPARISON: 2/16/2024  FINDINGS:  Portable view of the chest demonstrates the lungs to be grossly clear. There is no evidence of effusion, pneumothorax or other significant pleural disease. The mediastinum is unremarkable.  The heart size is normal.      Unremarkable portable chest.    This report was signed and finalized on 6/29/2024 6:11 PM by Isaiah Abbott MD.       I ordered and independently reviewed the above noted radiographic studies.      I viewed images of chest x-ray which showed no acute cardiopulmonary process per my independent interpretation.    See radiologist's dictation for official interpretation.        PROCEDURES    Procedures    ECG 12 Lead ED Triage Standing Order; SOA   Final Result          MEDICATIONS GIVEN IN ER    Medications   sodium chloride 0.9 % flush 10 mL (has no administration in time range)   ketorolac (TORADOL) injection 15 mg (15 mg Intravenous Given 6/29/24 1715)   ondansetron (ZOFRAN) injection 4 mg (4 mg Intravenous Given 6/29/24 1715)   sodium chloride 0.9 % bolus 1,000 mL (0 mL Intravenous Stopped 6/29/24 1954)   ipratropium-albuterol (DUO-NEB) nebulizer solution 3 mL (3 mL  Nebulization Given 6/29/24 1738)   methylPREDNISolone sodium succinate (SOLU-Medrol) injection 125 mg (125 mg Intravenous Given 6/29/24 1725)         MEDICAL DECISION MAKING, PROGRESS, and CONSULTS    All labs, if obtained, have been independently reviewed by me.  All radiology studies, if obtained, have been reviewed by me and the radiologist dictating the report.  All EKG's, if obtained, have been independently viewed and interpreted by me/my attending physician.      Discussion below represents my analysis of pertinent findings related to patient's condition, differential diagnosis, treatment plan and final disposition.    79-year-old female presenting with chest pain and shortness of breath she is upright alert and oriented communicating normal sentences in no acute distress.  Her vital signs as interpreted by me are stable with a initial O2 saturation is interpreted by me as 91% on room air.  She is normotensive mildly tachycardic with a rate of 92 initially which increased to 101, she maintained a rate in the low 100s throughout the ED course.    Extensive workup initiated to elucidate patient's chest pain and shortness of breath.  CBC showed no leukocytosis normal H&H.  CMP is nonactionable procalcitonin normal.  Respiratory panel unremarkable high-sensitivity troponin was within normal limits EKG showed no concerning signs for ischemic changes per ED attending interpretation including normal sinus rhythm, sinus tachycardia, normal axis.  D-dimer was within normal limits lowering any suspicion for PE or dissection.  A blood gas was ordered and found to be largely unremarkable with the patient's pH within normal limits.  proBNP normal.  However patient became increasingly hypoxic with her O2 saturation dropping to 88% she was placed on 1.5 L nasal cannula.  Walking O2 sats showed a drop in O2 down to 88% on room air while walking.  Patient has no oxygen at home this is a new oxygen requirement.  Given these  findings I contacted hospital medicine Dr. See who agreed to admit the patient to [facility] for acute hypoxic respiratory failure.                     Differential diagnosis:    Differential diagnosis included but was not limited to shortness of breath, pneumonia, acute coronary syndrome, acute hypoxic respiratory failure, pulmonary edema, flu, COVID, among others      Additional sources:    - Discussed/ obtained information from independent historians: None    - External (non-ED) record review: Office visit with endocrinology from 2/27/2024 regarding patient's thyroid mass and low TSH levels    - Chronic or social conditions impacting care: None    Orders placed during this visit:  Orders Placed This Encounter   Procedures    Respiratory Panel PCR w/COVID-19(SARS-CoV-2) [facility]A In-House, NP Swab in UTM/VTM, 2 HR TAT - Swab, Nasopharynx    XR Chest 1 View    [place] Draw    Comprehensive Metabolic Panel    BNP    Single High Sensitivity Troponin T    CBC Auto Differential    Procalcitonin    D-dimer, Quantitative    High Sensitivity Troponin T    High Sensitivity Troponin T 2Hr    Blood Gas, Arterial -With Co-Ox Panel: Yes    Blood Gas, Arterial With Co-Ox    NPO Diet NPO Type: Strict NPO    Undress & Gown    Continuous Pulse Oximetry    Vital Signs    Oxygen Therapy- Nasal Cannula; Titrate 1-6 LPM Per SpO2; 90 - 95%    ECG 12 Lead ED Triage Standing Order; SOA    Insert Peripheral IV    Initiate Observation Status    CBC & Differential    Green Top (Gel)    Lavender Top    Gold Top - SST    Light Blue Top         Additional orders considered but not ordered: Considered a CT PE study however patient's D-dimer was within normal limits significantly lowering suspicion for PE or dissection      ED Course:    Consultants: Hospital medicine attending Dr. See                Shared Decision Making:  After my consideration of clinical presentation and any laboratory/radiology  studies obtained, I discussed the findings with the patient/patient representative who is in agreement with the treatment plan and the final disposition.   Risks and benefits of discharge and/or observation/admission were discussed.       AS OF 20:11 EDT VITALS:    BP - 127/77  HR - 104  TEMP - 98.3 °F (36.8 °C) (Oral)  O2 SATS - 94%                  DIAGNOSIS  Final diagnoses:   Acute hypoxic respiratory failure   Chest pain, unspecified type         DISPOSITION  Admit to [facility]      Please note that portions of this document were completed with voice recognition software.      Andrew Guerrier PA-C  06/29/24 2011      Electronically signed by Wenceslao Bauman DO at 06/29/24 3067       Vital Signs (last day)       Date/Time Temp Temp src Pulse Resp BP Patient Position SpO2    07/01/24 1415 -- -- 72 20 118/76 Lying 96    07/01/24 0705 -- -- 67 18 -- -- 95    06/30/24 2319 97.9 (36.6) Oral 84 17 114/89 Lying 95    06/30/24 2011 -- -- 77 18 -- -- 95    06/30/24 1951 98.1 (36.7) Oral 84 18 117/67 Sitting 94    06/30/24 1623 -- -- -- -- 120/71 -- --    06/30/24 1540 97.9 (36.6) Oral 89 16 120/71 Sitting 94    06/30/24 1109 97.6 (36.4) Oral 93 18 119/76 Sitting 93    06/30/24 0933 -- -- -- -- -- -- 94    06/30/24 0724 -- -- 72 -- -- -- 95    06/30/24 0713 97.9 (36.6) Oral 75 20 115/75 Sitting 94    06/30/24 0637 -- -- -- -- -- -- 95    06/30/24 0500 -- -- -- -- -- -- 92    06/30/24 0413 -- -- -- -- -- -- 94    06/30/24 0412 -- -- -- -- -- -- 96    06/30/24 0400 97.5 (36.4) Oral 83 13 104/63 Lying 97    06/30/24 0000 98 (36.7) Oral 86 16 114/70 Lying 96          Current Facility-Administered Medications   Medication Dose Route Frequency Provider Last Rate Last Admin    acetaminophen (TYLENOL) tablet 650 mg  650 mg Oral Q6H PRN Trae See DO   650 mg at 07/01/24 0609    Or    acetaminophen (TYLENOL) suppository 650 mg  650 mg Rectal Q4H PRN Tare See DO         sennosides-docusate (PERICOLACE) 8.6-50 MG per tablet 2 tablet  2 tablet Oral BID PRN Trae See DO        And    polyethylene glycol (MIRALAX) packet 17 g  17 g Oral Daily PRN Trae See DO        And    bisacodyl (DULCOLAX) EC tablet 5 mg  5 mg Oral Daily PRN Trae See DO        And    bisacodyl (DULCOLAX) suppository 10 mg  10 mg Rectal Daily PRN Trae See DO        brimonidine (ALPHAGAN) 0.2 % ophthalmic solution 1 drop  1 drop Both Eyes Q12H Trae See DO        And    timolol (TIMOPTIC) 0.5 % ophthalmic solution 1 drop  1 drop Both Eyes Q12H Trae See DO        budesonide (PULMICORT) nebulizer solution 0.5 mg  0.5 mg Nebulization BID - RT Trae See DO   0.5 mg at 07/01/24 0705    Calcium Replacement - Follow Nurse / BPA Driven Protocol   Does not apply PRN Trae See DO        dorzolamide (TRUSOPT) 2 % ophthalmic solution 1 drop  1 drop Both Eyes BID Trae See DO        Enoxaparin Sodium (LOVENOX) syringe 40 mg  40 mg Subcutaneous Q24H Trae See DO   40 mg at 06/30/24 2123    furosemide (LASIX) injection 20 mg  20 mg Intravenous Once Kerley, Brian Joseph, DO        guaiFENesin-dextromethorphan (ROBITUSSIN DM) 100-10 MG/5ML syrup 5 mL  5 mL Oral Q6H PRN Trae See DO   5 mL at 07/01/24 0751    ipratropium-albuterol (DUO-NEB) nebulizer solution 3 mL  3 mL Nebulization Q4H PRN Trae See DO        Magnesium Standard Dose Replacement - Follow Nurse / BPA Driven Protocol   Does not apply PRN Trea See DO        melatonin tablet 5 mg  5 mg Oral Nightly PRN Trae See DO        metoprolol succinate XL (TOPROL-XL) 24 hr tablet 50 mg  50 mg Oral Nightly Trae See DO   50 mg at 06/30/24 2123    nitroglycerin (NITROSTAT) SL tablet 0.4 mg  0.4 mg Sublingual Q5 Min PRN Trae See,         ondansetron (ZOFRAN) injection 4 mg  4 mg Intravenous Q6H PRN Trae See, DO        oxybutynin XL (DITROPAN-XL) 24 hr tablet 10  mg  10 mg Oral Daily Trae See DO   10 mg at 07/01/24 0844    pantoprazole (PROTONIX) EC tablet 40 mg  40 mg Oral QAM AC Trae See DO   40 mg at 07/01/24 0721    Pharmacy to Dose enoxaparin (LOVENOX)   Does not apply Continuous PRN Trae See DO        Phosphorus Replacement - Follow Nurse / BPA Driven Protocol   Does not apply PRN Trae See DO        Potassium Replacement - Follow Nurse / BPA Driven Protocol   Does not apply PRN Trae See DO        predniSONE (DELTASONE) tablet 40 mg  40 mg Oral Daily With Breakfast Trae See DO   40 mg at 07/01/24 0752    sodium chloride 0.9 % flush 10 mL  10 mL Intravenous PRN Wenceslao Bauman DO        sodium chloride 0.9 % flush 10 mL  10 mL Intravenous Q12H Trae See DO   10 mL at 06/30/24 2124    sodium chloride 0.9 % flush 10 mL  10 mL Intravenous PRN Trae See DO        sodium chloride 0.9 % infusion 40 mL  40 mL Intravenous PRN Trae See DO         Lab Results (last 24 hours)       Procedure Component Value Units Date/Time    High Sensitivity Troponin T 2Hr [[id number]]  (Normal) Collected: 07/01/24 1234    Specimen: Blood Updated: 07/01/24 1310     HS Troponin T 11 ng/L      Troponin T Delta 0 ng/L     Narrative:      High Sensitive Troponin T Reference Range:  <14.0 ng/L- Negative Female for AMI  <22.0 ng/L- Negative Male for AMI  >=14 - Abnormal Female indicating possible myocardial injury.  >=22 - Abnormal Male indicating possible myocardial injury.   Clinicians would have to utilize clinical acumen, EKG, Troponin, and serial changes to determine if it is an Acute Myocardial Infarction or myocardial injury due to an underlying chronic condition.         High Sensitivity Troponin T [[id number]]  (Normal) Collected: 07/01/24 1047    Specimen: Blood Updated: 07/01/24 1121     HS Troponin T 11 ng/L     Narrative:      High Sensitive Troponin T Reference Range:  <14.0 ng/L- Negative Female for AMI  <22.0  ng/L- Negative Male for AMI  >=14 - Abnormal Female indicating possible myocardial injury.  >=22 - Abnormal Male indicating possible myocardial injury.   Clinicians would have to utilize clinical acumen, EKG, Troponin, and serial changes to determine if it is an Acute Myocardial Infarction or myocardial injury due to an underlying chronic condition.         Hemoglobin A1c [[id number]]  (Abnormal) Collected: 07/01/24 0600    Specimen: Blood Updated: 07/01/24 0649     Hemoglobin A1C 8.90 %     Narrative:      Hemoglobin A1C Ranges:    Increased Risk for Diabetes  5.7% to 6.4%  Diabetes                     >= 6.5%  Diabetic Goal                < 7.0%    Urinalysis With Culture If Indicated - Urine, Clean Catch [[id number]]  (Abnormal) Collected: 06/30/24 1751    Specimen: Urine, Clean Catch Updated: 06/30/24 1800     Color, UA Yellow     Appearance, UA Clear     pH, UA 6.0     Specific Gravity, UA >=1.030     Glucose, UA >=1000 mg/dL (3+)     Ketones, UA 15 mg/dL (1+)     Bilirubin, UA Negative     Blood, UA Negative     Protein, UA Negative     Leuk Esterase, UA Negative     Nitrite, UA Negative     Urobilinogen, UA 1.0 E.U./dL    Narrative:      In absence of clinical symptoms, the presence of pyuria, bacteria, and/or nitrites on the urinalysis result does not correlate with infection.  Urine microscopic not indicated.          Imaging Results (Last 72 Hours)       Procedure Component Value Units Date/Time    MRI Brain Without Contrast [[id number]] Collected: 07/01/24 1301     Updated: 07/01/24 1323    Narrative:      PROCEDURE: MRI BRAIN WO CONTRAST-     HISTORY: vertigo, eval for CVA; J96.01-Acute respiratory failure with  hypoxia; R07.9-Chest pain, unspecified     PROCEDURE: Multiplanar MR imaging of the brain was performed in multiple  MR sequences .     FINDINGS: Brain parenchyma displays normal signal without evidence of  mass, hemorrhage or edema.  Limited images the proximal cord are  unremarkable. The  ventricles are mildly dilated indicating mild atrophy  appropriate for age. There is minimal small vessel ischemic disease.  This is very appropriate for age. There is no extra-axial fluid or  midline shift. Flow-voids are appropriate. Diffusion weighted images  demonstrate no evidence of acute CVA. Limited images of the paranasal  sinuses are unremarkable.       Impression:      No acute intracranial process.     Mild, age-appropriate chronic change as described.           This report was signed and finalized on 7/1/2024 1:21 PM by Iraida Kruse MD.       CT Angiogram Chest [[id number]] Collected: 07/01/24 1153     Updated: 07/01/24 1157    Narrative:      PROCEDURE: CT ANGIOGRAM CHEST-     HISTORY: eval for PE; J96.01-Acute respiratory failure with hypoxia;  R07.9-Chest pain, unspecified        COMPARISON: None     FINDINGS: Thin section axial CT images of the chest were obtained with  contrast. 3D reformatted images were also obtained. This study was  performed with techniques to keep radiation doses as low as reasonably  achievable, (ALARA). Individualized dose reduction techniques using  automated exposure control or adjustment of mA and/or kV according to  the patient size were employed.     There is no evidence of pulmonary embolism. There is no evidence of  thoracic aortic aneurysm or dissection. Cardiomegaly is noted. There is  no evidence of mediastinal or hilar mass or adenopathy.  There is no  evidence of pulmonary mass or suspicious nodule. Mild bibasilar  atelectasis is noted along with small pleural effusions. No acute chest  wall abnormality is identified. Postoperative changes are seen in the  anterior chest bilaterally.     Limited images of the upper abdomen  are unremarkable.          Impression:         No evidence of pulmonary embolism.     Mild bibasilar atelectasis and small pleural effusions.              CTDI: 3.04 mGy  DLP:103.94 mGy.cm        This report was signed and finalized on  2024 11:55 AM by Eze Soni MD.       XR Chest 1 View [[id number]] Collected: 24     Updated: 24    Narrative:      PROCEDURE: XR CHEST 1 VW-     HISTORY: SOA Triage Protocol     COMPARISON: 2024     FINDINGS:  Portable view of the chest demonstrates the lungs to be  grossly clear. There is no evidence of effusion, pneumothorax or other  significant pleural disease. The mediastinum is unremarkable.     The heart size is normal.       Impression:      Unremarkable portable chest.            This report was signed and finalized on 2024 6:11 PM by Isaiah Abbott MD.                Physician Progress Notes (last 72 hours)        Kerley, Brian Joseph, DO at 24 1040              [facility]    PROGRESS NOTE    Name:  Aster Craft   Age:  79 y.o.  Sex:  female  :  1945  MRN:  [id number]   Visit Number:  [id number]  Admission Date:  2024  Date Of Service:  24  Primary Care Physician:  Yolie Cotto MD     LOS: 0 days :    Chief Complaint:      Follow-up; Shortness of air    Subjective:    Little better but still some shortness of air with intermittent tightness in her chest.  Feels like the tightness in her chest radiates up into her ears that time.  Still feels a little unsteady with walking.  Did need some help to go to the restroom.    Hospital Course:    Aster Craft is a 79-year-old female with past medical history significant for hypertension, hyperlipidemia, type 2 diabetes, remote history of breast cancer presents from home to the emergency room with 2 days of shortness of breath and nonproductive cough.  Patient reports audible wheezing.  States that she will have coughing episodes where she feels like she cannot catch her breath.  Is a lifelong non-smoker.  Is not on supplemental oxygen at baseline.  No known sick contacts.  Denies chest pain, nausea/vomiting or diarrhea.     ED summary: Patient afebrile,  hemodynamically stable and hypoxic 88% on room air.  AB.39/44/72/27/94% on 2 L nasal cannula.  Negative troponins x 2.  Normal proBNP.  CMP unremarkable.  D-dimer procalcitonin within normal limits.  CBC unremarkable.  Respiratory PCR panel negative.  Chest x-ray personally reviewed, unremarkable.      Observation general floor admission early a.m. 2024 with acute hypoxic respiratory failure, unclear etiology.    Review of Systems:     All systems were reviewed and negative except as mentioned in subjective, assessment and plan.    Vital Signs:    Temp:  [97.6 °F (36.4 °C)-98.1 °F (36.7 °C)] 97.9 °F (36.6 °C)  Heart Rate:  [67-93] 67  Resp:  [16-18] 18  BP: (114-120)/(67-89) 114/89    Intake and output:    I/O last 3 completed shifts:  In: 1660 [P.O.:660; IV Piggyback:1000]  Out: 200 [Urine:200]  No intake/output data recorded.    Physical Examination:    General Appearance:  Alert and cooperative.    Head:  Atraumatic and normocephalic.   Eyes: Conjunctivae and sclerae normal, no icterus. No pallor.   Throat: No oral lesions, no thrush, oral mucosa moist.   Neck: Supple, trachea midline, no thyromegaly.   Lungs:   Breath sounds heard bilaterally equally.  No wheezing or crackles. No Pleural rub or bronchial breathing.   Heart:  Normal S1 and S2, no murmur, no gallop, no rub. No JVD.   Abdomen:   Normal bowel sounds, no masses, no organomegaly. Soft, nontender, nondistended, no rebound tenderness.   Extremities: Supple, no edema, no cyanosis, no clubbing.   Skin: Warm.   Neurologic: Alert and oriented x 3. No facial asymmetry. Moves all four limbs. No tremors.      Laboratory results:    Results from last 7 days   Lab Units 24  0634 24  1634   SODIUM mmol/L 139 140   POTASSIUM mmol/L 4.5 4.1   CHLORIDE mmol/L 105 103   CO2 mmol/L 24.6 25.6   BUN mg/dL 18 11   CREATININE mg/dL 0.48* 0.54*   CALCIUM mg/dL 8.8 9.2   BILIRUBIN mg/dL  --  0.8   ALK PHOS U/L  --  98   ALT (SGPT) U/L  --  22   AST  (SGOT) U/L  --  20   GLUCOSE mg/dL 221* 167*     Results from last 7 days   Lab Units 06/30/24  0634 06/29/24  1634   WBC 10*3/mm3 10.00 10.59   HEMOGLOBIN g/dL 12.7 13.3   HEMATOCRIT % 39.0 39.1   PLATELETS 10*3/mm3 227 219         Results from last 7 days   Lab Units 06/29/24  2036 06/29/24  1832 06/29/24  1634   HSTROP T ng/L 8 10 10         Recent Labs     06/29/24  1924   PHART 7.394   ZBG5ARP 44.1   PO2ART 72.5*   [id number] 26.9   BASEEXCESS 1.6      I have reviewed the patient's laboratory results.    Radiology results:    Adult Transthoracic Echo Complete W/ Cont if Necessary Per Protocol    Result Date: 7/1/2024    Left ventricular systolic function is low normal. Calculated left ventricular 3D EF = 46% Left ventricular ejection fraction appears to be 46 - 50%.   The left ventricular cavity is borderline dilated.   Left ventricular diastolic function is consistent with (grade I) impaired relaxation.     XR Chest 1 View    Result Date: 6/29/2024  PROCEDURE: XR CHEST 1 VW-  HISTORY: SOA Triage Protocol  COMPARISON: 2/16/2024  FINDINGS:  Portable view of the chest demonstrates the lungs to be grossly clear. There is no evidence of effusion, pneumothorax or other significant pleural disease. The mediastinum is unremarkable.  The heart size is normal.      Impression: Unremarkable portable chest.    This report was signed and finalized on 6/29/2024 6:11 PM by Isaiah Abbott MD.     I have reviewed the patient's radiology reports.    Medication Review:     I have reviewed the patient's active and prn medications.     Problem List:      Acute respiratory failure with hypoxia      Assessment:     Acute hypoxic respiratory failure POA        Plan:     Resting in bed, appears comfortably ill although better than yesterday.  No respiratory distress.    Updated granddaughter Guillermina Nichols    Acute hypoxic respiratory failure, resolved  Chest tightness  History of vertigo  Generalized weakness  -MRI brain, CT angio chest.   With history of vertigo in the past years would like to rule out CVA.  With cough and initially intermittent hypoxia would like to rule out PE.  -Unclear etiology.  Bacterial and viral pneumonia ruled out.  -Patient does not have formal diagnosis of COPD  -Does have history significant for environmental allergies.  Possible exacerbation?  -Continue supplemental oxygen as necessary to maintain saturation greater than 88%.  Stable on room air at rest but required 1 to 2 L nasal cannula with ambulation in the ER.    UTI  Recheck urinalysis negative.  She finished her course of Macrobid.     Continue home medications as warranted.  Further orders as clinical course dictates.     Risk Assessment: Moderate  DVT Prophylaxis: Lovenox  Code Status: Full  Diet: As tolerated  Discharge Plan: Home; as early as  depending on clinical status    Brian Joseph Kerley, DO  24  10:40 EDT    Dictated utilizing Dragon dictation.      Electronically signed by Kerley, Brian Joseph, DO at 24 1042       Kerley, Brian Joseph, DO at 24 7414              [facility]    Name:  Atser Craft   Age:  79 y.o.  Sex:  female  :  1945  MRN:  [id number]   Visit Number:  [id number]  Admission Date:  2024  Date Of Service:  24  Primary Care Physician:  Yolie Cotto MD     LOS: 0 days :    Chief Complaint:      Follow-up; Shortness of air    Subjective:    Still does not feel well overnight, generally weak, did have some tightness in her chest before she came in, does feel little better.  Has been on Macrobid for UTI.    Hospital Course:    Aster Craft is a 79-year-old female with past medical history significant for hypertension, hyperlipidemia, type 2 diabetes, remote history of breast cancer presents from home to the emergency room with 2 days of shortness of breath and nonproductive cough.  Patient reports audible wheezing.  States that she will have coughing  episodes where she feels like she cannot catch her breath.  Is a lifelong non-smoker.  Is not on supplemental oxygen at baseline.  No known sick contacts.  Denies chest pain, nausea/vomiting or diarrhea.     ED summary: Patient afebrile, hemodynamically stable and hypoxic 88% on room air.  AB.39/44/72/27/94% on 2 L nasal cannula.  Negative troponins x 2.  Normal proBNP.  CMP unremarkable.  D-dimer procalcitonin within normal limits.  CBC unremarkable.  Respiratory PCR panel negative.  Chest x-ray personally reviewed, unremarkable.      Observation general floor admission early a.m. 2024 with acute hypoxic respiratory failure, unclear etiology.    Review of Systems:     All systems were reviewed and negative except as mentioned in subjective, assessment and plan.    Vital Signs:    Temp:  [97.5 °F (36.4 °C)-98.6 °F (37 °C)] 97.6 °F (36.4 °C)  Heart Rate:  [] 93  Resp:  [13-20] 18  BP: (104-137)/(63-84) 119/76    Intake and output:    I/O last 3 completed shifts:  In: 1000 [IV Piggyback:1000]  Out: -   I/O this shift:  In: 480 [P.O.:480]  Out: -     Physical Examination:    General Appearance:  Alert and cooperative.    Head:  Atraumatic and normocephalic.   Eyes: Conjunctivae and sclerae normal, no icterus. No pallor.   Throat: No oral lesions, no thrush, oral mucosa moist.   Neck: Supple, trachea midline, no thyromegaly.   Lungs:   Breath sounds heard bilaterally equally.  No wheezing or crackles. No Pleural rub or bronchial breathing.   Heart:  Normal S1 and S2, no murmur, no gallop, no rub. No JVD.   Abdomen:   Normal bowel sounds, no masses, no organomegaly. Soft, nontender, nondistended, no rebound tenderness.   Extremities: Supple, no edema, no cyanosis, no clubbing.   Skin: Warm.   Neurologic: Alert and oriented x 3. No facial asymmetry. Moves all four limbs. No tremors.      Laboratory results:    Results from last 7 days   Lab Units 24  0634 24  1634   SODIUM mmol/L 139 140    POTASSIUM mmol/L 4.5 4.1   CHLORIDE mmol/L 105 103   CO2 mmol/L 24.6 25.6   BUN mg/dL 18 11   CREATININE mg/dL 0.48* 0.54*   CALCIUM mg/dL 8.8 9.2   BILIRUBIN mg/dL  --  0.8   ALK PHOS U/L  --  98   ALT (SGPT) U/L  --  22   AST (SGOT) U/L  --  20   GLUCOSE mg/dL 221* 167*     Results from last 7 days   Lab Units 06/30/24  0634 06/29/24  1634   WBC 10*3/mm3 10.00 10.59   HEMOGLOBIN g/dL 12.7 13.3   HEMATOCRIT % 39.0 39.1   PLATELETS 10*3/mm3 227 219         Results from last 7 days   Lab Units 06/29/24  2036 06/29/24  1832 06/29/24  1634   HSTROP T ng/L 8 10 10         Recent Labs     06/29/24  1924   PHART 7.394   YRX5DMI 44.1   PO2ART 72.5*   DYQ2NSW 26.9   BASEEXCESS 1.6      I have reviewed the patient's laboratory results.    Radiology results:    XR Chest 1 View    Result Date: 6/29/2024  PROCEDURE: XR CHEST 1 VW-  HISTORY: SOA Triage Protocol  COMPARISON: 2/16/2024  FINDINGS:  Portable view of the chest demonstrates the lungs to be grossly clear. There is no evidence of effusion, pneumothorax or other significant pleural disease. The mediastinum is unremarkable.  The heart size is normal.      Impression: Unremarkable portable chest.    This report was signed and finalized on 6/29/2024 6:11 PM by Isaiah Abbott MD.     I have reviewed the patient's radiology reports.    Medication Review:     I have reviewed the patient's active and prn medications.     Problem List:      Acute respiratory failure with hypoxia      Assessment:     Acute hypoxic respiratory failure POA        Plan:     Resting in bed, appears comfortably ill.  No respiratory distress.    Acute hypoxic respiratory failure, resolved  -Unclear etiology.  Bacterial and viral pneumonia ruled out.  -Patient does not have formal diagnosis of COPD  -Does have history significant for environmental allergies.  Possible exacerbation?  -Continue supplemental oxygen as necessary to maintain saturation greater than 88%.  Stable on room air at rest but  required 1 to 2 L nasal cannula with ambulation in the ER.    UTI  Checking urinalysis and culture.  She was on Macrobid.  Possible that she may have something resistant to Macrobid and is feeling systemically ill from UTI.     Continue home medications as warranted.  Further orders as clinical course dictates.     Risk Assessment: Moderate  DVT Prophylaxis: Lovenox  Code Status: Full  Diet: As tolerated  Discharge Plan: Home; as early as 7/1 depending on clinical status    Brian Joseph Kerley, DO  06/30/24  14:54 EDT    Dictated utilizing Dragon dictation.      Electronically signed by Kerley, Brian Joseph, DO at 06/30/24 1535       Consult Notes (last 72 hours)  Notes from 06/28/24 1529 through 07/01/24 1529   No notes of this type exist for this encounter.

## 2024-07-02 ENCOUNTER — READMISSION MANAGEMENT (OUTPATIENT)
Dept: CALL CENTER | Facility: HOSPITAL | Age: 79
End: 2024-07-02
Payer: MEDICARE

## 2024-07-02 VITALS
HEIGHT: 63 IN | HEART RATE: 72 BPM | WEIGHT: 127.21 LBS | TEMPERATURE: 97.7 F | OXYGEN SATURATION: 95 % | DIASTOLIC BLOOD PRESSURE: 66 MMHG | SYSTOLIC BLOOD PRESSURE: 101 MMHG | RESPIRATION RATE: 16 BRPM | BODY MASS INDEX: 22.54 KG/M2

## 2024-07-02 PROBLEM — I50.33 ACUTE ON CHRONIC HEART FAILURE WITH PRESERVED EJECTION FRACTION (HFPEF): Status: ACTIVE | Noted: 2024-07-02

## 2024-07-02 PROBLEM — J96.01 ACUTE RESPIRATORY FAILURE WITH HYPOXIA: Status: RESOLVED | Noted: 2024-06-29 | Resolved: 2024-07-02

## 2024-07-02 PROBLEM — I50.30 (HFPEF) HEART FAILURE WITH PRESERVED EJECTION FRACTION: Chronic | Status: ACTIVE | Noted: 2024-07-02

## 2024-07-02 PROCEDURE — 94664 DEMO&/EVAL PT USE INHALER: CPT

## 2024-07-02 PROCEDURE — 94799 UNLISTED PULMONARY SVC/PX: CPT

## 2024-07-02 PROCEDURE — 99239 HOSP IP/OBS DSCHRG MGMT >30: CPT | Performed by: STUDENT IN AN ORGANIZED HEALTH CARE EDUCATION/TRAINING PROGRAM

## 2024-07-02 PROCEDURE — 94761 N-INVAS EAR/PLS OXIMETRY MLT: CPT

## 2024-07-02 RX ORDER — FUROSEMIDE 20 MG/1
20 TABLET ORAL DAILY
Qty: 5 TABLET | Refills: 0 | Status: ON HOLD | OUTPATIENT
Start: 2024-07-02

## 2024-07-02 RX ADMIN — OXYBUTYNIN CHLORIDE 10 MG: 5 TABLET, EXTENDED RELEASE ORAL at 08:26

## 2024-07-02 RX ADMIN — Medication 10 ML: at 08:26

## 2024-07-02 RX ADMIN — PANTOPRAZOLE SODIUM 40 MG: 40 TABLET, DELAYED RELEASE ORAL at 06:39

## 2024-07-02 RX ADMIN — BUDESONIDE 0.5 MG: 0.5 INHALANT RESPIRATORY (INHALATION) at 07:26

## 2024-07-02 NOTE — PLAN OF CARE
Problem: Adult Inpatient Plan of Care  Goal: Plan of Care Review  Outcome: Ongoing, Progressing  Flowsheets (Taken 7/2/2024 0622)  Progress: improving  Plan of Care Reviewed With: patient  Outcome Evaluation: VSS on RA, no acute events noted overnight. Plan of care continued.  Goal: Patient-Specific Goal (Individualized)  Outcome: Ongoing, Progressing  Goal: Absence of Hospital-Acquired Illness or Injury  Outcome: Ongoing, Progressing  Intervention: Identify and Manage Fall Risk  Recent Flowsheet Documentation  Taken 7/2/2024 0400 by Soo Aponte RN  Safety Promotion/Fall Prevention:   safety round/check completed   activity supervised   assistive device/personal items within reach   clutter free environment maintained   fall prevention program maintained  Taken 7/2/2024 0300 by Soo Aponte RN  Safety Promotion/Fall Prevention:   safety round/check completed   activity supervised   assistive device/personal items within reach   clutter free environment maintained   fall prevention program maintained   nonskid shoes/slippers when out of bed  Taken 7/2/2024 0200 by Soo Aponte RN  Safety Promotion/Fall Prevention:   safety round/check completed   activity supervised   assistive device/personal items within reach   clutter free environment maintained   fall prevention program maintained   nonskid shoes/slippers when out of bed  Taken 7/2/2024 0000 by Soo Aponte RN  Safety Promotion/Fall Prevention:   safety round/check completed   activity supervised   assistive device/personal items within reach   clutter free environment maintained  Taken 7/1/2024 2200 by Soo Aponte, RN  Safety Promotion/Fall Prevention:   safety round/check completed   activity supervised   assistive device/personal items within reach   clutter free environment maintained   fall prevention program maintained   nonskid shoes/slippers when out of bed  Taken 7/1/2024 2000 by Soo Aponte RN  Safety  Promotion/Fall Prevention:   activity supervised   assistive device/personal items within reach   clutter free environment maintained   fall prevention program maintained   safety round/check completed  Intervention: Prevent Skin Injury  Recent Flowsheet Documentation  Taken 7/2/2024 0400 by Soo Aponte RN  Body Position: supine, legs elevated  Taken 7/2/2024 0300 by Soo Aponte RN  Body Position:   turned   left  Taken 7/2/2024 0200 by Soo Aponte RN  Body Position:   turned   right  Taken 7/2/2024 0000 by Soo Aponte RN  Body Position:   tilted   right  Taken 7/1/2024 2200 by Soo Aponte RN  Body Position:   turned   left  Taken 7/1/2024 2000 by Soo Aponte RN  Body Position:   tilted   left  Intervention: Prevent and Manage VTE (Venous Thromboembolism) Risk  Recent Flowsheet Documentation  Taken 7/2/2024 0400 by Soo Aponte RN  Activity Management: bedrest  Taken 7/2/2024 0300 by Soo Aponte RN  Activity Management: bedrest  Taken 7/2/2024 0200 by Soo Aponte RN  Activity Management: bedrest  Taken 7/2/2024 0000 by Soo Aponte RN  Activity Management: activity encouraged  Taken 7/1/2024 2200 by Soo Aponte RN  Activity Management: bedrest  Taken 7/1/2024 2000 by Soo Aponte RN  Activity Management: activity encouraged  Intervention: Prevent Infection  Recent Flowsheet Documentation  Taken 7/2/2024 0400 by Soo Aponte RN  Infection Prevention:   environmental surveillance performed   rest/sleep promoted  Taken 7/2/2024 0300 by Soo Aponte RN  Infection Prevention:   environmental surveillance performed   hand hygiene promoted   rest/sleep promoted  Taken 7/2/2024 0200 by Soo Aponte RN  Infection Prevention:   environmental surveillance performed   hand hygiene promoted   rest/sleep promoted  Taken 7/1/2024 2200 by Soo Aponte RN  Infection Prevention:   environmental surveillance performed   hand  hygiene promoted   rest/sleep promoted  Taken 7/1/2024 2000 by Soo Aponte RN  Infection Prevention:   environmental surveillance performed   rest/sleep promoted  Goal: Optimal Comfort and Wellbeing  Outcome: Ongoing, Progressing  Intervention: Provide Person-Centered Care  Recent Flowsheet Documentation  Taken 7/1/2024 2000 by Soo Aponte RN  Trust Relationship/Rapport:   care explained   choices provided   reassurance provided   thoughts/feelings acknowledged  Goal: Readiness for Transition of Care  Outcome: Ongoing, Progressing   Goal Outcome Evaluation:  Plan of Care Reviewed With: patient        Progress: improving  Outcome Evaluation: VSS on RA, no acute events noted overnight. Plan of care continued.

## 2024-07-02 NOTE — DISCHARGE SUMMARY
[facility]   DISCHARGE SUMMARY      Name:  Aster Craft   Age:  79 y.o.  Sex:  female  :  1945  MRN:  [id number]   Visit Number:  [id number]    Admission Date:  2024  Date of Discharge:  2024  Primary Care Physician:  Yolie Cotto MD    Important issues to note:    -Establish with heart failure clinic.  -Continue short-course low-dose Lasix.  Recommend PCP check labs and review for any indication continue low-dose Lasix.  -Likely would benefit from stress test outpatient.    Discharge Diagnoses:     Heart failure with preserved ejection fraction  Hypoxia, Resolved        Problem List:     Active Hospital Problems    Diagnosis  POA    (HFpEF) heart failure with preserved ejection fraction [I50.30]  Yes      Resolved Hospital Problems    Diagnosis Date Resolved POA    **Acute respiratory failure with hypoxia [J96.01] 2024 Yes     Presenting Problem:    Chief Complaint   Patient presents with    Shortness of Breath     Pt reports worsening SOA and CP and headache since yesterday. Also reports cough and chills      Consults:     Consulting Physician(s)                     None              Procedures Performed:    None    History of presenting illness/Hospital Course:    Aster Craft is a 79-year-old female with past medical history significant for hypertension, hyperlipidemia, type 2 diabetes, remote history of breast cancer presents from home to the emergency room with 2 days of shortness of breath and nonproductive cough.  Patient reports audible wheezing.  States that she will have coughing episodes where she feels like she cannot catch her breath.  Is a lifelong non-smoker.  Is not on supplemental oxygen at baseline.  No known sick contacts.  Denies chest pain, nausea/vomiting or diarrhea.     ED summary: Patient afebrile, hemodynamically stable and hypoxic 88% on room air.  AB.39/44/72/27/94% on 2 L nasal cannula.  Negative troponins x 2.  Normal  "proBNP.  CMP unremarkable.  D-dimer procalcitonin within normal limits.  CBC unremarkable.  Respiratory PCR panel negative.  Chest x-ray personally reviewed, unremarkable.       Observation general floor admission early a.m. 6/30/2024 with acute hypoxic respiratory failure, unclear etiology.  Patient also reporting history of vertigo, intermittent chest pressure, \"uneasy on her legs\" recently more so.  Due to broad differential and cough, variety of tests to rule out particular causes.  MRI brain negative; no CVA.  PCR respiratory panel negative.  Troponins negative.  EKG nonischemic.  Echocardiogram EF low normal 64%, grade 1 diastolic dysfunction.    CT angio chest no evidence of PE, did show mild bibasilar atelectasis and pleural effusions, likely from heart failure preserved ejection fraction.  This is most likely the cause of her transient hypoxia.  Did provide a small bit of Lasix, although of note she did not appear frankly overloaded.    Stable for discharge home 7/2/2024.    -Establish with heart failure clinic.  -Continue short-course low-dose Lasix.  Recommend PCP check labs and review for any indication continue low-dose Lasix.  -Likely would benefit from stress test outpatient.    Vital Signs:    Temp:  [97.7 °F (36.5 °C)-98.3 °F (36.8 °C)] 97.7 °F (36.5 °C)  Heart Rate:  [67-84] 72  Resp:  [16-20] 16  BP: ()/(57-76) 101/66    Physical Exam:    General Appearance:  Alert and cooperative.    Head:  Atraumatic and normocephalic.   Eyes: Conjunctivae and sclerae normal, no icterus. No pallor.   Ears:  Ears with no abnormalities noted.   Throat: No oral lesions, no thrush, oral mucosa moist.   Neck: Supple, trachea midline, no thyromegaly.   Back:   No kyphoscoliosis present. No tenderness to palpation.   Lungs:   Breath sounds heard bilaterally equally.  No crackles or wheezing. No Pleural rub or bronchial breathing.   Heart:  Normal S1 and S2, no murmur, no gallop, no rub. No JVD.   Abdomen:   " Normal bowel sounds, no masses, no organomegaly. Soft, nontender, nondistended, no rebound tenderness.   Extremities: Supple, no edema, no cyanosis, no clubbing.   Pulses: Pulses palpable bilaterally.   Skin: Warm.   Neurologic: Alert and oriented x 3. No facial asymmetry. Moves all four limbs. No tremors.     Pertinent Lab Results:     Results from last 7 days   Lab Units 06/30/24  0634 06/29/24  1634   SODIUM mmol/L 139 140   POTASSIUM mmol/L 4.5 4.1   CHLORIDE mmol/L 105 103   CO2 mmol/L 24.6 25.6   BUN mg/dL 18 11   CREATININE mg/dL 0.48* 0.54*   CALCIUM mg/dL 8.8 9.2   BILIRUBIN mg/dL  --  0.8   ALK PHOS U/L  --  98   ALT (SGPT) U/L  --  22   AST (SGOT) U/L  --  20   GLUCOSE mg/dL 221* 167*     Results from last 7 days   Lab Units 06/30/24  0634 06/29/24  1634   WBC 10*3/mm3 10.00 10.59   HEMOGLOBIN g/dL 12.7 13.3   HEMATOCRIT % 39.0 39.1   PLATELETS 10*3/mm3 227 219         Results from last 7 days   Lab Units 07/01/24  1234 07/01/24  1047 06/29/24  2036   HSTROP T ng/L 11 11 8     Results from last 7 days   Lab Units 06/29/24  1634   PROBNP pg/mL 233.4             Results from last 7 days   Lab Units 06/29/24  1924   PH, ARTERIAL pH units 7.394   PO2 ART mm Hg 72.5*   PCO2, ARTERIAL mm Hg 44.1   HCO3 ART mmol/L 26.9           Pertinent Radiology Results:    Imaging Results (All)       Procedure Component Value Units Date/Time    MRI Brain Without Contrast [[id number]] Collected: 07/01/24 1301     Updated: 07/01/24 1323    Narrative:      PROCEDURE: MRI BRAIN WO CONTRAST-     HISTORY: vertigo, eval for CVA; J96.01-Acute respiratory failure with  hypoxia; R07.9-Chest pain, unspecified     PROCEDURE: Multiplanar MR imaging of the brain was performed in multiple  MR sequences .     FINDINGS: Brain parenchyma displays normal signal without evidence of  mass, hemorrhage or edema.  Limited images the proximal cord are  unremarkable. The ventricles are mildly dilated indicating mild atrophy  appropriate for age. There  is minimal small vessel ischemic disease.  This is very appropriate for age. There is no extra-axial fluid or  midline shift. Flow-voids are appropriate. Diffusion weighted images  demonstrate no evidence of acute CVA. Limited images of the paranasal  sinuses are unremarkable.       Impression:      No acute intracranial process.     Mild, age-appropriate chronic change as described.           This report was signed and finalized on 7/1/2024 1:21 PM by Iraida Kruse MD.       CT Angiogram Chest [[id number]] Collected: 07/01/24 1153     Updated: 07/01/24 1157    Narrative:      PROCEDURE: CT ANGIOGRAM CHEST-     HISTORY: eval for PE; J96.01-Acute respiratory failure with hypoxia;  R07.9-Chest pain, unspecified        COMPARISON: None     FINDINGS: Thin section axial CT images of the chest were obtained with  contrast. 3D reformatted images were also obtained. This study was  performed with techniques to keep radiation doses as low as reasonably  achievable, (ALARA). Individualized dose reduction techniques using  automated exposure control or adjustment of mA and/or kV according to  the patient size were employed.     There is no evidence of pulmonary embolism. There is no evidence of  thoracic aortic aneurysm or dissection. Cardiomegaly is noted. There is  no evidence of mediastinal or hilar mass or adenopathy.  There is no  evidence of pulmonary mass or suspicious nodule. Mild bibasilar  atelectasis is noted along with small pleural effusions. No acute chest  wall abnormality is identified. Postoperative changes are seen in the  anterior chest bilaterally.     Limited images of the upper abdomen  are unremarkable.          Impression:         No evidence of pulmonary embolism.     Mild bibasilar atelectasis and small pleural effusions.              CTDI: 3.04 mGy  DLP:103.94 mGy.cm        This report was signed and finalized on 7/1/2024 11:55 AM by Eze Soni MD.       XR Chest 1 View [[id number]] Collected:  06/29/24 1811     Updated: 06/29/24 1813    Narrative:      PROCEDURE: XR CHEST 1 VW-     HISTORY: SOA Triage Protocol     COMPARISON: 2/16/2024     FINDINGS:  Portable view of the chest demonstrates the lungs to be  grossly clear. There is no evidence of effusion, pneumothorax or other  significant pleural disease. The mediastinum is unremarkable.     The heart size is normal.       Impression:      Unremarkable portable chest.            This report was signed and finalized on 6/29/2024 6:11 PM by Isaiah Abbott MD.               Echo:    Results for orders placed during the hospital encounter of 06/29/24    Adult Transthoracic Echo Complete W/ Cont if Necessary Per Protocol    Interpretation Summary    Left ventricular systolic function is low normal. Calculated left ventricular 3D EF = 46% Left ventricular ejection fraction appears to be 46 - 50%.    The left ventricular cavity is borderline dilated.    Left ventricular diastolic function is consistent with (grade I) impaired relaxation.    Condition on Discharge:      Stable.    Code status during the hospital stay:    Code Status and Medical Interventions:   Ordered at: 06/29/24 2031     Code Status (Patient has no pulse and is not breathing):    CPR (Attempt to Resuscitate)     Medical Interventions (Patient has pulse or is breathing):    Full Support     Discharge Disposition:    Home or Self Care    Discharge Medications:       Discharge Medications        New Medications        Instructions Start Date   furosemide 20 MG tablet  Commonly known as: Lasix   20 mg, Oral, Daily             Continue These Medications        Instructions Start Date   albuterol sulfate  (90 Base) MCG/ACT inhaler  Commonly known as: PROVENTIL HFA;VENTOLIN HFA;PROAIR HFA   TWO PUFFS EVERY SIX HOURS AS NEEDED      azelastine 0.1 % nasal spray  Commonly known as: ASTELIN   INHALE ONE SPRAY IN EACH NOSTRIL TWICE DAILY      benzonatate 200 MG capsule  Commonly known as:  TESSALON   200 mg, Oral      brimonidine-timolol 0.2-0.5 % ophthalmic solution  Commonly known as: COMBIGAN   1 drop, Both Eyes, Every 12 Hours      calcium carbonate-cholecalciferol 500-400 MG-UNIT tablet tablet   Oral, Daily      cetirizine 10 MG tablet  Commonly known as: zyrTEC   1 tablet, Oral, Daily      Cholecalciferol 250 MCG (51888 UT) capsule   5,000 Units, Oral, Daily      CINNAMON PO   Oral      clobetasol 0.05 % ointment  Commonly known as: TEMOVATE   Apply to affected area BID x 2 wks then daily x 2 wks then 2-3x/wk      desloratadine 5 MG tablet  Commonly known as: CLARINEX   5 mg, Oral, Daily      dextromethorphan-guaifenesin  MG/5ML syrup  Commonly known as: ROBITUSSIN-DM   TAKE 10ML EVERY 4 HOURS AS NEEDED      diazePAM 5 MG tablet  Commonly known as: VALIUM   TAKE 1 TABLET one hour prior TO procedure      dorzolamide 2 % ophthalmic solution  Commonly known as: TRUSOPT   INSTILL ONE DROP IN EACH EYE TWICE DAILY      ezetimibe 10 MG tablet  Commonly known as: ZETIA   10 mg, Oral, Daily      Flovent  MCG/ACT inhaler  Generic drug: fluticasone   2 puffs, Inhalation, 2 Times Daily      FreeStyle Malina 2 Sensor misc   CHANGE SENSOR EVERY 14 DAYS      Gemtesa 75 MG tablet  Generic drug: Vibegron   75 mg, Oral, Daily      glipizide 5 MG ER tablet  Commonly known as: GLUCOTROL XL       ipratropium 0.03 % nasal spray  Commonly known as: ATROVENT   INHALE ONE SPRAY IN EACH NOSTRIL TWICE DAILY      ipratropium 0.06 % nasal spray  Commonly known as: ATROVENT   INHALE 1-2 SPRAYS IN EACH NOSTRIL TWICE DAILY      Januvia 100 MG tablet  Generic drug: SITagliptin   1 tablet, Oral, Daily      meclizine 12.5 MG tablet  Commonly known as: ANTIVERT   12.5 mg, Oral      metoprolol succinate XL 50 MG 24 hr tablet  Commonly known as: TOPROL-XL   50 mg, Oral, Daily      nitroglycerin 0.4 MG SL tablet  Commonly known as: NITROSTAT   0.4 mg, Sublingual      OneTouch Delica Plus [id number] misc   USE AS DIRECTED  EVERY DAY      OneTouch Verio Flex System w/Device kit   See Admin Instructions, for testing      OneTouch Verio test strip  Generic drug: glucose blood   Daily, for testing      pantoprazole 40 MG EC tablet  Commonly known as: PROTONIX   40 mg, Oral      Rhopressa 0.02 % solution  Generic drug: Netarsudil Dimesylate   1 drop, Both Eyes, Nightly      simethicone 80 MG chewable tablet  Commonly known as: MYLICON   80 mg, Oral, Every 6 Hours PRN      Triamcinolone Acetonide 55 MCG/ACT nasal inhaler  Commonly known as: NASACORT   2 sprays, Nasal, Daily             Stop These Medications      amoxicillin 500 MG capsule  Commonly known as: AMOXIL     fish oil 1000 MG capsule capsule     methylPREDNISolone 4 MG dose pack  Commonly known as: MEDROL     montelukast 10 MG tablet  Commonly known as: SINGULAIR     nitrofurantoin (macrocrystal-monohydrate) 100 MG capsule  Commonly known as: MACROBID     ondansetron ODT 4 MG disintegrating tablet  Commonly known as: ZOFRAN-ODT     predniSONE 20 MG tablet  Commonly known as: DELTASONE     travoprost (SHANNON free) 0.004 % solution ophthalmic solution  Commonly known as: TRAVATAN     valACYclovir 1000 MG tablet  Commonly known as: VALTREX            Discharge Diet:     Diet Instructions       Diet: Diabetic Diets, Cardiac Diets, Fluid Restriction (240 mL/tray) Diets; No Salt Packet; Regular (IDDSI 7); Thin (IDDSI 0); Consistent Carbohydrate; 2000 mL/day      Discharge Diet:  Diabetic Diets  Cardiac Diets  Fluid Restriction (240 mL/tray) Diets       Cardiac Diet: No Salt Packet    Texture: Regular (IDDSI 7)    Fluid Consistency: Thin (IDDSI 0)    Diabetic Diet: Consistent Carbohydrate    Fluid Restriction Diet (240 mL/tray): 2000 mL/day          Activity at Discharge:     Activity Instructions       Activity as Tolerated            Follow-up Appointments:    Additional Instructions for the Follow-ups that You Need to Schedule       Ambulatory Referral to Disease State Management   As  directed      To dept: [facility] [[id number]]   What program(s) are you referring for?: Heart Failure   Follow-up needed: Yes               Follow-up Information       Yolie Cotto MD Follow up in 3 day(s).    Specialty: Family Medicine  Contact information:  [address] [place] [address]  [phone]                           Future Appointments   Date Time Provider Department Center   8/8/2024 12:40 PM Brennan Cheng MD [facility]   2/13/2025  2:30 PM Aliza Thoa APRN [facility]     Test Results Pending at Discharge:           Brian Joseph Kerley, DO  07/02/24  08:33 EDT    Time: I spent 35 minutes on this discharge activity which included: face-to-face encounter with the patient, reviewing the data in the system, coordination of the care with the nursing staff as well as consultants, documentation, and entering orders.     Dictated utilizing Dragon dictation.

## 2024-07-02 NOTE — CASE MANAGEMENT/SOCIAL WORK
Case Management Discharge Note      Final Note: DC home with family. Denies any needs.         Selected Continued Care - Discharged on 7/2/2024 Admission date: 6/29/2024 - Discharge disposition: Home or Self Care      Destination    No services have been selected for the patient.                Durable Medical Equipment    No services have been selected for the patient.                Dialysis/Infusion    No services have been selected for the patient.                Home Medical Care    No services have been selected for the patient.                Therapy    No services have been selected for the patient.                Community Resources    No services have been selected for the patient.                Community & DME    No services have been selected for the patient.                    Transportation Services  Private: Car    Final Discharge Disposition Code: 01 - home or self-care

## 2024-07-03 NOTE — PAYOR COMM NOTE
"To:  Fulda  From: Lydia Soni RN  Phone: [phone]  Fax: [phone]  NPI: [id number]  TIN: [id number]  Member ID: [id number]  MRN: [id number]    Vanessa Ahuja (79 y.o. Female)       Date of Birth   1945    Social Security Number       Address   [address] [place], [place] [address]    Home Phone   [phone]    MRN   [id number]       Mandaeism   [employer]    Marital Status                               Admission Date   6/29/24    Admission Type   Emergency    Admitting Provider   Trae See DO    Attending Provider       [facility]       Discharge Date   7/2/2024    Discharge Disposition   Home or Self Care    Discharge Destination   Home                              Attending Provider: (none)   Allergies: Caffeine    Isolation: None   Infection: None   Code Status: Prior    Ht: 160 cm (62.99\")   Wt: 57.7 kg (127 lb 3.3 oz)    Admission Cmt: None   Principal Problem: Acute respiratory failure with hypoxia [J96.01]                   Active Insurance as of 6/29/2024       Primary Coverage       Payor Plan Insurance Group Employer/Plan Group    ANTHEM MEDICARE REPLACEMENT ANTHEM MEDICARE ADVANTAGE [id number]       Payor Plan Address Payor Plan Phone Number Payor Plan Fax Number Effective Dates    [address] [phone]  9/1/2020 - None Entered    [facility] [address]         Subscriber Name Subscriber Birth Date Member ID       VANESSA AHUJA 1945 [id number]               Secondary Coverage       Payor Plan Insurance Group Employer/Plan Group    [place] [id number]       Payor Plan Address Payor Plan Phone Number Payor Plan Fax Number Effective Dates    [employer] [address] [address] [phone]  11/1/2022 - None Entered    [facility] [id number]         Subscriber Name Subscriber Birth Date Member ID       VANESSA AHUJA 1945 [id number]                     Emergency " Contacts        (Rel.) Home Phone Work Phone Mobile Phone    Guillermina Gar (Grandchild) [phone]    Pedro Pablo Alonso (Brother) -- -- [phone]    Chilo Craft (Son) -- -- [phone]    ROMI CRAFT (Son) [phone] -- --    GOMEZTERRIE BANGURA (Grandchild) [phone] -- --                 Discharge Summary        Kerley Caleb Kirby  at 24 0833              [facility]   DISCHARGE SUMMARY      Name:  Aster Craft   Age:  79 y.o.  Sex:  female  :  1945  MRN:  [id number]   Visit Number:  [id number]    Admission Date:  2024  Date of Discharge:  2024  Primary Care Physician:  Yolie Cotto MD    Important issues to note:    -Establish with heart failure clinic.  -Continue short-course low-dose Lasix.  Recommend PCP check labs and review for any indication continue low-dose Lasix.  -Likely would benefit from stress test outpatient.    Discharge Diagnoses:     Heart failure with preserved ejection fraction  Hypoxia, Resolved        Problem List:     Active Hospital Problems    Diagnosis  POA    (HFpEF) heart failure with preserved ejection fraction [I50.30]  Yes      Resolved Hospital Problems    Diagnosis Date Resolved POA    **Acute respiratory failure with hypoxia [J96.01] 2024 Yes     Presenting Problem:    Chief Complaint   Patient presents with    Shortness of Breath     Pt reports worsening SOA and CP and headache since yesterday. Also reports cough and chills      Consults:     Consulting Physician(s)                     None              Procedures Performed:    None    History of presenting illness/Hospital Course:    Aster Craft is a 79-year-old female with past medical history significant for hypertension, hyperlipidemia, type 2 diabetes, remote history of breast cancer presents from home to the emergency room with 2 days of shortness of breath and nonproductive cough.  Patient reports audible  "wheezing.  States that she will have coughing episodes where she feels like she cannot catch her breath.  Is a lifelong non-smoker.  Is not on supplemental oxygen at baseline.  No known sick contacts.  Denies chest pain, nausea/vomiting or diarrhea.     ED summary: Patient afebrile, hemodynamically stable and hypoxic 88% on room air.  AB.39/44/72/27/94% on 2 L nasal cannula.  Negative troponins x 2.  Normal proBNP.  CMP unremarkable.  D-dimer procalcitonin within normal limits.  CBC unremarkable.  Respiratory PCR panel negative.  Chest x-ray personally reviewed, unremarkable.       Observation general floor admission early a.m. 2024 with acute hypoxic respiratory failure, unclear etiology.  Patient also reporting history of vertigo, intermittent chest pressure, \"uneasy on her legs\" recently more so.  Due to broad differential and cough, variety of tests to rule out particular causes.  MRI brain negative; no CVA.  PCR respiratory panel negative.  Troponins negative.  EKG nonischemic.  Echocardiogram EF low normal 64%, grade 1 diastolic dysfunction.    CT angio chest no evidence of PE, did show mild bibasilar atelectasis and pleural effusions, likely from heart failure preserved ejection fraction.  This is most likely the cause of her transient hypoxia.  Did provide a small bit of Lasix, although of note she did not appear frankly overloaded.    Stable for discharge home 2024.    -Establish with heart failure clinic.  -Continue short-course low-dose Lasix.  Recommend PCP check labs and review for any indication continue low-dose Lasix.  -Likely would benefit from stress test outpatient.    Vital Signs:    Temp:  [97.7 °F (36.5 °C)-98.3 °F (36.8 °C)] 97.7 °F (36.5 °C)  Heart Rate:  [67-84] 72  Resp:  [16-20] 16  BP: ()/(57-76) 101/66    Physical Exam:    General Appearance:  Alert and cooperative.    Head:  Atraumatic and normocephalic.   Eyes: Conjunctivae and sclerae normal, no icterus. No pallor. "   Ears:  Ears with no abnormalities noted.   Throat: No oral lesions, no thrush, oral mucosa moist.   Neck: Supple, trachea midline, no thyromegaly.   Back:   No kyphoscoliosis present. No tenderness to palpation.   Lungs:   Breath sounds heard bilaterally equally.  No crackles or wheezing. No Pleural rub or bronchial breathing.   Heart:  Normal S1 and S2, no murmur, no gallop, no rub. No JVD.   Abdomen:   Normal bowel sounds, no masses, no organomegaly. Soft, nontender, nondistended, no rebound tenderness.   Extremities: Supple, no edema, no cyanosis, no clubbing.   Pulses: Pulses palpable bilaterally.   Skin: Warm.   Neurologic: Alert and oriented x 3. No facial asymmetry. Moves all four limbs. No tremors.     Pertinent Lab Results:     Results from last 7 days   Lab Units 06/30/24  0634 06/29/24  1634   SODIUM mmol/L 139 140   POTASSIUM mmol/L 4.5 4.1   CHLORIDE mmol/L 105 103   CO2 mmol/L 24.6 25.6   BUN mg/dL 18 11   CREATININE mg/dL 0.48* 0.54*   CALCIUM mg/dL 8.8 9.2   BILIRUBIN mg/dL  --  0.8   ALK PHOS U/L  --  98   ALT (SGPT) U/L  --  22   AST (SGOT) U/L  --  20   GLUCOSE mg/dL 221* 167*     Results from last 7 days   Lab Units 06/30/24  0634 06/29/24  1634   WBC 10*3/mm3 10.00 10.59   HEMOGLOBIN g/dL 12.7 13.3   HEMATOCRIT % 39.0 39.1   PLATELETS 10*3/mm3 227 219         Results from last 7 days   Lab Units 07/01/24  1234 07/01/24  1047 06/29/24  2036   HSTROP T ng/L 11 11 8     Results from last 7 days   Lab Units 06/29/24  1634   PROBNP pg/mL 233.4             Results from last 7 days   Lab Units 06/29/24  1924   PH, ARTERIAL pH units 7.394   PO2 ART mm Hg 72.5*   PCO2, ARTERIAL mm Hg 44.1   HCO3 ART mmol/L 26.9           Pertinent Radiology Results:    Imaging Results (All)       Procedure Component Value Units Date/Time    MRI Brain Without Contrast [[id number]] Collected: 07/01/24 1301     Updated: 07/01/24 1323    Narrative:      PROCEDURE: MRI BRAIN WO CONTRAST-     HISTORY: vertigo, eval for CVA;  J96.01-Acute respiratory failure with  hypoxia; R07.9-Chest pain, unspecified     PROCEDURE: Multiplanar MR imaging of the brain was performed in multiple  MR sequences .     FINDINGS: Brain parenchyma displays normal signal without evidence of  mass, hemorrhage or edema.  Limited images the proximal cord are  unremarkable. The ventricles are mildly dilated indicating mild atrophy  appropriate for age. There is minimal small vessel ischemic disease.  This is very appropriate for age. There is no extra-axial fluid or  midline shift. Flow-voids are appropriate. Diffusion weighted images  demonstrate no evidence of acute CVA. Limited images of the paranasal  sinuses are unremarkable.       Impression:      No acute intracranial process.     Mild, age-appropriate chronic change as described.           This report was signed and finalized on 7/1/2024 1:21 PM by Iraida Kruse MD.       CT Angiogram Chest [[id number]] Collected: 07/01/24 1153     Updated: 07/01/24 1157    Narrative:      PROCEDURE: CT ANGIOGRAM CHEST-     HISTORY: eval for PE; J96.01-Acute respiratory failure with hypoxia;  R07.9-Chest pain, unspecified        COMPARISON: None     FINDINGS: Thin section axial CT images of the chest were obtained with  contrast. 3D reformatted images were also obtained. This study was  performed with techniques to keep radiation doses as low as reasonably  achievable, (ALARA). Individualized dose reduction techniques using  automated exposure control or adjustment of mA and/or kV according to  the patient size were employed.     There is no evidence of pulmonary embolism. There is no evidence of  thoracic aortic aneurysm or dissection. Cardiomegaly is noted. There is  no evidence of mediastinal or hilar mass or adenopathy.  There is no  evidence of pulmonary mass or suspicious nodule. Mild bibasilar  atelectasis is noted along with small pleural effusions. No acute chest  wall abnormality is identified. Postoperative changes  are seen in the  anterior chest bilaterally.     Limited images of the upper abdomen  are unremarkable.          Impression:         No evidence of pulmonary embolism.     Mild bibasilar atelectasis and small pleural effusions.              CTDI: 3.04 mGy  DLP:103.94 mGy.cm        This report was signed and finalized on 7/1/2024 11:55 AM by Eze Soni MD.       XR Chest 1 View [[id number]] Collected: 06/29/24 1811     Updated: 06/29/24 1813    Narrative:      PROCEDURE: XR CHEST 1 VW-     HISTORY: SOA Triage Protocol     COMPARISON: 2/16/2024     FINDINGS:  Portable view of the chest demonstrates the lungs to be  grossly clear. There is no evidence of effusion, pneumothorax or other  significant pleural disease. The mediastinum is unremarkable.     The heart size is normal.       Impression:      Unremarkable portable chest.            This report was signed and finalized on 6/29/2024 6:11 PM by Isaiah Abbott MD.               Echo:    Results for orders placed during the hospital encounter of 06/29/24    Adult Transthoracic Echo Complete W/ Cont if Necessary Per Protocol    Interpretation Summary    Left ventricular systolic function is low normal. Calculated left ventricular 3D EF = 46% Left ventricular ejection fraction appears to be 46 - 50%.    The left ventricular cavity is borderline dilated.    Left ventricular diastolic function is consistent with (grade I) impaired relaxation.    Condition on Discharge:      Stable.    Code status during the hospital stay:    Code Status and Medical Interventions:   Ordered at: 06/29/24 2031     Code Status (Patient has no pulse and is not breathing):    CPR (Attempt to Resuscitate)     Medical Interventions (Patient has pulse or is breathing):    Full Support     Discharge Disposition:    Home or Self Care    Discharge Medications:       Discharge Medications        New Medications        Instructions Start Date   furosemide 20 MG tablet  Commonly known as: Lasix    20 mg, Oral, Daily             Continue These Medications        Instructions Start Date   albuterol sulfate  (90 Base) MCG/ACT inhaler  Commonly known as: PROVENTIL HFA;VENTOLIN HFA;PROAIR HFA   TWO PUFFS EVERY SIX HOURS AS NEEDED      azelastine 0.1 % nasal spray  Commonly known as: ASTELIN   INHALE ONE SPRAY IN EACH NOSTRIL TWICE DAILY      benzonatate 200 MG capsule  Commonly known as: TESSALON   200 mg, Oral      brimonidine-timolol 0.2-0.5 % ophthalmic solution  Commonly known as: COMBIGAN   1 drop, Both Eyes, Every 12 Hours      calcium carbonate-cholecalciferol 500-400 MG-UNIT tablet tablet   Oral, Daily      cetirizine 10 MG tablet  Commonly known as: zyrTEC   1 tablet, Oral, Daily      Cholecalciferol 250 MCG (72199 UT) capsule   5,000 Units, Oral, Daily      CINNAMON PO   Oral      clobetasol 0.05 % ointment  Commonly known as: TEMOVATE   Apply to affected area BID x 2 wks then daily x 2 wks then 2-3x/wk      desloratadine 5 MG tablet  Commonly known as: CLARINEX   5 mg, Oral, Daily      dextromethorphan-guaifenesin  MG/5ML syrup  Commonly known as: ROBITUSSIN-DM   TAKE 10ML EVERY 4 HOURS AS NEEDED      diazePAM 5 MG tablet  Commonly known as: VALIUM   TAKE 1 TABLET one hour prior TO procedure      dorzolamide 2 % ophthalmic solution  Commonly known as: TRUSOPT   INSTILL ONE DROP IN EACH EYE TWICE DAILY      ezetimibe 10 MG tablet  Commonly known as: ZETIA   10 mg, Oral, Daily      Flovent  MCG/ACT inhaler  Generic drug: fluticasone   2 puffs, Inhalation, 2 Times Daily      FreeStyle Malina 2 Sensor misc   CHANGE SENSOR EVERY 14 DAYS      Gemtesa 75 MG tablet  Generic drug: Vibegron   75 mg, Oral, Daily      glipizide 5 MG ER tablet  Commonly known as: GLUCOTROL XL       ipratropium 0.03 % nasal spray  Commonly known as: ATROVENT   INHALE ONE SPRAY IN EACH NOSTRIL TWICE DAILY      ipratropium 0.06 % nasal spray  Commonly known as: ATROVENT   INHALE 1-2 SPRAYS IN EACH NOSTRIL TWICE  DAILY      Januvia 100 MG tablet  Generic drug: SITagliptin   1 tablet, Oral, Daily      meclizine 12.5 MG tablet  Commonly known as: ANTIVERT   12.5 mg, Oral      metoprolol succinate XL 50 MG 24 hr tablet  Commonly known as: TOPROL-XL   50 mg, Oral, Daily      nitroglycerin 0.4 MG SL tablet  Commonly known as: NITROSTAT   0.4 mg, Sublingual      OneTouch Delica Plus [id number] misc   USE AS DIRECTED EVERY DAY      OneTouch Verio Flex System w/Device kit   See Admin Instructions, for testing      OneTouch Verio test strip  Generic drug: glucose blood   Daily, for testing      pantoprazole 40 MG EC tablet  Commonly known as: PROTONIX   40 mg, Oral      Rhopressa 0.02 % solution  Generic drug: Netarsudil Dimesylate   1 drop, Both Eyes, Nightly      simethicone 80 MG chewable tablet  Commonly known as: MYLICON   80 mg, Oral, Every 6 Hours PRN      Triamcinolone Acetonide 55 MCG/ACT nasal inhaler  Commonly known as: NASACORT   2 sprays, Nasal, Daily             Stop These Medications      amoxicillin 500 MG capsule  Commonly known as: AMOXIL     fish oil 1000 MG capsule capsule     methylPREDNISolone 4 MG dose pack  Commonly known as: MEDROL     montelukast 10 MG tablet  Commonly known as: SINGULAIR     nitrofurantoin (macrocrystal-monohydrate) 100 MG capsule  Commonly known as: MACROBID     ondansetron ODT 4 MG disintegrating tablet  Commonly known as: ZOFRAN-ODT     predniSONE 20 MG tablet  Commonly known as: DELTASONE     travoprost (SHANNON free) 0.004 % solution ophthalmic solution  Commonly known as: TRAVATAN     valACYclovir 1000 MG tablet  Commonly known as: VALTREX            Discharge Diet:     Diet Instructions       Diet: Diabetic Diets, Cardiac Diets, Fluid Restriction (240 mL/tray) Diets; No Salt Packet; Regular (IDDSI 7); Thin (IDDSI 0); Consistent Carbohydrate; 2000 mL/day      Discharge Diet:  Diabetic Diets  Cardiac Diets  Fluid Restriction (240 mL/tray) Diets       Cardiac Diet: No Salt Packet    Texture:  Regular (IDDSI 7)    Fluid Consistency: Thin (IDDSI 0)    Diabetic Diet: Consistent Carbohydrate    Fluid Restriction Diet (240 mL/tray): 2000 mL/day          Activity at Discharge:     Activity Instructions       Activity as Tolerated            Follow-up Appointments:    Additional Instructions for the Follow-ups that You Need to Schedule       Ambulatory Referral to Disease State Management   As directed      To dept: [facility] [[id number]]   What program(s) are you referring for?: Heart Failure   Follow-up needed: Yes               Follow-up Information       Yolie Cotto MD Follow up in 3 day(s).    Specialty: Family Medicine  Contact information:  [address] [facility] [address]  [phone]                           Future Appointments   Date Time Provider Department Center   8/8/2024 12:40 PM Brennan Cheng MD [facility]   2/13/2025  2:30 PM Aliza Thao APRN [facility] [place] [facility]     Test Results Pending at Discharge:           Brian Joseph Kerley, DO  07/02/24  08:33 EDT    Time: I spent 35 minutes on this discharge activity which included: face-to-face encounter with the patient, reviewing the data in the system, coordination of the care with the nursing staff as well as consultants, documentation, and entering orders.     Dictated utilizing Dragon dictation.      Electronically signed by Kerley, Brian Joseph, DO at 07/02/24 0802

## 2024-07-03 NOTE — OUTREACH NOTE
Prep Survey      Flowsheet Row Responses   [facility] facility patient discharged from? [facility]   Is LACE score < 7 ? No   Eligibility Readm Mgmt   Discharge diagnosis Heart failure with preserved ejection fraction  Hypoxia, Resolved   Does the patient have one of the following disease processes/diagnoses(primary or secondary)? CHF   Does the patient have Home health ordered? No   Is there a DME ordered? No   Prep survey completed? Yes            ROYER INGRAM - Registered Nurse

## 2024-07-09 ENCOUNTER — READMISSION MANAGEMENT (OUTPATIENT)
Dept: CALL CENTER | Facility: HOSPITAL | Age: 79
End: 2024-07-09
Payer: MEDICARE

## 2024-07-09 NOTE — OUTREACH NOTE
CHF Week 1 Survey      Flowsheet Row Responses   [facility] patient discharged from? [facility]   Does the patient have one of the following disease processes/diagnoses(primary or secondary)? CHF   CHF Week 1 attempt successful? Yes   Call start time 0845   Call end time 0902   Meds reviewed with patient/caregiver? Yes   Is the patient having any side effects they believe may be caused by any medication additions or changes? No   Does the patient have all medications ordered at discharge? Yes   Is the patient taking all medications as directed (includes completed medication regime)? Yes   Comments regarding appointments Cardiology appt 07/10/24. [facility] will be rescheduling PCP appt since it is at same time as her cardiology appt.   Does the patient have a primary care provider?  Yes   Does the patient have an appointment with their PCP within 7 days of discharge? Yes   Has the patient kept scheduled appointments due by today? No   What is preventing the patient from keeping their appointments? --  [[facility] was not aware of DSM appt, and has rescheduled.]   Nursing Interventions Advised to reschedule appointment   Has home health visited the patient within 72 hours of discharge? N/A   Pulse Ox monitoring Intermittent   Pulse Ox device source Patient   O2 Sat comments States O2 sats were in the 90s with HR of 100.   O2 Sat: education provided Sat levels, When to seek care   Psychosocial issues? No   Did the patient receive a copy of their discharge instructions? Yes   Nursing interventions Reviewed instructions with patient   What is the patient's perception of their health status since discharge? Improving   Nursing interventions Nurse provided patient education   Is the patient able to teach back signs and symptoms of worsening condition? (i.e. weight gain, shortness of air, etc.) Yes  [Encouraged to weigh daily-reviewed weight parameters.]   If the patient is a current smoker, are they able to teach back  resources for cessation? Not a smoker   Is the patient/caregiver able to teach back the hierarchy of who to call/visit for symptoms/problems? PCP, Specialist, Home health nurse, Urgent Care, ED, 911 Yes   Is the patient able to teach back Heart Failure Zones? No   CHF Nursing Interventions Education provided on various zones   CHF Zone this Call Green Zone   Green Zone Patient reports doing well, No new or worsening shortness of breath, Physical activity level is normal for you, No new swelling -  feet, ankles and legs look normal for you, Weight check stable, No chest pain   Green Zone Interventions Daily weight check, Low sodium diet, Meds as directed, Follow up visits planned    CHF Week 1 call completed? Yes   Is the patient interested in additional calls from an ambulatory ? No   Would this patient benefit from a Referral to [facility] Social Work? No   Wrap up additional comments Patient states is improving. Denies any edema, worsening SOA, or weight gain. Encouraged to monitor BP/HR/O2 sats/weight daily and keep record for appts. Denies any needs or concerns today.   Call end time 0902            Thi COREAS - Registered Nurse

## 2024-07-10 NOTE — PROGRESS NOTES
"Enter Query Response Below      Query Response: Acute HFpEF              If applicable, please update the problem list.     Patient: Aster Parrish        : 1945  Account: [id number]           Admit Date: 2024        How to Respond to this query:       a. Click New Note     b. Answer query within the yellow box.                c. Update the Problem List, if applicable.      If you have any questions about this query contact me at: [email or web address]     Dr. Kerley:    Patient with history including hypertension and diabetes presented with shortness of breath and cough and was admitted . Echo was performed showing EF of 46 - 50% and Left ventricular diastolic function is consistent with (grade I)   impaired relaxation.   \"HFpEF\" is noted in the discharge summary and narrative indicates \"CT angio chest no evidence of PE, did show mild bibasilar atelectasis and pleural effusions, likely from heart failure preserved ejection fraction.  This is most likely the cause of her transient hypoxia. Did provide a small bit of Lasix, although of note she did not appear frankly overloaded.\"  Discharge orders for plan to establish with heart failure clinic and Lasix po daily.      Please clarify if patient treated/monitored for one or more of the following:    Acute HFpEF  Other- specify_____  Unable to determine      By submitting this query, we are merely seeking further clarification of documentation to accurately reflect all conditions that you are monitoring, evaluating, treating or that extend the hospitalization or utilize additional resources of care. Please utilize your independent clinical judgment when addressing the question(s) above.     This query and your response, once completed, will be entered into the legal medical record.    Sincerely,  Carla Hurt RN, [facility]  Clinical Documentation Integrity Program     "

## 2024-07-10 NOTE — PROGRESS NOTES
"Enter Query Response Below      Query Response: Acute respiratory failure was not supported, hypoxia only              If applicable, please update the problem list.     Patient: Aster Parrish        : 1945  Account: [id number]           Admit Date: 2024        How to Respond to this query:       a. Click New Note     b. Answer query within the yellow box.                c. Update the Problem List, if applicable.      If you have any questions about this query contact me at: [email or web address]     Dr. Kerley:    Patient admitted  with CHF.  Presented with shortness of breath and nonproductive couth.   On arrival 02 saturation was noted at 91% on room air.  02 saturation while in ED dropped to 88-89% and 1.5L was added per the flowsheet for vital signs.  ED exam noted positive for shortness of breath but without any respiratory distress, and normal pulmonary effort.  ABG's on 2L- ph 7.39, pco2 44, po2 77.  H&P- \"Stable on room air at rest but required 1 to 2 L nasal cannula with ambulation in the ER.\" Patient was transitioned to room air by  and remained without supplemental oxygen for the remainder of the stay.  Both hypoxia and Acute respiratory failure with hypoxia are documented in the record and discharge summary.    After study, was acute respiratory failure clinically supported during this admission?    Acute respiratory failure was supported with additional clinical indicators: ____________  Acute respiratory failure was not supported, hypoxia only  Other- specify_____________  Unable to determine      By submitting this query, we are merely seeking further clarification of documentation to accurately reflect all conditions that you are monitoring, evaluating, treating or that extend the hospitalization or utilize additional resources of care. Please utilize your independent clinical judgment when addressing the question(s) above.     This query and your response, once completed, " will be entered into the legal medical record.    Sincerely,  Carla Hurt RN, [facility]  Clinical Documentation Integrity Program

## 2024-07-11 ENCOUNTER — LAB (OUTPATIENT)
Dept: LAB | Facility: HOSPITAL | Age: 79
End: 2024-07-11
Payer: MEDICARE

## 2024-07-11 ENCOUNTER — DISEASE STATE MANAGEMENT VISIT (OUTPATIENT)
Dept: PHARMACY | Facility: HOSPITAL | Age: 79
End: 2024-07-11
Payer: MEDICARE

## 2024-07-11 VITALS
TEMPERATURE: 97.7 F | HEIGHT: 64 IN | RESPIRATION RATE: 16 BRPM | OXYGEN SATURATION: 94 % | WEIGHT: 127 LBS | BODY MASS INDEX: 21.68 KG/M2 | SYSTOLIC BLOOD PRESSURE: 111 MMHG | HEART RATE: 80 BPM | DIASTOLIC BLOOD PRESSURE: 66 MMHG

## 2024-07-11 DIAGNOSIS — I50.22 HEART FAILURE WITH MILDLY REDUCED EJECTION FRACTION (HFMREF): ICD-10-CM

## 2024-07-11 DIAGNOSIS — Z87.440 HISTORY OF UTI: ICD-10-CM

## 2024-07-11 DIAGNOSIS — R00.2 PALPITATIONS: ICD-10-CM

## 2024-07-11 DIAGNOSIS — R06.09 DOE (DYSPNEA ON EXERTION): Primary | ICD-10-CM

## 2024-07-11 DIAGNOSIS — Z91.89 AT RISK FOR CORONARY ARTERY DISEASE: ICD-10-CM

## 2024-07-11 LAB
BILIRUB UR QL STRIP: NEGATIVE
CLARITY UR: CLEAR
COLOR UR: YELLOW
GLUCOSE UR STRIP-MCNC: ABNORMAL MG/DL
HGB UR QL STRIP.AUTO: NEGATIVE
KETONES UR QL STRIP: ABNORMAL
LEUKOCYTE ESTERASE UR QL STRIP.AUTO: ABNORMAL
NITRITE UR QL STRIP: POSITIVE
PH UR STRIP.AUTO: 6.5 [PH] (ref 5–8)
PROT UR QL STRIP: ABNORMAL
SP GR UR STRIP: 1.03 (ref 1–1.03)
UROBILINOGEN UR QL STRIP: ABNORMAL

## 2024-07-11 PROCEDURE — 81003 URINALYSIS AUTO W/O SCOPE: CPT

## 2024-07-11 PROCEDURE — G0463 HOSPITAL OUTPT CLINIC VISIT: HCPCS

## 2024-07-11 NOTE — PROGRESS NOTES
"             [facility] Follow Up Note    Aster Craft  [id number]  2024    Primary Care Provider: Yolie Cotto MD   Referring Provider: Teressa Garcia A*    Chief Complaint: ***    History of Present Illness:   {Mr/Mrs/Ms:[id number]} Aster Craft is a 79 y.o. female who presents to the Cardiology Clinic for follow up of ***     Sob with acrtivity.  Takes breaks with ADL's showering, cleaning, takes breaks, naps.  neck feels  tired.  Has feeling a pain on the mid to R side of back.  Dry cough. She says her belly feels bloated.   She reports having a fast HR in the past (like a horse) and was on 100 mg of metoprolol at one time (under Dr. Palacios), but got her tapered down to 50 mg daily.  She takes all her meds at night.. Bilateral mastecomy with prosthesis. Palpitations multiple times a week, but doesn't bother her if she is lying on her back. She said her BP has been \"low\" lately (111 mmHg).  She has slept in recliner for years secondary to GERD. Drinks 3-4 bottles of water and or lemonade or 1 cup coffee in the morning.   Patient reports having a recent UTI, but doesn't know if she finished her course of abx when she was recently admitted.    Past Cardiac Testin. Last Coronary Angio: ***  2. Prior Stress Testing: ***  3. Last Echo: ***  4. Prior Holter Monitor: ***    Review of Systems:   Review of Systems  As Noted in HPI.   I have reviewed and confirmed the accuracy of the ROS as documented by the MA/LPN/RN MAIKEL Branch    I have reviewed and/or updated the patient's past medical, past surgical, family, social history, problem list and allergies as appropriate.     Medications:     Current Outpatient Medications:     benzonatate (TESSALON) 200 MG capsule, Take 1 capsule by mouth., Disp: , Rfl:     Blood Glucose Monitoring Suppl (OneTouch Verio Flex System) w/Device kit, See Admin Instructions. for testing, Disp: , Rfl:     calcium " carbonate-cholecalciferol 500-400 MG-UNIT tablet tablet, Take  by mouth Daily., Disp: , Rfl:     Cholecalciferol 250 MCG (90996 UT) capsule, Take 5,000 Units by mouth Daily., Disp: , Rfl:     clobetasol (TEMOVATE) 0.05 % ointment, Apply to affected area BID x 2 wks then daily x 2 wks then 2-3x/wk, Disp: 60 g, Rfl: 6    Continuous Blood Gluc Sensor (FreeStyle Malina 2 Sensor) misc, CHANGE SENSOR EVERY 14 DAYS, Disp: , Rfl:     dextromethorphan-guaifenesin (ROBITUSSIN-DM)  MG/5ML syrup, TAKE 10ML EVERY 4 HOURS AS NEEDED, Disp: , Rfl:     ezetimibe (ZETIA) 10 MG tablet, Take 1 tablet by mouth Daily., Disp: , Rfl:     furosemide (Lasix) 20 MG tablet, Take 1 tablet by mouth Daily., Disp: 5 tablet, Rfl: 0    Lancets (OneTouch Delica Plus [id number]) misc, USE AS DIRECTED EVERY DAY, Disp: , Rfl:     meclizine (ANTIVERT) 12.5 MG tablet, Take 1 tablet by mouth 3 (Three) Times a Day As Needed., Disp: , Rfl:     metoprolol succinate XL (TOPROL-XL) 50 MG 24 hr tablet, Take 1 tablet by mouth Daily., Disp: , Rfl:     nitroglycerin (NITROSTAT) 0.4 MG SL tablet, Place 1 tablet under the tongue., Disp: , Rfl:     OneTouch Verio test strip, Daily. for testing, Disp: , Rfl:     pantoprazole (PROTONIX) 40 MG EC tablet, Take 1 tablet by mouth., Disp: , Rfl:     Rhopressa 0.02 % solution, Administer 1 drop to both eyes Every Night., Disp: , Rfl:     simethicone (MYLICON) 80 MG chewable tablet, Chew 1 tablet Every 6 (Six) Hours As Needed for Flatulence., Disp: , Rfl:     albuterol sulfate  (90 Base) MCG/ACT inhaler, TWO PUFFS EVERY SIX HOURS AS NEEDED (Patient not taking: Reported on 7/11/2024), Disp: , Rfl:     azelastine (ASTELIN) 0.1 % nasal spray, INHALE ONE SPRAY IN EACH NOSTRIL TWICE DAILY (Patient not taking: Reported on 7/11/2024), Disp: , Rfl:     brimonidine-timolol (COMBIGAN) 0.2-0.5 % ophthalmic solution, Administer 1 drop to both eyes Every 12 (Twelve) Hours. (Patient not taking: Reported on 7/11/2024), Disp: ,  "Rfl:     cetirizine (zyrTEC) 10 MG tablet, Take 1 tablet by mouth Daily. (Patient not taking: Reported on 7/11/2024), Disp: , Rfl:     CINNAMON PO, Take  by mouth. (Patient not taking: Reported on 7/11/2024), Disp: , Rfl:     desloratadine (CLARINEX) 5 MG tablet, Take 1 tablet by mouth Daily. (Patient not taking: Reported on 7/11/2024), Disp: , Rfl:     diazePAM (VALIUM) 5 MG tablet, TAKE 1 TABLET one hour prior TO procedure (Patient not taking: Reported on 7/11/2024), Disp: , Rfl:     dorzolamide (TRUSOPT) 2 % ophthalmic solution, INSTILL ONE DROP IN EACH EYE TWICE DAILY (Patient not taking: Reported on 7/11/2024), Disp: , Rfl:     Flovent  MCG/ACT inhaler, Inhale 2 puffs 2 (Two) Times a Day. (Patient not taking: Reported on 7/11/2024), Disp: , Rfl:     glipizide (GLUCOTROL XL) 5 MG ER tablet, , Disp: , Rfl:     ipratropium (ATROVENT) 0.03 % nasal spray, INHALE ONE SPRAY IN EACH NOSTRIL TWICE DAILY (Patient not taking: Reported on 7/11/2024), Disp: , Rfl:     ipratropium (ATROVENT) 0.06 % nasal spray, INHALE 1-2 SPRAYS IN EACH NOSTRIL TWICE DAILY (Patient not taking: Reported on 7/11/2024), Disp: , Rfl:     Januvia 100 MG tablet, Take 1 tablet by mouth Daily. (Patient not taking: Reported on 7/11/2024), Disp: , Rfl:     Triamcinolone Acetonide (NASACORT) 55 MCG/ACT nasal inhaler, 2 sprays into the nostril(s) as directed by provider Daily. (Patient not taking: Reported on 7/11/2024), Disp: , Rfl:     Vibegron (Gemtesa) 75 MG tablet, Take 1 tablet by mouth Daily. (Patient not taking: Reported on 7/11/2024), Disp: 30 tablet, Rfl: 11    Physical Exam:  Vital Signs:   Vitals:    07/11/24 1345   BP: 111/66   BP Location: Right arm   Patient Position: Sitting   Cuff Size: Adult   Pulse: 80   Resp: 16   Temp: 97.7 °F (36.5 °C)   TempSrc: Infrared   SpO2: 94%  Comment: RA   Weight: 57.6 kg (127 lb)   Height: 161.3 cm (63.5\")     Body mass index is 22.14 kg/m².    Physical Exam  Vitals and nursing note reviewed. "   Constitutional:       General: She is not in acute distress.  HENT:      Head: Normocephalic and atraumatic.   Neck:      Trachea: Trachea normal.   Cardiovascular:      Rate and Rhythm: Normal rate and regular rhythm.      Pulses: Normal pulses.      Heart sounds: Normal heart sounds. No murmur heard.     No friction rub. No gallop.   Pulmonary:      Effort: Pulmonary effort is normal.      Breath sounds: Normal breath sounds.   Musculoskeletal:      Cervical back: Neck supple.      Right lower leg: No edema.      Left lower leg: No edema.   Skin:     General: Skin is warm and dry.   Neurological:      Mental Status: She is alert and oriented to person, place, and time.   Psychiatric:         Mood and Affect: Mood normal.         Behavior: Behavior normal. Behavior is cooperative.         Thought Content: Thought content does not include suicidal ideation.         Results Review:   I reviewed the patient's new clinical results.    Procedures    Assessment / Plan:   Diagnoses and all orders for this visit:    1. KEARNEY (dyspnea on exertion) (Primary)  -     Stress Test With Myocardial Perfusion (1 Day); Future    2. At risk for coronary artery disease  -     Stress Test With Myocardial Perfusion (1 Day); Future    3. Heart failure with mildly reduced ejection fraction (HFmrEF)  -     Stress Test With Myocardial Perfusion (1 Day); Future    4. Palpitations  -     Holter Monitor - 72 Hour Up To 15 Days; Future    5. History of UTI  -     Urinalysis without microscopic (no culture) - Urine, Clean Catch; Future        Preventative Cardiology:   Tobacco Cessation: N/A   Advance Care Planning: ACP discussion was held with the patient during this visit. Patient does not have an advance directive, information provided.     Follow Up:   No follow-ups on file.      Thank you for allowing me to participate in the care of your patient. Please to not hesitate to contact me with additional questions or concerns.     Teressa HARLEY  MAIKEL Garcia

## 2024-07-12 ENCOUNTER — TELEPHONE (OUTPATIENT)
Dept: CARDIOLOGY | Facility: CLINIC | Age: 79
End: 2024-07-12
Payer: MEDICARE

## 2024-07-12 DIAGNOSIS — A49.9 UTI (URINARY TRACT INFECTION), BACTERIAL: Primary | ICD-10-CM

## 2024-07-12 DIAGNOSIS — N39.0 URINARY TRACT INFECTION WITHOUT HEMATURIA, SITE UNSPECIFIED: ICD-10-CM

## 2024-07-12 DIAGNOSIS — N39.0 UTI (URINARY TRACT INFECTION), BACTERIAL: Primary | ICD-10-CM

## 2024-07-12 RX ORDER — NITROFURANTOIN 25; 75 MG/1; MG/1
100 CAPSULE ORAL 2 TIMES DAILY
Qty: 10 CAPSULE | Refills: 0 | Status: ON HOLD | OUTPATIENT
Start: 2024-07-12

## 2024-07-12 NOTE — TELEPHONE ENCOUNTER
Please advise the patient she has a UTI.  Rx has been sent to pharmacy on file.    She must her to f/u with urgent care/PCP next week to make sure this is resolved.    She can f/u in the HF clinic in 1-2 weeks.    Trae, PharmD--NINI

## 2024-07-12 NOTE — TELEPHONE ENCOUNTER
Contacted patient to discuss however no answer. Left detailed voicemail with Joslyn's instructions as allowable per verbal release authorization. Informed patient to call clinic if she has any questions regarding plan. She has follow up scheduled on 7/18.    Also spoke with Guillermina who will help make sure patient gets antibiotics and appropriate follow up.

## 2024-07-13 ENCOUNTER — READMISSION MANAGEMENT (OUTPATIENT)
Dept: CALL CENTER | Facility: HOSPITAL | Age: 79
End: 2024-07-13
Payer: MEDICARE

## 2024-07-13 ENCOUNTER — APPOINTMENT (OUTPATIENT)
Dept: GENERAL RADIOLOGY | Facility: HOSPITAL | Age: 79
End: 2024-07-13
Payer: MEDICARE

## 2024-07-13 ENCOUNTER — HOSPITAL ENCOUNTER (INPATIENT)
Facility: HOSPITAL | Age: 79
LOS: 1 days | Discharge: NURSING, REHAB OR LONG-TERM CARE | End: 2024-07-16
Attending: EMERGENCY MEDICINE | Admitting: FAMILY MEDICINE
Payer: MEDICARE

## 2024-07-13 DIAGNOSIS — J96.01 ACUTE RESPIRATORY FAILURE WITH HYPOXIA: ICD-10-CM

## 2024-07-13 DIAGNOSIS — E87.70 HYPERVOLEMIA, UNSPECIFIED HYPERVOLEMIA TYPE: ICD-10-CM

## 2024-07-13 DIAGNOSIS — R07.89 CHEST PAIN, ATYPICAL: Primary | ICD-10-CM

## 2024-07-13 PROBLEM — I50.43 ACUTE ON CHRONIC COMBINED SYSTOLIC AND DIASTOLIC CHF (CONGESTIVE HEART FAILURE): Status: ACTIVE | Noted: 2024-07-13

## 2024-07-13 PROBLEM — I50.33 ACUTE ON CHRONIC DIASTOLIC CHF (CONGESTIVE HEART FAILURE): Status: ACTIVE | Noted: 2024-07-13

## 2024-07-13 PROBLEM — E11.9 TYPE 2 DIABETES MELLITUS WITHOUT COMPLICATION, WITHOUT LONG-TERM CURRENT USE OF INSULIN: Status: ACTIVE | Noted: 2024-07-13

## 2024-07-13 LAB
ALBUMIN SERPL-MCNC: 4.5 G/DL (ref 3.5–5.2)
ALBUMIN/GLOB SERPL: 1.5 G/DL
ALP SERPL-CCNC: 93 U/L (ref 39–117)
ALT SERPL W P-5'-P-CCNC: 21 U/L (ref 1–33)
ANION GAP SERPL CALCULATED.3IONS-SCNC: 13.5 MMOL/L (ref 5–15)
AST SERPL-CCNC: 19 U/L (ref 1–32)
BASOPHILS # BLD AUTO: 0.03 10*3/MM3 (ref 0–0.2)
BASOPHILS NFR BLD AUTO: 0.2 % (ref 0–1.5)
BILIRUB SERPL-MCNC: 0.6 MG/DL (ref 0–1.2)
BILIRUB UR QL STRIP: NEGATIVE
BUN SERPL-MCNC: 28 MG/DL (ref 8–23)
BUN/CREAT SERPL: 36.4 (ref 7–25)
CALCIUM SPEC-SCNC: 9.2 MG/DL (ref 8.6–10.5)
CHLORIDE SERPL-SCNC: 101 MMOL/L (ref 98–107)
CK SERPL-CCNC: 77 U/L (ref 20–180)
CLARITY UR: CLEAR
CO2 SERPL-SCNC: 27.5 MMOL/L (ref 22–29)
COLOR UR: YELLOW
CREAT SERPL-MCNC: 0.77 MG/DL (ref 0.57–1)
D-LACTATE SERPL-SCNC: 1.4 MMOL/L (ref 0.5–2)
DEPRECATED RDW RBC AUTO: 44.1 FL (ref 37–54)
EGFRCR SERPLBLD CKD-EPI 2021: 79.6 ML/MIN/1.73
EOSINOPHIL # BLD AUTO: 0.03 10*3/MM3 (ref 0–0.4)
EOSINOPHIL NFR BLD AUTO: 0.2 % (ref 0.3–6.2)
ERYTHROCYTE [DISTWIDTH] IN BLOOD BY AUTOMATED COUNT: 12.9 % (ref 12.3–15.4)
GLOBULIN UR ELPH-MCNC: 3 GM/DL
GLUCOSE SERPL-MCNC: 195 MG/DL (ref 65–99)
GLUCOSE UR STRIP-MCNC: NEGATIVE MG/DL
HCT VFR BLD AUTO: 41.3 % (ref 34–46.6)
HGB BLD-MCNC: 13.7 G/DL (ref 12–15.9)
HGB UR QL STRIP.AUTO: NEGATIVE
HOLD SPECIMEN: NORMAL
HOLD SPECIMEN: NORMAL
IMM GRANULOCYTES # BLD AUTO: 0.07 10*3/MM3 (ref 0–0.05)
IMM GRANULOCYTES NFR BLD AUTO: 0.4 % (ref 0–0.5)
KETONES UR QL STRIP: NEGATIVE
LEUKOCYTE ESTERASE UR QL STRIP.AUTO: NEGATIVE
LYMPHOCYTES # BLD AUTO: 1.09 10*3/MM3 (ref 0.7–3.1)
LYMPHOCYTES NFR BLD AUTO: 6.7 % (ref 19.6–45.3)
MCH RBC QN AUTO: 31.2 PG (ref 26.6–33)
MCHC RBC AUTO-ENTMCNC: 33.2 G/DL (ref 31.5–35.7)
MCV RBC AUTO: 94.1 FL (ref 79–97)
MONOCYTES # BLD AUTO: 0.23 10*3/MM3 (ref 0.1–0.9)
MONOCYTES NFR BLD AUTO: 1.4 % (ref 5–12)
NEUTROPHILS NFR BLD AUTO: 14.8 10*3/MM3 (ref 1.7–7)
NEUTROPHILS NFR BLD AUTO: 91.1 % (ref 42.7–76)
NITRITE UR QL STRIP: NEGATIVE
NRBC BLD AUTO-RTO: 0 /100 WBC (ref 0–0.2)
NT-PROBNP SERPL-MCNC: 95.5 PG/ML (ref 0–1800)
PH UR STRIP.AUTO: 6.5 [PH] (ref 5–8)
PLATELET # BLD AUTO: 240 10*3/MM3 (ref 140–450)
PMV BLD AUTO: 9.8 FL (ref 6–12)
POTASSIUM SERPL-SCNC: 4.1 MMOL/L (ref 3.5–5.2)
PROCALCITONIN SERPL-MCNC: 0.06 NG/ML (ref 0–0.25)
PROT SERPL-MCNC: 7.5 G/DL (ref 6–8.5)
PROT UR QL STRIP: NEGATIVE
RBC # BLD AUTO: 4.39 10*6/MM3 (ref 3.77–5.28)
SODIUM SERPL-SCNC: 142 MMOL/L (ref 136–145)
SP GR UR STRIP: 1.01 (ref 1–1.03)
TROPONIN T SERPL HS-MCNC: 12 NG/L
TROPONIN T SERPL HS-MCNC: 9 NG/L
UROBILINOGEN UR QL STRIP: NORMAL
WBC NRBC COR # BLD AUTO: 16.25 10*3/MM3 (ref 3.4–10.8)
WHOLE BLOOD HOLD COAG: NORMAL
WHOLE BLOOD HOLD SPECIMEN: NORMAL

## 2024-07-13 PROCEDURE — G0378 HOSPITAL OBSERVATION PER HR: HCPCS

## 2024-07-13 PROCEDURE — 25010000002 FUROSEMIDE PER 20 MG: Performed by: EMERGENCY MEDICINE

## 2024-07-13 PROCEDURE — 25010000002 KETOROLAC TROMETHAMINE PER 15 MG: Performed by: EMERGENCY MEDICINE

## 2024-07-13 PROCEDURE — 84145 PROCALCITONIN (PCT): CPT | Performed by: EMERGENCY MEDICINE

## 2024-07-13 PROCEDURE — 85025 COMPLETE CBC W/AUTO DIFF WBC: CPT | Performed by: EMERGENCY MEDICINE

## 2024-07-13 PROCEDURE — 83605 ASSAY OF LACTIC ACID: CPT | Performed by: EMERGENCY MEDICINE

## 2024-07-13 PROCEDURE — 84484 ASSAY OF TROPONIN QUANT: CPT | Performed by: EMERGENCY MEDICINE

## 2024-07-13 PROCEDURE — 80053 COMPREHEN METABOLIC PANEL: CPT | Performed by: EMERGENCY MEDICINE

## 2024-07-13 PROCEDURE — 71045 X-RAY EXAM CHEST 1 VIEW: CPT

## 2024-07-13 PROCEDURE — 82550 ASSAY OF CK (CPK): CPT | Performed by: EMERGENCY MEDICINE

## 2024-07-13 PROCEDURE — 93005 ELECTROCARDIOGRAM TRACING: CPT | Performed by: EMERGENCY MEDICINE

## 2024-07-13 PROCEDURE — 25010000002 ORPHENADRINE CITRATE PER 60 MG: Performed by: EMERGENCY MEDICINE

## 2024-07-13 PROCEDURE — 83880 ASSAY OF NATRIURETIC PEPTIDE: CPT | Performed by: EMERGENCY MEDICINE

## 2024-07-13 PROCEDURE — 99285 EMERGENCY DEPT VISIT HI MDM: CPT

## 2024-07-13 PROCEDURE — 36415 COLL VENOUS BLD VENIPUNCTURE: CPT

## 2024-07-13 PROCEDURE — 84443 ASSAY THYROID STIM HORMONE: CPT | Performed by: FAMILY MEDICINE

## 2024-07-13 PROCEDURE — 81003 URINALYSIS AUTO W/O SCOPE: CPT | Performed by: EMERGENCY MEDICINE

## 2024-07-13 PROCEDURE — 87040 BLOOD CULTURE FOR BACTERIA: CPT | Performed by: EMERGENCY MEDICINE

## 2024-07-13 PROCEDURE — 99223 1ST HOSP IP/OBS HIGH 75: CPT | Performed by: FAMILY MEDICINE

## 2024-07-13 RX ORDER — NICOTINE POLACRILEX 4 MG
15 LOZENGE BUCCAL
Status: DISCONTINUED | OUTPATIENT
Start: 2024-07-13 | End: 2024-07-16 | Stop reason: HOSPADM

## 2024-07-13 RX ORDER — AMOXICILLIN 250 MG
2 CAPSULE ORAL 2 TIMES DAILY PRN
Status: DISCONTINUED | OUTPATIENT
Start: 2024-07-13 | End: 2024-07-16 | Stop reason: HOSPADM

## 2024-07-13 RX ORDER — POLYETHYLENE GLYCOL 3350 17 G/17G
17 POWDER, FOR SOLUTION ORAL DAILY PRN
Status: DISCONTINUED | OUTPATIENT
Start: 2024-07-13 | End: 2024-07-16 | Stop reason: HOSPADM

## 2024-07-13 RX ORDER — FUROSEMIDE 40 MG/1
40 TABLET ORAL DAILY
Status: DISCONTINUED | OUTPATIENT
Start: 2024-07-14 | End: 2024-07-16 | Stop reason: HOSPADM

## 2024-07-13 RX ORDER — NITROFURANTOIN 25; 75 MG/1; MG/1
100 CAPSULE ORAL 2 TIMES DAILY
Status: DISCONTINUED | OUTPATIENT
Start: 2024-07-14 | End: 2024-07-15

## 2024-07-13 RX ORDER — INSULIN LISPRO 100 [IU]/ML
2-7 INJECTION, SOLUTION INTRAVENOUS; SUBCUTANEOUS
Status: DISCONTINUED | OUTPATIENT
Start: 2024-07-14 | End: 2024-07-16 | Stop reason: HOSPADM

## 2024-07-13 RX ORDER — METOPROLOL SUCCINATE 50 MG/1
50 TABLET, EXTENDED RELEASE ORAL NIGHTLY
Status: DISCONTINUED | OUTPATIENT
Start: 2024-07-13 | End: 2024-07-16 | Stop reason: HOSPADM

## 2024-07-13 RX ORDER — DIPHENHYDRAMINE HCL 25 MG
25 CAPSULE ORAL NIGHTLY PRN
Status: DISCONTINUED | OUTPATIENT
Start: 2024-07-13 | End: 2024-07-16 | Stop reason: HOSPADM

## 2024-07-13 RX ORDER — SODIUM CHLORIDE 0.9 % (FLUSH) 0.9 %
10 SYRINGE (ML) INJECTION EVERY 12 HOURS SCHEDULED
Status: DISCONTINUED | OUTPATIENT
Start: 2024-07-13 | End: 2024-07-16 | Stop reason: HOSPADM

## 2024-07-13 RX ORDER — ONDANSETRON 4 MG/1
4 TABLET, ORALLY DISINTEGRATING ORAL EVERY 6 HOURS PRN
Status: DISCONTINUED | OUTPATIENT
Start: 2024-07-13 | End: 2024-07-16 | Stop reason: HOSPADM

## 2024-07-13 RX ORDER — ORPHENADRINE CITRATE 30 MG/ML
60 INJECTION INTRAMUSCULAR; INTRAVENOUS ONCE
Status: COMPLETED | OUTPATIENT
Start: 2024-07-13 | End: 2024-07-13

## 2024-07-13 RX ORDER — ACETAMINOPHEN 650 MG/1
650 SUPPOSITORY RECTAL EVERY 4 HOURS PRN
Status: DISCONTINUED | OUTPATIENT
Start: 2024-07-13 | End: 2024-07-16 | Stop reason: HOSPADM

## 2024-07-13 RX ORDER — PANTOPRAZOLE SODIUM 40 MG/1
40 TABLET, DELAYED RELEASE ORAL
Status: DISCONTINUED | OUTPATIENT
Start: 2024-07-14 | End: 2024-07-16 | Stop reason: HOSPADM

## 2024-07-13 RX ORDER — BISACODYL 5 MG/1
5 TABLET, DELAYED RELEASE ORAL DAILY PRN
Status: DISCONTINUED | OUTPATIENT
Start: 2024-07-13 | End: 2024-07-16 | Stop reason: HOSPADM

## 2024-07-13 RX ORDER — ACETAMINOPHEN 160 MG/5ML
650 SOLUTION ORAL EVERY 4 HOURS PRN
Status: DISCONTINUED | OUTPATIENT
Start: 2024-07-13 | End: 2024-07-16 | Stop reason: HOSPADM

## 2024-07-13 RX ORDER — GUAIFENESIN/DEXTROMETHORPHAN 100-10MG/5
5 SYRUP ORAL EVERY 6 HOURS PRN
Status: DISCONTINUED | OUTPATIENT
Start: 2024-07-13 | End: 2024-07-16 | Stop reason: HOSPADM

## 2024-07-13 RX ORDER — SODIUM CHLORIDE 0.9 % (FLUSH) 0.9 %
10 SYRINGE (ML) INJECTION AS NEEDED
Status: DISCONTINUED | OUTPATIENT
Start: 2024-07-13 | End: 2024-07-16 | Stop reason: HOSPADM

## 2024-07-13 RX ORDER — NITROGLYCERIN 0.4 MG/1
0.4 TABLET SUBLINGUAL
Status: DISCONTINUED | OUTPATIENT
Start: 2024-07-13 | End: 2024-07-15

## 2024-07-13 RX ORDER — DEXTROSE MONOHYDRATE 25 G/50ML
25 INJECTION, SOLUTION INTRAVENOUS
Status: DISCONTINUED | OUTPATIENT
Start: 2024-07-13 | End: 2024-07-16 | Stop reason: HOSPADM

## 2024-07-13 RX ORDER — SODIUM CHLORIDE 9 MG/ML
40 INJECTION, SOLUTION INTRAVENOUS AS NEEDED
Status: DISCONTINUED | OUTPATIENT
Start: 2024-07-13 | End: 2024-07-16 | Stop reason: HOSPADM

## 2024-07-13 RX ORDER — ACETAMINOPHEN 325 MG/1
650 TABLET ORAL EVERY 4 HOURS PRN
Status: DISCONTINUED | OUTPATIENT
Start: 2024-07-13 | End: 2024-07-16 | Stop reason: HOSPADM

## 2024-07-13 RX ORDER — FUROSEMIDE 10 MG/ML
40 INJECTION INTRAMUSCULAR; INTRAVENOUS ONCE
Status: COMPLETED | OUTPATIENT
Start: 2024-07-13 | End: 2024-07-13

## 2024-07-13 RX ORDER — ONDANSETRON 2 MG/ML
4 INJECTION INTRAMUSCULAR; INTRAVENOUS EVERY 6 HOURS PRN
Status: DISCONTINUED | OUTPATIENT
Start: 2024-07-13 | End: 2024-07-16 | Stop reason: HOSPADM

## 2024-07-13 RX ORDER — BISACODYL 10 MG
10 SUPPOSITORY, RECTAL RECTAL DAILY PRN
Status: DISCONTINUED | OUTPATIENT
Start: 2024-07-13 | End: 2024-07-16 | Stop reason: HOSPADM

## 2024-07-13 RX ORDER — KETOROLAC TROMETHAMINE 30 MG/ML
15 INJECTION, SOLUTION INTRAMUSCULAR; INTRAVENOUS ONCE
Status: COMPLETED | OUTPATIENT
Start: 2024-07-13 | End: 2024-07-13

## 2024-07-13 RX ORDER — ALUMINA, MAGNESIA, AND SIMETHICONE 2400; 2400; 240 MG/30ML; MG/30ML; MG/30ML
15 SUSPENSION ORAL EVERY 6 HOURS PRN
Status: DISCONTINUED | OUTPATIENT
Start: 2024-07-13 | End: 2024-07-16 | Stop reason: HOSPADM

## 2024-07-13 RX ADMIN — KETOROLAC TROMETHAMINE 15 MG: 30 INJECTION, SOLUTION INTRAMUSCULAR; INTRAVENOUS at 19:38

## 2024-07-13 RX ADMIN — FUROSEMIDE 40 MG: 10 INJECTION, SOLUTION INTRAMUSCULAR; INTRAVENOUS at 17:38

## 2024-07-13 RX ADMIN — ORPHENADRINE CITRATE 60 MG: 30 INJECTION, SOLUTION INTRAMUSCULAR; INTRAVENOUS at 19:40

## 2024-07-13 RX ADMIN — METOPROLOL SUCCINATE 50 MG: 50 TABLET, EXTENDED RELEASE ORAL at 23:42

## 2024-07-13 RX ADMIN — Medication 10 ML: at 23:43

## 2024-07-13 NOTE — ED PROVIDER NOTES
EMERGENCY DEPARTMENT ENCOUNTER    Pt Name: Aster Craft  MRN: [id number]  Pt :   1945  Room Number:    Date of encounter:  2024  PCP: Yolie Cotto MD  ED Provider: Wenceslao Terry MD    Historian: Patient and Family      HPI:  Chief Complaint   Patient presents with    Shortness of Breath          Context: Aster Craft is a 79 y.o. female who presents to the ED c/o shortness of breath, chest discomfort, weakness.  The patient was out of the yard sale today, started becoming short of breath, had cough, reports is productive for white sputum.  Reports some chest discomfort like a pressure tightness sensation, feels smothered.  Denies fevers, however she does report she is currently taking antibiotics for urinary tract infection.      PAST MEDICAL HISTORY  Past Medical History:   Diagnosis Date    Arthritis     Breast cancer     RIGHT BREAST STAGE 3    Diabetes mellitus     Elevated cholesterol     GERD (gastroesophageal reflux disease) 2021    History of cancer chemotherapy     Hypertension     Hyperthyroidism 2022    Kidney stone     Lichen sclerosus     Shingles     Thickened endometrium 2018    Urinary incontinence     Urinary tract infection          PAST SURGICAL HISTORY  Past Surgical History:   Procedure Laterality Date    CATARACT EXTRACTION Right 01/10/2023    CERVICAL CONE BIOPSY      CHOLECYSTECTOMY  2010    COLONOSCOPY N/A 2018    Procedure: COLONOSCOPY;  Surgeon: Trenton Lyons MD;  Location: [facility];  Service: Gastroenterology    DILATATION AND CURETTAGE      ENDOSCOPY N/A 2018    Procedure: ESOPHAGOGASTRODUODENOSCOPY;  Surgeon: Trenton Lyons MD;  Location: [facility];  Service: Gastroenterology    MASTECTOMY Right 10/1998    MASTECTOMY Left 2013    TUBAL ABDOMINAL LIGATION           FAMILY HISTORY  Family History   Problem Relation Age of Onset    Breast cancer Mother     Diabetes Mother     Cancer Mother     Ovarian cancer Maternal  Aunt     Cancer Maternal Aunt          SOCIAL HISTORY  Social History     Socioeconomic History    Marital status:    Tobacco Use    Smoking status: Never     Passive exposure: Never    Smokeless tobacco: Never   Vaping Use    Vaping status: Never Used   Substance and Sexual Activity    Alcohol use: No    Drug use: No    Sexual activity: Not Currently         ALLERGIES  Caffeine        REVIEW OF SYSTEMS  Review of Systems   Constitutional:  Negative for chills and fever.   HENT:  Negative for sore throat and trouble swallowing.    Eyes:  Negative for pain and redness.   Respiratory:  Positive for cough and shortness of breath.    Cardiovascular:  Positive for chest pain. Negative for leg swelling.   Gastrointestinal:  Negative for abdominal pain, nausea and vomiting.   Genitourinary:  Negative for dysuria and urgency.   Musculoskeletal:  Negative for back pain and neck pain.   Skin:  Negative for rash and wound.   Neurological:  Positive for weakness. Negative for dizziness.        All systems reviewed and negative except for those discussed in HPI.       PHYSICAL EXAM    I have reviewed the triage vital signs and nursing notes.    ED Triage Vitals [07/13/24 1706]   Temp Heart Rate Resp BP SpO2   97.7 °F (36.5 °C) 104 18 122/72 91 %      Temp src Heart Rate Source Patient Position BP Location FiO2 (%)   Oral Monitor Sitting Left arm --       Physical Exam  Constitutional:       Appearance: Normal appearance. She is not ill-appearing.   HENT:      Head: Normocephalic and atraumatic.      Right Ear: External ear normal.      Left Ear: External ear normal.      Nose: Nose normal.      Mouth/Throat:      Mouth: Mucous membranes are moist.      Pharynx: Oropharynx is clear.   Eyes:      Extraocular Movements: Extraocular movements intact.      Conjunctiva/sclera: Conjunctivae normal.      Pupils: Pupils are equal, round, and reactive to light.   Cardiovascular:      Rate and Rhythm: Normal rate and regular rhythm.       Pulses:           Radial pulses are 2+ on the right side and 2+ on the left side.   Pulmonary:      Effort: Pulmonary effort is normal.      Breath sounds: Normal breath sounds.   Abdominal:      General: There is no distension.      Palpations: Abdomen is soft.      Tenderness: There is no abdominal tenderness.   Musculoskeletal:         General: No tenderness or deformity. Normal range of motion.      Cervical back: Normal range of motion and neck supple.      Right lower leg: No edema.      Left lower leg: No edema.   Skin:     General: Skin is warm and dry.      Capillary Refill: Capillary refill takes less than 2 seconds.   Neurological:      General: No focal deficit present.      Mental Status: She is alert and oriented to person, place, and time.            LAB RESULTS  Recent Results (from the past 24 hour(s))   Comprehensive Metabolic Panel    Collection Time: 07/13/24  5:18 PM    Specimen: Blood   Result Value Ref Range    Glucose 195 (H) 65 - 99 mg/dL    BUN 28 (H) 8 - 23 mg/dL    Creatinine 0.77 0.57 - 1.00 mg/dL    Sodium 142 136 - 145 mmol/L    Potassium 4.1 3.5 - 5.2 mmol/L    Chloride 101 98 - 107 mmol/L    CO2 27.5 22.0 - 29.0 mmol/L    Calcium 9.2 8.6 - 10.5 mg/dL    Total Protein 7.5 6.0 - 8.5 g/dL    Albumin 4.5 3.5 - 5.2 g/dL    ALT (SGPT) 21 1 - 33 U/L    AST (SGOT) 19 1 - 32 U/L    Alkaline Phosphatase 93 39 - 117 U/L    Total Bilirubin 0.6 0.0 - 1.2 mg/dL    Globulin 3.0 gm/dL    A/G Ratio 1.5 g/dL    BUN/Creatinine Ratio 36.4 (H) 7.0 - 25.0    Anion Gap 13.5 5.0 - 15.0 mmol/L    eGFR 79.6 >60.0 mL/min/1.73   BNP    Collection Time: 07/13/24  5:18 PM    Specimen: Blood   Result Value Ref Range    proBNP 95.5 0.0 - 1,800.0 pg/mL   Single High Sensitivity Troponin T    Collection Time: 07/13/24  5:18 PM    Specimen: Blood   Result Value Ref Range    HS Troponin T 12 <14 ng/L   Green Top (Gel)    Collection Time: 07/13/24  5:18 PM   Result Value Ref Range    Extra Tube Hold for add-ons.     Lavender Top    Collection Time: 07/13/24  5:18 PM   Result Value Ref Range    Extra Tube hold for add-on    Gold Top - SST    Collection Time: 07/13/24  5:18 PM   Result Value Ref Range    Extra Tube Hold for add-ons.    Light Blue Top    Collection Time: 07/13/24  5:18 PM   Result Value Ref Range    Extra Tube Hold for add-ons.    CBC Auto Differential    Collection Time: 07/13/24  5:18 PM    Specimen: Blood   Result Value Ref Range    WBC 16.25 (H) 3.40 - 10.80 10*3/mm3    RBC 4.39 3.77 - 5.28 10*6/mm3    Hemoglobin 13.7 12.0 - 15.9 g/dL    Hematocrit 41.3 34.0 - 46.6 %    MCV 94.1 79.0 - 97.0 fL    MCH 31.2 26.6 - 33.0 pg    MCHC 33.2 31.5 - 35.7 g/dL    RDW 12.9 12.3 - 15.4 %    RDW-SD 44.1 37.0 - 54.0 fl    MPV 9.8 6.0 - 12.0 fL    Platelets 240 140 - 450 10*3/mm3    Neutrophil % 91.1 (H) 42.7 - 76.0 %    Lymphocyte % 6.7 (L) 19.6 - 45.3 %    Monocyte % 1.4 (L) 5.0 - 12.0 %    Eosinophil % 0.2 (L) 0.3 - 6.2 %    Basophil % 0.2 0.0 - 1.5 %    Immature Grans % 0.4 0.0 - 0.5 %    Neutrophils, Absolute 14.80 (H) 1.70 - 7.00 10*3/mm3    Lymphocytes, Absolute 1.09 0.70 - 3.10 10*3/mm3    Monocytes, Absolute 0.23 0.10 - 0.90 10*3/mm3    Eosinophils, Absolute 0.03 0.00 - 0.40 10*3/mm3    Basophils, Absolute 0.03 0.00 - 0.20 10*3/mm3    Immature Grans, Absolute 0.07 (H) 0.00 - 0.05 10*3/mm3    nRBC 0.0 0.0 - 0.2 /100 WBC   Lactic Acid, Plasma    Collection Time: 07/13/24  5:18 PM    Specimen: Blood   Result Value Ref Range    Lactate 1.4 0.5 - 2.0 mmol/L   Procalcitonin    Collection Time: 07/13/24  5:18 PM    Specimen: Blood   Result Value Ref Range    Procalcitonin 0.06 0.00 - 0.25 ng/mL   CK    Collection Time: 07/13/24  5:18 PM    Specimen: Blood   Result Value Ref Range    Creatine Kinase 77 20 - 180 U/L   Urinalysis With Microscopic If Indicated (No Culture) - Urine, Clean Catch    Collection Time: 07/13/24  6:39 PM    Specimen: Urine, Clean Catch   Result Value Ref Range    Color, UA Yellow Yellow, Straw     Appearance, UA Clear Clear    pH, UA 6.5 5.0 - 8.0    Specific Gravity, UA 1.007 1.005 - 1.030    Glucose, UA Negative Negative    Ketones, UA Negative Negative    Bilirubin, UA Negative Negative    Blood, UA Negative Negative    Protein, UA Negative Negative    Leuk Esterase, UA Negative Negative    Nitrite, UA Negative Negative    Urobilinogen, UA 0.2 E.U./dL 0.2 - 1.0 E.U./dL   Single High Sensitivity Troponin T    Collection Time: 07/13/24  7:18 PM    Specimen: Blood   Result Value Ref Range    HS Troponin T 9 <14 ng/L       If labs were ordered, I independently reviewed the results and considered them in treating the patient.        RADIOLOGY  No Radiology Exams Resulted Within Past 24 Hours        PROCEDURES    Procedures    Interpretations    O2 Sat: The patients oxygen saturation was 91% on Room Air.  This was independently interpreted by me as Normal    EKG: I reviewed and independently interpreted the EKG as sinus tach rate of 102, normal axis, elevated QRS duration, right bundle branch block, no ST elevation, T wave inversion lead III    Cardiac Monitoring: I reviewed and independently interpreted the Rhythm Strip as Normal Sinus rhythm rate of 107    Radiology: I ordered and independently reviewed the above noted radiographic studies.  I viewed images of Chest Xray which showed  interstitial edema  per my independent interpretation. See radiologist's dictation for official interpretation.     HEART Score: 3       MEDICATIONS GIVEN IN ER    Medications   sodium chloride 0.9 % flush 10 mL (has no administration in time range)   furosemide (LASIX) injection 40 mg (40 mg Intravenous Given 7/13/24 1738)   ketorolac (TORADOL) injection 15 mg (15 mg Intravenous Given 7/13/24 1938)   orphenadrine (NORFLEX) injection 60 mg (60 mg Intravenous Given 7/13/24 1940)         MEDICAL DECISION MAKING, PROGRESS, and CONSULTS    All labs, if obtained, have been independently reviewed by me.  All radiology studies, if  obtained, have been reviewed by me and the radiologist dictating the report.  All EKG's, if obtained, have been independently viewed and interpreted by me      Discussion below represents my analysis of pertinent findings related to patient's condition, differential diagnosis, treatment plan and final disposition.      Differential diagnosis:    79-year-old female presented ED with complaint of chest discomfort, has a squeezing tight sensation in the chest, she also reports some difficulty breathing and cough.  With her history of heart failure certainly concern for pulmonary edema, versus ACS although her pain is quite atypical.  As well as she was out in the heat, this could be heat related, could be pneumonia or other infectious process.  Will obtain laboratory workup including BNP, troponin, x-ray, provide IV Lasix as the patient reports she has not been taking the Lasix she was prescribed at discharge recently    Additional Sources:  External (non-ED) record review:  Discharge summary 7/2/2024 regarding admission for respiratory failure       Orders placed during this visit:  Orders Placed This Encounter   Procedures    Blood Culture - Blood,    Blood Culture - Blood,    XR Chest 1 View    [place] Draw    Comprehensive Metabolic Panel    BNP    Single High Sensitivity Troponin T    CBC Auto Differential    Lactic Acid, Plasma    Procalcitonin    CK    Urinalysis With Microscopic If Indicated (No Culture) - Urine, Clean Catch    Single High Sensitivity Troponin T    Diet: Cardiac, Diabetic, Fluid Restriction (240 mL/tray); Low Sodium (2g); Consistent Carbohydrate; 1500 mL/day; Fluid Consistency: Thin (IDDSI 0)    Undress & Gown    Continuous Pulse Oximetry    Vital Signs    Oxygen Therapy- Nasal Cannula; Titrate 1-6 LPM Per SpO2; 90 - 95%    ECG 12 Lead ED Triage Standing Order; SOA    Insert Peripheral IV    Initiate Observation Status    CBC & Differential    Green Top (Gel)    Lavender Top    Gold Top - SST     Light Blue Top         Additional orders considered but not ordered:  None    ED Course:    Consultants:  Hospitalist    ED Course as of 07/13/24 2130   Sat Jul 13, 2024   1734 CBC & Differential(!)  WBC elevated, has known UTI however with cough, lactic/blood cultures added as well as procalcitonin [CS]   1800 BNP  BNP is negative [CS]   1800 Single High Sensitivity Troponin T  Troponin is negative, will repeat in 2 hours per protocol [CS]   1800 Lactic Acid, Plasma  Lactic negative, less likely acute septic process [CS]   1837 Procalcitonin  Procalcitonin negative, less likely significant bacterial process [CS]   1956 Single High Sensitivity Troponin T  Repeat troponin significantly improved [CS]   2106 Patient ambulated, became hypoxic requiring oxygen, she is quite weak unable to make it onto the nursing station.  As such I do feel the patient requires admission to the hospital.  I spoke directly to hospitalist Dr. Patel agrees to evaluate the patient for admission.  Patient aware and agreeable to plan for admission [CS]      ED Course User Index  [CS] Wenceslao Terry MD           After my consideration of clinical presentation and any laboratory/radiology studies obtained, I discussed the findings with the patient/patient representative who is in agreement with the treatment plan and the final disposition. Risks and benefits of discharge were discussed.     AS OF 21:30 EDT VITALS:    BP - 107/72  HR - 96  TEMP - 97.7 °F (36.5 °C) (Oral)  O2 SATS - 96%    I reviewed the patients prescription monitoring report if available prior to discharge    DIAGNOSIS  Final diagnoses:   Acute respiratory failure with hypoxia   Hypervolemia, unspecified hypervolemia type         DISPOSITION  ED Disposition       ED Disposition   Decision to Admit    Condition   --    Comment   Level of Care: Telemetry [5]   Diagnosis: Acute on chronic diastolic CHF (congestive heart failure) [1308074]   Admitting Physician:  SHEMAR MARISCAL [[id number]]   Attending Physician: SHEMAR MARISCAL [[id number]]                     Please note that portions of this document were completed with voice recognition software.        Wenceslao Terry MD  07/13/24 4026

## 2024-07-13 NOTE — Clinical Note
Hemostasis started on the right radial artery. R-Band was used in achieving hemostasis. Radial compression device applied to vessel. Hemostasis achieved successfully. Closure device additional comment: 14 cc @ 10:51

## 2024-07-14 LAB
ANION GAP SERPL CALCULATED.3IONS-SCNC: 12.3 MMOL/L (ref 5–15)
BUN SERPL-MCNC: 29 MG/DL (ref 8–23)
BUN/CREAT SERPL: 56.9 (ref 7–25)
CALCIUM SPEC-SCNC: 8.7 MG/DL (ref 8.6–10.5)
CHLORIDE SERPL-SCNC: 98 MMOL/L (ref 98–107)
CO2 SERPL-SCNC: 26.7 MMOL/L (ref 22–29)
CREAT SERPL-MCNC: 0.51 MG/DL (ref 0.57–1)
EGFRCR SERPLBLD CKD-EPI 2021: 96.3 ML/MIN/1.73
GLUCOSE BLDC GLUCOMTR-MCNC: 165 MG/DL (ref 70–130)
GLUCOSE BLDC GLUCOMTR-MCNC: 168 MG/DL (ref 70–130)
GLUCOSE BLDC GLUCOMTR-MCNC: 169 MG/DL (ref 70–130)
GLUCOSE BLDC GLUCOMTR-MCNC: 179 MG/DL (ref 70–130)
GLUCOSE SERPL-MCNC: 188 MG/DL (ref 65–99)
HCT VFR BLD AUTO: 38.4 % (ref 34–46.6)
HGB BLD-MCNC: 12.9 G/DL (ref 12–15.9)
POTASSIUM SERPL-SCNC: 3.5 MMOL/L (ref 3.5–5.2)
SODIUM SERPL-SCNC: 137 MMOL/L (ref 136–145)
TSH SERPL DL<=0.05 MIU/L-ACNC: 0.15 UIU/ML (ref 0.27–4.2)

## 2024-07-14 PROCEDURE — 25010000002 ENOXAPARIN PER 10 MG: Performed by: FAMILY MEDICINE

## 2024-07-14 PROCEDURE — G0378 HOSPITAL OBSERVATION PER HR: HCPCS

## 2024-07-14 PROCEDURE — 85014 HEMATOCRIT: CPT | Performed by: FAMILY MEDICINE

## 2024-07-14 PROCEDURE — 99232 SBSQ HOSP IP/OBS MODERATE 35: CPT | Performed by: FAMILY MEDICINE

## 2024-07-14 PROCEDURE — 82948 REAGENT STRIP/BLOOD GLUCOSE: CPT | Performed by: FAMILY MEDICINE

## 2024-07-14 PROCEDURE — 85018 HEMOGLOBIN: CPT | Performed by: FAMILY MEDICINE

## 2024-07-14 PROCEDURE — 82948 REAGENT STRIP/BLOOD GLUCOSE: CPT

## 2024-07-14 PROCEDURE — 99204 OFFICE O/P NEW MOD 45 MIN: CPT | Performed by: INTERNAL MEDICINE

## 2024-07-14 PROCEDURE — 80048 BASIC METABOLIC PNL TOTAL CA: CPT | Performed by: FAMILY MEDICINE

## 2024-07-14 PROCEDURE — 63710000001 INSULIN LISPRO (HUMAN) PER 5 UNITS: Performed by: FAMILY MEDICINE

## 2024-07-14 PROCEDURE — 97161 PT EVAL LOW COMPLEX 20 MIN: CPT

## 2024-07-14 RX ORDER — ENOXAPARIN SODIUM 100 MG/ML
40 INJECTION SUBCUTANEOUS DAILY
Status: DISCONTINUED | OUTPATIENT
Start: 2024-07-14 | End: 2024-07-16 | Stop reason: HOSPADM

## 2024-07-14 RX ORDER — ASPIRIN 325 MG
325 TABLET, DELAYED RELEASE (ENTERIC COATED) ORAL DAILY
Status: DISCONTINUED | OUTPATIENT
Start: 2024-07-14 | End: 2024-07-15 | Stop reason: SDUPTHER

## 2024-07-14 RX ORDER — ATORVASTATIN CALCIUM 80 MG/1
80 TABLET, FILM COATED ORAL NIGHTLY
Status: DISCONTINUED | OUTPATIENT
Start: 2024-07-14 | End: 2024-07-16 | Stop reason: HOSPADM

## 2024-07-14 RX ADMIN — ASPIRIN 325 MG: 325 TABLET, COATED ORAL at 11:48

## 2024-07-14 RX ADMIN — ACETAMINOPHEN 650 MG: 325 TABLET, FILM COATED ORAL at 11:50

## 2024-07-14 RX ADMIN — NITROFURANTOIN MONOHYDRATE/MACROCRYSTALLINE 100 MG: 25; 75 CAPSULE ORAL at 08:36

## 2024-07-14 RX ADMIN — INSULIN LISPRO 2 UNITS: 100 INJECTION, SOLUTION INTRAVENOUS; SUBCUTANEOUS at 08:37

## 2024-07-14 RX ADMIN — Medication 10 ML: at 21:07

## 2024-07-14 RX ADMIN — ACETAMINOPHEN 650 MG: 325 TABLET, FILM COATED ORAL at 16:33

## 2024-07-14 RX ADMIN — ATORVASTATIN CALCIUM 80 MG: 80 TABLET, FILM COATED ORAL at 21:11

## 2024-07-14 RX ADMIN — INSULIN LISPRO 2 UNITS: 100 INJECTION, SOLUTION INTRAVENOUS; SUBCUTANEOUS at 16:33

## 2024-07-14 RX ADMIN — FUROSEMIDE 40 MG: 40 TABLET ORAL at 08:36

## 2024-07-14 RX ADMIN — GUAIFENESIN AND DEXTROMETHORPHAN 5 ML: 100; 10 SYRUP ORAL at 15:09

## 2024-07-14 RX ADMIN — ENOXAPARIN SODIUM 40 MG: 100 INJECTION SUBCUTANEOUS at 12:35

## 2024-07-14 RX ADMIN — METOPROLOL SUCCINATE 50 MG: 50 TABLET, EXTENDED RELEASE ORAL at 21:06

## 2024-07-14 RX ADMIN — NITROFURANTOIN MONOHYDRATE/MACROCRYSTALLINE 100 MG: 25; 75 CAPSULE ORAL at 21:06

## 2024-07-14 RX ADMIN — Medication 10 ML: at 08:36

## 2024-07-14 RX ADMIN — PANTOPRAZOLE SODIUM 40 MG: 40 TABLET, DELAYED RELEASE ORAL at 06:39

## 2024-07-14 RX ADMIN — INSULIN LISPRO 2 UNITS: 100 INJECTION, SOLUTION INTRAVENOUS; SUBCUTANEOUS at 21:11

## 2024-07-14 RX ADMIN — INSULIN LISPRO 2 UNITS: 100 INJECTION, SOLUTION INTRAVENOUS; SUBCUTANEOUS at 11:48

## 2024-07-14 NOTE — PAYOR COMM NOTE
"TO:BC  FROM:SHANIQUE WAGNER, RN PHONE [phone]  OBSERVATION NOTIFICATION   TAX ID [id number] [facility] [phone]    Vanessa Craft (79 y.o. Female)       Date of Birth   1945    Social Security Number       Address   [address] [place] [address]    Home Phone   [phone]    MRN   [id number]       Restoration   Religion of Aristides    Marital Status                               Admission Date   7/13/24    Admission Type   Emergency    Admitting Provider   Brianna Patel DO    Attending Provider   German Mae MD    Department, Room/Bed   [facility] TELEMETRY 3, 320/1       Discharge Date       Discharge Disposition       Discharge Destination                                 Attending Provider: German Mae MD    Allergies: Caffeine    Isolation: None   Infection: None   Code Status: CPR    Ht: 160 cm (63\")   Wt: 57.6 kg (127 lb)    Admission Cmt: None   Principal Problem: Acute on chronic combined systolic and diastolic CHF (congestive heart failure) [I50.43]                   Active Insurance as of 7/13/2024       Primary Coverage       Payor Plan Insurance Group Employer/Plan Group    ANTHEM MEDICARE REPLACEMENT ANTHEM MEDICARE ADVANTAGE [id number]       Payor Plan Address Payor Plan Phone Number Payor Plan Fax Number Effective Dates    [address] [phone]  9/1/2020 - None Entered    [facility] [id number]         Subscriber Name Subscriber Birth Date Member ID       VANESSA CRAFT 1945 [id number]               Secondary Coverage       Payor Plan Insurance Group Employer/Plan Group    [place] [id number]       Payor Plan Address Payor Plan Phone Number Payor Plan Fax Number Effective Dates    [employer] [address] [address] [phone]  11/1/2022 - None Entered    [facility] [id number]         Subscriber Name Subscriber Birth Date Member ID       VANESSA CRAFT 1945 [id number]                     Emergency " Contacts        (Rel.) Home Phone Work Phone Mobile Phone    Guillermina Gar (Grandchild) -- -- [phone]    Chilo Craft (Son) -- -- [phone]    ROMI CRAFT (Son) [phone] -- --    TERRIE GOMEZ (Grandchild) [phone] -- --                 History & Physical        Brianna Patel DO at 24 2104            [facility]   HISTORY AND PHYSICAL      Name:  Aster Craft   Age:  79 y.o.  Sex:  female  :  1945  MRN:  [id number]   Visit Number:  [id number]  Admission Date:  2024  Date Of Service:  24  Primary Care Physician:  Yolie Cotto MD    Chief Complaint:     Shortness of breath, chest pain    History Of Presenting Illness:      The patient is a 79-year-old female with a history of hypertension, diabetes not on medication, hypothyroidism, reported cardiomyopathy, with recent diagnosis of heart failure reduced ejection fraction, who presented to the emergency room with complaints of shortness of breath and chest pain.  Patient states in the past she had seen Dr. Palacios, had been on metoprolol due to reported cardiomyopathy.  She is unsure if she was ever diagnosed with atrial fibrillation.  She was asked in the hospital about 2 weeks ago due to breathing difficulty, was diuresed, and had outpatient cardiology follow-up.  She is due to see cardiology this week.  She noted today while outside at a yard sale she developed symptoms of chest pain, pressure, heaviness in her chest and associated shortness of breath.  She went inside to rest, cool down, but continued to have symptoms and was brought to the ER.  Currently is feeling better.  She is not currently taking diuretics at home.  She is not on any medications for diabetes, she wants to talk with her PCP about that.  She notes a stress test performed many years ago, no recent cardiac procedures or testing other than echocardiogram at last hospital stay.    In the ER, the  patient was overall hemodynamically stable.  She was borderline hypoxic with ambulation and is currently on 2 L of oxygen.  She was given IV Lasix.  Her labs are largely nonactionable.  She has been on Macrobid due to recent UTI with clear urine sample tonight.  Chest x-ray with some mild interstitial edema.  EKG and troponins reassuring.  Admitted for observation.    Review Of Systems:    All systems were reviewed and negative except as mentioned in history of presenting illness, assessment and plan.    Past Medical History: Patient  has a past medical history of Arthritis, Breast cancer, Diabetes mellitus, Elevated cholesterol, GERD (gastroesophageal reflux disease) (08/23/2021), History of cancer chemotherapy, Hypertension, Hyperthyroidism (05/19/2022), Kidney stone, Lichen sclerosus, Shingles, Thickened endometrium (2018), Urinary incontinence, and Urinary tract infection.    Past Surgical History: Patient  has a past surgical history that includes Mastectomy (Right, 10/1998); Mastectomy (Left, 07/2013); Cholecystectomy (03/2010); Dilation and curettage of uterus; Cervical cone biopsy; Esophagogastroduodenoscopy (N/A, 07/06/2018); Colonoscopy (N/A, 07/06/2018); Tubal ligation; and Cataract Extraction (Right, 01/10/2023).    Social History: Patient  reports that she has never smoked. She has never been exposed to tobacco smoke. She has never used smokeless tobacco. She reports that she does not drink alcohol and does not use drugs.    Family History:  Patient's family history has been reviewed and found to be noncontributory.     Allergies:      Caffeine    Home Medications:    Prior to Admission Medications       Prescriptions Last Dose Informant Patient Reported? Taking?    albuterol sulfate  (90 Base) MCG/ACT inhaler   Yes No    TWO PUFFS EVERY SIX HOURS AS NEEDED    Patient not taking:  Reported on 7/11/2024    azelastine (ASTELIN) 0.1 % nasal spray   Yes No    INHALE ONE SPRAY IN EACH NOSTRIL TWICE  DAILY    Patient not taking:  Reported on 7/11/2024    benzonatate (TESSALON) 200 MG capsule   Yes No    Take 1 capsule by mouth.    Blood Glucose Monitoring Suppl (OneTouch Verio Flex System) w/Device kit   Yes No    See Admin Instructions. for testing    brimonidine-timolol (COMBIGAN) 0.2-0.5 % ophthalmic solution   Yes No    Administer 1 drop to both eyes Every 12 (Twelve) Hours.    Patient not taking:  Reported on 7/11/2024    calcium carbonate-cholecalciferol 500-400 MG-UNIT tablet tablet   Yes No    Take  by mouth Daily.    cetirizine (zyrTEC) 10 MG tablet   Yes No    Take 1 tablet by mouth Daily.    Patient not taking:  Reported on 7/11/2024    Cholecalciferol 250 MCG (23527 UT) capsule   Yes No    Take 5,000 Units by mouth Daily.    CINNAMON PO   Yes No    Take  by mouth.    Patient not taking:  Reported on 7/11/2024    clobetasol (TEMOVATE) 0.05 % ointment   No No    Apply to affected area BID x 2 wks then daily x 2 wks then 2-3x/wk    Continuous Blood Gluc Sensor (FreeStyle Malina 2 Sensor) misc   Yes No    CHANGE SENSOR EVERY 14 DAYS    desloratadine (CLARINEX) 5 MG tablet   Yes No    Take 1 tablet by mouth Daily.    Patient not taking:  Reported on 7/11/2024    dextromethorphan-guaifenesin (ROBITUSSIN-DM)  MG/5ML syrup   Yes No    TAKE 10ML EVERY 4 HOURS AS NEEDED    diazePAM (VALIUM) 5 MG tablet   Yes No    TAKE 1 TABLET one hour prior TO procedure    Patient not taking:  Reported on 7/11/2024    dorzolamide (TRUSOPT) 2 % ophthalmic solution   Yes No    INSTILL ONE DROP IN EACH EYE TWICE DAILY    Patient not taking:  Reported on 7/11/2024    ezetimibe (ZETIA) 10 MG tablet   Yes No    Take 1 tablet by mouth Daily.    Flovent  MCG/ACT inhaler   Yes No    Inhale 2 puffs 2 (Two) Times a Day.    Patient not taking:  Reported on 7/11/2024    furosemide (Lasix) 20 MG tablet   No No    Take 1 tablet by mouth Daily.    glipizide (GLUCOTROL XL) 5 MG ER tablet   Yes No    Patient not taking:  Reported  on 7/11/2024    ipratropium (ATROVENT) 0.03 % nasal spray   Yes No    INHALE ONE SPRAY IN EACH NOSTRIL TWICE DAILY    Patient not taking:  Reported on 7/11/2024    ipratropium (ATROVENT) 0.06 % nasal spray   Yes No    INHALE 1-2 SPRAYS IN EACH NOSTRIL TWICE DAILY    Patient not taking:  Reported on 7/11/2024    Januvia 100 MG tablet   Yes No    Take 1 tablet by mouth Daily.    Patient not taking:  Reported on 7/11/2024    Lancets (OneTouch Delica Plus [id number]) misc   Yes No    USE AS DIRECTED EVERY DAY    meclizine (ANTIVERT) 12.5 MG tablet   Yes No    Take 1 tablet by mouth 3 (Three) Times a Day As Needed.    metoprolol succinate XL (TOPROL-XL) 50 MG 24 hr tablet   Yes No    Take 1 tablet by mouth Daily.    nitrofurantoin, macrocrystal-monohydrate, (MACROBID) 100 MG capsule   No No    Take 1 capsule by mouth 2 (Two) Times a Day.    nitroglycerin (NITROSTAT) 0.4 MG SL tablet   Yes No    Place 1 tablet under the tongue.    OneTouch Verio test strip   Yes No    Daily. for testing    pantoprazole (PROTONIX) 40 MG EC tablet   Yes No    Take 1 tablet by mouth.    Rhopressa 0.02 % solution   Yes No    Administer 1 drop to both eyes Every Night.    simethicone (MYLICON) 80 MG chewable tablet   Yes No    Chew 1 tablet Every 6 (Six) Hours As Needed for Flatulence.    Triamcinolone Acetonide (NASACORT) 55 MCG/ACT nasal inhaler   Yes No    2 sprays into the nostril(s) as directed by provider Daily.    Patient not taking:  Reported on 7/11/2024    Vibegron (Gemtesa) 75 MG tablet   No No    Take 1 tablet by mouth Daily.    Patient not taking:  Reported on 7/11/2024          ED Medications:    Medications   sodium chloride 0.9 % flush 10 mL (has no administration in time range)   furosemide (LASIX) injection 40 mg (40 mg Intravenous Given 7/13/24 1738)   ketorolac (TORADOL) injection 15 mg (15 mg Intravenous Given 7/13/24 1938)   orphenadrine (NORFLEX) injection 60 mg (60 mg Intravenous Given 7/13/24 1940)     Vital  "Signs:  Temp:  [97.7 °F (36.5 °C)] 97.7 °F (36.5 °C)  Heart Rate:  [104-117] 107  Resp:  [18] 18  BP: (116-130)/(69-76) 130/76        07/13/24  1706   Weight: 57.6 kg (127 lb)     Body mass index is 22.5 kg/m².    Physical Exam:     Most recent vital Signs: /76   Pulse 107   Temp 97.7 °F (36.5 °C) (Oral)   Resp 18   Ht 160 cm (63\")   Wt 57.6 kg (127 lb)   SpO2 95%   BMI 22.50 kg/m²     Physical Exam  Constitutional:       General: She is not in acute distress.     Appearance: She is normal weight.   HENT:      Mouth/Throat:      Mouth: Mucous membranes are moist.      Pharynx: Oropharynx is clear.   Eyes:      Extraocular Movements: Extraocular movements intact.      Pupils: Pupils are equal, round, and reactive to light.   Cardiovascular:      Rate and Rhythm: Regular rhythm. Tachycardia present.      Pulses: Normal pulses.      Heart sounds: No murmur heard.     No gallop.   Pulmonary:      Effort: Pulmonary effort is normal.      Breath sounds: No rhonchi or rales.   Abdominal:      General: Bowel sounds are normal. There is no distension.      Tenderness: There is no abdominal tenderness.   Musculoskeletal:      Right lower leg: Edema present.      Left lower leg: Edema present.   Skin:     General: Skin is warm.      Capillary Refill: Capillary refill takes less than 2 seconds.      Findings: No bruising or lesion.   Neurological:      General: No focal deficit present.      Mental Status: She is alert. Mental status is at baseline.      Motor: Weakness present.         Laboratory data:    I have reviewed the labs done in the emergency room.    Results from last 7 days   Lab Units 07/13/24  1718   SODIUM mmol/L 142   POTASSIUM mmol/L 4.1   CHLORIDE mmol/L 101   CO2 mmol/L 27.5   BUN mg/dL 28*   CREATININE mg/dL 0.77   CALCIUM mg/dL 9.2   BILIRUBIN mg/dL 0.6   ALK PHOS U/L 93   ALT (SGPT) U/L 21   AST (SGOT) U/L 19   GLUCOSE mg/dL 195*     Results from last 7 days   Lab Units 07/13/24  1718   WBC " 10*3/mm3 16.25*   HEMOGLOBIN g/dL 13.7   HEMATOCRIT % 41.3   PLATELETS 10*3/mm3 240         Results from last 7 days   Lab Units 07/13/24  1918 07/13/24  1718   CK TOTAL U/L  --  77   HSTROP T ng/L 9 12     Results from last 7 days   Lab Units 07/13/24  1718   PROBNP pg/mL 95.5                 Results from last 7 days   Lab Units 07/13/24  1839   COLOR UA  Yellow   GLUCOSE UA  Negative   KETONES UA  Negative   BLOOD UA  Negative   LEUKOCYTES UA  Negative   PH, URINE  6.5   BILIRUBIN UA  Negative   UROBILINOGEN UA  0.2 E.U./dL       Pain Management Panel           No data to display                EKG:      Appears to be a sinus rhythm, rate of 100, right bundle branch block noted.  There are nonspecific T wave changes.    Radiology:    No radiology results for the last 3 days    Assessment:    Acute on chronic heart failure midrange ejection fraction/diastolic exacerbation, POA  Chest pain  Tachycardia, A-fib history?  Uncontrolled type 2 diabetes  Hyperthyroidism  Prior breast cancer  GERD  Hypertension    Plan:    Admit for observation    CHF/chest pain:  Recent echo with midrange ejection fraction noted.  Was not taking diuretics outpatient.  Placed back on Lasix.  Continue the metoprolol.  She is due to see cardiology outpatient, however with ongoing symptoms concerning for possible angina will have Dr. Morrow see in consultation.  Troponin stable.  Telemetry monitoring.  She also has outpatient Holter monitor on currently.    Arrhythmia:  Patient reports prior history of tachycardia, unsure if actually atrial fibrillation.  She has been on metoprolol for multiple years.  EKG normal here in sinus rhythm.  She has outpatient Holter monitor currently on.    Diabetes:  Patient not taking anything at home for this.  Notes had been diet controlled, understands A1c is elevated now.  Recommend she take medication for this    Hyperthyroidism:  Follows with endocrinology, is known to have thyroid nodules.  Will check  TSH labs.  Obviously can contribute to heart disease as well as arrhythmia.    Further recommendations depend upon clinical course.  Plan of care discussed with the patient    Risk Assessment: High  DVT Prophylaxis: Heparin  Code Status: Full  Diet: Diabetic cardiac fluid restriction    Advance Care Planning  ACP discussion was held with the patient during this visit. Patient does not have an advance directive, declines further assistance.           Brianna Patel DO  24  21:06 EDT    Dictated utilizing Dragon dictation.    Electronically signed by Brianna Patel DO at 24 2346          Emergency Department Notes        Joanie Singh RN at 24          Walking pulse ox on room air, low 89% high 90%    Electronically signed by Joanie Singh RN at 24       Rosana Segura RN at 24 1849          Placed on 2L NC for comfort    Electronically signed by Rosana Segura RN at 24 191       Wenceslao Terry MD at 24 1801           EMERGENCY DEPARTMENT ENCOUNTER    Pt Name: Aster Craft  MRN: [id number]  Pt :   1945  Room Number:    Date of encounter:  2024  PCP: Yolie Cotto MD  ED Provider: Wenecslao Terry MD    Historian: Patient and Family      HPI:  Chief Complaint   Patient presents with    Shortness of Breath          Context: Aster Craft is a 79 y.o. female who presents to the ED c/o shortness of breath, chest discomfort, weakness.  The patient was out of the yard sale today, started becoming short of breath, had cough, reports is productive for white sputum.  Reports some chest discomfort like a pressure tightness sensation, feels smothered.  Denies fevers, however she does report she is currently taking antibiotics for urinary tract infection.      PAST MEDICAL HISTORY  Past Medical History:   Diagnosis Date    Arthritis     Breast cancer     RIGHT BREAST STAGE 3    Diabetes mellitus     Elevated  cholesterol     GERD (gastroesophageal reflux disease) 08/23/2021    History of cancer chemotherapy     Hypertension     Hyperthyroidism 05/19/2022    Kidney stone     Lichen sclerosus     Shingles     Thickened endometrium 2018    Urinary incontinence     Urinary tract infection          PAST SURGICAL HISTORY  Past Surgical History:   Procedure Laterality Date    CATARACT EXTRACTION Right 01/10/2023    CERVICAL CONE BIOPSY      CHOLECYSTECTOMY  03/2010    COLONOSCOPY N/A 07/06/2018    Procedure: COLONOSCOPY;  Surgeon: Trenton Lyons MD;  Location: [facility] OR;  Service: Gastroenterology    DILATATION AND CURETTAGE      ENDOSCOPY N/A 07/06/2018    Procedure: ESOPHAGOGASTRODUODENOSCOPY;  Surgeon: Trenton yLons MD;  Location: [facility] OR;  Service: Gastroenterology    MASTECTOMY Right 10/1998    MASTECTOMY Left 07/2013    TUBAL ABDOMINAL LIGATION           FAMILY HISTORY  Family History   Problem Relation Age of Onset    Breast cancer Mother     Diabetes Mother     Cancer Mother     Ovarian cancer Maternal Aunt     Cancer Maternal Aunt          SOCIAL HISTORY  Social History     Socioeconomic History    Marital status:    Tobacco Use    Smoking status: Never     Passive exposure: Never    Smokeless tobacco: Never   Vaping Use    Vaping status: Never Used   Substance and Sexual Activity    Alcohol use: No    Drug use: No    Sexual activity: Not Currently         ALLERGIES  Caffeine        REVIEW OF SYSTEMS  Review of Systems   Constitutional:  Negative for chills and fever.   HENT:  Negative for sore throat and trouble swallowing.    Eyes:  Negative for pain and redness.   Respiratory:  Positive for cough and shortness of breath.    Cardiovascular:  Positive for chest pain. Negative for leg swelling.   Gastrointestinal:  Negative for abdominal pain, nausea and vomiting.   Genitourinary:  Negative for dysuria and urgency.   Musculoskeletal:  Negative for back pain and neck pain.   Skin:  Negative for rash and  wound.   Neurological:  Positive for weakness. Negative for dizziness.        All systems reviewed and negative except for those discussed in HPI.       PHYSICAL EXAM    I have reviewed the triage vital signs and nursing notes.    ED Triage Vitals [07/13/24 1706]   Temp Heart Rate Resp BP SpO2   97.7 °F (36.5 °C) 104 18 122/72 91 %      Temp src Heart Rate Source Patient Position BP Location FiO2 (%)   Oral Monitor Sitting Left arm --       Physical Exam  Constitutional:       Appearance: Normal appearance. She is not ill-appearing.   HENT:      Head: Normocephalic and atraumatic.      Right Ear: External ear normal.      Left Ear: External ear normal.      Nose: Nose normal.      Mouth/Throat:      Mouth: Mucous membranes are moist.      Pharynx: Oropharynx is clear.   Eyes:      Extraocular Movements: Extraocular movements intact.      Conjunctiva/sclera: Conjunctivae normal.      Pupils: Pupils are equal, round, and reactive to light.   Cardiovascular:      Rate and Rhythm: Normal rate and regular rhythm.      Pulses:           Radial pulses are 2+ on the right side and 2+ on the left side.   Pulmonary:      Effort: Pulmonary effort is normal.      Breath sounds: Normal breath sounds.   Abdominal:      General: There is no distension.      Palpations: Abdomen is soft.      Tenderness: There is no abdominal tenderness.   Musculoskeletal:         General: No tenderness or deformity. Normal range of motion.      Cervical back: Normal range of motion and neck supple.      Right lower leg: No edema.      Left lower leg: No edema.   Skin:     General: Skin is warm and dry.      Capillary Refill: Capillary refill takes less than 2 seconds.   Neurological:      General: No focal deficit present.      Mental Status: She is alert and oriented to person, place, and time.            LAB RESULTS  Recent Results (from the past 24 hour(s))   Comprehensive Metabolic Panel    Collection Time: 07/13/24  5:18 PM    Specimen: Blood    Result Value Ref Range    Glucose 195 (H) 65 - 99 mg/dL    BUN 28 (H) 8 - 23 mg/dL    Creatinine 0.77 0.57 - 1.00 mg/dL    Sodium 142 136 - 145 mmol/L    Potassium 4.1 3.5 - 5.2 mmol/L    Chloride 101 98 - 107 mmol/L    CO2 27.5 22.0 - 29.0 mmol/L    Calcium 9.2 8.6 - 10.5 mg/dL    Total Protein 7.5 6.0 - 8.5 g/dL    Albumin 4.5 3.5 - 5.2 g/dL    ALT (SGPT) 21 1 - 33 U/L    AST (SGOT) 19 1 - 32 U/L    Alkaline Phosphatase 93 39 - 117 U/L    Total Bilirubin 0.6 0.0 - 1.2 mg/dL    Globulin 3.0 gm/dL    A/G Ratio 1.5 g/dL    BUN/Creatinine Ratio 36.4 (H) 7.0 - 25.0    Anion Gap 13.5 5.0 - 15.0 mmol/L    eGFR 79.6 >60.0 mL/min/1.73   BNP    Collection Time: 07/13/24  5:18 PM    Specimen: Blood   Result Value Ref Range    proBNP 95.5 0.0 - 1,800.0 pg/mL   Single High Sensitivity Troponin T    Collection Time: 07/13/24  5:18 PM    Specimen: Blood   Result Value Ref Range    HS Troponin T 12 <14 ng/L   Green Top (Gel)    Collection Time: 07/13/24  5:18 PM   Result Value Ref Range    Extra Tube Hold for add-ons.    Lavender Top    Collection Time: 07/13/24  5:18 PM   Result Value Ref Range    Extra Tube hold for add-on    Gold Top - SST    Collection Time: 07/13/24  5:18 PM   Result Value Ref Range    Extra Tube Hold for add-ons.    Light Blue Top    Collection Time: 07/13/24  5:18 PM   Result Value Ref Range    Extra Tube Hold for add-ons.    CBC Auto Differential    Collection Time: 07/13/24  5:18 PM    Specimen: Blood   Result Value Ref Range    WBC 16.25 (H) 3.40 - 10.80 10*3/mm3    RBC 4.39 3.77 - 5.28 10*6/mm3    Hemoglobin 13.7 12.0 - 15.9 g/dL    Hematocrit 41.3 34.0 - 46.6 %    MCV 94.1 79.0 - 97.0 fL    MCH 31.2 26.6 - 33.0 pg    MCHC 33.2 31.5 - 35.7 g/dL    RDW 12.9 12.3 - 15.4 %    RDW-SD 44.1 37.0 - 54.0 fl    MPV 9.8 6.0 - 12.0 fL    Platelets 240 140 - 450 10*3/mm3    Neutrophil % 91.1 (H) 42.7 - 76.0 %    Lymphocyte % 6.7 (L) 19.6 - 45.3 %    Monocyte % 1.4 (L) 5.0 - 12.0 %    Eosinophil % 0.2 (L) 0.3 -  6.2 %    Basophil % 0.2 0.0 - 1.5 %    Immature Grans % 0.4 0.0 - 0.5 %    Neutrophils, Absolute 14.80 (H) 1.70 - 7.00 10*3/mm3    Lymphocytes, Absolute 1.09 0.70 - 3.10 10*3/mm3    Monocytes, Absolute 0.23 0.10 - 0.90 10*3/mm3    Eosinophils, Absolute 0.03 0.00 - 0.40 10*3/mm3    Basophils, Absolute 0.03 0.00 - 0.20 10*3/mm3    Immature Grans, Absolute 0.07 (H) 0.00 - 0.05 10*3/mm3    nRBC 0.0 0.0 - 0.2 /100 WBC   Lactic Acid, Plasma    Collection Time: 07/13/24  5:18 PM    Specimen: Blood   Result Value Ref Range    Lactate 1.4 0.5 - 2.0 mmol/L   Procalcitonin    Collection Time: 07/13/24  5:18 PM    Specimen: Blood   Result Value Ref Range    Procalcitonin 0.06 0.00 - 0.25 ng/mL   CK    Collection Time: 07/13/24  5:18 PM    Specimen: Blood   Result Value Ref Range    Creatine Kinase 77 20 - 180 U/L   Urinalysis With Microscopic If Indicated (No Culture) - Urine, Clean Catch    Collection Time: 07/13/24  6:39 PM    Specimen: Urine, Clean Catch   Result Value Ref Range    Color, UA Yellow Yellow, Straw    Appearance, UA Clear Clear    pH, UA 6.5 5.0 - 8.0    Specific Gravity, UA 1.007 1.005 - 1.030    Glucose, UA Negative Negative    Ketones, UA Negative Negative    Bilirubin, UA Negative Negative    Blood, UA Negative Negative    Protein, UA Negative Negative    Leuk Esterase, UA Negative Negative    Nitrite, UA Negative Negative    Urobilinogen, UA 0.2 E.U./dL 0.2 - 1.0 E.U./dL   Single High Sensitivity Troponin T    Collection Time: 07/13/24  7:18 PM    Specimen: Blood   Result Value Ref Range    HS Troponin T 9 <14 ng/L       If labs were ordered, I independently reviewed the results and considered them in treating the patient.        RADIOLOGY  No Radiology Exams Resulted Within Past 24 Hours        PROCEDURES    Procedures    Interpretations    O2 Sat: The patients oxygen saturation was 91% on Room Air.  This was independently interpreted by me as Normal    EKG: I reviewed and independently interpreted the  EKG as sinus tach rate of 102, normal axis, elevated QRS duration, right bundle branch block, no ST elevation, T wave inversion lead III    Cardiac Monitoring: I reviewed and independently interpreted the Rhythm Strip as Normal Sinus rhythm rate of 107    Radiology: I ordered and independently reviewed the above noted radiographic studies.  I viewed images of Chest Xray which showed  interstitial edema  per my independent interpretation. See radiologist's dictation for official interpretation.     HEART Score: 3       MEDICATIONS GIVEN IN ER    Medications   sodium chloride 0.9 % flush 10 mL (has no administration in time range)   furosemide (LASIX) injection 40 mg (40 mg Intravenous Given 7/13/24 1738)   ketorolac (TORADOL) injection 15 mg (15 mg Intravenous Given 7/13/24 1938)   orphenadrine (NORFLEX) injection 60 mg (60 mg Intravenous Given 7/13/24 1940)         MEDICAL DECISION MAKING, PROGRESS, and CONSULTS    All labs, if obtained, have been independently reviewed by me.  All radiology studies, if obtained, have been reviewed by me and the radiologist dictating the report.  All EKG's, if obtained, have been independently viewed and interpreted by me      Discussion below represents my analysis of pertinent findings related to patient's condition, differential diagnosis, treatment plan and final disposition.      Differential diagnosis:    79-year-old female presented ED with complaint of chest discomfort, has a squeezing tight sensation in the chest, she also reports some difficulty breathing and cough.  With her history of heart failure certainly concern for pulmonary edema, versus ACS although her pain is quite atypical.  As well as she was out in the heat, this could be heat related, could be pneumonia or other infectious process.  Will obtain laboratory workup including BNP, troponin, x-ray, provide IV Lasix as the patient reports she has not been taking the Lasix she was prescribed at discharge  recently    Additional Sources:  External (non-ED) record review:  Discharge summary 7/2/2024 regarding admission for respiratory failure       Orders placed during this visit:  Orders Placed This Encounter   Procedures    Blood Culture - Blood,    Blood Culture - Blood,    XR Chest 1 View    [place] Draw    Comprehensive Metabolic Panel    BNP    Single High Sensitivity Troponin T    CBC Auto Differential    Lactic Acid, Plasma    Procalcitonin    CK    Urinalysis With Microscopic If Indicated (No Culture) - Urine, Clean Catch    Single High Sensitivity Troponin T    Diet: Cardiac, Diabetic, Fluid Restriction (240 mL/tray); Low Sodium (2g); Consistent Carbohydrate; 1500 mL/day; Fluid Consistency: Thin (IDDSI 0)    Undress & Gown    Continuous Pulse Oximetry    Vital Signs    Oxygen Therapy- Nasal Cannula; Titrate 1-6 LPM Per SpO2; 90 - 95%    ECG 12 Lead ED Triage Standing Order; SOA    Insert Peripheral IV    Initiate Observation Status    CBC & Differential    Green Top (Gel)    Lavender Top    Gold Top - SST    Light Blue Top         Additional orders considered but not ordered:  None    ED Course:    Consultants:  Hospitalist    ED Course as of 07/13/24 2130   Sat Jul 13, 2024   1734 CBC & Differential(!)  WBC elevated, has known UTI however with cough, lactic/blood cultures added as well as procalcitonin [CS]   1800 BNP  BNP is negative [CS]   1800 Single High Sensitivity Troponin T  Troponin is negative, will repeat in 2 hours per protocol [CS]   1800 Lactic Acid, Plasma  Lactic negative, less likely acute septic process [CS]   1837 Procalcitonin  Procalcitonin negative, less likely significant bacterial process [CS]   1956 Single High Sensitivity Troponin T  Repeat troponin significantly improved [CS]   2106 Patient ambulated, became hypoxic requiring oxygen, she is quite weak unable to make it onto the nursing station.  As such I do feel the patient requires admission to the hospital.  I spoke directly to  hospitalist Dr. Patel agrees to evaluate the patient for admission.  Patient aware and agreeable to plan for admission [CS]      ED Course User Index  [CS] Wenceslao Terry MD           After my consideration of clinical presentation and any laboratory/radiology studies obtained, I discussed the findings with the patient/patient representative who is in agreement with the treatment plan and the final disposition. Risks and benefits of discharge were discussed.     AS OF 21:30 EDT VITALS:    BP - 107/72  HR - 96  TEMP - 97.7 °F (36.5 °C) (Oral)  O2 SATS - 96%    I reviewed the patients prescription monitoring report if available prior to discharge    DIAGNOSIS  Final diagnoses:   Acute respiratory failure with hypoxia   Hypervolemia, unspecified hypervolemia type         DISPOSITION  ED Disposition       ED Disposition   Decision to Admit    Condition   --    Comment   Level of Care: Telemetry [5]   Diagnosis: Acute on chronic diastolic CHF (congestive heart failure) [0119806]   Admitting Physician: SHEMAR PATEL [[id number]]   Attending Physician: SHEMAR PATEL [[id number]]                     Please note that portions of this document were completed with voice recognition software.        Wenceslao Terry MD  07/13/24 2131      Electronically signed by Wenceslao Terry MD at 07/13/24 2131       Vital Signs (last day)       Date/Time Temp Temp src Pulse Resp BP Patient Position SpO2    07/14/24 0700 98.1 (36.7) Oral 79 16 105/63 Lying 95    07/14/24 0408 98.1 (36.7) Oral 94 16 121/70 Lying 97    07/13/24 2236 98.4 (36.9) Oral 86 16 109/69 Lying --    07/13/24 2100 -- -- 96 -- 107/72 -- 96    07/13/24 2036 -- -- 107 -- 130/76 -- --    07/13/24 2000 -- -- 106 -- 116/69 -- --    07/13/24 1930 -- -- 116 -- 117/74 -- 95    07/13/24 1910 -- -- 117 -- -- -- 95    07/13/24 1900 -- -- 112 -- 116/73 -- 95    07/13/24 1849 -- -- 112 -- -- -- 89    07/13/24 1738 -- -- 107 -- 129/73 -- --     07/13/24 1706 97.7 (36.5) Oral 104 18 122/72 Sitting 91          Current Facility-Administered Medications   Medication Dose Route Frequency Provider Last Rate Last Admin    acetaminophen (TYLENOL) tablet 650 mg  650 mg Oral Q4H PRN Brianna Patel DO        Or    acetaminophen (TYLENOL) 160 MG/5ML oral solution 650 mg  650 mg Oral Q4H PRN Brianna Patel DO        Or    acetaminophen (TYLENOL) suppository 650 mg  650 mg Rectal Q4H PRN Brianna Patel DO        aluminum-magnesium hydroxide-simethicone (MAALOX MAX) 400-400-40 MG/5ML suspension 15 mL  15 mL Oral Q6H PRN Brianna Patel DO        sennosides-docusate (PERICOLACE) 8.6-50 MG per tablet 2 tablet  2 tablet Oral BID PRN Brianna Patel DO        And    polyethylene glycol (MIRALAX) packet 17 g  17 g Oral Daily PRN Brianna Patel DO        And    bisacodyl (DULCOLAX) EC tablet 5 mg  5 mg Oral Daily PRN Brianna Patel DO        And    bisacodyl (DULCOLAX) suppository 10 mg  10 mg Rectal Daily PRN Brianna Patel DO        dextrose (D50W) (25 g/50 mL) IV injection 25 g  25 g Intravenous Q15 Min PRN Brianna Patel DO        dextrose (GLUTOSE) oral gel 15 g  15 g Oral Q15 Min PRN Brianna Patel DO        diphenhydrAMINE (BENADRYL) capsule 25 mg  25 mg Oral Nightly PRN Brianna Patel DO        furosemide (LASIX) tablet 40 mg  40 mg Oral Daily Brianna Patel DO        glucagon (GLUCAGEN) injection 1 mg  1 mg Intramuscular Q15 Min PRN Brianna Patel DO        guaiFENesin-dextromethorphan (ROBITUSSIN DM) 100-10 MG/5ML syrup 5 mL  5 mL Oral Q6H PRN Brianna Patel DO        Insulin Lispro (humaLOG) injection 2-7 Units  2-7 Units Subcutaneous 4x Daily AC & at Bedtime Brianna Patel DO        metoprolol succinate XL (TOPROL-XL) 24 hr tablet 50 mg  50 mg Oral Nightly Brianna Patel DO   50 mg at 07/13/24 7816    nitrofurantoin  (macrocrystal-monohydrate) (MACROBID) capsule 100 mg  100 mg Oral BID Brianna Patelus, DO        nitroglycerin (NITROSTAT) SL tablet 0.4 mg  0.4 mg Sublingual Q5 Min PRN Brianna Patelus, DO        ondansetron ODT (ZOFRAN-ODT) disintegrating tablet 4 mg  4 mg Oral Q6H PRN Brianna Patelus, DO        Or    ondansetron (ZOFRAN) injection 4 mg  4 mg Intravenous Q6H PRN Amanda, Brianna Sanchezus, DO        pantoprazole (PROTONIX) EC tablet 40 mg  40 mg Oral Q AM Brianna Patelus, DO   40 mg at 07/14/24 0639    sodium chloride 0.9 % flush 10 mL  10 mL Intravenous PRN Brianna Patelus, DO        sodium chloride 0.9 % flush 10 mL  10 mL Intravenous Q12H Brianna Patelus, DO   10 mL at 07/13/24 2343    sodium chloride 0.9 % flush 10 mL  10 mL Intravenous PRN Brianna Patelus, DO        sodium chloride 0.9 % infusion 40 mL  40 mL Intravenous PRN Brianna Patelus, DO         Lab Results (last 24 hours)       Procedure Component Value Units Date/Time    POC Glucose 4x Daily Before Meals & at Bedtime [[id number]]  (Abnormal) Collected: 07/14/24 0722    Specimen: Blood Updated: 07/14/24 0734     Glucose 169 mg/dL      Comment: Serial Number: [id number]:  [id number]       Basic Metabolic Panel [[id number]]  (Abnormal) Collected: 07/14/24 0610    Specimen: Blood Updated: 07/14/24 0702     Glucose 188 mg/dL      BUN 29 mg/dL      Creatinine 0.51 mg/dL      Sodium 137 mmol/L      Potassium 3.5 mmol/L      Chloride 98 mmol/L      CO2 26.7 mmol/L      Calcium 8.7 mg/dL      BUN/Creatinine Ratio 56.9     Anion Gap 12.3 mmol/L      eGFR 96.3 mL/min/1.73     Narrative:      GFR Normal >60  Chronic Kidney Disease <60  Kidney Failure <15    The GFR formula is only valid for adults with stable renal function between ages 18 and 70.    Hemoglobin & Hematocrit, Blood [[id number]]  (Normal) Collected: 07/14/24 0610    Specimen: Blood Updated: 07/14/24 0639     Hemoglobin 12.9 g/dL      Hematocrit  38.4 %     TSH [[id number]]  (Abnormal) Collected: 07/13/24 1918    Specimen: Blood Updated: 07/14/24 0019     TSH 0.146 uIU/mL     Single High Sensitivity Troponin T [[id number]]  (Normal) Collected: 07/13/24 1918    Specimen: Blood Updated: 07/13/24 1946     HS Troponin T 9 ng/L     Narrative:      High Sensitive Troponin T Reference Range:  <14.0 ng/L- Negative Female for AMI  <22.0 ng/L- Negative Male for AMI  >=14 - Abnormal Female indicating possible myocardial injury.  >=22 - Abnormal Male indicating possible myocardial injury.   Clinicians would have to utilize clinical acumen, EKG, Troponin, and serial changes to determine if it is an Acute Myocardial Infarction or myocardial injury due to an underlying chronic condition.         Urinalysis With Microscopic If Indicated (No Culture) - Urine, Clean Catch [[id number]]  (Normal) Collected: 07/13/24 1839    Specimen: Urine, Clean Catch Updated: 07/13/24 1848     Color, UA Yellow     Appearance, UA Clear     pH, UA 6.5     Specific Gravity, UA 1.007     Glucose, UA Negative     Ketones, UA Negative     Bilirubin, UA Negative     Blood, UA Negative     Protein, UA Negative     Leuk Esterase, UA Negative     Nitrite, UA Negative     Urobilinogen, UA 0.2 E.U./dL    Narrative:      Urine microscopic not indicated.    Blood Culture - Blood, Hand, Left [[id number]] Collected: 07/13/24 1812    Specimen: Blood from Hand, Left Updated: 07/13/24 1817    Blood Culture - Blood, Arm, Left [[id number]] Collected: 07/13/24 1800    Specimen: Blood from Arm, Left Updated: 07/13/24 1817    Procalcitonin [[id number]]  (Normal) Collected: 07/13/24 1718    Specimen: Blood Updated: 07/13/24 1811     Procalcitonin 0.06 ng/mL     Narrative:      As a Marker for Sepsis (Non-Neonates):    1. <0.5 ng/mL represents a low risk of severe sepsis and/or septic shock.  2. >2 ng/mL represents a high risk of severe sepsis and/or septic shock.    As a Marker for Lower Respiratory Tract Infections that  "require antibiotic therapy:    PCT on Admission    Antibiotic Therapy       6-12 Hrs later    >0.5                Strongly Recommended  >0.25 - <0.5        Recommended   0.1 - 0.25          Discouraged              Remeasure/reassess PCT  <0.1                Strongly Discouraged     Remeasure/reassess PCT    As 28 day mortality risk marker: \"Change in Procalcitonin Result\" (>80% or <=80%) if Day 0 (or Day 1) and Day 4 values are available. Refer to http://www.[facility]-pct-calculator.com    Change in PCT <=80%  A decrease of PCT levels below or equal to 80% defines a positive change in PCT test result representing a higher risk for 28-day all-cause mortality of patients diagnosed with severe sepsis for septic shock.    Change in PCT >80%  A decrease of PCT levels of more than 80% defines a negative change in PCT result representing a lower risk for 28-day all-cause mortality of patients diagnosed with severe sepsis or septic shock.       Lactic Acid, Plasma [[id number]]  (Normal) Collected: 07/13/24 1718    Specimen: Blood Updated: 07/13/24 1745     Lactate 1.4 mmol/L     BNP [[id number]]  (Normal) Collected: 07/13/24 1718    Specimen: Blood Updated: 07/13/24 1742     proBNP 95.5 pg/mL     Narrative:      This assay is used as an aid in the diagnosis of individuals suspected of having heart failure. It can be used as an aid in the diagnosis of acute decompensated heart failure (ADHF) in patients presenting with signs and symptoms of ADHF to the emergency department (ED). In addition, NT-proBNP of <300 pg/mL indicates ADHF is not likely.    Age Range Result Interpretation  NT-proBNP Concentration (pg/mL:      <50             Positive            >450                   Gray                 300-450                    Negative             <300    50-75           Positive            >900                  Gray                300-900                  Negative            <300      >75             Positive            >1800         "          Robert                300-1800                  Negative            <300    Single High Sensitivity Troponin T [[id number]]  (Normal) Collected: 07/13/24 1718    Specimen: Blood Updated: 07/13/24 1742     HS Troponin T 12 ng/L     Narrative:      High Sensitive Troponin T Reference Range:  <14.0 ng/L- Negative Female for AMI  <22.0 ng/L- Negative Male for AMI  >=14 - Abnormal Female indicating possible myocardial injury.  >=22 - Abnormal Male indicating possible myocardial injury.   Clinicians would have to utilize clinical acumen, EKG, Troponin, and serial changes to determine if it is an Acute Myocardial Infarction or myocardial injury due to an underlying chronic condition.         Comprehensive Metabolic Panel [[id number]]  (Abnormal) Collected: 07/13/24 1718    Specimen: Blood Updated: 07/13/24 1740     Glucose 195 mg/dL      Comment: Glucose >180, Hemoglobin A1C recommended.        BUN 28 mg/dL      Creatinine 0.77 mg/dL      Sodium 142 mmol/L      Potassium 4.1 mmol/L      Chloride 101 mmol/L      CO2 27.5 mmol/L      Calcium 9.2 mg/dL      Total Protein 7.5 g/dL      Albumin 4.5 g/dL      ALT (SGPT) 21 U/L      AST (SGOT) 19 U/L      Alkaline Phosphatase 93 U/L      Total Bilirubin 0.6 mg/dL      Globulin 3.0 gm/dL      A/G Ratio 1.5 g/dL      BUN/Creatinine Ratio 36.4     Anion Gap 13.5 mmol/L      eGFR 79.6 mL/min/1.73     Narrative:      GFR Normal >60  Chronic Kidney Disease <60  Kidney Failure <15    The GFR formula is only valid for adults with stable renal function between ages 18 and 70.    CK [[id number]]  (Normal) Collected: 07/13/24 1718    Specimen: Blood Updated: 07/13/24 1740     Creatine Kinase 77 U/L     [place] Draw [[id number]] Collected: 07/13/24 1718    Specimen: Blood Updated: 07/13/24 1731    Narrative:      The following orders were created for panel order [place] Draw.  Procedure                               Abnormality         Status                     ---------                                -----------         ------                     Green Top (Gel)[[id number]]                                  Final result               Lavender Top[[id number]]                                     Final result               Gold Top - SST[[id number]]                                   Final result               Light Blue Top[[id number]]                                   Final result                 Please view results for these tests on the individual orders.    Green Top (Gel) [[id number]] Collected: 07/13/24 1718    Specimen: Blood Updated: 07/13/24 1731     Extra Tube Hold for add-ons.     Comment: Auto resulted.       Lavender Top [[id number]] Collected: 07/13/24 1718    Specimen: Blood Updated: 07/13/24 1731     Extra Tube hold for add-on     Comment: Auto resulted       Gold Top - SST [[id number]] Collected: 07/13/24 1718    Specimen: Blood Updated: 07/13/24 1731     Extra Tube Hold for add-ons.     Comment: Auto resulted.       Light Blue Top [[id number]] Collected: 07/13/24 1718    Specimen: Blood Updated: 07/13/24 1731     Extra Tube Hold for add-ons.     Comment: Auto resulted       CBC & Differential [[id number]]  (Abnormal) Collected: 07/13/24 1718    Specimen: Blood Updated: 07/13/24 1723    Narrative:      The following orders were created for panel order CBC & Differential.  Procedure                               Abnormality         Status                     ---------                               -----------         ------                     CBC Auto Differential[[id number]]        Abnormal            Final result                 Please view results for these tests on the individual orders.    CBC Auto Differential [[id number]]  (Abnormal) Collected: 07/13/24 1718    Specimen: Blood Updated: 07/13/24 1723     WBC 16.25 10*3/mm3      RBC 4.39 10*6/mm3      Hemoglobin 13.7 g/dL      Hematocrit 41.3 %      MCV 94.1 fL      MCH 31.2 pg      MCHC 33.2 g/dL      RDW 12.9 %      RDW-SD 44.1 fl      MPV  9.8 fL      Platelets 240 10*3/mm3      Neutrophil % 91.1 %      Lymphocyte % 6.7 %      Monocyte % 1.4 %      Eosinophil % 0.2 %      Basophil % 0.2 %      Immature Grans % 0.4 %      Neutrophils, Absolute 14.80 10*3/mm3      Lymphocytes, Absolute 1.09 10*3/mm3      Monocytes, Absolute 0.23 10*3/mm3      Eosinophils, Absolute 0.03 10*3/mm3      Basophils, Absolute 0.03 10*3/mm3      Immature Grans, Absolute 0.07 10*3/mm3      nRBC 0.0 /100 WBC           Imaging Results (Last 24 Hours)       Procedure Component Value Units Date/Time    XR Chest 1 View [[id number]] Resulted: 07/13/24 1729     Updated: 07/13/24 1730          Physician Progress Notes (last 24 hours)  Notes from 07/13/24 0757 through 07/14/24 0757   No notes of this type exist for this encounter.       Consult Notes (last 24 hours)  Notes from 07/13/24 0757 through 07/14/24 0757   No notes of this type exist for this encounter.

## 2024-07-14 NOTE — CONSULTS
"[facility]-Cardiology Consult Note    Referring Provider: Tu  Reason for Consultation: Chest pain    Patient Care Team:  Yolie Cotto MD as PCP - General (Family Medicine)  Susannah Morley PA-C as Physician Assistant (Physician Assistant)    Chief complaint : Chest pain    Subjective:    History of present illness: This is a 79-year-old female patient who is readmitted for chest discomfort.  The patient describes having a diffuse anterior heaviness across her central chest.  The discomfort does not radiate.  It is associated with shortness of breath, diaphoresis and palpitations.  She reports feeling as though her heart is \"racing and pounding out of my chest\".  On both hospitalizations her twelve-lead electrocardiogram has showed sinus rhythm with a right bundle branch block but no high risk ischemic ST-T wave changes or injury current.  Cardiac troponins have been serially normal on both hospitalizations.  She has no history of coronary artery disease or prior myocardial infarction.  The patient was seen in the outpatient cardiology clinic and was recommended to have an echocardiogram, Holter monitor and a vasodilator nuclear stress test.  The echocardiogram showed an ejection fraction of 45-50%.  She is currently wearing the heart monitor.  Her nuclear stress test has not yet been performed.  It was scheduled for Thursday of last week, but the patient indicates she was unaware that was the test date.  She is a lifelong non-smoker.  She is a survivor of stage III breast cancer.  She has a history of hypertension, type 2 diabetes mellitus and dyslipidemia.  She indicates that her blood pressure has been running \"low\" over the last several months and her beta-blocker dose has been subsequently down titrated.    Review of Systems   Review of Systems   Constitutional: Positive for diaphoresis. Negative for chills, fever, malaise/fatigue, weight gain and weight loss.   HENT:  Negative for ear discharge, hearing " loss, hoarse voice and nosebleeds.    Eyes:  Negative for discharge, double vision, pain and photophobia.   Cardiovascular:  Positive for chest pain. Negative for claudication, cyanosis, dyspnea on exertion, irregular heartbeat, leg swelling, near-syncope, orthopnea, palpitations, paroxysmal nocturnal dyspnea and syncope.   Respiratory:  Positive for shortness of breath. Negative for cough, hemoptysis, sputum production and wheezing.    Endocrine: Negative for cold intolerance, heat intolerance, polydipsia, polyphagia and polyuria.   Hematologic/Lymphatic: Negative for adenopathy and bleeding problem. Does not bruise/bleed easily.   Skin:  Negative for color change, flushing, itching and rash.   Musculoskeletal:  Negative for muscle cramps, muscle weakness, myalgias and stiffness.   Gastrointestinal:  Negative for abdominal pain, diarrhea, hematemesis, hematochezia, nausea and vomiting.   Genitourinary:  Negative for dysuria, frequency and nocturia.   Neurological:  Negative for focal weakness, loss of balance, numbness, paresthesias and seizures.   Psychiatric/Behavioral:  Negative for altered mental status, hallucinations and suicidal ideas.    Allergic/Immunologic: Negative for HIV exposure, hives and persistent infections.       History  Past Medical History:   Diagnosis Date    Arthritis     Breast cancer     RIGHT BREAST STAGE 3    Diabetes mellitus     Elevated cholesterol     GERD (gastroesophageal reflux disease) 08/23/2021    History of cancer chemotherapy     Hypertension     Hyperthyroidism 05/19/2022    Kidney stone     Lichen sclerosus     Shingles     Thickened endometrium 2018    Urinary incontinence     Urinary tract infection    ,   Past Surgical History:   Procedure Laterality Date    CATARACT EXTRACTION Right 01/10/2023    CERVICAL CONE BIOPSY      CHOLECYSTECTOMY  03/2010    COLONOSCOPY N/A 07/06/2018    Procedure: COLONOSCOPY;  Surgeon: Trenton Lyons MD;  Location: [facility];  Service:  Gastroenterology    DILATATION AND CURETTAGE      ENDOSCOPY N/A 07/06/2018    Procedure: ESOPHAGOGASTRODUODENOSCOPY;  Surgeon: Trenton Lyons MD;  Location: [facility];  Service: Gastroenterology    MASTECTOMY Right 10/1998    MASTECTOMY Left 07/2013    TUBAL ABDOMINAL LIGATION     ,   Family History   Problem Relation Age of Onset    Breast cancer Mother     Diabetes Mother     Cancer Mother     Ovarian cancer Maternal Aunt     Cancer Maternal Aunt    ,   Social History     Tobacco Use    Smoking status: Never     Passive exposure: Never    Smokeless tobacco: Never   Vaping Use    Vaping status: Never Used   Substance Use Topics    Alcohol use: No    Drug use: No   ,   Medications Prior to Admission   Medication Sig Dispense Refill Last Dose    benzonatate (TESSALON) 200 MG capsule Take 100 mg by mouth 3 times a day.   7/13/2024    Blood Glucose Monitoring Suppl (OneTouch Verio Flex System) w/Device kit See Admin Instructions. for testing   7/13/2024    calcium carbonate-cholecalciferol 500-400 MG-UNIT tablet tablet Take  by mouth Daily.   7/12/2024 at 2200    ezetimibe (ZETIA) 10 MG tablet Take 1 tablet by mouth Daily.   7/12/2024 at 2200    Lancets (OneTouch Delica Plus [id number]) misc USE AS DIRECTED EVERY DAY   7/13/2024    metoprolol succinate XL (TOPROL-XL) 50 MG 24 hr tablet Take 1 tablet by mouth Daily.   7/12/2024 at 2200    nitrofurantoin, macrocrystal-monohydrate, (MACROBID) 100 MG capsule Take 1 capsule by mouth 2 (Two) Times a Day. 10 capsule 0 7/13/2024 at 1100    ZingCheckoutTouch Verio test strip Daily. for testing   7/13/2024    pantoprazole (PROTONIX) 40 MG EC tablet Take 1 tablet by mouth.   7/12/2024 at 1700    Rhopressa 0.02 % solution Administer 1 drop to both eyes Every Night.   7/12/2024 at 2200    vitamin D3 125 MCG (5000 UT) capsule capsule Take 1 capsule by mouth Daily.   7/12/2024 at 2200    albuterol sulfate  (90 Base) MCG/ACT inhaler TWO PUFFS EVERY SIX HOURS AS NEEDED (Patient not  taking: Reported on 7/11/2024)       azelastine (ASTELIN) 0.1 % nasal spray INHALE ONE SPRAY IN EACH NOSTRIL TWICE DAILY (Patient not taking: Reported on 7/11/2024)       brimonidine-timolol (COMBIGAN) 0.2-0.5 % ophthalmic solution Administer 1 drop to both eyes Every 12 (Twelve) Hours. (Patient not taking: Reported on 7/11/2024)       cetirizine (zyrTEC) 10 MG tablet Take 1 tablet by mouth Daily. (Patient not taking: Reported on 7/11/2024)       Cholecalciferol 250 MCG (69246 UT) capsule Take 5,000 Units by mouth Daily.       CINNAMON PO Take  by mouth. (Patient not taking: Reported on 7/11/2024)       clobetasol (TEMOVATE) 0.05 % ointment Apply to affected area BID x 2 wks then daily x 2 wks then 2-3x/wk 60 g 6 Unknown    Continuous Blood Gluc Sensor (FreeStyle Malina 2 Sensor) misc CHANGE SENSOR EVERY 14 DAYS   Unknown    desloratadine (CLARINEX) 5 MG tablet Take 1 tablet by mouth Daily. (Patient not taking: Reported on 7/11/2024)       dextromethorphan-guaifenesin (ROBITUSSIN-DM)  MG/5ML syrup TAKE 10ML EVERY 4 HOURS AS NEEDED       diazePAM (VALIUM) 5 MG tablet TAKE 1 TABLET one hour prior TO procedure (Patient not taking: Reported on 7/11/2024)       dorzolamide (TRUSOPT) 2 % ophthalmic solution INSTILL ONE DROP IN EACH EYE TWICE DAILY (Patient not taking: Reported on 7/11/2024)       Flovent  MCG/ACT inhaler Inhale 2 puffs 2 (Two) Times a Day. (Patient not taking: Reported on 7/11/2024)       furosemide (Lasix) 20 MG tablet Take 1 tablet by mouth Daily. 5 tablet 0 Unknown    glipizide (GLUCOTROL XL) 5 MG ER tablet  (Patient not taking: Reported on 7/11/2024)       ipratropium (ATROVENT) 0.03 % nasal spray INHALE ONE SPRAY IN EACH NOSTRIL TWICE DAILY (Patient not taking: Reported on 7/11/2024)       ipratropium (ATROVENT) 0.06 % nasal spray INHALE 1-2 SPRAYS IN EACH NOSTRIL TWICE DAILY (Patient not taking: Reported on 7/11/2024)       Januvia 100 MG tablet Take 1 tablet by mouth Daily. (Patient not  "taking: Reported on 7/11/2024)       meclizine (ANTIVERT) 12.5 MG tablet Take 1 tablet by mouth 3 (Three) Times a Day As Needed.   Unknown    nitroglycerin (NITROSTAT) 0.4 MG SL tablet Place 1 tablet under the tongue.       simethicone (MYLICON) 80 MG chewable tablet Chew 1 tablet Every 6 (Six) Hours As Needed for Flatulence.   Unknown    Triamcinolone Acetonide (NASACORT) 55 MCG/ACT nasal inhaler 2 sprays into the nostril(s) as directed by provider Daily. (Patient not taking: Reported on 7/11/2024)       Vibegron (Gemtesa) 75 MG tablet Take 1 tablet by mouth Daily. 30 tablet 11     and Allergies:  Caffeine    Objective:    Vital Sign Min/Max for last 24 hours  Temp  Min: 97.7 °F (36.5 °C)  Max: 98.4 °F (36.9 °C)   BP  Min: 105/63  Max: 130/76   Pulse  Min: 79  Max: 117   Resp  Min: 16  Max: 18   SpO2  Min: 89 %  Max: 97 %   Flow (L/min)  Min: 2  Max: 2   Weight  Min: 57.6 kg (127 lb)  Max: 57.6 kg (127 lb)     Flowsheet Rows      Flowsheet Row First Filed Value   Admission Height 160 cm (63\") Documented at 07/13/2024 1706   Admission Weight 57.6 kg (127 lb) Documented at 07/13/2024 1706                 Physical Exam:   Vitals and nursing note reviewed.   Constitutional:       Appearance: Healthy appearance. Not in distress.   Neck:      Vascular: No JVR. JVD normal.   Pulmonary:      Effort: Pulmonary effort is normal.      Breath sounds: Normal breath sounds. No wheezing. No rhonchi. No rales.   Chest:      Chest wall: Not tender to palpatation.   Cardiovascular:      PMI at left midclavicular line. Normal rate. Regular rhythm. Normal S1. Normal S2.       Murmurs: There is no murmur.      No gallop.  No click. No rub.   Pulses:     Intact distal pulses.   Edema:     Peripheral edema absent.   Abdominal:      General: Bowel sounds are normal.      Palpations: Abdomen is soft.      Tenderness: There is no abdominal tenderness.   Musculoskeletal: Normal range of motion.         General: No tenderness. Skin:     " General: Skin is warm and dry.   Neurological:      General: No focal deficit present.      Mental Status: Alert and oriented to person, place and time.         Results Review:   I reviewed the patient's new clinical results.  Results from last 7 days   Lab Units 07/14/24  0610 07/13/24  1718   WBC 10*3/mm3  --  16.25*   HEMOGLOBIN g/dL 12.9 13.7   HEMATOCRIT % 38.4 41.3   PLATELETS 10*3/mm3  --  240     Results from last 7 days   Lab Units 07/14/24  0610 07/13/24  1718   SODIUM mmol/L 137 142   POTASSIUM mmol/L 3.5 4.1   CHLORIDE mmol/L 98 101   CO2 mmol/L 26.7 27.5   BUN mg/dL 29* 28*   CREATININE mg/dL 0.51* 0.77   GLUCOSE mg/dL 188* 195*   CALCIUM mg/dL 8.7 9.2     Lab Results   Lab Value Date/Time    TROPONINT 9 07/13/2024 1918    TROPONINT 12 07/13/2024 1718    TROPONINT 11 07/01/2024 1234    TROPONINT 11 07/01/2024 1047    TROPONINT 8 06/29/2024 2036    TROPONINT 10 06/29/2024 1832    TROPONINT 10 06/29/2024 1634    TROPONINT 8 02/16/2024 1547    TROPONINT 9 02/16/2024 1326             Assessment/Plan:      Acute on chronic combined systolic and diastolic CHF (congestive heart failure)    GERD (gastroesophageal reflux disease)    Hyperthyroidism    Type 2 diabetes mellitus without complication, without long-term current use of insulin      Atypical chest pain.  Multiple hospitalizations for atypical chest pain.  I have recommended invasive coronary angiography from an anticipated radial approach with interventional standby, tomorrow morning.  Ms. Craft has been engaged in a patient level discussion explaining the rationale for proceeding with invasive procedure.  The procedure of coronary angiography with catheter-based coronary revascularization has been explained in detail, using layman's terminology, including risks, benefits and alternatives.  She expresses understanding and desire to proceed.  Further recommendations will be predicated on the results of her catheterization findings.  She will need to  be n.p.o. after midnight.    I discussed the patient's findings and my recommendations with patient    Brian Morrow MD  07/14/24  11:19 EDT

## 2024-07-14 NOTE — PROGRESS NOTES
[facility]    Name:  Aster Craft   Age:  79 y.o.  Sex:  female  :  1945  MRN:  [id number]   Visit Number:  [id number]  Admission Date:  2024  Date Of Service:  24  Primary Care Physician:  Yolie Cotto MD     LOS: 0 days :    Chief Complaint:      Shortness of breath, chest pain     Subjective:    Patient was seen and examined at bedside today.  Patient resting comfortably in bed with no distress.  Patient reports improvement and feeling better today.  She reports that her chest pain/pressure is resolved now.  She reports her pain basically started as cramping in her legs and radiated up to her chest yesterday.  Reports improvement on her legs cramping too.  She is hemodynamically stable, afebrile, on 2 L nasal cannula.  She is not on oxygen at baseline.    Hospital Course:    The patient is a 79-year-old female with a history of hypertension, diabetes not on medication, hypothyroidism, reported cardiomyopathy, with recent diagnosis of heart failure reduced ejection fraction, who presented to the emergency room with complaints of shortness of breath and chest pain.  Patient states in the past she had seen Dr. Palacios, had been on metoprolol due to reported cardiomyopathy.  She is unsure if she was ever diagnosed with atrial fibrillation.  She was asked in the hospital about 2 weeks ago due to breathing difficulty, was diuresed, and had outpatient cardiology follow-up.  She is due to see cardiology this week.  She noted today while outside at a yard sale she developed symptoms of chest pain, pressure, heaviness in her chest and associated shortness of breath.  She went inside to rest, cool down, but continued to have symptoms and was brought to the ER.  Currently is feeling better.  She is not currently taking diuretics at home.  She is not on any medications for diabetes, she wants to talk with her PCP about that.  She notes a stress test  performed many years ago, no recent cardiac procedures or testing other than echocardiogram at last hospital stay.     In the ER, the patient was overall hemodynamically stable.  She was borderline hypoxic with ambulation and is currently on 2 L of oxygen.  She was given IV Lasix.  Her labs are largely nonactionable.  She has been on Macrobid due to recent UTI with clear urine sample tonight.  Chest x-ray with some mild interstitial edema.  EKG and troponins reassuring.  Admitted for observation.    Review of Systems:     All systems were reviewed and negative except as mentioned in subjective, assessment and plan.    Vital Signs:    Temp:  [97.7 °F (36.5 °C)-98.4 °F (36.9 °C)] 98.1 °F (36.7 °C)  Heart Rate:  [] 82  Resp:  [16-18] 16  BP: (105-130)/(63-76) 105/63    Intake and output:    I/O last 3 completed shifts:  In: -   Out: 700 [Urine:700]  I/O this shift:  In: 195 [P.O.:195]  Out: -     Physical Examination:    General Appearance:  Alert and cooperative.  No acute distress.   Head:  Atraumatic and normocephalic.   Eyes: Conjunctivae and sclerae normal, no icterus. No pallor.   Throat: No oral lesions, no thrush, oral mucosa moist.   Neck: Supple, trachea midline, no thyromegaly.   Lungs:   Breath sounds heard bilaterally equally.  No wheezing or crackles. No Pleural rub or bronchial breathing.   Heart:  Normal S1 and S2, no murmur, no gallop, no rub. No JVD.   Abdomen:   Normal bowel sounds, no masses, no organomegaly. Soft, nontender, nondistended, no rebound tenderness.   Extremities: Supple, no edema, no cyanosis, no clubbing.   Skin: No bleeding or rash.   Neurologic: Alert and oriented x 3. No facial asymmetry. Moves all four limbs. No tremors.  Generalized weakness noted.     Laboratory results:    Results from last 7 days   Lab Units 07/14/24  0610 07/13/24  1718   SODIUM mmol/L 137 142   POTASSIUM mmol/L 3.5 4.1   CHLORIDE mmol/L 98 101   CO2 mmol/L 26.7 27.5   BUN mg/dL 29* 28*   CREATININE  mg/dL 0.51* 0.77   CALCIUM mg/dL 8.7 9.2   BILIRUBIN mg/dL  --  0.6   ALK PHOS U/L  --  93   ALT (SGPT) U/L  --  21   AST (SGOT) U/L  --  19   GLUCOSE mg/dL 188* 195*     Results from last 7 days   Lab Units 07/14/24  0610 07/13/24  1718   WBC 10*3/mm3  --  16.25*   HEMOGLOBIN g/dL 12.9 13.7   HEMATOCRIT % 38.4 41.3   PLATELETS 10*3/mm3  --  240         Results from last 7 days   Lab Units 07/13/24  1918 07/13/24  1718   CK TOTAL U/L  --  77   HSTROP T ng/L 9 12         Recent Labs     06/29/24  1924   PHART 7.394   [id number] 44.1   PO2ART 72.5*   [id number] 26.9   BASEEXCESS 1.6      I have reviewed the patient's laboratory results.    Radiology results:    XR Chest 1 View    Result Date: 7/14/2024  PROCEDURE: XR CHEST 1 VW-  HISTORY: SOA Triage Protocol, shortness of breath and chest pressure beginning today.  COMPARISON: June 29, 2024.  FINDINGS: The heart is mildly enlarged, stable.. The lungs are clear. The mediastinum is unremarkable. There is no pneumothorax.  There are no acute osseous abnormalities.      Impression: No acute cardiopulmonary process.      This report was signed and finalized on 7/14/2024 8:45 AM by Iraida Kruse MD.     I have reviewed the patient's radiology reports.    Medication Review:     I have reviewed the patient's active and prn medications.     Problem List:      Acute on chronic combined systolic and diastolic CHF (congestive heart failure)    GERD (gastroesophageal reflux disease)    Hyperthyroidism    Type 2 diabetes mellitus without complication, without long-term current use of insulin      Assessment:    Acute on chronic heart failure midrange ejection fraction/diastolic exacerbation, POA  Chest pain  Tachycardia, A-fib history?  Uncontrolled type 2 diabetes  Hyperthyroidism  Prior breast cancer  GERD  Hypertension    Plan:    CHF/chest pain:  Recent echo with midrange ejection fraction noted.  Was not taking diuretics outpatient.  Placed back on Lasix.  Continue the metoprolol.   She is due to see cardiology outpatient, however with ongoing symptoms concerning for possible angina will have Dr. Morrow see in consultation.  Troponin stable.  Telemetry monitoring.  She also has outpatient Holter monitor on currently.     Arrhythmia:  Patient reports prior history of tachycardia, unsure if actually atrial fibrillation.  She has been on metoprolol for multiple years.  EKG normal here in sinus rhythm.  She has outpatient Holter monitor currently on.     Diabetes:  Patient not taking anything at home for this.  Notes had been diet controlled, understands A1c is elevated now.  Recommend she take medication for this     Hyperthyroidism:  Follows with endocrinology, is known to have thyroid nodules.  Will check TSH labs.  Obviously can contribute to heart disease as well as arrhythmia.     PT/OT consulted.  Further recommendations depend upon clinical course.  Plan of care discussed with the patient  I have reviewed the copied text and it is accurate as of 7/14/2024     DVT Prophylaxis: Lovenox  Code Status: Full  Diet: Diabetic cardiac fluid restriction  Discharge Plan: Likely discharge in 1 to 2 days.    German Mae MD  07/14/24  10:38 EDT    Dictated utilizing Dragon dictation.

## 2024-07-14 NOTE — PLAN OF CARE
Problem: Adult Inpatient Plan of Care  Goal: Plan of Care Review  Outcome: Ongoing, Progressing  Goal: Patient-Specific Goal (Individualized)  Outcome: Ongoing, Progressing  Goal: Absence of Hospital-Acquired Illness or Injury  Outcome: Ongoing, Progressing  Intervention: Identify and Manage Fall Risk  Recent Flowsheet Documentation  Taken 7/14/2024 0200 by Bev Aguilera RN  Safety Promotion/Fall Prevention:   activity supervised   assistive device/personal items within reach   lighting adjusted   nonskid shoes/slippers when out of bed   safety round/check completed   clutter free environment maintained  Taken 7/14/2024 0000 by Bev Aguilera RN  Safety Promotion/Fall Prevention:   activity supervised   assistive device/personal items within reach   lighting adjusted   nonskid shoes/slippers when out of bed   safety round/check completed   clutter free environment maintained  Taken 7/13/2024 2200 by Bev Aguilera RN  Safety Promotion/Fall Prevention:   activity supervised   assistive device/personal items within reach   lighting adjusted   nonskid shoes/slippers when out of bed   safety round/check completed   clutter free environment maintained  Intervention: Prevent Skin Injury  Recent Flowsheet Documentation  Taken 7/14/2024 0200 by Bev Aguilera RN  Body Position: position changed independently  Taken 7/14/2024 0000 by Bev Aguilera RN  Body Position: position changed independently  Taken 7/13/2024 2200 by Bev Aguilera RN  Body Position: position changed independently  Intervention: Prevent and Manage VTE (Venous Thromboembolism) Risk  Recent Flowsheet Documentation  Taken 7/14/2024 0200 by Bev Aguilera RN  Activity Management: activity encouraged  Taken 7/14/2024 0000 by Bev Aguilera RN  Activity Management: activity encouraged  Taken 7/13/2024 2200 by Bev Aguilera RN  Activity Management: activity encouraged  VTE Prevention/Management: patient refused intervention  Range of Motion:  active ROM (range of motion) encouraged  Goal: Optimal Comfort and Wellbeing  Outcome: Ongoing, Progressing  Intervention: Provide Person-Centered Care  Recent Flowsheet Documentation  Taken 7/13/2024 2200 by Bev Aguilera RN  Trust Relationship/Rapport: care explained  Goal: Readiness for Transition of Care  Outcome: Ongoing, Progressing  Intervention: Mutually Develop Transition Plan  Recent Flowsheet Documentation  Taken 7/13/2024 2235 by Bev Aguilera RN  Transportation Anticipated: family or friend will provide  Patient/Family Anticipated Services at Transition: none  Patient/Family Anticipates Transition to: home with family  Taken 7/13/2024 2228 by Bev Aguilera, RN  Equipment Currently Used at Home: glucometer   Goal Outcome Evaluation:

## 2024-07-14 NOTE — THERAPY EVALUATION
Patient Name: Aster Craft  : 1945    MRN: [id number]                              Today's Date: 2024       Admit Date: 2024    Visit Dx:     ICD-10-CM ICD-9-CM   1. Chest pain, atypical  R07.89 786.59   2. Acute respiratory failure with hypoxia  J96.01 518.81   3. Hypervolemia, unspecified hypervolemia type  E87.70 276.69     Patient Active Problem List   Diagnosis    Thickened endometrium    Right lower quadrant abdominal pain    Fatty (change of) liver, not elsewhere classified    A-fib    Breast CA    Essential hypertension    GERD (gastroesophageal reflux disease)    Hyperthyroidism    (HFpEF) heart failure with preserved ejection fraction    Acute on chronic heart failure with preserved ejection fraction (HFpEF)    Acute on chronic combined systolic and diastolic CHF (congestive heart failure)    Type 2 diabetes mellitus without complication, without long-term current use of insulin     Past Medical History:   Diagnosis Date    Arthritis     Breast cancer     RIGHT BREAST STAGE 3    Diabetes mellitus     Elevated cholesterol     GERD (gastroesophageal reflux disease) 2021    History of cancer chemotherapy     Hypertension     Hyperthyroidism 2022    Kidney stone     Lichen sclerosus     Shingles     Thickened endometrium     Urinary incontinence     Urinary tract infection      Past Surgical History:   Procedure Laterality Date    CATARACT EXTRACTION Right 01/10/2023    CERVICAL CONE BIOPSY      CHOLECYSTECTOMY  2010    COLONOSCOPY N/A 2018    Procedure: COLONOSCOPY;  Surgeon: Trenton Lyons MD;  Location: [facility] OR;  Service: Gastroenterology    DILATATION AND CURETTAGE      ENDOSCOPY N/A 2018    Procedure: ESOPHAGOGASTRODUODENOSCOPY;  Surgeon: Trenton Lyons MD;  Location: [facility] OR;  Service: Gastroenterology    MASTECTOMY Right 10/1998    MASTECTOMY Left 2013    TUBAL ABDOMINAL LIGATION        General Information       Row Name 24 1459           Physical Therapy Time and Intention    Document Type evaluation  -TW     Mode of Treatment physical therapy  -TW       Row Name 07/14/24 1458          General Information    Patient Profile Reviewed yes  -TW     Prior Level of Function independent:;all household mobility  pt does not use any assistive device at home for ambulation but has noticed she isn't walking as much due to shortness of breath and leg cramps recently.  -TW     Existing Precautions/Restrictions fall;oxygen therapy device and L/min;cardiac  -TW     Barriers to Rehab previous functional deficit  -TW       Row Name 07/14/24 1458          Living Environment    People in Home child(popeye), adult  -TW       Row Name 07/14/24 1458          Home Main Entrance    Number of Stairs, Main Entrance none  -TW       Row Name 07/14/24 1458          Stairs Within Home, Primary    Number of Stairs, Within Home, Primary none  -TW       Row Name 07/14/24 1458          Cognition    Orientation Status (Cognition) oriented x 4;verbal cues/prompts needed for orientation  -TW       Row Name 07/14/24 1458          Safety Issues, Functional Mobility    Safety Issues Affecting Function (Mobility) safety precaution awareness;safety precautions follow-through/compliance;insight into deficits/self-awareness;sequencing abilities  -TW     Impairments Affecting Function (Mobility) balance;endurance/activity tolerance;shortness of breath;strength  -TW               User Key  (r) = Recorded By, (t) = Taken By, (c) = Cosigned By      Initials Name Provider Type    TW Poppy Savage PT Physical Therapist                   Mobility       Row Name 07/14/24 1458          Bed Mobility    Bed Mobility supine-sit  -TW     Supine-Sit [place] (Bed Mobility) contact guard;verbal cues  -TW     Assistive Device (Bed Mobility) head of bed elevated  -TW     Comment, (Bed Mobility) pt was able to sit on EOB with supervision and was able to assist with changing her gown without any loss of  balance.  -TW       Row Name 07/14/24 1458          Transfers    Comment, (Transfers) pt education on use of FWW  -TW       Row Name 07/14/24 1458          Bed-Chair Transfer    Bed-Chair [place] (Transfers) contact guard;verbal cues  -TW     Assistive Device (Bed-Chair Transfers) walker, front-wheeled  -TW       Row Name 07/14/24 1458          Sit-Stand Transfer    Sit-Stand [place] (Transfers) contact guard;verbal cues  -TW     Assistive Device (Sit-Stand Transfers) walker, front-wheeled  -TW       Row Name 07/14/24 1458          Gait/Stairs (Locomotion)    Patient was able to Ambulate no, other medical factors prevent ambulation  -TW     Reason Patient was unable to Ambulate Other (Comment)  pt kept having non-productive coughing that contributed to shortness of breath so pt transferred to chair only this date  -TW               User Key  (r) = Recorded By, (t) = Taken By, (c) = Cosigned By      Initials Name Provider Type    TW Poppy Savage, PT Physical Therapist                   Obj/Interventions       Row Name 07/14/24 1458          Range of Motion Comprehensive    Comment, General Range of Motion BLE grossly WFLs  -TW       Row Name 07/14/24 1458          Strength Comprehensive (MMT)    Comment, General Manual Muscle Testing (MMT) Assessment BLE grossly 3/5 to 4-/5  -TW       [facility] Name 07/14/24 1458          Balance    Balance Assessment sitting static balance;sitting dynamic balance;sit to stand dynamic balance;standing static balance;standing dynamic balance  -TW     Static Sitting Balance standby assist  -TW     Dynamic Sitting Balance standby assist  -TW     Position, Sitting Balance unsupported;sitting edge of bed  -TW     Sit to Stand Dynamic Balance contact guard;verbal cues  -TW     Static Standing Balance contact guard  -TW     Dynamic Standing Balance contact guard  -TW     Position/Device Used, Standing Balance supported;walker, front-wheeled  -TW               User Key  (r) = Recorded  By, (t) = Taken By, (c) = Cosigned By      Initials Name Provider Type    TW [place], Poppy, PT Physical Therapist                   Goals/Plan       Row Name 07/14/24 1458          Bed Mobility Goal 1 (PT)    Activity/Assistive Device (Bed Mobility Goal 1, PT) bed mobility activities, all  -TW     [place] Level/Cues Needed (Bed Mobility Goal 1, PT) modified independence  -TW     Time Frame (Bed Mobility Goal 1, PT) short term goal (STG);3 days  -TW     Progress/Outcomes (Bed Mobility Goal 1, PT) goal ongoing  -TW       Row Name 07/14/24 1458          Transfer Goal 1 (PT)    Activity/Assistive Device (Transfer Goal 1, PT) sit-to-stand/stand-to-sit;bed-to-chair/chair-to-bed;toilet;walker, rolling  -TW     [place] Level/Cues Needed (Transfer Goal 1, PT) supervision required  -TW     Time Frame (Transfer Goal 1, PT) long term goal (LTG);10 days  -TW     Progress/Outcome (Transfer Goal 1, PT) goal ongoing  -TW       Row Name 07/14/24 1458          Gait Training Goal 1 (PT)    Activity/Assistive Device (Gait Training Goal 1, PT) gait (walking locomotion);assistive device use;increase endurance/gait distance;decrease fall risk  -TW     [place] Level (Gait Training Goal 1, PT) standby assist  -TW     Distance (Gait Training Goal 1, PT) 75 ft  -TW     Time Frame (Gait Training Goal 1, PT) long term goal (LTG);10 days  -TW     Strategies/Barriers (Gait Training Goal 1, PT) O2 sat above 90%  -TW     Progress/Outcome (Gait Training Goal 1, PT) goal ongoing  -TW       Row Name 07/14/24 1458          Patient Education Goal (PT)    Activity (Patient Education Goal, PT) BLE ther ex 1 x 10  -TW     [place]/Cues/Accuracy (Memory Goal 2, PT) demonstrates adequately  -TW     Time Frame (Patient Education Goal, PT) 10 days;long term goal (LTG)  -TW     Progress/Outcome (Patient Education Goal, PT) goal ongoing  -TW       Row Name 07/14/24 1458          Therapy Assessment/Plan (PT)    Planned Therapy Interventions  (PT) balance training;bed mobility training;gait training;patient/family education;transfer training;strengthening  -TW               User Key  (r) = Recorded By, (t) = Taken By, (c) = Cosigned By      Initials Name Provider Type    Poppy Ruff, BRAULIO Physical Therapist                   Clinical Impression       Row Name 07/14/24 1458          Pain    Pretreatment Pain Rating 0/10 - no pain  -TW     Posttreatment Pain Rating 0/10 - no pain  -TW     Pain Location - chest  -TW     Pre/Posttreatment Pain Comment no c/o chest pain of LE cramping but pt did express discomfort in chest due to frequent coughing. Pt encouraged to splint with pillow while coughtng.  -TW     Pain Intervention(s) Other (Comment)  -       Row Name 07/14/24 1454          Plan of Care Review    Plan of Care Reviewed With patient;grandchild(popeye)  -     Outcome Evaluation PT evaluation completed this p.m. Initially pt was expressing she was too tired to participate in PT evaluation. Pt's great-granddaughter present and encouraged pt to at least get OOB and pt agreed. It was noted that pt had frequent non-productive cough throughout assessment and this was her only c/o discomfort during evaluation. Pt was O x 4 and on 2 L NC O2 throughout session (O2 sat 91-93%). Pt provided CGA to come to sit on EOB and she was able to sit on EOB with supervision and pt assisted with changing her gown without any loss of balance. CGA and cues for sit to stand with FWW and to pivot to chair. Pt did express she felt safer using FWW and would be appropriate for a rollator upon d/c to assist pt with ambulation balance and endurance. Pt presents with deficits in strength, endurance, and balance. She is expected to improve her functional mobility with continued PT services prior to d/c.  -       Row Name 07/14/24 1452          Therapy Assessment/Plan (PT)    Patient/Family Therapy Goals Statement (PT) to return home with family to assist.  -TW     Rehab  Potential (PT) good, to achieve stated therapy goals  -TW     Criteria for Skilled Interventions Met (PT) yes;meets criteria;skilled treatment is necessary  -TW     Therapy Frequency (PT) 5 times/wk  -TW     Predicted Duration of Therapy Intervention (PT) 10 days  -TW       Row Name 07/14/24 1458          Vital Signs    Pretreatment Heart Rate (beats/min) 90  -TW     Posttreatment Heart Rate (beats/min) 93  -TW     Pre SpO2 (%) 93  -TW     O2 Delivery Pre Treatment supplemental O2  2 L  -TW     Intra SpO2 (%) 91  -TW     Post SpO2 (%) 93  -TW     O2 Delivery Post Treatment supplemental O2  2 L  -TW     Pre Patient Position Supine  -TW     Intra Patient Position Standing  -TW     Post Patient Position Sitting  -TW       Row Name 07/14/24 1458          Positioning and Restraints    Pre-Treatment Position in bed  -TW     Post Treatment Position chair  -TW     In Chair notified nsg;sitting;call light within reach;with family/caregiver;encouraged to call for assist  -TW               User Key  (r) = Recorded By, (t) = Taken By, (c) = Cosigned By      Initials Name Provider Type    TW Poppy Savage, PT Physical Therapist                   Outcome Measures       Row Name 07/14/24 1458 07/14/24 0800       How much help from another person do you currently need...    Turning from your back to your side while in flat bed without using bedrails? 4  -TW 4  -ML    Moving from lying on back to sitting on the side of a flat bed without bedrails? 3  -TW 4  -ML    Moving to and from a bed to a chair (including a wheelchair)? 3  -TW 3  -ML    Standing up from a chair using your arms (e.g., wheelchair, bedside chair)? 3  -TW 3  -ML    Climbing 3-5 steps with a railing? 3  -TW 3  -ML    To walk in hospital room? 3  -TW 3  -ML    AM-PAC 6 Clicks Score (PT) 19  -TW 20  -ML    Highest Level of Mobility Goal 6 --> Walk 10 steps or more  -TW 6 --> Walk 10 steps or more  -ML      Row Name 07/14/24 1458          Functional Assessment     Outcome Measure Options AM-PAC 6 Clicks Basic Mobility (PT)  -               User Key  (r) = Recorded By, (t) = Taken By, (c) = Cosigned By      Initials Name Provider Type    TW Poppy Savage PT Physical Therapist    Yolie Alarcon RN Registered Nurse                                 Physical Therapy Education       Title: PT OT SLP Therapies (In Progress)       Topic: Physical Therapy (In Progress)       Point: Mobility training (Done)       Learning Progress Summary             Patient Acceptance, E, VU by TW at 7/14/2024 1458    Comment: Pt and her great graddaughter educated on benefits of rollator for ambulation   Family Acceptance, E, VU by TW at 7/14/2024 1458    Comment: Pt and her great graddaughter educated on benefits of rollator for ambulation                         Point: Home exercise program (Not Started)       Learner Progress:  Not documented in this visit.              Point: Body mechanics (Not Started)       Learner Progress:  Not documented in this visit.              Point: Precautions (Not Started)       Learner Progress:  Not documented in this visit.                              User Key       Initials Effective Dates Name Provider Type Discipline     06/16/21 -  Poppy Savage PT Physical Therapist PT                  PT Recommendation and Plan  Planned Therapy Interventions (PT): balance training, bed mobility training, gait training, patient/family education, transfer training, strengthening  Plan of Care Reviewed With: patient, grandchild(popeye)  Outcome Evaluation: PT evaluation completed this p.m. Initially pt was expressing she was too tired to participate in PT evaluation. Pt's great-granddaughter present and encouraged pt to at least get OOB and pt agreed. It was noted that pt had frequent non-productive cough throughout assessment and this was her only c/o discomfort during evaluation. Pt was O x 4 and on 2 L NC O2 throughout session (O2 sat 91-93%). Pt provided CGA to come  to sit on EOB and she was able to sit on EOB with supervision and pt assisted with changing her gown without any loss of balance. CGA and cues for sit to stand with FWW and to pivot to chair. Pt did express she felt safer using FWW and would be appropriate for a rollator upon d/c to assist pt with ambulation balance and endurance. Pt presents with deficits in strength, endurance, and balance. She is expected to improve her functional mobility with continued PT services prior to d/c.     Time Calculation:   PT Evaluation Complexity  History, PT Evaluation Complexity: 3 or more personal factors and/or comorbidities  Examination of Body Systems (PT Eval Complexity): total of 3 or more elements  Clinical Presentation (PT Evaluation Complexity): stable  Clinical Decision Making (PT Evaluation Complexity): low complexity  Overall Complexity (PT Evaluation Complexity): low complexity     PT Charges       Row Name 07/14/24 1458             Time Calculation    Stop Time 1458  -TW      PT Received On 07/14/24 -TW      PT Goal Re-Cert Due Date 07/24/24 -TW                User Key  (r) = Recorded By, (t) = Taken By, (c) = Cosigned By      Initials Name Provider Type    Poppy Ruff PT Physical Therapist                  Therapy Charges for Today       Code Description Service Date Service Provider Modifiers Qty    [id number]  PT EVAL LOW COMPLEXITY 3 7/14/2024 Poppy Savage PT GP 1            PT G-Codes  Outcome Measure Options: AM-PAC 6 Clicks Basic Mobility (PT)  AM-PAC 6 Clicks Score (PT): 19  PT Discharge Summary  Anticipated Discharge Disposition (PT): home with assist    Poppy Savage PT  7/14/2024

## 2024-07-14 NOTE — OUTREACH NOTE
CHF Week 2 Survey      Flowsheet Row Responses   [facility] facility patient discharged from? [facility]   Does the patient have one of the following disease processes/diagnoses(primary or secondary)? CHF   Week 2 attempt successful? No   Unsuccessful attempts Attempt 1   Revoke Readmitted            EDILBERTO ASHER - Registered Nurse

## 2024-07-14 NOTE — PLAN OF CARE
Goal Outcome Evaluation:  Plan of Care Reviewed With: patient     Patient is resting comfortably in bed, no acute distress noted, no c/o voiced. Will continue to monitor

## 2024-07-14 NOTE — H&P
[facility]   HISTORY AND PHYSICAL      Name:  Aster Craft   Age:  79 y.o.  Sex:  female  :  1945  MRN:  [id number]   Visit Number:  [id number]  Admission Date:  2024  Date Of Service:  24  Primary Care Physician:  Yolie Cotto MD    Chief Complaint:     Shortness of breath, chest pain    History Of Presenting Illness:      The patient is a 79-year-old female with a history of hypertension, diabetes not on medication, hypothyroidism, reported cardiomyopathy, with recent diagnosis of heart failure reduced ejection fraction, who presented to the emergency room with complaints of shortness of breath and chest pain.  Patient states in the past she had seen Dr. Palacios, had been on metoprolol due to reported cardiomyopathy.  She is unsure if she was ever diagnosed with atrial fibrillation.  She was asked in the hospital about 2 weeks ago due to breathing difficulty, was diuresed, and had outpatient cardiology follow-up.  She is due to see cardiology this week.  She noted today while outside at a yard sale she developed symptoms of chest pain, pressure, heaviness in her chest and associated shortness of breath.  She went inside to rest, cool down, but continued to have symptoms and was brought to the ER.  Currently is feeling better.  She is not currently taking diuretics at home.  She is not on any medications for diabetes, she wants to talk with her PCP about that.  She notes a stress test performed many years ago, no recent cardiac procedures or testing other than echocardiogram at last hospital stay.    In the ER, the patient was overall hemodynamically stable.  She was borderline hypoxic with ambulation and is currently on 2 L of oxygen.  She was given IV Lasix.  Her labs are largely nonactionable.  She has been on Macrobid due to recent UTI with clear urine sample tonight.  Chest x-ray with some mild interstitial edema.  EKG and troponins reassuring.  Admitted  for observation.    Review Of Systems:    All systems were reviewed and negative except as mentioned in history of presenting illness, assessment and plan.    Past Medical History: Patient  has a past medical history of Arthritis, Breast cancer, Diabetes mellitus, Elevated cholesterol, GERD (gastroesophageal reflux disease) (08/23/2021), History of cancer chemotherapy, Hypertension, Hyperthyroidism (05/19/2022), Kidney stone, Lichen sclerosus, Shingles, Thickened endometrium (2018), Urinary incontinence, and Urinary tract infection.    Past Surgical History: Patient  has a past surgical history that includes Mastectomy (Right, 10/1998); Mastectomy (Left, 07/2013); Cholecystectomy (03/2010); Dilation and curettage of uterus; Cervical cone biopsy; Esophagogastroduodenoscopy (N/A, 07/06/2018); Colonoscopy (N/A, 07/06/2018); Tubal ligation; and Cataract Extraction (Right, 01/10/2023).    Social History: Patient  reports that she has never smoked. She has never been exposed to tobacco smoke. She has never used smokeless tobacco. She reports that she does not drink alcohol and does not use drugs.    Family History:  Patient's family history has been reviewed and found to be noncontributory.     Allergies:      Caffeine    Home Medications:    Prior to Admission Medications       Prescriptions Last Dose Informant Patient Reported? Taking?    albuterol sulfate  (90 Base) MCG/ACT inhaler   Yes No    TWO PUFFS EVERY SIX HOURS AS NEEDED    Patient not taking:  Reported on 7/11/2024    azelastine (ASTELIN) 0.1 % nasal spray   Yes No    INHALE ONE SPRAY IN EACH NOSTRIL TWICE DAILY    Patient not taking:  Reported on 7/11/2024    benzonatate (TESSALON) 200 MG capsule   Yes No    Take 1 capsule by mouth.    Blood Glucose Monitoring Suppl (OneTouch Verio Flex System) w/Device kit   Yes No    See Admin Instructions. for testing    brimonidine-timolol (COMBIGAN) 0.2-0.5 % ophthalmic solution   Yes No    Administer 1 drop to  both eyes Every 12 (Twelve) Hours.    Patient not taking:  Reported on 7/11/2024    calcium carbonate-cholecalciferol 500-400 MG-UNIT tablet tablet   Yes No    Take  by mouth Daily.    cetirizine (zyrTEC) 10 MG tablet   Yes No    Take 1 tablet by mouth Daily.    Patient not taking:  Reported on 7/11/2024    Cholecalciferol 250 MCG (65851 UT) capsule   Yes No    Take 5,000 Units by mouth Daily.    CINNAMON PO   Yes No    Take  by mouth.    Patient not taking:  Reported on 7/11/2024    clobetasol (TEMOVATE) 0.05 % ointment   No No    Apply to affected area BID x 2 wks then daily x 2 wks then 2-3x/wk    Continuous Blood Gluc Sensor (FreeStyle Malina 2 Sensor) misc   Yes No    CHANGE SENSOR EVERY 14 DAYS    desloratadine (CLARINEX) 5 MG tablet   Yes No    Take 1 tablet by mouth Daily.    Patient not taking:  Reported on 7/11/2024    dextromethorphan-guaifenesin (ROBITUSSIN-DM)  MG/5ML syrup   Yes No    TAKE 10ML EVERY 4 HOURS AS NEEDED    diazePAM (VALIUM) 5 MG tablet   Yes No    TAKE 1 TABLET one hour prior TO procedure    Patient not taking:  Reported on 7/11/2024    dorzolamide (TRUSOPT) 2 % ophthalmic solution   Yes No    INSTILL ONE DROP IN EACH EYE TWICE DAILY    Patient not taking:  Reported on 7/11/2024    ezetimibe (ZETIA) 10 MG tablet   Yes No    Take 1 tablet by mouth Daily.    Flovent  MCG/ACT inhaler   Yes No    Inhale 2 puffs 2 (Two) Times a Day.    Patient not taking:  Reported on 7/11/2024    furosemide (Lasix) 20 MG tablet   No No    Take 1 tablet by mouth Daily.    glipizide (GLUCOTROL XL) 5 MG ER tablet   Yes No    Patient not taking:  Reported on 7/11/2024    ipratropium (ATROVENT) 0.03 % nasal spray   Yes No    INHALE ONE SPRAY IN EACH NOSTRIL TWICE DAILY    Patient not taking:  Reported on 7/11/2024    ipratropium (ATROVENT) 0.06 % nasal spray   Yes No    INHALE 1-2 SPRAYS IN EACH NOSTRIL TWICE DAILY    Patient not taking:  Reported on 7/11/2024    Januvia 100 MG tablet   Yes No    Take  "1 tablet by mouth Daily.    Patient not taking:  Reported on 7/11/2024    Lancets (OneTouch Delica Plus [id number]) misc   Yes No    USE AS DIRECTED EVERY DAY    meclizine (ANTIVERT) 12.5 MG tablet   Yes No    Take 1 tablet by mouth 3 (Three) Times a Day As Needed.    metoprolol succinate XL (TOPROL-XL) 50 MG 24 hr tablet   Yes No    Take 1 tablet by mouth Daily.    nitrofurantoin, macrocrystal-monohydrate, (MACROBID) 100 MG capsule   No No    Take 1 capsule by mouth 2 (Two) Times a Day.    nitroglycerin (NITROSTAT) 0.4 MG SL tablet   Yes No    Place 1 tablet under the tongue.    OneTouch Verio test strip   Yes No    Daily. for testing    pantoprazole (PROTONIX) 40 MG EC tablet   Yes No    Take 1 tablet by mouth.    Rhopressa 0.02 % solution   Yes No    Administer 1 drop to both eyes Every Night.    simethicone (MYLICON) 80 MG chewable tablet   Yes No    Chew 1 tablet Every 6 (Six) Hours As Needed for Flatulence.    Triamcinolone Acetonide (NASACORT) 55 MCG/ACT nasal inhaler   Yes No    2 sprays into the nostril(s) as directed by provider Daily.    Patient not taking:  Reported on 7/11/2024    Vibegron (Gemtesa) 75 MG tablet   No No    Take 1 tablet by mouth Daily.    Patient not taking:  Reported on 7/11/2024          ED Medications:    Medications   sodium chloride 0.9 % flush 10 mL (has no administration in time range)   furosemide (LASIX) injection 40 mg (40 mg Intravenous Given 7/13/24 1738)   ketorolac (TORADOL) injection 15 mg (15 mg Intravenous Given 7/13/24 1938)   orphenadrine (NORFLEX) injection 60 mg (60 mg Intravenous Given 7/13/24 1940)     Vital Signs:  Temp:  [97.7 °F (36.5 °C)] 97.7 °F (36.5 °C)  Heart Rate:  [104-117] 107  Resp:  [18] 18  BP: (116-130)/(69-76) 130/76        07/13/24  1706   Weight: 57.6 kg (127 lb)     Body mass index is 22.5 kg/m².    Physical Exam:     Most recent vital Signs: /76   Pulse 107   Temp 97.7 °F (36.5 °C) (Oral)   Resp 18   Ht 160 cm (63\")   Wt 57.6 kg (127 " lb)   SpO2 95%   BMI 22.50 kg/m²     Physical Exam  Constitutional:       General: She is not in acute distress.     Appearance: She is normal weight.   HENT:      Mouth/Throat:      Mouth: Mucous membranes are moist.      Pharynx: Oropharynx is clear.   Eyes:      Extraocular Movements: Extraocular movements intact.      Pupils: Pupils are equal, round, and reactive to light.   Cardiovascular:      Rate and Rhythm: Regular rhythm. Tachycardia present.      Pulses: Normal pulses.      Heart sounds: No murmur heard.     No gallop.   Pulmonary:      Effort: Pulmonary effort is normal.      Breath sounds: No rhonchi or rales.   Abdominal:      General: Bowel sounds are normal. There is no distension.      Tenderness: There is no abdominal tenderness.   Musculoskeletal:      Right lower leg: Edema present.      Left lower leg: Edema present.   Skin:     General: Skin is warm.      Capillary Refill: Capillary refill takes less than 2 seconds.      Findings: No bruising or lesion.   Neurological:      General: No focal deficit present.      Mental Status: She is alert. Mental status is at baseline.      Motor: Weakness present.         Laboratory data:    I have reviewed the labs done in the emergency room.    Results from last 7 days   Lab Units 07/13/24  1718   SODIUM mmol/L 142   POTASSIUM mmol/L 4.1   CHLORIDE mmol/L 101   CO2 mmol/L 27.5   BUN mg/dL 28*   CREATININE mg/dL 0.77   CALCIUM mg/dL 9.2   BILIRUBIN mg/dL 0.6   ALK PHOS U/L 93   ALT (SGPT) U/L 21   AST (SGOT) U/L 19   GLUCOSE mg/dL 195*     Results from last 7 days   Lab Units 07/13/24  1718   WBC 10*3/mm3 16.25*   HEMOGLOBIN g/dL 13.7   HEMATOCRIT % 41.3   PLATELETS 10*3/mm3 240         Results from last 7 days   Lab Units 07/13/24  1918 07/13/24  1718   CK TOTAL U/L  --  77   HSTROP T ng/L 9 12     Results from last 7 days   Lab Units 07/13/24  1718   PROBNP pg/mL 95.5                 Results from last 7 days   Lab Units 07/13/24  1839   COLOR UA  Yellow    GLUCOSE UA  Negative   KETONES UA  Negative   BLOOD UA  Negative   LEUKOCYTES UA  Negative   PH, URINE  6.5   BILIRUBIN UA  Negative   UROBILINOGEN UA  0.2 E.U./dL       Pain Management Panel           No data to display                EKG:      Appears to be a sinus rhythm, rate of 100, right bundle branch block noted.  There are nonspecific T wave changes.    Radiology:    No radiology results for the last 3 days    Assessment:    Acute on chronic heart failure midrange ejection fraction/diastolic exacerbation, POA  Chest pain  Tachycardia, A-fib history?  Uncontrolled type 2 diabetes  Hyperthyroidism  Prior breast cancer  GERD  Hypertension    Plan:    Admit for observation    CHF/chest pain:  Recent echo with midrange ejection fraction noted.  Was not taking diuretics outpatient.  Placed back on Lasix.  Continue the metoprolol.  She is due to see cardiology outpatient, however with ongoing symptoms concerning for possible angina will have Dr. Morrow see in consultation.  Troponin stable.  Telemetry monitoring.  She also has outpatient Holter monitor on currently.    Arrhythmia:  Patient reports prior history of tachycardia, unsure if actually atrial fibrillation.  She has been on metoprolol for multiple years.  EKG normal here in sinus rhythm.  She has outpatient Holter monitor currently on.    Diabetes:  Patient not taking anything at home for this.  Notes had been diet controlled, understands A1c is elevated now.  Recommend she take medication for this    Hyperthyroidism:  Follows with endocrinology, is known to have thyroid nodules.  Will check TSH labs.  Obviously can contribute to heart disease as well as arrhythmia.    Further recommendations depend upon clinical course.  Plan of care discussed with the patient    Risk Assessment: High  DVT Prophylaxis: Heparin  Code Status: Full  Diet: Diabetic cardiac fluid restriction    Advance Care Planning   ACP discussion was held with the patient during this  visit. Patient does not have an advance directive, declines further assistance.           Brianna Patel,   07/13/24  21:06 EDT    Dictated utilizing Dragon dictation.

## 2024-07-14 NOTE — CASE MANAGEMENT/SOCIAL WORK
Discharge Planning Assessment  [facility]     Patient Name: Aster Craft  MRN: [id number]  Today's Date: 7/14/2024    Admit Date: 7/13/2024    Plan: Home with family   Discharge Needs Assessment       Row Name 07/14/24 0951       Living Environment    Current Living Arrangements home      Row Name 07/14/24 0947       Living Environment    People in Home child(popeye), adult    Current Living Arrangements home    Potentially Unsafe Housing Conditions none    In the past 12 months has the electric, gas, oil, or water company threatened to shut off services in your home? Yes    Primary Care Provided by self    Provides Primary Care For no one    Family Caregiver if Needed child(popeye), adult    Quality of Family Relationships involved    Able to Return to Prior Arrangements yes       Resource/Environmental Concerns    Resource/Environmental Concerns none    Transportation Concerns none       Transportation Needs    In the past 12 months, has lack of transportation kept you from medical appointments or from getting medications? no    In the past 12 months, has lack of transportation kept you from meetings, work, or from getting things needed for daily living? No       Food Insecurity    Within the past 12 months, you worried that your food would run out before you got the money to buy more. Never true    Within the past 12 months, the food you bought just didn't last and you didn't have money to get more. Never true       Transition Planning    Patient/Family Anticipates Transition to home with family    Patient/Family Anticipated Services at Transition none    Transportation Anticipated car, drives self;family or friend will provide       Discharge Needs Assessment    Readmission Within the Last 30 Days previous discharge plan unsuccessful    Equipment Currently Used at Home scales;BP cuff    Concerns to be Addressed discharge planning    Anticipated Changes Related to Illness none                   Discharge Plan        Row Name 07/14/24 0952       Plan    Plan Home with family    Plan Comments Spoke to pt regarding discharge  plans Confirmed address ,phone number and primary MD as being correct on face sheet .Her daughter has been staying  with her One level home .She is independent with ADLS .Drives herself to the doctor  .Uses [facility]   Drugs Declining Meds to Bed  She does have scales and has been weighing herself every day and recording the results  and BP cuff  Does not use nay other DME    She was here 2 weeks ago  She did not see her primary because she saw Teressa KO who was evaluating her and set up an appointment with Cardiologist  7/16 am She also has an appointment with her primary 7/16 in the PM  Does not have advanced directive declining  information regarding this /Plan is to return home                  Continued Care and Services - Admitted Since 7/13/2024    No active coordination exists for this encounter.          Demographic Summary       Row Name 07/14/24 0944       General Information    Admission Type observation    Arrived From emergency department    Required Notices Provided Observation Status Notice    Referral Source admission list    Reason for Consult discharge planning    Preferred Language English                   Functional Status       Row Name 07/14/24 0947       Functional Status    Usual Activity Tolerance good       Physical Activity    On average, how many days per week do you engage in moderate to strenuous exercise (like a brisk walk)? 0 days    On average, how many minutes do you engage in exercise at this level? 0 min    Number of minutes of exercise per week 0       Functional Status, IADL    Medications independent    Meal Preparation independent    Housekeeping independent    Laundry independent    Shopping independent       Mental Status    General Appearance WDL WDL                   Psychosocial    No documentation.                  Abuse/Neglect    No  documentation.                  Legal    No documentation.                  Substance Abuse    No documentation.                  Patient Forms    No documentation.                     Aster Farris RN

## 2024-07-14 NOTE — PROGRESS NOTES
"Pharmacy Consult - Enoxaparin Dosing  Aster Craft is a 79 y.o. female who has been consulted to dose Enoxaparin for VTE PPX.     Allergies  Caffeine    Relevant clinical data and objective history reviewed:   [Ht: 160 cm (63\"); Wt: 57.6 kg (127 lb)]  Body mass index is 22.5 kg/m².  Estimated Creatinine Clearance: 84 mL/min (A) (by C-G formula based on SCr of 0.51 mg/dL (L)).  Results from last 7 days   Lab Units 07/14/24  0610 07/13/24  1718   HEMOGLOBIN g/dL 12.9 13.7   HEMATOCRIT % 38.4 41.3   PLATELETS 10*3/mm3  --  240   CREATININE mg/dL 0.51* 0.77       Asessment/Plan  Initiate Enoxaparin 40mg SQ every 24 hours  Pharmacy will monitor Ms. Craft's renal function and clinical status and adjust the Enoxaparin dose and/or frequency as needed.    Thanks,   Yolie Monroy, PharmD  7/14/2024  12:05 EDT      "

## 2024-07-14 NOTE — PLAN OF CARE
Goal Outcome Evaluation:  Plan of Care Reviewed With: patient, grandchild(popeye)           Outcome Evaluation: PT evaluation completed this p.m. Initially pt was expressing she was too tired to participate in PT evaluation. Pt's great-granddaughter present and encouraged pt to at least get OOB and pt agreed. It was noted that pt had frequent non-productive cough throughout assessment and this was her only c/o discomfort during evaluation. Pt was O x 4 and on 2 L NC O2 throughout session (O2 sat 91-93%). Pt provided CGA to come to sit on EOB and she was able to sit on EOB with supervision and pt assisted with changing her gown without any loss of balance. CGA and cues for sit to stand with FWW and to pivot to chair. Pt did express she felt safer using FWW and would be appropriate for a rollator upon d/c to assist pt with ambulation balance and endurance. Pt presents with deficits in strength, endurance, and balance. She is expected to improve her functional mobility with continued PT services prior to d/c.      Anticipated Discharge Disposition (PT): home with assist

## 2024-07-15 PROBLEM — R07.89 CHEST PAIN, ATYPICAL: Status: ACTIVE | Noted: 2024-07-13

## 2024-07-15 PROBLEM — I50.33 ACUTE ON CHRONIC DIASTOLIC HEART FAILURE: Status: ACTIVE | Noted: 2024-07-15

## 2024-07-15 LAB
ANION GAP SERPL CALCULATED.3IONS-SCNC: 12.1 MMOL/L (ref 5–15)
BUN SERPL-MCNC: 22 MG/DL (ref 8–23)
BUN/CREAT SERPL: 47.8 (ref 7–25)
CALCIUM SPEC-SCNC: 8.9 MG/DL (ref 8.6–10.5)
CHLORIDE SERPL-SCNC: 99 MMOL/L (ref 98–107)
CO2 SERPL-SCNC: 28.9 MMOL/L (ref 22–29)
CREAT SERPL-MCNC: 0.46 MG/DL (ref 0.57–1)
DEPRECATED RDW RBC AUTO: 43.8 FL (ref 37–54)
EGFRCR SERPLBLD CKD-EPI 2021: 98.7 ML/MIN/1.73
ERYTHROCYTE [DISTWIDTH] IN BLOOD BY AUTOMATED COUNT: 12.7 % (ref 12.3–15.4)
GLUCOSE BLDC GLUCOMTR-MCNC: 121 MG/DL (ref 70–130)
GLUCOSE BLDC GLUCOMTR-MCNC: 133 MG/DL (ref 70–130)
GLUCOSE BLDC GLUCOMTR-MCNC: 153 MG/DL (ref 70–130)
GLUCOSE BLDC GLUCOMTR-MCNC: 156 MG/DL (ref 70–130)
GLUCOSE SERPL-MCNC: 130 MG/DL (ref 65–99)
HCT VFR BLD AUTO: 39 % (ref 34–46.6)
HGB BLD-MCNC: 13 G/DL (ref 12–15.9)
MCH RBC QN AUTO: 31.1 PG (ref 26.6–33)
MCHC RBC AUTO-ENTMCNC: 33.3 G/DL (ref 31.5–35.7)
MCV RBC AUTO: 93.3 FL (ref 79–97)
PLATELET # BLD AUTO: 213 10*3/MM3 (ref 140–450)
PMV BLD AUTO: 9.8 FL (ref 6–12)
POTASSIUM SERPL-SCNC: 3.5 MMOL/L (ref 3.5–5.2)
RBC # BLD AUTO: 4.18 10*6/MM3 (ref 3.77–5.28)
SODIUM SERPL-SCNC: 140 MMOL/L (ref 136–145)
WBC NRBC COR # BLD AUTO: 12.39 10*3/MM3 (ref 3.4–10.8)

## 2024-07-15 PROCEDURE — 63710000001 INSULIN LISPRO (HUMAN) PER 5 UNITS: Performed by: INTERNAL MEDICINE

## 2024-07-15 PROCEDURE — B2111ZZ FLUOROSCOPY OF MULTIPLE CORONARY ARTERIES USING LOW OSMOLAR CONTRAST: ICD-10-PCS | Performed by: INTERNAL MEDICINE

## 2024-07-15 PROCEDURE — 25010000002 HEPARIN (PORCINE) PER 1000 UNITS: Performed by: INTERNAL MEDICINE

## 2024-07-15 PROCEDURE — C1769 GUIDE WIRE: HCPCS | Performed by: INTERNAL MEDICINE

## 2024-07-15 PROCEDURE — 93454 CORONARY ARTERY ANGIO S&I: CPT | Performed by: INTERNAL MEDICINE

## 2024-07-15 PROCEDURE — 82948 REAGENT STRIP/BLOOD GLUCOSE: CPT

## 2024-07-15 PROCEDURE — 85027 COMPLETE CBC AUTOMATED: CPT | Performed by: FAMILY MEDICINE

## 2024-07-15 PROCEDURE — 25510000001 IOPAMIDOL PER 1 ML: Performed by: INTERNAL MEDICINE

## 2024-07-15 PROCEDURE — 25010000002 MIDAZOLAM PER 1MG: Performed by: INTERNAL MEDICINE

## 2024-07-15 PROCEDURE — 99232 SBSQ HOSP IP/OBS MODERATE 35: CPT | Performed by: INTERNAL MEDICINE

## 2024-07-15 PROCEDURE — 25010000002 LIDOCAINE 1 % SOLUTION: Performed by: INTERNAL MEDICINE

## 2024-07-15 PROCEDURE — 25010000002 FENTANYL CITRATE (PF) 50 MCG/ML SOLUTION: Performed by: INTERNAL MEDICINE

## 2024-07-15 PROCEDURE — C1894 INTRO/SHEATH, NON-LASER: HCPCS | Performed by: INTERNAL MEDICINE

## 2024-07-15 PROCEDURE — 82948 REAGENT STRIP/BLOOD GLUCOSE: CPT | Performed by: FAMILY MEDICINE

## 2024-07-15 PROCEDURE — 82948 REAGENT STRIP/BLOOD GLUCOSE: CPT | Performed by: INTERNAL MEDICINE

## 2024-07-15 PROCEDURE — 25810000003 SODIUM CHLORIDE 0.9 % SOLUTION: Performed by: INTERNAL MEDICINE

## 2024-07-15 PROCEDURE — 80048 BASIC METABOLIC PNL TOTAL CA: CPT | Performed by: FAMILY MEDICINE

## 2024-07-15 RX ORDER — ALPRAZOLAM 0.25 MG/1
0.25 TABLET ORAL 3 TIMES DAILY PRN
Status: DISCONTINUED | OUTPATIENT
Start: 2024-07-15 | End: 2024-07-16 | Stop reason: HOSPADM

## 2024-07-15 RX ORDER — NITROGLYCERIN 0.4 MG/1
0.4 TABLET SUBLINGUAL
Status: DISCONTINUED | OUTPATIENT
Start: 2024-07-15 | End: 2024-07-16 | Stop reason: HOSPADM

## 2024-07-15 RX ORDER — HEPARIN SODIUM 1000 [USP'U]/ML
INJECTION, SOLUTION INTRAVENOUS; SUBCUTANEOUS
Status: DISCONTINUED | OUTPATIENT
Start: 2024-07-15 | End: 2024-07-15 | Stop reason: HOSPADM

## 2024-07-15 RX ORDER — DIPHENHYDRAMINE HYDROCHLORIDE 50 MG/ML
25 INJECTION INTRAMUSCULAR; INTRAVENOUS EVERY 6 HOURS PRN
Status: DISCONTINUED | OUTPATIENT
Start: 2024-07-15 | End: 2024-07-16 | Stop reason: HOSPADM

## 2024-07-15 RX ORDER — MIDAZOLAM HYDROCHLORIDE 2 MG/2ML
INJECTION, SOLUTION INTRAMUSCULAR; INTRAVENOUS
Status: DISCONTINUED | OUTPATIENT
Start: 2024-07-15 | End: 2024-07-15 | Stop reason: HOSPADM

## 2024-07-15 RX ORDER — SODIUM CHLORIDE 9 MG/ML
100 INJECTION, SOLUTION INTRAVENOUS CONTINUOUS
Status: ACTIVE | OUTPATIENT
Start: 2024-07-15 | End: 2024-07-15

## 2024-07-15 RX ORDER — LIDOCAINE HYDROCHLORIDE 10 MG/ML
INJECTION, SOLUTION INFILTRATION; PERINEURAL
Status: DISCONTINUED | OUTPATIENT
Start: 2024-07-15 | End: 2024-07-15 | Stop reason: HOSPADM

## 2024-07-15 RX ORDER — FENTANYL CITRATE 50 UG/ML
INJECTION, SOLUTION INTRAMUSCULAR; INTRAVENOUS
Status: DISCONTINUED | OUTPATIENT
Start: 2024-07-15 | End: 2024-07-15 | Stop reason: HOSPADM

## 2024-07-15 RX ORDER — HYDROCODONE BITARTRATE AND ACETAMINOPHEN 5; 325 MG/1; MG/1
1 TABLET ORAL EVERY 4 HOURS PRN
Status: DISCONTINUED | OUTPATIENT
Start: 2024-07-15 | End: 2024-07-16 | Stop reason: HOSPADM

## 2024-07-15 RX ORDER — ONDANSETRON 2 MG/ML
4 INJECTION INTRAMUSCULAR; INTRAVENOUS EVERY 6 HOURS PRN
Status: DISCONTINUED | OUTPATIENT
Start: 2024-07-15 | End: 2024-07-16 | Stop reason: HOSPADM

## 2024-07-15 RX ORDER — ONDANSETRON 4 MG/1
4 TABLET, ORALLY DISINTEGRATING ORAL EVERY 6 HOURS PRN
Status: DISCONTINUED | OUTPATIENT
Start: 2024-07-15 | End: 2024-07-16 | Stop reason: HOSPADM

## 2024-07-15 RX ORDER — ACETAMINOPHEN 325 MG/1
650 TABLET ORAL EVERY 4 HOURS PRN
Status: DISCONTINUED | OUTPATIENT
Start: 2024-07-15 | End: 2024-07-16 | Stop reason: HOSPADM

## 2024-07-15 RX ORDER — ANTACID TABLETS 500 MG/1
1 TABLET, CHEWABLE ORAL DAILY
COMMUNITY
End: 2024-07-16 | Stop reason: HOSPADM

## 2024-07-15 RX ORDER — VERAPAMIL HYDROCHLORIDE 2.5 MG/ML
INJECTION, SOLUTION INTRAVENOUS
Status: DISCONTINUED | OUTPATIENT
Start: 2024-07-15 | End: 2024-07-15 | Stop reason: HOSPADM

## 2024-07-15 RX ORDER — TEMAZEPAM 15 MG/1
15 CAPSULE ORAL NIGHTLY PRN
Status: DISCONTINUED | OUTPATIENT
Start: 2024-07-15 | End: 2024-07-16 | Stop reason: HOSPADM

## 2024-07-15 RX ORDER — NALOXONE HCL 0.4 MG/ML
0.4 VIAL (ML) INJECTION
Status: DISCONTINUED | OUTPATIENT
Start: 2024-07-15 | End: 2024-07-16 | Stop reason: HOSPADM

## 2024-07-15 RX ORDER — ASPIRIN 81 MG/1
81 TABLET ORAL DAILY
Status: DISCONTINUED | OUTPATIENT
Start: 2024-07-16 | End: 2024-07-16 | Stop reason: HOSPADM

## 2024-07-15 RX ADMIN — Medication 10 ML: at 08:27

## 2024-07-15 RX ADMIN — PANTOPRAZOLE SODIUM 40 MG: 40 TABLET, DELAYED RELEASE ORAL at 05:09

## 2024-07-15 RX ADMIN — SODIUM CHLORIDE 100 ML/HR: 9 INJECTION, SOLUTION INTRAVENOUS at 11:43

## 2024-07-15 RX ADMIN — GUAIFENESIN AND DEXTROMETHORPHAN 5 ML: 100; 10 SYRUP ORAL at 21:08

## 2024-07-15 RX ADMIN — FUROSEMIDE 40 MG: 40 TABLET ORAL at 08:27

## 2024-07-15 RX ADMIN — ACETAMINOPHEN 650 MG: 325 TABLET, FILM COATED ORAL at 11:43

## 2024-07-15 RX ADMIN — Medication 10 ML: at 21:13

## 2024-07-15 RX ADMIN — INSULIN LISPRO 2 UNITS: 100 INJECTION, SOLUTION INTRAVENOUS; SUBCUTANEOUS at 11:42

## 2024-07-15 RX ADMIN — ASPIRIN 325 MG: 325 TABLET, COATED ORAL at 08:27

## 2024-07-15 RX ADMIN — ATORVASTATIN CALCIUM 80 MG: 80 TABLET, FILM COATED ORAL at 21:09

## 2024-07-15 RX ADMIN — NITROFURANTOIN MONOHYDRATE/MACROCRYSTALLINE 100 MG: 25; 75 CAPSULE ORAL at 08:27

## 2024-07-15 RX ADMIN — SENNOSIDES AND DOCUSATE SODIUM 2 TABLET: 50; 8.6 TABLET ORAL at 21:52

## 2024-07-15 RX ADMIN — METOPROLOL SUCCINATE 50 MG: 50 TABLET, EXTENDED RELEASE ORAL at 21:09

## 2024-07-15 NOTE — PROGRESS NOTES
[facility]  Heart Failure Pharmacist Note     Patient Name: Aster Craft  :1945  Age: 79 y.o.  Sex: female  Referring Provider: Teressa Garcia A*   Primary Cardiologist: John Howard  Encounter Provider:  MAIKEL Branch    HPI: Patient presents for initial evaluation in heart failure clinic for CHF (LVmEF=46%) Echo completed on 24 during hospital admission. PMH significant for AFib, CHF, T2DM, HTN and Hx of Breast cancer.     She denies chest pain, dizziness, palpitations, lightheadedness, abdominal or lower extremity swelling. She reports that she recently had a UTI but isn't sure if she finished her antibiotics. She does report upper back pain. She reports that she sleeps in a recliner for Acid reflux. She has a scale at home and typically weighs 120-125lbs. Today she weighed 127lbs on her home scale and in the clinic. We discussed using a consistent weight at home with or without her prosthesis to ensure consistent weight. She typically drinks 3-4 bottles of water daily and a cup of decaf coffee.       Heart Failure GDMT:      Drug Class   Drug   Dose Last Dose Adjustment   Additional Titration   Notes   ACEi/ARB/ARNI        Beta Blocker Metoprolol Succinate 50mg      MRA        SGLT2i Defer   N/A Hx of UTI     Other CV Meds:  Lasix 20mg daily    Medication Use:  Adherence:   Past hx of medication use/intolerance:  Affordability:      Diet:  Breakfast:  Lunch:  Dinner:  Snacks/Desserts:  Drinks:      Social Determinants:    Social Determinants of Health     Tobacco Use: Low Risk  (2024)    Patient History     Smoking Tobacco Use: Never     Smokeless Tobacco Use: Never     Passive Exposure: Never   Recent Concern: Tobacco Use - Medium Risk (2024)    Received from  Healthcare    Patient History     Smoking Tobacco Use: Former     Smokeless Tobacco Use: Never     Passive Exposure: Not on file   Alcohol Use: Not At Risk (2024)    AUDIT-C     Frequency of  Alcohol Consumption: Never     Average Number of Drinks: Patient does not drink     Frequency of Binge Drinking: Never   Financial Resource Strain: Low Risk  (7/14/2024)    Overall Financial Resource Strain (CARDIA)     Difficulty of Paying Living Expenses: Not hard at all   Food Insecurity: No Food Insecurity (7/14/2024)    Hunger Vital Sign     Worried About Running Out of Food in the Last Year: Never true     Ran Out of Food in the Last Year: Never true   Transportation Needs: No Transportation Needs (7/14/2024)    PRAPARE - Transportation     Lack of Transportation (Medical): No     Lack of Transportation (Non-Medical): No   Physical Activity: Inactive (7/14/2024)    Exercise Vital Sign     Days of Exercise per Week: 0 days     Minutes of Exercise per Session: 0 min   Stress: No Stress Concern Present (7/14/2024)    [employer] - Occupational Stress Questionnaire     Feeling of Stress : Only a little   Social Connections: Not At Risk (7/14/2024)    Family and Community Support     Help with Day-to-Day Activities: I don't need any help     Lonely or Isolated: Never   Interpersonal Safety: Not At Risk (7/13/2024)    Abuse Screen     Unsafe at Home or Work/School: no     Feels Threatened by Someone?: no     Does Anyone Keep You from Contacting Others or Doint Things Outside the Home?: no     Physical Sign of Abuse Present: no   Depression: Not at risk (7/14/2024)    PHQ-2     PHQ-2 Score: 0   Housing Stability: Not At Risk (7/14/2024)    Housing Stability     Current Living Arrangements: home     Potentially Unsafe Housing Conditions: none   Utilities: At Risk (7/14/2024)    [facility] Utilities     Threatened with loss of utilities: Yes   Health Literacy: Not At Risk (7/14/2024)    Education     Help with school or training?: No     Preferred Language: English   Employment: Not At Risk (7/14/2024)    Employment     Do you want help finding or keeping work or a job?: I do not need or want help  "  Disabilities: Not At Risk (7/13/2024)    Disabilities     Concentrating, Remembering, or Making Decisions Difficulty: no     Doing Errands Independently Difficulty: no       Immunization Status:  Pneumococcal:   Influenza:  COVID-19:    OBJECTIVE:  /66 (BP Location: Right arm, Patient Position: Sitting, Cuff Size: Adult)   Pulse 80   Temp 97.7 °F (36.5 °C) (Infrared)   Resp 16   Ht 161.3 cm (63.5\")   Wt 57.6 kg (127 lb)   SpO2 94% Comment: RA  BMI 22.14 kg/m²     Body mass index is 22.14 kg/m².  Wt Readings from Last 1 Encounters:   07/13/24 57.6 kg (127 lb)       Home BP Readings:   10-yr ASCVD risk: The ASCVD Risk score (Sayra MARTINES, et al., 2019) failed to calculate for the following reasons:    Cannot find a previous HDL lab    Cannot find a previous total cholesterol lab    Lab Review:  Renal Function: Estimated Creatinine Clearance: 93.1 mL/min (A) (by C-G formula based on SCr of 0.46 mg/dL (L)).    Lab Results   Component Value Date    PROBNP 95.5 07/13/2024       Results for orders placed during the hospital encounter of 06/29/24    Adult Transthoracic Echo Complete W/ Cont if Necessary Per Protocol    Interpretation Summary    Left ventricular systolic function is low normal. Calculated left ventricular 3D EF = 46% Left ventricular ejection fraction appears to be 46 - 50%.    The left ventricular cavity is borderline dilated.    Left ventricular diastolic function is consistent with (grade I) impaired relaxation.        6 MINUTE WALK                        Patient Education:  Aster Craft was provided written and verbal education during their clinic visit today. Topics reviewed include:  The basics of heart failure (defining heart failure, ejection fraction, stages of disease)  Signs and symptoms of heart failure, self track zones, and when to contact clinic or seek emergency care  Overview of heart failure treatment plan (pharmacologic and non-pharmacologic) and goals of treatment  Tips for " "success with managing heart failure medications  List of medications that may be used to treat heart failure and how they work in the body    Patient was given a heart failure tracker calendar to document weight, blood pressure, and symptoms/overall feeling each day. Patient was encouraged to complete this daily in order to help guide therapy adjustments. Aster Craft expressed understanding.    ASSESSMENT/PLAN:  HFmEF (EF = 46%)  Patient will continue blood pressure, HR, and daily weight log at home and bring to her appointment to proceed with further GDMT.  Urinalysis today to determine need for further Abx for UTI.   Continue Metoprolol Succinate 50mg daily   Advised patient to call clinic with 2-3 lb weight gain in 24 hrs or >5 lb weight gain in 1 week     Aster Craft was provided written and verbal education during their clinic visit today. Topics reviewed include:  Dietary modifications that can improve heart health  Quick tips for limiting sodium intake (<2000 mg/day), including foods to look for at the grocery store and making smart choices when eating out  \"[employer]\"  How to read a nutrition label  Managing fluid intake (<1500 mL/day)  Reinforced education provided at previous visit     Ana Laura Pa, PharmD  [facility]  07/15/24  10:21 EDT   "

## 2024-07-15 NOTE — PAYOR COMM NOTE
"TO:BC  FROM:SHANIQUE WAGNER, RN PHONE [phone]  CLINICALS REF# [id number]    Vanessa Ahuja (79 y.o. Female)       Date of Birth   1945    Social Security Number       Address   [address] [place] [id number]    Home Phone   [phone]    MRN   [id number]       Congregation   [employer] of [employer]    Marital Status                               Admission Date   7/13/24    Admission Type   Emergency    Admitting Provider   Brianna Patel DO    Attending Provider   Brianna Patel DO    Department, Room/Bed   [facility] TELEMETRY 3, 320/1       Discharge Date       Discharge Disposition       Discharge Destination                                 Attending Provider: Brianna Patel DO    Allergies: Caffeine    Isolation: None   Infection: None   Code Status: CPR    Ht: 160 cm (63\")   Wt: 57.6 kg (127 lb)    Admission Cmt: None   Principal Problem: Chest pain, atypical [R07.89]                   Active Insurance as of 7/13/2024       Primary Coverage       Payor Plan Insurance Group Employer/Plan Group    ANTHEM MEDICARE REPLACEMENT ANTHEM MEDICARE ADVANTAGE [id number]       Payor Plan Address Payor Plan Phone Number Payor Plan Fax Number Effective Dates    [address] [phone]  9/1/2020 - None Entered    [facility] [address]         Subscriber Name Subscriber Birth Date Member ID       VANESSA AHUJA 1945 [id number]               Secondary Coverage       Payor Plan Insurance Group Employer/Plan Group    [place] [id number]       Payor Plan Address Payor Plan Phone Number Payor Plan Fax Number Effective Dates    [employer] [address] [address] [phone]  11/1/2022 - None Entered    [facility] [address]         Subscriber Name Subscriber Birth Date Member ID       VANESSA AHUJA 1945 [id number]                     Emergency Contacts        (Rel.) Home Phone Work Phone Mobile Phone    " Guillermina Gar (Grandchild) -- -- [phone]    Chilo Craft (Son) -- -- [phone]    ROMI CRAFT (Son) [phone] -- --    TERRIE GOMEZ (Grandchild) [phone] -- --                 History & Physical        Brianna PatelDO at 24 [phone]            [facility]   HISTORY AND PHYSICAL      Name:  Aster Craft   Age:  79 y.o.  Sex:  female  :  1945  MRN:  [id number]   Visit Number:  [id number]  Admission Date:  2024  Date Of Service:  24  Primary Care Physician:  Yolie Cotto MD    Chief Complaint:     Shortness of breath, chest pain    History Of Presenting Illness:      The patient is a 79-year-old female with a history of hypertension, diabetes not on medication, hypothyroidism, reported cardiomyopathy, with recent diagnosis of heart failure reduced ejection fraction, who presented to the emergency room with complaints of shortness of breath and chest pain.  Patient states in the past she had seen Dr. Palacios, had been on metoprolol due to reported cardiomyopathy.  She is unsure if she was ever diagnosed with atrial fibrillation.  She was asked in the hospital about 2 weeks ago due to breathing difficulty, was diuresed, and had outpatient cardiology follow-up.  She is due to see cardiology this week.  She noted today while outside at a yard sale she developed symptoms of chest pain, pressure, heaviness in her chest and associated shortness of breath.  She went inside to rest, cool down, but continued to have symptoms and was brought to the ER.  Currently is feeling better.  She is not currently taking diuretics at home.  She is not on any medications for diabetes, she wants to talk with her PCP about that.  She notes a stress test performed many years ago, no recent cardiac procedures or testing other than echocardiogram at last hospital stay.    In the ER, the patient was overall hemodynamically stable.  She was borderline hypoxic  with ambulation and is currently on 2 L of oxygen.  She was given IV Lasix.  Her labs are largely nonactionable.  She has been on Macrobid due to recent UTI with clear urine sample tonight.  Chest x-ray with some mild interstitial edema.  EKG and troponins reassuring.  Admitted for observation.    Review Of Systems:    All systems were reviewed and negative except as mentioned in history of presenting illness, assessment and plan.    Past Medical History: Patient  has a past medical history of Arthritis, Breast cancer, Diabetes mellitus, Elevated cholesterol, GERD (gastroesophageal reflux disease) (08/23/2021), History of cancer chemotherapy, Hypertension, Hyperthyroidism (05/19/2022), Kidney stone, Lichen sclerosus, Shingles, Thickened endometrium (2018), Urinary incontinence, and Urinary tract infection.    Past Surgical History: Patient  has a past surgical history that includes Mastectomy (Right, 10/1998); Mastectomy (Left, 07/2013); Cholecystectomy (03/2010); Dilation and curettage of uterus; Cervical cone biopsy; Esophagogastroduodenoscopy (N/A, 07/06/2018); Colonoscopy (N/A, 07/06/2018); Tubal ligation; and Cataract Extraction (Right, 01/10/2023).    Social History: Patient  reports that she has never smoked. She has never been exposed to tobacco smoke. She has never used smokeless tobacco. She reports that she does not drink alcohol and does not use drugs.    Family History:  Patient's family history has been reviewed and found to be noncontributory.     Allergies:      Caffeine    Home Medications:    Prior to Admission Medications       Prescriptions Last Dose Informant Patient Reported? Taking?    albuterol sulfate  (90 Base) MCG/ACT inhaler   Yes No    TWO PUFFS EVERY SIX HOURS AS NEEDED    Patient not taking:  Reported on 7/11/2024    azelastine (ASTELIN) 0.1 % nasal spray   Yes No    INHALE ONE SPRAY IN EACH NOSTRIL TWICE DAILY    Patient not taking:  Reported on 7/11/2024    benzonatate  (TESSALON) 200 MG capsule   Yes No    Take 1 capsule by mouth.    Blood Glucose Monitoring Suppl (OneTouch Verio Flex System) w/Device kit   Yes No    See Admin Instructions. for testing    brimonidine-timolol (COMBIGAN) 0.2-0.5 % ophthalmic solution   Yes No    Administer 1 drop to both eyes Every 12 (Twelve) Hours.    Patient not taking:  Reported on 7/11/2024    calcium carbonate-cholecalciferol 500-400 MG-UNIT tablet tablet   Yes No    Take  by mouth Daily.    cetirizine (zyrTEC) 10 MG tablet   Yes No    Take 1 tablet by mouth Daily.    Patient not taking:  Reported on 7/11/2024    Cholecalciferol 250 MCG (97715 UT) capsule   Yes No    Take 5,000 Units by mouth Daily.    CINNAMON PO   Yes No    Take  by mouth.    Patient not taking:  Reported on 7/11/2024    clobetasol (TEMOVATE) 0.05 % ointment   No No    Apply to affected area BID x 2 wks then daily x 2 wks then 2-3x/wk    Continuous Blood Gluc Sensor (FreeStyle Malina 2 Sensor) misc   Yes No    CHANGE SENSOR EVERY 14 DAYS    desloratadine (CLARINEX) 5 MG tablet   Yes No    Take 1 tablet by mouth Daily.    Patient not taking:  Reported on 7/11/2024    dextromethorphan-guaifenesin (ROBITUSSIN-DM)  MG/5ML syrup   Yes No    TAKE 10ML EVERY 4 HOURS AS NEEDED    diazePAM (VALIUM) 5 MG tablet   Yes No    TAKE 1 TABLET one hour prior TO procedure    Patient not taking:  Reported on 7/11/2024    dorzolamide (TRUSOPT) 2 % ophthalmic solution   Yes No    INSTILL ONE DROP IN EACH EYE TWICE DAILY    Patient not taking:  Reported on 7/11/2024    ezetimibe (ZETIA) 10 MG tablet   Yes No    Take 1 tablet by mouth Daily.    Flovent  MCG/ACT inhaler   Yes No    Inhale 2 puffs 2 (Two) Times a Day.    Patient not taking:  Reported on 7/11/2024    furosemide (Lasix) 20 MG tablet   No No    Take 1 tablet by mouth Daily.    glipizide (GLUCOTROL XL) 5 MG ER tablet   Yes No    Patient not taking:  Reported on 7/11/2024    ipratropium (ATROVENT) 0.03 % nasal spray   Yes No     INHALE ONE SPRAY IN EACH NOSTRIL TWICE DAILY    Patient not taking:  Reported on 7/11/2024    ipratropium (ATROVENT) 0.06 % nasal spray   Yes No    INHALE 1-2 SPRAYS IN EACH NOSTRIL TWICE DAILY    Patient not taking:  Reported on 7/11/2024    Januvia 100 MG tablet   Yes No    Take 1 tablet by mouth Daily.    Patient not taking:  Reported on 7/11/2024    Lancets (OneTouch Delica Plus [id number]) misc   Yes No    USE AS DIRECTED EVERY DAY    meclizine (ANTIVERT) 12.5 MG tablet   Yes No    Take 1 tablet by mouth 3 (Three) Times a Day As Needed.    metoprolol succinate XL (TOPROL-XL) 50 MG 24 hr tablet   Yes No    Take 1 tablet by mouth Daily.    nitrofurantoin, macrocrystal-monohydrate, (MACROBID) 100 MG capsule   No No    Take 1 capsule by mouth 2 (Two) Times a Day.    nitroglycerin (NITROSTAT) 0.4 MG SL tablet   Yes No    Place 1 tablet under the tongue.    OneTouch Verio test strip   Yes No    Daily. for testing    pantoprazole (PROTONIX) 40 MG EC tablet   Yes No    Take 1 tablet by mouth.    Rhopressa 0.02 % solution   Yes No    Administer 1 drop to both eyes Every Night.    simethicone (MYLICON) 80 MG chewable tablet   Yes No    Chew 1 tablet Every 6 (Six) Hours As Needed for Flatulence.    Triamcinolone Acetonide (NASACORT) 55 MCG/ACT nasal inhaler   Yes No    2 sprays into the nostril(s) as directed by provider Daily.    Patient not taking:  Reported on 7/11/2024    Vibegron (Gemtesa) 75 MG tablet   No No    Take 1 tablet by mouth Daily.    Patient not taking:  Reported on 7/11/2024          ED Medications:    Medications   sodium chloride 0.9 % flush 10 mL (has no administration in time range)   furosemide (LASIX) injection 40 mg (40 mg Intravenous Given 7/13/24 1738)   ketorolac (TORADOL) injection 15 mg (15 mg Intravenous Given 7/13/24 1938)   orphenadrine (NORFLEX) injection 60 mg (60 mg Intravenous Given 7/13/24 1940)     Vital Signs:  Temp:  [97.7 °F (36.5 °C)] 97.7 °F (36.5 °C)  Heart Rate:  [104-117]  "107  Resp:  [18] 18  BP: (116-130)/(69-76) 130/76        07/13/24  1706   Weight: 57.6 kg (127 lb)     Body mass index is 22.5 kg/m².    Physical Exam:     Most recent vital Signs: /76   Pulse 107   Temp 97.7 °F (36.5 °C) (Oral)   Resp 18   Ht 160 cm (63\")   Wt 57.6 kg (127 lb)   SpO2 95%   BMI 22.50 kg/m²     Physical Exam  Constitutional:       General: She is not in acute distress.     Appearance: She is normal weight.   HENT:      Mouth/Throat:      Mouth: Mucous membranes are moist.      Pharynx: Oropharynx is clear.   Eyes:      Extraocular Movements: Extraocular movements intact.      Pupils: Pupils are equal, round, and reactive to light.   Cardiovascular:      Rate and Rhythm: Regular rhythm. Tachycardia present.      Pulses: Normal pulses.      Heart sounds: No murmur heard.     No gallop.   Pulmonary:      Effort: Pulmonary effort is normal.      Breath sounds: No rhonchi or rales.   Abdominal:      General: Bowel sounds are normal. There is no distension.      Tenderness: There is no abdominal tenderness.   Musculoskeletal:      Right lower leg: Edema present.      Left lower leg: Edema present.   Skin:     General: Skin is warm.      Capillary Refill: Capillary refill takes less than 2 seconds.      Findings: No bruising or lesion.   Neurological:      General: No focal deficit present.      Mental Status: She is alert. Mental status is at baseline.      Motor: Weakness present.         Laboratory data:    I have reviewed the labs done in the emergency room.    Results from last 7 days   Lab Units 07/13/24  1718   SODIUM mmol/L 142   POTASSIUM mmol/L 4.1   CHLORIDE mmol/L 101   CO2 mmol/L 27.5   BUN mg/dL 28*   CREATININE mg/dL 0.77   CALCIUM mg/dL 9.2   BILIRUBIN mg/dL 0.6   ALK PHOS U/L 93   ALT (SGPT) U/L 21   AST (SGOT) U/L 19   GLUCOSE mg/dL 195*     Results from last 7 days   Lab Units 07/13/24  1718   WBC 10*3/mm3 16.25*   HEMOGLOBIN g/dL 13.7   HEMATOCRIT % 41.3   PLATELETS 10*3/mm3 " 240         Results from last 7 days   Lab Units 07/13/24  1918 07/13/24  1718   CK TOTAL U/L  --  77   HSTROP T ng/L 9 12     Results from last 7 days   Lab Units 07/13/24  1718   PROBNP pg/mL 95.5                 Results from last 7 days   Lab Units 07/13/24  1839   COLOR UA  Yellow   GLUCOSE UA  Negative   KETONES UA  Negative   BLOOD UA  Negative   LEUKOCYTES UA  Negative   PH, URINE  6.5   BILIRUBIN UA  Negative   UROBILINOGEN UA  0.2 E.U./dL       Pain Management Panel           No data to display                EKG:      Appears to be a sinus rhythm, rate of 100, right bundle branch block noted.  There are nonspecific T wave changes.    Radiology:    No radiology results for the last 3 days    Assessment:    Acute on chronic heart failure midrange ejection fraction/diastolic exacerbation, POA  Chest pain  Tachycardia, A-fib history?  Uncontrolled type 2 diabetes  Hyperthyroidism  Prior breast cancer  GERD  Hypertension    Plan:    Admit for observation    CHF/chest pain:  Recent echo with midrange ejection fraction noted.  Was not taking diuretics outpatient.  Placed back on Lasix.  Continue the metoprolol.  She is due to see cardiology outpatient, however with ongoing symptoms concerning for possible angina will have Dr. Morrow see in consultation.  Troponin stable.  Telemetry monitoring.  She also has outpatient Holter monitor on currently.    Arrhythmia:  Patient reports prior history of tachycardia, unsure if actually atrial fibrillation.  She has been on metoprolol for multiple years.  EKG normal here in sinus rhythm.  She has outpatient Holter monitor currently on.    Diabetes:  Patient not taking anything at home for this.  Notes had been diet controlled, understands A1c is elevated now.  Recommend she take medication for this    Hyperthyroidism:  Follows with endocrinology, is known to have thyroid nodules.  Will check TSH labs.  Obviously can contribute to heart disease as well as  arrhythmia.    Further recommendations depend upon clinical course.  Plan of care discussed with the patient    Risk Assessment: High  DVT Prophylaxis: Heparin  Code Status: Full  Diet: Diabetic cardiac fluid restriction    Advance Care Planning  ACP discussion was held with the patient during this visit. Patient does not have an advance directive, declines further assistance.           Brianna Patel DO  24  21:06 EDT    Dictated utilizing Dragon dictation.    Electronically signed by Brianna Patel DO at 24 2346          Emergency Department Notes        Joanie Singh RN at 24          Walking pulse ox on room air, low 89% high 90%    Electronically signed by Joanie Singh RN at 24       Rosana Segura RN at 24 1849          Placed on 2L NC for comfort    Electronically signed by Rosana Segura RN at 24 191       Wenceslao Terry MD at 24 1801           EMERGENCY DEPARTMENT ENCOUNTER    Pt Name: Aster Craft  MRN: [id number]  Pt :   1945  Room Number:    Date of encounter:  2024  PCP: Yolie Cotto MD  ED Provider: Wenceslao Terry MD    Historian: Patient and Family      HPI:  Chief Complaint   Patient presents with    Shortness of Breath          Context: Aster Craft is a 79 y.o. female who presents to the ED c/o shortness of breath, chest discomfort, weakness.  The patient was out of the yard sale today, started becoming short of breath, had cough, reports is productive for white sputum.  Reports some chest discomfort like a pressure tightness sensation, feels smothered.  Denies fevers, however she does report she is currently taking antibiotics for urinary tract infection.      PAST MEDICAL HISTORY  Past Medical History:   Diagnosis Date    Arthritis     Breast cancer     RIGHT BREAST STAGE 3    Diabetes mellitus     Elevated cholesterol     GERD (gastroesophageal reflux disease) 2021     History of cancer chemotherapy     Hypertension     Hyperthyroidism 05/19/2022    Kidney stone     Lichen sclerosus     Shingles     Thickened endometrium 2018    Urinary incontinence     Urinary tract infection          PAST SURGICAL HISTORY  Past Surgical History:   Procedure Laterality Date    CATARACT EXTRACTION Right 01/10/2023    CERVICAL CONE BIOPSY      CHOLECYSTECTOMY  03/2010    COLONOSCOPY N/A 07/06/2018    Procedure: COLONOSCOPY;  Surgeon: Trenton Lyons MD;  Location: [facility] OR;  Service: Gastroenterology    DILATATION AND CURETTAGE      ENDOSCOPY N/A 07/06/2018    Procedure: ESOPHAGOGASTRODUODENOSCOPY;  Surgeon: Trenton Lyons MD;  Location: [facility] OR;  Service: Gastroenterology    MASTECTOMY Right 10/1998    MASTECTOMY Left 07/2013    TUBAL ABDOMINAL LIGATION           FAMILY HISTORY  Family History   Problem Relation Age of Onset    Breast cancer Mother     Diabetes Mother     Cancer Mother     Ovarian cancer Maternal Aunt     Cancer Maternal Aunt          SOCIAL HISTORY  Social History     Socioeconomic History    Marital status:    Tobacco Use    Smoking status: Never     Passive exposure: Never    Smokeless tobacco: Never   Vaping Use    Vaping status: Never Used   Substance and Sexual Activity    Alcohol use: No    Drug use: No    Sexual activity: Not Currently         ALLERGIES  Caffeine        REVIEW OF SYSTEMS  Review of Systems   Constitutional:  Negative for chills and fever.   HENT:  Negative for sore throat and trouble swallowing.    Eyes:  Negative for pain and redness.   Respiratory:  Positive for cough and shortness of breath.    Cardiovascular:  Positive for chest pain. Negative for leg swelling.   Gastrointestinal:  Negative for abdominal pain, nausea and vomiting.   Genitourinary:  Negative for dysuria and urgency.   Musculoskeletal:  Negative for back pain and neck pain.   Skin:  Negative for rash and wound.   Neurological:  Positive for weakness. Negative for dizziness.         All systems reviewed and negative except for those discussed in HPI.       PHYSICAL EXAM    I have reviewed the triage vital signs and nursing notes.    ED Triage Vitals [07/13/24 1706]   Temp Heart Rate Resp BP SpO2   97.7 °F (36.5 °C) 104 18 122/72 91 %      Temp src Heart Rate Source Patient Position BP Location FiO2 (%)   Oral Monitor Sitting Left arm --       Physical Exam  Constitutional:       Appearance: Normal appearance. She is not ill-appearing.   HENT:      Head: Normocephalic and atraumatic.      Right Ear: External ear normal.      Left Ear: External ear normal.      Nose: Nose normal.      Mouth/Throat:      Mouth: Mucous membranes are moist.      Pharynx: Oropharynx is clear.   Eyes:      Extraocular Movements: Extraocular movements intact.      Conjunctiva/sclera: Conjunctivae normal.      Pupils: Pupils are equal, round, and reactive to light.   Cardiovascular:      Rate and Rhythm: Normal rate and regular rhythm.      Pulses:           Radial pulses are 2+ on the right side and 2+ on the left side.   Pulmonary:      Effort: Pulmonary effort is normal.      Breath sounds: Normal breath sounds.   Abdominal:      General: There is no distension.      Palpations: Abdomen is soft.      Tenderness: There is no abdominal tenderness.   Musculoskeletal:         General: No tenderness or deformity. Normal range of motion.      Cervical back: Normal range of motion and neck supple.      Right lower leg: No edema.      Left lower leg: No edema.   Skin:     General: Skin is warm and dry.      Capillary Refill: Capillary refill takes less than 2 seconds.   Neurological:      General: No focal deficit present.      Mental Status: She is alert and oriented to person, place, and time.            LAB RESULTS  Recent Results (from the past 24 hour(s))   Comprehensive Metabolic Panel    Collection Time: 07/13/24  5:18 PM    Specimen: Blood   Result Value Ref Range    Glucose 195 (H) 65 - 99 mg/dL    BUN 28 (H) 8  - 23 mg/dL    Creatinine 0.77 0.57 - 1.00 mg/dL    Sodium 142 136 - 145 mmol/L    Potassium 4.1 3.5 - 5.2 mmol/L    Chloride 101 98 - 107 mmol/L    CO2 27.5 22.0 - 29.0 mmol/L    Calcium 9.2 8.6 - 10.5 mg/dL    Total Protein 7.5 6.0 - 8.5 g/dL    Albumin 4.5 3.5 - 5.2 g/dL    ALT (SGPT) 21 1 - 33 U/L    AST (SGOT) 19 1 - 32 U/L    Alkaline Phosphatase 93 39 - 117 U/L    Total Bilirubin 0.6 0.0 - 1.2 mg/dL    Globulin 3.0 gm/dL    A/G Ratio 1.5 g/dL    BUN/Creatinine Ratio 36.4 (H) 7.0 - 25.0    Anion Gap 13.5 5.0 - 15.0 mmol/L    eGFR 79.6 >60.0 mL/min/1.73   BNP    Collection Time: 07/13/24  5:18 PM    Specimen: Blood   Result Value Ref Range    proBNP 95.5 0.0 - 1,800.0 pg/mL   Single High Sensitivity Troponin T    Collection Time: 07/13/24  5:18 PM    Specimen: Blood   Result Value Ref Range    HS Troponin T 12 <14 ng/L   Green Top (Gel)    Collection Time: 07/13/24  5:18 PM   Result Value Ref Range    Extra Tube Hold for add-ons.    Lavender Top    Collection Time: 07/13/24  5:18 PM   Result Value Ref Range    Extra Tube hold for add-on    Gold Top - SST    Collection Time: 07/13/24  5:18 PM   Result Value Ref Range    Extra Tube Hold for add-ons.    Light Blue Top    Collection Time: 07/13/24  5:18 PM   Result Value Ref Range    Extra Tube Hold for add-ons.    CBC Auto Differential    Collection Time: 07/13/24  5:18 PM    Specimen: Blood   Result Value Ref Range    WBC 16.25 (H) 3.40 - 10.80 10*3/mm3    RBC 4.39 3.77 - 5.28 10*6/mm3    Hemoglobin 13.7 12.0 - 15.9 g/dL    Hematocrit 41.3 34.0 - 46.6 %    MCV 94.1 79.0 - 97.0 fL    MCH 31.2 26.6 - 33.0 pg    MCHC 33.2 31.5 - 35.7 g/dL    RDW 12.9 12.3 - 15.4 %    RDW-SD 44.1 37.0 - 54.0 fl    MPV 9.8 6.0 - 12.0 fL    Platelets 240 140 - 450 10*3/mm3    Neutrophil % 91.1 (H) 42.7 - 76.0 %    Lymphocyte % 6.7 (L) 19.6 - 45.3 %    Monocyte % 1.4 (L) 5.0 - 12.0 %    Eosinophil % 0.2 (L) 0.3 - 6.2 %    Basophil % 0.2 0.0 - 1.5 %    Immature Grans % 0.4 0.0 - 0.5 %     Neutrophils, Absolute 14.80 (H) 1.70 - 7.00 10*3/mm3    Lymphocytes, Absolute 1.09 0.70 - 3.10 10*3/mm3    Monocytes, Absolute 0.23 0.10 - 0.90 10*3/mm3    Eosinophils, Absolute 0.03 0.00 - 0.40 10*3/mm3    Basophils, Absolute 0.03 0.00 - 0.20 10*3/mm3    Immature Grans, Absolute 0.07 (H) 0.00 - 0.05 10*3/mm3    nRBC 0.0 0.0 - 0.2 /100 WBC   Lactic Acid, Plasma    Collection Time: 07/13/24  5:18 PM    Specimen: Blood   Result Value Ref Range    Lactate 1.4 0.5 - 2.0 mmol/L   Procalcitonin    Collection Time: 07/13/24  5:18 PM    Specimen: Blood   Result Value Ref Range    Procalcitonin 0.06 0.00 - 0.25 ng/mL   CK    Collection Time: 07/13/24  5:18 PM    Specimen: Blood   Result Value Ref Range    Creatine Kinase 77 20 - 180 U/L   Urinalysis With Microscopic If Indicated (No Culture) - Urine, Clean Catch    Collection Time: 07/13/24  6:39 PM    Specimen: Urine, Clean Catch   Result Value Ref Range    Color, UA Yellow Yellow, Straw    Appearance, UA Clear Clear    pH, UA 6.5 5.0 - 8.0    Specific Gravity, UA 1.007 1.005 - 1.030    Glucose, UA Negative Negative    Ketones, UA Negative Negative    Bilirubin, UA Negative Negative    Blood, UA Negative Negative    Protein, UA Negative Negative    Leuk Esterase, UA Negative Negative    Nitrite, UA Negative Negative    Urobilinogen, UA 0.2 E.U./dL 0.2 - 1.0 E.U./dL   Single High Sensitivity Troponin T    Collection Time: 07/13/24  7:18 PM    Specimen: Blood   Result Value Ref Range    HS Troponin T 9 <14 ng/L       If labs were ordered, I independently reviewed the results and considered them in treating the patient.        RADIOLOGY  No Radiology Exams Resulted Within Past 24 Hours        PROCEDURES    Procedures    Interpretations    O2 Sat: The patients oxygen saturation was 91% on Room Air.  This was independently interpreted by me as Normal    EKG: I reviewed and independently interpreted the EKG as sinus tach rate of 102, normal axis, elevated QRS duration, right  bundle branch block, no ST elevation, T wave inversion lead III    Cardiac Monitoring: I reviewed and independently interpreted the Rhythm Strip as Normal Sinus rhythm rate of 107    Radiology: I ordered and independently reviewed the above noted radiographic studies.  I viewed images of Chest Xray which showed  interstitial edema  per my independent interpretation. See radiologist's dictation for official interpretation.     HEART Score: 3       MEDICATIONS GIVEN IN ER    Medications   sodium chloride 0.9 % flush 10 mL (has no administration in time range)   furosemide (LASIX) injection 40 mg (40 mg Intravenous Given 7/13/24 1738)   ketorolac (TORADOL) injection 15 mg (15 mg Intravenous Given 7/13/24 1938)   orphenadrine (NORFLEX) injection 60 mg (60 mg Intravenous Given 7/13/24 1940)         MEDICAL DECISION MAKING, PROGRESS, and CONSULTS    All labs, if obtained, have been independently reviewed by me.  All radiology studies, if obtained, have been reviewed by me and the radiologist dictating the report.  All EKG's, if obtained, have been independently viewed and interpreted by me      Discussion below represents my analysis of pertinent findings related to patient's condition, differential diagnosis, treatment plan and final disposition.      Differential diagnosis:    79-year-old female presented ED with complaint of chest discomfort, has a squeezing tight sensation in the chest, she also reports some difficulty breathing and cough.  With her history of heart failure certainly concern for pulmonary edema, versus ACS although her pain is quite atypical.  As well as she was out in the heat, this could be heat related, could be pneumonia or other infectious process.  Will obtain laboratory workup including BNP, troponin, x-ray, provide IV Lasix as the patient reports she has not been taking the Lasix she was prescribed at discharge recently    Additional Sources:  External (non-ED) record review:  Discharge  summary 7/2/2024 regarding admission for respiratory failure       Orders placed during this visit:  Orders Placed This Encounter   Procedures    Blood Culture - Blood,    Blood Culture - Blood,    XR Chest 1 View    [place] Draw    Comprehensive Metabolic Panel    BNP    Single High Sensitivity Troponin T    CBC Auto Differential    Lactic Acid, Plasma    Procalcitonin    CK    Urinalysis With Microscopic If Indicated (No Culture) - Urine, Clean Catch    Single High Sensitivity Troponin T    Diet: Cardiac, Diabetic, Fluid Restriction (240 mL/tray); Low Sodium (2g); Consistent Carbohydrate; 1500 mL/day; Fluid Consistency: Thin (IDDSI 0)    Undress & Gown    Continuous Pulse Oximetry    Vital Signs    Oxygen Therapy- Nasal Cannula; Titrate 1-6 LPM Per SpO2; 90 - 95%    ECG 12 Lead ED Triage Standing Order; SOA    Insert Peripheral IV    Initiate Observation Status    CBC & Differential    Green Top (Gel)    Lavender Top    Gold Top - SST    Light Blue Top         Additional orders considered but not ordered:  None    ED Course:    Consultants:  Hospitalist    ED Course as of 07/13/24 2130   Sat Jul 13, 2024   1734 CBC & Differential(!)  WBC elevated, has known UTI however with cough, lactic/blood cultures added as well as procalcitonin [CS]   1800 BNP  BNP is negative [CS]   1800 Single High Sensitivity Troponin T  Troponin is negative, will repeat in 2 hours per protocol [CS]   1800 Lactic Acid, Plasma  Lactic negative, less likely acute septic process [CS]   1837 Procalcitonin  Procalcitonin negative, less likely significant bacterial process [CS]   1956 Single High Sensitivity Troponin T  Repeat troponin significantly improved [CS]   2106 Patient ambulated, became hypoxic requiring oxygen, she is quite weak unable to make it onto the nursing station.  As such I do feel the patient requires admission to the hospital.  I spoke directly to hospitalist Dr. Patel agrees to evaluate the patient for admission.  Patient  aware and agreeable to plan for admission [CS]      ED Course User Index  [CS] Wenceslao Terry MD           After my consideration of clinical presentation and any laboratory/radiology studies obtained, I discussed the findings with the patient/patient representative who is in agreement with the treatment plan and the final disposition. Risks and benefits of discharge were discussed.     AS OF 21:30 EDT VITALS:    BP - 107/72  HR - 96  TEMP - 97.7 °F (36.5 °C) (Oral)  O2 SATS - 96%    I reviewed the patients prescription monitoring report if available prior to discharge    DIAGNOSIS  Final diagnoses:   Acute respiratory failure with hypoxia   Hypervolemia, unspecified hypervolemia type         DISPOSITION  ED Disposition       ED Disposition   Decision to Admit    Condition   --    Comment   Level of Care: Telemetry [5]   Diagnosis: Acute on chronic diastolic CHF (congestive heart failure) [2825716]   Admitting Physician: SHEMAR MARISCAL [[id number]]   Attending Physician: SHEMAR MARISCAL [[id number]]                     Please note that portions of this document were completed with voice recognition software.        Wenceslao Terry MD  07/13/24 2131      Electronically signed by Wenceslao Terry MD at 07/13/24 2131       Vital Signs (last day)       Date/Time Temp Temp src Pulse Resp BP Patient Position SpO2    07/15/24 1145 -- -- 69 -- 111/73 -- 97    07/15/24 1132 97.8 (36.6) Oral 70 16 104/65 Lying 97    07/15/24 1115 -- -- 78 -- 117/67 -- 95    07/15/24 10:50:01 -- -- 81 15 91/53 -- 93    07/15/24 10:28:12 -- -- 81 -- 125/78 -- 96    07/15/24 10:26:39 -- -- -- -- -- -- 90    07/15/24 0734 98.2 (36.8) Oral 70 16 108/67 Lying 95    07/15/24 0436 97.7 (36.5) Oral 72 15 128/73 Lying 96    07/14/24 2326 98.2 (36.8) Oral 67 14 102/63 Lying 97    07/14/24 1900 98.4 (36.9) Oral 81 16 117/69 Sitting 95    07/14/24 1737 -- -- 88 -- -- -- 95    07/14/24 1600 -- -- 86 -- -- -- 94     07/14/24 1500 98.1 (36.7) Oral 88 18 104/63 Sitting 95    07/14/24 1300 -- -- 77 -- -- -- 95    07/14/24 1225 -- -- 78 -- -- -- 97    07/14/24 1100 98.6 (37) Oral 77 16 116/71 Sitting 96    07/14/24 0848 -- -- 82 -- -- -- 97    07/14/24 0700 98.1 (36.7) Oral 79 16 105/63 Lying 95    07/14/24 0408 98.1 (36.7) Oral 94 16 121/70 Lying 97          Current Facility-Administered Medications   Medication Dose Route Frequency Provider Last Rate Last Admin    acetaminophen (TYLENOL) tablet 650 mg  650 mg Oral Q4H PRN Brian Morrow MD   650 mg at 07/15/24 1143    Or    acetaminophen (TYLENOL) 160 MG/5ML oral solution 650 mg  650 mg Oral Q4H PRN Brian Morrow MD        Or    acetaminophen (TYLENOL) suppository 650 mg  650 mg Rectal Q4H PRN Brian Morrow MD        acetaminophen (TYLENOL) tablet 650 mg  650 mg Oral Q4H PRN Brian Morrow MD        ALPRAZolam (XANAX) tablet 0.25 mg  0.25 mg Oral TID PRN Brian Morrow MD        aluminum-magnesium hydroxide-simethicone (MAALOX MAX) 400-400-40 MG/5ML suspension 15 mL  15 mL Oral Q6H PRN Brian Morrow MD        aspirin EC tablet 325 mg  325 mg Oral Daily Brian Morrow MD   325 mg at 07/15/24 0827    [START ON 7/16/2024] aspirin EC tablet 81 mg  81 mg Oral Daily Brian Morrow MD        atorvastatin (LIPITOR) tablet 80 mg  80 mg Oral Nightly Brian Morrow MD   80 mg at 07/14/24 2111    sennosides-docusate (PERICOLACE) 8.6-50 MG per tablet 2 tablet  2 tablet Oral BID PRN Brian Morrow MD        And    polyethylene glycol (MIRALAX) packet 17 g  17 g Oral Daily PRN Brian Morrow MD        And    bisacodyl (DULCOLAX) EC tablet 5 mg  5 mg Oral Daily PRN Brian Morrow MD        And    bisacodyl (DULCOLAX) suppository 10 mg  10 mg Rectal Daily PRN Brian Morrow MD        dextrose (D50W) (25 g/50 mL) IV injection 25 g  25 g Intravenous Q15 Min PRN Brian Morrow MD        dextrose (GLUTOSE) oral gel 15 g  15 g Oral Q15 Min PRN  Breeding, Brian KATHLEEN MD        diphenhydrAMINE (BENADRYL) capsule 25 mg  25 mg Oral Nightly PRN Breeding, Brian KATHLEEN MD        diphenhydrAMINE (BENADRYL) injection 25 mg  25 mg Intravenous Q6H PRN Breeding, Brian KATHLEEN MD        Enoxaparin Sodium (LOVENOX) syringe 40 mg  40 mg Subcutaneous Daily Breeding, Brian KATHLEEN MD   40 mg at 07/14/24 1235    furosemide (LASIX) tablet 40 mg  40 mg Oral Daily Breeding, Brian KATHLEEN MD   40 mg at 07/15/24 0827    glucagon (GLUCAGEN) injection 1 mg  1 mg Intramuscular Q15 Min PRN Breeding, Brian KATHLEEN MD        guaiFENesin-dextromethorphan (ROBITUSSIN DM) 100-10 MG/5ML syrup 5 mL  5 mL Oral Q6H PRN Breeding, Brian KATHLEEN MD   5 mL at 07/14/24 1509    HYDROcodone-acetaminophen (NORCO) 5-325 MG per tablet 1 tablet  1 tablet Oral Q4H PRN Breeding, Brian KATHLEEN MD        Insulin Lispro (humaLOG) injection 2-7 Units  2-7 Units Subcutaneous 4x Daily AC & at Bedtime Breeding, Brian KATHLEEN MD   2 Units at 07/15/24 1142    metoprolol succinate XL (TOPROL-XL) 24 hr tablet 50 mg  50 mg Oral Nightly Breeding, Brian KATHLEEN MD   50 mg at 07/14/24 2106    morphine injection 4 mg  4 mg Intravenous Q1H PRN Breeding, Brian KATHLEEN MD        And    naloxone (NARCAN) injection 0.4 mg  0.4 mg Intravenous Q5 Min PRN Breeding, Brian KATHLEEN MD        nitroglycerin (NITROSTAT) SL tablet 0.4 mg  0.4 mg Sublingual Q5 Min PRN Breeding, Brian KATHLEEN MD        ondansetron ODT (ZOFRAN-ODT) disintegrating tablet 4 mg  4 mg Oral Q6H PRN Breeding, Brian KATHLEEN MD        Or    ondansetron (ZOFRAN) injection 4 mg  4 mg Intravenous Q6H PRN Breeding, Brian KATHLEEN MD        ondansetron ODT (ZOFRAN-ODT) disintegrating tablet 4 mg  4 mg Oral Q6H PRN Breeding, Brian KATHLEEN MD        Or    ondansetron (ZOFRAN) injection 4 mg  4 mg Intravenous Q6H PRN Breeding, Brian T, MD        pantoprazole (PROTONIX) EC tablet 40 mg  40 mg Oral Q AM Brian Morrow MD   40 mg at 07/15/24 0509    Pharmacy to Dose enoxaparin (LOVENOX)   Does not apply Continuous PRN Brian Morrow MD         sodium chloride 0.9 % flush 10 mL  10 mL Intravenous PRN Brian Morrow MD        sodium chloride 0.9 % flush 10 mL  10 mL Intravenous Q12H Brian Morrow MD   10 mL at 07/15/24 0827    sodium chloride 0.9 % flush 10 mL  10 mL Intravenous PRN Brian Morrow MD        sodium chloride 0.9 % infusion 40 mL  40 mL Intravenous PRN Brian Morrow MD        sodium chloride 0.9 % infusion  100 mL/hr Intravenous Continuous Brian Morrow  mL/hr at 07/15/24 1143 100 mL/hr at 07/15/24 1143    temazepam (RESTORIL) capsule 15 mg  15 mg Oral Nightly PRN Brian Morrow MD         Lab Results (last 24 hours)       Procedure Component Value Units Date/Time    POC Glucose 4x Daily Before Meals & at Bedtime [[id number]]  (Abnormal) Collected: 07/15/24 1125    Specimen: Blood Updated: 07/15/24 1136     Glucose 153 mg/dL      Comment: Serial Number: [id number]:  [id number]       POC Glucose 4x Daily Before Meals & at Bedtime [[id number]]  (Normal) Collected: 07/15/24 0742    Specimen: Blood Updated: 07/15/24 0745     Glucose 121 mg/dL      Comment: Serial Number: [id number]:  [id number]       Basic Metabolic Panel [[id number]]  (Abnormal) Collected: 07/15/24 0612    Specimen: Blood Updated: 07/15/24 0715     Glucose 130 mg/dL      BUN 22 mg/dL      Creatinine 0.46 mg/dL      Sodium 140 mmol/L      Potassium 3.5 mmol/L      Chloride 99 mmol/L      CO2 28.9 mmol/L      Calcium 8.9 mg/dL      BUN/Creatinine Ratio 47.8     Anion Gap 12.1 mmol/L      eGFR 98.7 mL/min/1.73     Narrative:      GFR Normal >60  Chronic Kidney Disease <60  Kidney Failure <15    The GFR formula is only valid for adults with stable renal function between ages 18 and 70.    CBC (No Diff) [[id number]]  (Abnormal) Collected: 07/15/24 0612    Specimen: Blood Updated: 07/15/24 0634     WBC 12.39 10*3/mm3      RBC 4.18 10*6/mm3      Hemoglobin 13.0 g/dL      Hematocrit 39.0 %      MCV 93.3 fL      MCH 31.1 pg      MCHC 33.3 g/dL       RDW 12.7 %      RDW-SD 43.8 fl      MPV 9.8 fL      Platelets 213 10*3/mm3     POC Glucose Once [[id number]]  (Abnormal) Collected: 24    Specimen: Blood Updated: 24     Glucose 168 mg/dL      Comment: Serial Number: [id number]:  [id number]       Blood Culture - Blood, Arm, Left [[id number]]  (Normal) Collected: 24 1800    Specimen: Blood from Arm, Left Updated: 24 183     Blood Culture No growth at 24 hours    Blood Culture - Blood, Hand, Left [[id number]]  (Normal) Collected: 24 181    Specimen: Blood from Hand, Left Updated: 24     Blood Culture No growth at 24 hours    POC Glucose Once [[id number]]  (Abnormal) Collected: 24 1556    Specimen: Blood Updated: 24 1800     Glucose 165 mg/dL      Comment: Serial Number: [id number]:  [id number]             Imaging Results (Last 24 Hours)       ** No results found for the last 24 hours. **             Physician Progress Notes (last 72 hours)        Trae See DO at 07/15/24 1204              [facility]    Name:  Aster Craft   Age:  79 y.o.  Sex:  female  :  1945  MRN:  [id number]   Visit Number:  [id number]  Admission Date:  2024  Date Of Service:  07/15/24  Primary Care Physician:  Yolie Cotto MD     LOS: 0 days :    Chief Complaint:      Shortness of breath, chest pain     Subjective:    Patient was seen postcardiac cath.  Resting comfortably in bed, granddaughter at bedside.  Discussed discharge planning.  Patient and granddaughter concerned about her going home and living alone.  Requested short-term rehab placement.    Hospital Course:    The patient is a 79-year-old female with a history of hypertension, diabetes not on medication, hypothyroidism, reported cardiomyopathy, with recent diagnosis of heart failure reduced ejection fraction, who presented to the emergency room with complaints of shortness of breath and chest pain.   Patient states in the past she had seen Dr. Palacios, had been on metoprolol due to reported cardiomyopathy.  She is unsure if she was ever diagnosed with atrial fibrillation.  She was asked in the hospital about 2 weeks ago due to breathing difficulty, was diuresed, and had outpatient cardiology follow-up.  She is due to see cardiology this week.  She noted today while outside at a yard sale she developed symptoms of chest pain, pressure, heaviness in her chest and associated shortness of breath.  She went inside to rest, cool down, but continued to have symptoms and was brought to the ER.  Currently is feeling better.  She is not currently taking diuretics at home.  She is not on any medications for diabetes, she wants to talk with her PCP about that.  She notes a stress test performed many years ago, no recent cardiac procedures or testing other than echocardiogram at last hospital stay.     In the ER, the patient was overall hemodynamically stable.  She was borderline hypoxic with ambulation and is currently on 2 L of oxygen.  She was given IV Lasix.  Her labs are largely nonactionable.  She has been on Macrobid due to recent UTI with clear urine sample tonight.  Chest x-ray with some mild interstitial edema.  EKG and troponins reassuring.  Admitted for observation.    Review of Systems:     All systems were reviewed and negative except as mentioned in subjective, assessment and plan.    Vital Signs:    Temp:  [97.7 °F (36.5 °C)-98.4 °F (36.9 °C)] 97.8 °F (36.6 °C)  Heart Rate:  [67-88] 69  Resp:  [14-18] 16  BP: ()/(53-78) 111/73    Intake and output:    I/O last 3 completed shifts:  In: 435 [P.O.:435]  Out: 2700 [Urine:2700]  No intake/output data recorded.    Physical Examination:    General Appearance:  Alert and cooperative.    Head:  Atraumatic and normocephalic.   Eyes: Conjunctivae and sclerae normal, no icterus. No pallor.   Throat: No oral lesions, no thrush, oral mucosa moist.   Neck: Supple,  trachea midline, no thyromegaly.   Lungs:   Breath sounds heard bilaterally equally.  No wheezing or crackles. No Pleural rub or bronchial breathing.   Heart:  Normal S1 and S2, no murmur, no gallop, no rub. No JVD.   Abdomen:   Normal bowel sounds, no masses, no organomegaly. Soft, nontender, nondistended, no rebound tenderness.   Extremities: Supple, no edema, no cyanosis, no clubbing.   Skin: No bleeding or rash.   Neurologic: Alert and oriented x 3. No facial asymmetry. Moves all four limbs. No tremors.      Laboratory results:    Results from last 7 days   Lab Units 07/15/24  0612 07/14/24  0610 07/13/24  1718   SODIUM mmol/L 140 137 142   POTASSIUM mmol/L 3.5 3.5 4.1   CHLORIDE mmol/L 99 98 101   CO2 mmol/L 28.9 26.7 27.5   BUN mg/dL 22 29* 28*   CREATININE mg/dL 0.46* 0.51* 0.77   CALCIUM mg/dL 8.9 8.7 9.2   BILIRUBIN mg/dL  --   --  0.6   ALK PHOS U/L  --   --  93   ALT (SGPT) U/L  --   --  21   AST (SGOT) U/L  --   --  19   GLUCOSE mg/dL 130* 188* 195*     Results from last 7 days   Lab Units 07/15/24  0612 07/14/24  0610 07/13/24  1718   WBC 10*3/mm3 12.39*  --  16.25*   HEMOGLOBIN g/dL 13.0 12.9 13.7   HEMATOCRIT % 39.0 38.4 41.3   PLATELETS 10*3/mm3 213  --  240         Results from last 7 days   Lab Units 07/13/24  1918 07/13/24  1718   CK TOTAL U/L  --  77   HSTROP T ng/L 9 12     Results from last 7 days   Lab Units 07/13/24  1812 07/13/24  1800   BLOODCX  No growth at 24 hours No growth at 24 hours     Recent Labs     06/29/24  1924   PHART 7.394   HMT0KTD 44.1   PO2ART 72.5*   PUJ1XXH 26.9   BASEEXCESS 1.6      I have reviewed the patient's laboratory results.    Radiology results:    Cardiac Catheterization/Vascular Study    Result Date: 7/15/2024  Angiographically near normal coronary arteries Noncardiac chest pain     XR Chest 1 View    Result Date: 7/14/2024  PROCEDURE: XR CHEST 1 VW-  HISTORY: SOA Triage Protocol, shortness of breath and chest pressure beginning today.  COMPARISON: June 29,  "2024.  FINDINGS: The heart is mildly enlarged, stable.. The lungs are clear. The mediastinum is unremarkable. There is no pneumothorax.  There are no acute osseous abnormalities.      Impression: No acute cardiopulmonary process.      This report was signed and finalized on 7/14/2024 8:45 AM by Iraida Kruse MD.     I have reviewed the patient's radiology reports.    Medication Review:     I have reviewed the patient's active and prn medications.     Problem List:      Chest pain, atypical    GERD (gastroesophageal reflux disease)    Hyperthyroidism    Acute on chronic combined systolic and diastolic CHF (congestive heart failure)    Type 2 diabetes mellitus without complication, without long-term current use of insulin    Acute on chronic diastolic heart failure      Assessment:    Acute on chronic heart failure midrange ejection fraction/diastolic exacerbation, POA  Chest pain  Tachycardia, A-fib history?  Uncontrolled type 2 diabetes  Hyperthyroidism  Prior breast cancer  GERD  Hypertension    Plan:    CHF/  Non-Cardiac chest pain:  -Recent echo with midrange ejection fraction noted.    -Was not taking diuretics outpatient.  Placed back on Lasix.    -Continue the metoprolol.     -Troponin stable.    -Telemetry monitoring.  She also has outpatient Holter monitor on currently.  -Dr. Morrow cardiology consulted.  Patient underwent a cardiac catheterization 7/15/2024.  Angiographically \"near normal\" coronary arteries.  Recommend to optimize medical therapy and evaluate for noncardiac causes of chest pain.  -Highly suspicious patient has underlying anxiety.     Arrhythmia:  Patient reports prior history of tachycardia, unsure if actually atrial fibrillation.  She has been on metoprolol for multiple years.  EKG normal here in sinus rhythm.  She has outpatient Holter monitor currently on.     Diabetes:  Patient not taking anything at home for this.  Notes had been diet controlled, understands A1c is elevated now.  " Recommend she take medication for this     Hyperthyroidism:  Follows with endocrinology, is known to have thyroid nodules.  Will check TSH labs.  Obviously can contribute to heart disease as well as arrhythmia.     PT/OT consulted.  Further recommendations depend upon clinical course.  Plan of care discussed with the patient       DVT Prophylaxis: Lovenox  Code Status: Full  Diet: Diabetic cardiac fluid restriction  Discharge Plan: Short-term rehab    Trae See DO  07/15/24  12:05 EDT    Dictated utilizing Dragon dictation.        Electronically signed by Trae See DO at 07/15/24 1208       German Mae MD at 24 1038              [facility]    Name:  Aster Craft   Age:  79 y.o.  Sex:  female  :  1945  MRN:  [id number]   Visit Number:  [id number]  Admission Date:  2024  Date Of Service:  24  Primary Care Physician:  Yolie Cotto MD     LOS: 0 days :    Chief Complaint:      Shortness of breath, chest pain     Subjective:    Patient was seen and examined at bedside today.  Patient resting comfortably in bed with no distress.  Patient reports improvement and feeling better today.  She reports that her chest pain/pressure is resolved now.  She reports her pain basically started as cramping in her legs and radiated up to her chest yesterday.  Reports improvement on her legs cramping too.  She is hemodynamically stable, afebrile, on 2 L nasal cannula.  She is not on oxygen at baseline.    Hospital Course:    The patient is a 79-year-old female with a history of hypertension, diabetes not on medication, hypothyroidism, reported cardiomyopathy, with recent diagnosis of heart failure reduced ejection fraction, who presented to the emergency room with complaints of shortness of breath and chest pain.  Patient states in the past she had seen Dr. Palacios, had been on metoprolol due to reported cardiomyopathy.  She is unsure if she was  ever diagnosed with atrial fibrillation.  She was asked in the hospital about 2 weeks ago due to breathing difficulty, was diuresed, and had outpatient cardiology follow-up.  She is due to see cardiology this week.  She noted today while outside at a yard sale she developed symptoms of chest pain, pressure, heaviness in her chest and associated shortness of breath.  She went inside to rest, cool down, but continued to have symptoms and was brought to the ER.  Currently is feeling better.  She is not currently taking diuretics at home.  She is not on any medications for diabetes, she wants to talk with her PCP about that.  She notes a stress test performed many years ago, no recent cardiac procedures or testing other than echocardiogram at last hospital stay.     In the ER, the patient was overall hemodynamically stable.  She was borderline hypoxic with ambulation and is currently on 2 L of oxygen.  She was given IV Lasix.  Her labs are largely nonactionable.  She has been on Macrobid due to recent UTI with clear urine sample tonight.  Chest x-ray with some mild interstitial edema.  EKG and troponins reassuring.  Admitted for observation.    Review of Systems:     All systems were reviewed and negative except as mentioned in subjective, assessment and plan.    Vital Signs:    Temp:  [97.7 °F (36.5 °C)-98.4 °F (36.9 °C)] 98.1 °F (36.7 °C)  Heart Rate:  [] 82  Resp:  [16-18] 16  BP: (105-130)/(63-76) 105/63    Intake and output:    I/O last 3 completed shifts:  In: -   Out: 700 [Urine:700]  I/O this shift:  In: 195 [P.O.:195]  Out: -     Physical Examination:    General Appearance:  Alert and cooperative.  No acute distress.   Head:  Atraumatic and normocephalic.   Eyes: Conjunctivae and sclerae normal, no icterus. No pallor.   Throat: No oral lesions, no thrush, oral mucosa moist.   Neck: Supple, trachea midline, no thyromegaly.   Lungs:   Breath sounds heard bilaterally equally.  No wheezing or crackles. No  Pleural rub or bronchial breathing.   Heart:  Normal S1 and S2, no murmur, no gallop, no rub. No JVD.   Abdomen:   Normal bowel sounds, no masses, no organomegaly. Soft, nontender, nondistended, no rebound tenderness.   Extremities: Supple, no edema, no cyanosis, no clubbing.   Skin: No bleeding or rash.   Neurologic: Alert and oriented x 3. No facial asymmetry. Moves all four limbs. No tremors.  Generalized weakness noted.     Laboratory results:    Results from last 7 days   Lab Units 07/14/24  0610 07/13/24  1718   SODIUM mmol/L 137 142   POTASSIUM mmol/L 3.5 4.1   CHLORIDE mmol/L 98 101   CO2 mmol/L 26.7 27.5   BUN mg/dL 29* 28*   CREATININE mg/dL 0.51* 0.77   CALCIUM mg/dL 8.7 9.2   BILIRUBIN mg/dL  --  0.6   ALK PHOS U/L  --  93   ALT (SGPT) U/L  --  21   AST (SGOT) U/L  --  19   GLUCOSE mg/dL 188* 195*     Results from last 7 days   Lab Units 07/14/24  0610 07/13/24  1718   WBC 10*3/mm3  --  16.25*   HEMOGLOBIN g/dL 12.9 13.7   HEMATOCRIT % 38.4 41.3   PLATELETS 10*3/mm3  --  240         Results from last 7 days   Lab Units 07/13/24  1918 07/13/24  1718   CK TOTAL U/L  --  77   HSTROP T ng/L 9 12         Recent Labs     06/29/24  1924   PHART 7.394   [id number] 44.1   PO2ART 72.5*   [id number] 26.9   BASEEXCESS 1.6      I have reviewed the patient's laboratory results.    Radiology results:    XR Chest 1 View    Result Date: 7/14/2024  PROCEDURE: XR CHEST 1 VW-  HISTORY: SOA Triage Protocol, shortness of breath and chest pressure beginning today.  COMPARISON: June 29, 2024.  FINDINGS: The heart is mildly enlarged, stable.. The lungs are clear. The mediastinum is unremarkable. There is no pneumothorax.  There are no acute osseous abnormalities.      Impression: No acute cardiopulmonary process.      This report was signed and finalized on 7/14/2024 8:45 AM by Iraida Kruse MD.     I have reviewed the patient's radiology reports.    Medication Review:     I have reviewed the patient's active and prn medications.      Problem List:      Acute on chronic combined systolic and diastolic CHF (congestive heart failure)    GERD (gastroesophageal reflux disease)    Hyperthyroidism    Type 2 diabetes mellitus without complication, without long-term current use of insulin      Assessment:    Acute on chronic heart failure midrange ejection fraction/diastolic exacerbation, POA  Chest pain  Tachycardia, A-fib history?  Uncontrolled type 2 diabetes  Hyperthyroidism  Prior breast cancer  GERD  Hypertension    Plan:    CHF/chest pain:  Recent echo with midrange ejection fraction noted.  Was not taking diuretics outpatient.  Placed back on Lasix.  Continue the metoprolol.  She is due to see cardiology outpatient, however with ongoing symptoms concerning for possible angina will have Dr. Morrow see in consultation.  Troponin stable.  Telemetry monitoring.  She also has outpatient Holter monitor on currently.     Arrhythmia:  Patient reports prior history of tachycardia, unsure if actually atrial fibrillation.  She has been on metoprolol for multiple years.  EKG normal here in sinus rhythm.  She has outpatient Holter monitor currently on.     Diabetes:  Patient not taking anything at home for this.  Notes had been diet controlled, understands A1c is elevated now.  Recommend she take medication for this     Hyperthyroidism:  Follows with endocrinology, is known to have thyroid nodules.  Will check TSH labs.  Obviously can contribute to heart disease as well as arrhythmia.     PT/OT consulted.  Further recommendations depend upon clinical course.  Plan of care discussed with the patient  I have reviewed the copied text and it is accurate as of 7/14/2024     DVT Prophylaxis: Lovenox  Code Status: Full  Diet: Diabetic cardiac fluid restriction  Discharge Plan: Likely discharge in 1 to 2 days.    German Mae MD  07/14/24  10:38 EDT    Dictated utilizing Dragon dictation.      Electronically signed by German Mae MD at 07/14/24 6383    "       Consult Notes (last 72 hours)        Brian Morrow MD at 07/14/24 1119        Consult Orders    1. Inpatient Cardiology Consult [[id number]] ordered by German Mae MD at 07/14/24 0901                 [facility]-Cardiology Consult Note    Referring Provider: Tu  Reason for Consultation: Chest pain    Patient Care Team:  Yolie Cotto MD as PCP - General (Family Medicine)  Susannah Morley PA-C as Physician Assistant (Physician Assistant)    Chief complaint : Chest pain    Subjective:    History of present illness: This is a 79-year-old female patient who is readmitted for chest discomfort.  The patient describes having a diffuse anterior heaviness across her central chest.  The discomfort does not radiate.  It is associated with shortness of breath, diaphoresis and palpitations.  She reports feeling as though her heart is \"racing and pounding out of my chest\".  On both hospitalizations her twelve-lead electrocardiogram has showed sinus rhythm with a right bundle branch block but no high risk ischemic ST-T wave changes or injury current.  Cardiac troponins have been serially normal on both hospitalizations.  She has no history of coronary artery disease or prior myocardial infarction.  The patient was seen in the outpatient cardiology clinic and was recommended to have an echocardiogram, Holter monitor and a vasodilator nuclear stress test.  The echocardiogram showed an ejection fraction of 45-50%.  She is currently wearing the heart monitor.  Her nuclear stress test has not yet been performed.  It was scheduled for Thursday of last week, but the patient indicates she was unaware that was the test date.  She is a lifelong non-smoker.  She is a survivor of stage III breast cancer.  She has a history of hypertension, type 2 diabetes mellitus and dyslipidemia.  She indicates that her blood pressure has been running \"low\" over the last several months and her beta-blocker dose has been subsequently down " titrated.    Review of Systems   Review of Systems   Constitutional: Positive for diaphoresis. Negative for chills, fever, malaise/fatigue, weight gain and weight loss.   HENT:  Negative for ear discharge, hearing loss, hoarse voice and nosebleeds.    Eyes:  Negative for discharge, double vision, pain and photophobia.   Cardiovascular:  Positive for chest pain. Negative for claudication, cyanosis, dyspnea on exertion, irregular heartbeat, leg swelling, near-syncope, orthopnea, palpitations, paroxysmal nocturnal dyspnea and syncope.   Respiratory:  Positive for shortness of breath. Negative for cough, hemoptysis, sputum production and wheezing.    Endocrine: Negative for cold intolerance, heat intolerance, polydipsia, polyphagia and polyuria.   Hematologic/Lymphatic: Negative for adenopathy and bleeding problem. Does not bruise/bleed easily.   Skin:  Negative for color change, flushing, itching and rash.   Musculoskeletal:  Negative for muscle cramps, muscle weakness, myalgias and stiffness.   Gastrointestinal:  Negative for abdominal pain, diarrhea, hematemesis, hematochezia, nausea and vomiting.   Genitourinary:  Negative for dysuria, frequency and nocturia.   Neurological:  Negative for focal weakness, loss of balance, numbness, paresthesias and seizures.   Psychiatric/Behavioral:  Negative for altered mental status, hallucinations and suicidal ideas.    Allergic/Immunologic: Negative for HIV exposure, hives and persistent infections.       History  Past Medical History:   Diagnosis Date    Arthritis     Breast cancer     RIGHT BREAST STAGE 3    Diabetes mellitus     Elevated cholesterol     GERD (gastroesophageal reflux disease) 08/23/2021    History of cancer chemotherapy     Hypertension     Hyperthyroidism 05/19/2022    Kidney stone     Lichen sclerosus     Shingles     Thickened endometrium 2018    Urinary incontinence     Urinary tract infection    ,   Past Surgical History:   Procedure Laterality Date     CATARACT EXTRACTION Right 01/10/2023    CERVICAL CONE BIOPSY      CHOLECYSTECTOMY  03/2010    COLONOSCOPY N/A 07/06/2018    Procedure: COLONOSCOPY;  Surgeon: Trenton Lyons MD;  Location:  [facility];  Service: Gastroenterology    DILATATION AND CURETTAGE      ENDOSCOPY N/A 07/06/2018    Procedure: ESOPHAGOGASTRODUODENOSCOPY;  Surgeon: Trenton Lyons MD;  Location:  [facility];  Service: Gastroenterology    MASTECTOMY Right 10/1998    MASTECTOMY Left 07/2013    TUBAL ABDOMINAL LIGATION     ,   Family History   Problem Relation Age of Onset    Breast cancer Mother     Diabetes Mother     Cancer Mother     Ovarian cancer Maternal Aunt     Cancer Maternal Aunt    ,   Social History     Tobacco Use    Smoking status: Never     Passive exposure: Never    Smokeless tobacco: Never   Vaping Use    Vaping status: Never Used   Substance Use Topics    Alcohol use: No    Drug use: No   ,   Medications Prior to Admission   Medication Sig Dispense Refill Last Dose    benzonatate (TESSALON) 200 MG capsule Take 100 mg by mouth 3 times a day.   7/13/2024    Blood Glucose Monitoring Suppl (OneTouch Verio Flex System) w/Device kit See Admin Instructions. for testing   7/13/2024    calcium carbonate-cholecalciferol 500-400 MG-UNIT tablet tablet Take  by mouth Daily.   7/12/2024 at 2200    ezetimibe (ZETIA) 10 MG tablet Take 1 tablet by mouth Daily.   7/12/2024 at 2200    Lancets (OneTouch Delica Plus Kmuzip24C) misc USE AS DIRECTED EVERY DAY   7/13/2024    metoprolol succinate XL (TOPROL-XL) 50 MG 24 hr tablet Take 1 tablet by mouth Daily.   7/12/2024 at 2200    nitrofurantoin, macrocrystal-monohydrate, (MACROBID) 100 MG capsule Take 1 capsule by mouth 2 (Two) Times a Day. 10 capsule 0 7/13/2024 at 1100    INVOLTAuch Verio test strip Daily. for testing   7/13/2024    pantoprazole (PROTONIX) 40 MG EC tablet Take 1 tablet by mouth.   7/12/2024 at 1700    Rhopressa 0.02 % solution Administer 1 drop to both eyes Every Night.   7/12/2024 at  2200    vitamin D3 125 MCG (5000 UT) capsule capsule Take 1 capsule by mouth Daily.   7/12/2024 at 2200    albuterol sulfate  (90 Base) MCG/ACT inhaler TWO PUFFS EVERY SIX HOURS AS NEEDED (Patient not taking: Reported on 7/11/2024)       azelastine (ASTELIN) 0.1 % nasal spray INHALE ONE SPRAY IN EACH NOSTRIL TWICE DAILY (Patient not taking: Reported on 7/11/2024)       brimonidine-timolol (COMBIGAN) 0.2-0.5 % ophthalmic solution Administer 1 drop to both eyes Every 12 (Twelve) Hours. (Patient not taking: Reported on 7/11/2024)       cetirizine (zyrTEC) 10 MG tablet Take 1 tablet by mouth Daily. (Patient not taking: Reported on 7/11/2024)       Cholecalciferol 250 MCG (05449 UT) capsule Take 5,000 Units by mouth Daily.       CINNAMON PO Take  by mouth. (Patient not taking: Reported on 7/11/2024)       clobetasol (TEMOVATE) 0.05 % ointment Apply to affected area BID x 2 wks then daily x 2 wks then 2-3x/wk 60 g 6 Unknown    Continuous Blood Gluc Sensor (FreeStyle Malina 2 Sensor) misc CHANGE SENSOR EVERY 14 DAYS   Unknown    desloratadine (CLARINEX) 5 MG tablet Take 1 tablet by mouth Daily. (Patient not taking: Reported on 7/11/2024)       dextromethorphan-guaifenesin (ROBITUSSIN-DM)  MG/5ML syrup TAKE 10ML EVERY 4 HOURS AS NEEDED       diazePAM (VALIUM) 5 MG tablet TAKE 1 TABLET one hour prior TO procedure (Patient not taking: Reported on 7/11/2024)       dorzolamide (TRUSOPT) 2 % ophthalmic solution INSTILL ONE DROP IN EACH EYE TWICE DAILY (Patient not taking: Reported on 7/11/2024)       Flovent  MCG/ACT inhaler Inhale 2 puffs 2 (Two) Times a Day. (Patient not taking: Reported on 7/11/2024)       furosemide (Lasix) 20 MG tablet Take 1 tablet by mouth Daily. 5 tablet 0 Unknown    glipizide (GLUCOTROL XL) 5 MG ER tablet  (Patient not taking: Reported on 7/11/2024)       ipratropium (ATROVENT) 0.03 % nasal spray INHALE ONE SPRAY IN EACH NOSTRIL TWICE DAILY (Patient not taking: Reported on 7/11/2024)   "     ipratropium (ATROVENT) 0.06 % nasal spray INHALE 1-2 SPRAYS IN EACH NOSTRIL TWICE DAILY (Patient not taking: Reported on 7/11/2024)       Januvia 100 MG tablet Take 1 tablet by mouth Daily. (Patient not taking: Reported on 7/11/2024)       meclizine (ANTIVERT) 12.5 MG tablet Take 1 tablet by mouth 3 (Three) Times a Day As Needed.   Unknown    nitroglycerin (NITROSTAT) 0.4 MG SL tablet Place 1 tablet under the tongue.       simethicone (MYLICON) 80 MG chewable tablet Chew 1 tablet Every 6 (Six) Hours As Needed for Flatulence.   Unknown    Triamcinolone Acetonide (NASACORT) 55 MCG/ACT nasal inhaler 2 sprays into the nostril(s) as directed by provider Daily. (Patient not taking: Reported on 7/11/2024)       Vibegron (Gemtesa) 75 MG tablet Take 1 tablet by mouth Daily. 30 tablet 11     and Allergies:  Caffeine    Objective:    Vital Sign Min/Max for last 24 hours  Temp  Min: 97.7 °F (36.5 °C)  Max: 98.4 °F (36.9 °C)   BP  Min: 105/63  Max: 130/76   Pulse  Min: 79  Max: 117   Resp  Min: 16  Max: 18   SpO2  Min: 89 %  Max: 97 %   Flow (L/min)  Min: 2  Max: 2   Weight  Min: 57.6 kg (127 lb)  Max: 57.6 kg (127 lb)     Flowsheet Rows      Flowsheet Row First Filed Value   Admission Height 160 cm (63\") Documented at 07/13/2024 1706   Admission Weight 57.6 kg (127 lb) Documented at 07/13/2024 1706                 Physical Exam:   Vitals and nursing note reviewed.   Constitutional:       Appearance: Healthy appearance. Not in distress.   Neck:      Vascular: No JVR. JVD normal.   Pulmonary:      Effort: Pulmonary effort is normal.      Breath sounds: Normal breath sounds. No wheezing. No rhonchi. No rales.   Chest:      Chest wall: Not tender to palpatation.   Cardiovascular:      PMI at left midclavicular line. Normal rate. Regular rhythm. Normal S1. Normal S2.       Murmurs: There is no murmur.      No gallop.  No click. No rub.   Pulses:     Intact distal pulses.   Edema:     Peripheral edema absent.   Abdominal:      " General: Bowel sounds are normal.      Palpations: Abdomen is soft.      Tenderness: There is no abdominal tenderness.   Musculoskeletal: Normal range of motion.         General: No tenderness. Skin:     General: Skin is warm and dry.   Neurological:      General: No focal deficit present.      Mental Status: Alert and oriented to person, place and time.         Results Review:   I reviewed the patient's new clinical results.  Results from last 7 days   Lab Units 07/14/24  0610 07/13/24  1718   WBC 10*3/mm3  --  16.25*   HEMOGLOBIN g/dL 12.9 13.7   HEMATOCRIT % 38.4 41.3   PLATELETS 10*3/mm3  --  240     Results from last 7 days   Lab Units 07/14/24  0610 07/13/24  1718   SODIUM mmol/L 137 142   POTASSIUM mmol/L 3.5 4.1   CHLORIDE mmol/L 98 101   CO2 mmol/L 26.7 27.5   BUN mg/dL 29* 28*   CREATININE mg/dL 0.51* 0.77   GLUCOSE mg/dL 188* 195*   CALCIUM mg/dL 8.7 9.2     Lab Results   Lab Value Date/Time    TROPONINT 9 07/13/2024 1918    TROPONINT 12 07/13/2024 1718    TROPONINT 11 07/01/2024 1234    TROPONINT 11 07/01/2024 1047    TROPONINT 8 06/29/2024 2036    TROPONINT 10 06/29/2024 1832    TROPONINT 10 06/29/2024 1634    TROPONINT 8 02/16/2024 1547    TROPONINT 9 02/16/2024 1326             Assessment/Plan:      Acute on chronic combined systolic and diastolic CHF (congestive heart failure)    GERD (gastroesophageal reflux disease)    Hyperthyroidism    Type 2 diabetes mellitus without complication, without long-term current use of insulin      Atypical chest pain.  Multiple hospitalizations for atypical chest pain.  I have recommended invasive coronary angiography from an anticipated radial approach with interventional standby, tomorrow morning.  Ms. Craft has been engaged in a patient level discussion explaining the rationale for proceeding with invasive procedure.  The procedure of coronary angiography with catheter-based coronary revascularization has been explained in detail, using layman's terminology,  including risks, benefits and alternatives.  She expresses understanding and desire to proceed.  Further recommendations will be predicated on the results of her catheterization findings.  She will need to be n.p.o. after midnight.    I discussed the patient's findings and my recommendations with patient    Brian KATHLEEN. MD [place]  07/14/24  11:19 EDT            Electronically signed by Brian Morrow MD at 07/14/24 9585

## 2024-07-15 NOTE — CASE MANAGEMENT/SOCIAL WORK
Case Management/Social Work    Patient Name:  Aster Craft  YOB: 1945  MRN: [id number]  Admit Date:  7/13/2024        YANELY has sent referral to [facility] per request of family. YANELY following.       13:52 EDT YANELY spoke with [facility] who can offer bed. YANELY unsure if pt is medically ready. YANELY will start pre-cert when know more. YANELY following       Electronically signed by:  CECY Guido  07/15/24 13:01 EDT

## 2024-07-15 NOTE — PLAN OF CARE
Goal Outcome Evaluation:   VSS. Patient NPO since midnight for heart cath this AM. No acute events this shift.

## 2024-07-15 NOTE — ACP (ADVANCE CARE PLANNING)
I spoke with the pt at her bedside.  A Living Will was completed designating her granddaughter,Sanna Draper,  as her surrogate decision maker.

## 2024-07-15 NOTE — CONSULTS
"Diabetes Education    Patient Name:  sAter Craft  YOB: 1945  MRN: [id number]  Admit Date:  7/13/2024        Second attempt to see pt. For DM education.  Pt. Has multiple visitors in her room.  Discussed that I was leaving her with written information: \"Diabetes Basics\", BG logs and the Healthy Plate Method.  I will re-attempt to see pt. Later this afternoon.      Electronically signed by:  Ronna Gan RN  07/15/24 14:17 EDT  "

## 2024-07-15 NOTE — CONSULTS
"Diabetes Education  Assessment/Teaching    Patient Name:  Aster Craft  YOB: 1945  MRN: [id number]  Admit Date:  7/13/2024      Assessment Date:  7/15/2024  Flowsheet Row Most Recent Value   General Information     Height 160 cm (63\")   Height Method Stated   Weight 57.6 kg (127 lb)   Pregnancy Assessment    Diabetes History    What type of diabetes do you have? Type 2   Length of Diabetes Diagnosis 1 - 5 years  [Pt. thinks she was diagnosed about 4-5 years ago]   Current DM knowledge good   Do you test your blood sugar at home? yes   Frequency of checks Freestyle radha 2 CGM- continuous   Meter type CGM   Who performs the test? self   Typical readings fasting about 178-200   Have you had low blood sugar? (<70mg/dl) no   Have you had high blood sugar? (>140mg/dl) yes   How often do you have high blood sugar? frequently   When was your last high blood sugar? on admit 195   Education Preferences    What areas of diabetes would you like to learn about? avoiding high blood sugar, avoiding low blood sugar, diabetes complications, diet information, medications for diabetes, understanding diabetes   Nutrition Information    Are you currently following a special meal plan? occasionally   Do you eat mostly at home or out of the house? at home   Assessment Topics    Healthy Eating - Assessment Needs education  [Reviewed no sugary drinks, limiting concentrated sweets.  pt. states that she is not a big sweets eater.  discussed portioning carb foods based on the Healthy Plate Method.]   Being Active - Assessment --  [pt. is very active at home prior to hospitalization]   Taking Medication - Assessment Needs education  [pt. states that although she has Glipizide and Januvia odered she has never taken any medicines for her DM.  pt. states that she was hoping to control by diet only.]   Problem Solving - Assessment Competent   Reducing Risk - Assessment Competent   Healthy Coping - Assessment Competent "   Monitoring - Assessment Competent   DM Goals             Flowsheet Row Most Recent Value   DM Education Needs    Meter Has own   Meter Type --  [Freestyle Malina 2]   Frequency of Testing Other (comment)  [continuous]   Blood Glucose Target 150   Blood Glucose Target Range fasting  and 2 hours after meals no higher than 180   Medication Oral, Actions, Side effects  [pt. states that she is willing to start on medication.  States that she would like a med without a lot of side effects.  Pt's son had a bad experience with Metformin and she is reluctant to try it.]   Problem Solving Hypoglycemia, Hyperglycemia, Signs, Symptoms, Treatment   Healthy Eating Reviewed meal plan, Basic meal plan provided   Healthy Coping Appropriate   Motivation Engaged, Strong   Teaching Method Explanation, Discussion, Handouts   Patient Response Verbalized understanding              Other Comments:  DM education referral related to non-compliance.  Pt. Is a 79 y.o female with a 4-5 year diagnosis of Type 2 DM.  A1c today is 8.9% .  Pt. States that she was hoping to control her DM by diet and activity.  Discussed that she has been wearing a CGM and fasting BG is around 178-200 and randomly throughout the day her highest number is about 275.Discussed with pt. The progressive nature of DM.  Discussed overtime pt's may become more resistant or have loss of some insulin production and although doing a good job on the diet they may need medication to deal with one of the specific problems with type 2.  Pt. Was prescribed glipizide about 2-3 weeks ago ,but did not start it as she was trying to diet control.  Pt's stated that a friend or family member was having good control with Januvia and this was called in for the patient ,but again pt. Has not started this medication.  Discussed being alert and knowing how to treat hypoglycemia if her BG was less than 70.  Pt. Would have benefit to kidneys , CHF and some impact on fatty liver from  "SGLT-2. However, pt. Does get frequent UTI's.  Pt. Was provided written information about target goals in her \"Diabetes Basics\" book, pt. Was given BG logs.  Discussed writing down her premeal , 2 hour post meal and evening BG and taking logs with her to her provider visits.  Pt. Was also provided with the Healthy Plate Method.  Pt. Was encouraged to contact me should she have any further DM questions.          Electronically signed by:  Ronna Gan RN  07/15/24 16:06 EDT  "

## 2024-07-15 NOTE — PROGRESS NOTES
[facility]    Name:  Aster Craft   Age:  79 y.o.  Sex:  female  :  1945  MRN:  [id number]   Visit Number:  [id number]  Admission Date:  2024  Date Of Service:  07/15/24  Primary Care Physician:  Yolie Cotto MD     LOS: 0 days :    Chief Complaint:      Shortness of breath, chest pain     Subjective:    Patient was seen postcardiac cath.  Resting comfortably in bed, granddaughter at bedside.  Discussed discharge planning.  Patient and granddaughter concerned about her going home and living alone.  Requested short-term rehab placement.    Hospital Course:    The patient is a 79-year-old female with a history of hypertension, diabetes not on medication, hypothyroidism, reported cardiomyopathy, with recent diagnosis of heart failure reduced ejection fraction, who presented to the emergency room with complaints of shortness of breath and chest pain.  Patient states in the past she had seen Dr. Palacios, had been on metoprolol due to reported cardiomyopathy.  She is unsure if she was ever diagnosed with atrial fibrillation.  She was asked in the hospital about 2 weeks ago due to breathing difficulty, was diuresed, and had outpatient cardiology follow-up.  She is due to see cardiology this week.  She noted today while outside at a yard sale she developed symptoms of chest pain, pressure, heaviness in her chest and associated shortness of breath.  She went inside to rest, cool down, but continued to have symptoms and was brought to the ER.  Currently is feeling better.  She is not currently taking diuretics at home.  She is not on any medications for diabetes, she wants to talk with her PCP about that.  She notes a stress test performed many years ago, no recent cardiac procedures or testing other than echocardiogram at last hospital stay.     In the ER, the patient was overall hemodynamically stable.  She was borderline hypoxic with ambulation and is  currently on 2 L of oxygen.  She was given IV Lasix.  Her labs are largely nonactionable.  She has been on Macrobid due to recent UTI with clear urine sample tonight.  Chest x-ray with some mild interstitial edema.  EKG and troponins reassuring.  Admitted for observation.    Review of Systems:     All systems were reviewed and negative except as mentioned in subjective, assessment and plan.    Vital Signs:    Temp:  [97.7 °F (36.5 °C)-98.4 °F (36.9 °C)] 97.8 °F (36.6 °C)  Heart Rate:  [67-88] 69  Resp:  [14-18] 16  BP: ()/(53-78) 111/73    Intake and output:    I/O last 3 completed shifts:  In: 435 [P.O.:435]  Out: 2700 [Urine:2700]  No intake/output data recorded.    Physical Examination:    General Appearance:  Alert and cooperative.    Head:  Atraumatic and normocephalic.   Eyes: Conjunctivae and sclerae normal, no icterus. No pallor.   Throat: No oral lesions, no thrush, oral mucosa moist.   Neck: Supple, trachea midline, no thyromegaly.   Lungs:   Breath sounds heard bilaterally equally.  No wheezing or crackles. No Pleural rub or bronchial breathing.   Heart:  Normal S1 and S2, no murmur, no gallop, no rub. No JVD.   Abdomen:   Normal bowel sounds, no masses, no organomegaly. Soft, nontender, nondistended, no rebound tenderness.   Extremities: Supple, no edema, no cyanosis, no clubbing.   Skin: No bleeding or rash.   Neurologic: Alert and oriented x 3. No facial asymmetry. Moves all four limbs. No tremors.      Laboratory results:    Results from last 7 days   Lab Units 07/15/24  0612 07/14/24  0610 07/13/24  1718   SODIUM mmol/L 140 137 142   POTASSIUM mmol/L 3.5 3.5 4.1   CHLORIDE mmol/L 99 98 101   CO2 mmol/L 28.9 26.7 27.5   BUN mg/dL 22 29* 28*   CREATININE mg/dL 0.46* 0.51* 0.77   CALCIUM mg/dL 8.9 8.7 9.2   BILIRUBIN mg/dL  --   --  0.6   ALK PHOS U/L  --   --  93   ALT (SGPT) U/L  --   --  21   AST (SGOT) U/L  --   --  19   GLUCOSE mg/dL 130* 188* 195*     Results from last 7 days   Lab Units  07/15/24  0612 07/14/24  0610 07/13/24  1718   WBC 10*3/mm3 12.39*  --  16.25*   HEMOGLOBIN g/dL 13.0 12.9 13.7   HEMATOCRIT % 39.0 38.4 41.3   PLATELETS 10*3/mm3 213  --  240         Results from last 7 days   Lab Units 07/13/24  1918 07/13/24  1718   CK TOTAL U/L  --  77   HSTROP T ng/L 9 12     Results from last 7 days   Lab Units 07/13/24  1812 07/13/24  1800   BLOODCX  No growth at 24 hours No growth at 24 hours     Recent Labs     06/29/24  1924   PHART 7.394   STE6AET 44.1   PO2ART 72.5*   MMU1FGX 26.9   BASEEXCESS 1.6      I have reviewed the patient's laboratory results.    Radiology results:    Cardiac Catheterization/Vascular Study    Result Date: 7/15/2024  Angiographically near normal coronary arteries Noncardiac chest pain     XR Chest 1 View    Result Date: 7/14/2024  PROCEDURE: XR CHEST 1 VW-  HISTORY: SOA Triage Protocol, shortness of breath and chest pressure beginning today.  COMPARISON: June 29, 2024.  FINDINGS: The heart is mildly enlarged, stable.. The lungs are clear. The mediastinum is unremarkable. There is no pneumothorax.  There are no acute osseous abnormalities.      Impression: No acute cardiopulmonary process.      This report was signed and finalized on 7/14/2024 8:45 AM by Iraida Kruse MD.     I have reviewed the patient's radiology reports.    Medication Review:     I have reviewed the patient's active and prn medications.     Problem List:      Chest pain, atypical    GERD (gastroesophageal reflux disease)    Hyperthyroidism    Acute on chronic combined systolic and diastolic CHF (congestive heart failure)    Type 2 diabetes mellitus without complication, without long-term current use of insulin    Acute on chronic diastolic heart failure      Assessment:    Acute on chronic heart failure midrange ejection fraction/diastolic exacerbation, POA  Chest pain  Tachycardia, A-fib history?  Uncontrolled type 2 diabetes  Hyperthyroidism  Prior breast  "cancer  GERD  Hypertension    Plan:    CHF/  Non-Cardiac chest pain:  -Recent echo with midrange ejection fraction noted.    -Was not taking diuretics outpatient.  Placed back on Lasix.    -Continue the metoprolol.     -Troponin stable.    -Telemetry monitoring.  She also has outpatient Holter monitor on currently.  -Dr. Morrow cardiology consulted.  Patient underwent a cardiac catheterization 7/15/2024.  Angiographically \"near normal\" coronary arteries.  Recommend to optimize medical therapy and evaluate for noncardiac causes of chest pain.  -Highly suspicious patient has underlying anxiety.     Arrhythmia:  Patient reports prior history of tachycardia, unsure if actually atrial fibrillation.  She has been on metoprolol for multiple years.  EKG normal here in sinus rhythm.  She has outpatient Holter monitor currently on.     Diabetes:  Patient not taking anything at home for this.  Notes had been diet controlled, understands A1c is elevated now.  Recommend she take medication for this     Hyperthyroidism:  Follows with endocrinology, is known to have thyroid nodules.  Will check TSH labs.  Obviously can contribute to heart disease as well as arrhythmia.     PT/OT consulted.  Further recommendations depend upon clinical course.  Plan of care discussed with the patient       DVT Prophylaxis: Lovenox  Code Status: Full  Diet: Diabetic cardiac fluid restriction  Discharge Plan: Short-term rehab    Trae See DO  07/15/24  12:05 EDT    Dictated utilizing Dragon dictation.      "

## 2024-07-15 NOTE — CASE MANAGEMENT/SOCIAL WORK
Met with pt and her granddaughter. They request referral for STR be sent to [facility]. Will update YANELY Rangel. IMM obtained. Also requested to see about making a HCS/advance planning - will request.

## 2024-07-15 NOTE — DISCHARGE PLACEMENT REQUEST
"STR Referral   Please contact CECY Rangel at [phone]    Vanessa Craft (79 y.o. Female)       Date of Birth   1945    Social Security Number       Address   [address] [place] [id number]    Home Phone   [phone]    MRN   [id number]       Scientology   [employer]    Marital Status                               Admission Date   7/13/24    Admission Type   Emergency    Admitting Provider   Brianna Patel DO    Attending Provider   Brianna Patel DO    Department, Room/Bed   [facility] TELEMETRY 3, 320/1       Discharge Date       Discharge Disposition       Discharge Destination                                 Attending Provider: Brianna Patel DO    Allergies: Caffeine    Isolation: None   Infection: None   Code Status: CPR    Ht: 160 cm (63\")   Wt: 57.6 kg (127 lb)    Admission Cmt: None   Principal Problem: Chest pain, atypical [R07.89]                   Active Insurance as of 7/13/2024       Primary Coverage       Payor Plan Insurance Group Employer/Plan Group    ANTHEM MEDICARE REPLACEMENT ANTHEM MEDICARE ADVANTAGE [id number]       Payor Plan Address Payor Plan Phone Number Payor Plan Fax Number Effective Dates    [address] [phone]  9/1/2020 - None Entered    [facility] [id number]         Subscriber Name Subscriber Birth Date Member ID       VANESSA CRAFT 1945 [id number]               Secondary Coverage       Payor Plan Insurance Group Employer/Plan Group    [place] [id number]       Payor Plan Address Payor Plan Phone Number Payor Plan Fax Number Effective Dates    [employer] [address] [address] [phone]  11/1/2022 - None Entered    [facility] [id number]         Subscriber Name Subscriber Birth Date Member ID       VANESSA CRAFT 1945 [id number]                     Emergency Contacts        (Rel.) Home Phone Work Phone Mobile Phone    Guillermina Gar (Grandchild) -- -- " [phone]    Chilo Craft (Son) -- -- [phone]    ROMI CRAFT (Son) [phone] -- --    GOMEZTERRIE BANGURA (Grandchild) [phone] -- --                 History & Physical        Brianna Patel DO at 24 2106            [facility]   HISTORY AND PHYSICAL      Name:  Aster Craft   Age:  79 y.o.  Sex:  female  :  1945  MRN:  [id number]   Visit Number:  [id number]  Admission Date:  2024  Date Of Service:  24  Primary Care Physician:  Yolie Cotto MD    Chief Complaint:     Shortness of breath, chest pain    History Of Presenting Illness:      The patient is a 79-year-old female with a history of hypertension, diabetes not on medication, hypothyroidism, reported cardiomyopathy, with recent diagnosis of heart failure reduced ejection fraction, who presented to the emergency room with complaints of shortness of breath and chest pain.  Patient states in the past she had seen Dr. Palacios, had been on metoprolol due to reported cardiomyopathy.  She is unsure if she was ever diagnosed with atrial fibrillation.  She was asked in the hospital about 2 weeks ago due to breathing difficulty, was diuresed, and had outpatient cardiology follow-up.  She is due to see cardiology this week.  She noted today while outside at a yard sale she developed symptoms of chest pain, pressure, heaviness in her chest and associated shortness of breath.  She went inside to rest, cool down, but continued to have symptoms and was brought to the ER.  Currently is feeling better.  She is not currently taking diuretics at home.  She is not on any medications for diabetes, she wants to talk with her PCP about that.  She notes a stress test performed many years ago, no recent cardiac procedures or testing other than echocardiogram at last hospital stay.    In the ER, the patient was overall hemodynamically stable.  She was borderline hypoxic with ambulation and is currently on  2 L of oxygen.  She was given IV Lasix.  Her labs are largely nonactionable.  She has been on Macrobid due to recent UTI with clear urine sample tonight.  Chest x-ray with some mild interstitial edema.  EKG and troponins reassuring.  Admitted for observation.    Review Of Systems:    All systems were reviewed and negative except as mentioned in history of presenting illness, assessment and plan.    Past Medical History: Patient  has a past medical history of Arthritis, Breast cancer, Diabetes mellitus, Elevated cholesterol, GERD (gastroesophageal reflux disease) (08/23/2021), History of cancer chemotherapy, Hypertension, Hyperthyroidism (05/19/2022), Kidney stone, Lichen sclerosus, Shingles, Thickened endometrium (2018), Urinary incontinence, and Urinary tract infection.    Past Surgical History: Patient  has a past surgical history that includes Mastectomy (Right, 10/1998); Mastectomy (Left, 07/2013); Cholecystectomy (03/2010); Dilation and curettage of uterus; Cervical cone biopsy; Esophagogastroduodenoscopy (N/A, 07/06/2018); Colonoscopy (N/A, 07/06/2018); Tubal ligation; and Cataract Extraction (Right, 01/10/2023).    Social History: Patient  reports that she has never smoked. She has never been exposed to tobacco smoke. She has never used smokeless tobacco. She reports that she does not drink alcohol and does not use drugs.    Family History:  Patient's family history has been reviewed and found to be noncontributory.     Allergies:      Caffeine    Home Medications:    Prior to Admission Medications       Prescriptions Last Dose Informant Patient Reported? Taking?    albuterol sulfate  (90 Base) MCG/ACT inhaler   Yes No    TWO PUFFS EVERY SIX HOURS AS NEEDED    Patient not taking:  Reported on 7/11/2024    azelastine (ASTELIN) 0.1 % nasal spray   Yes No    INHALE ONE SPRAY IN EACH NOSTRIL TWICE DAILY    Patient not taking:  Reported on 7/11/2024    benzonatate (TESSALON) 200 MG capsule   Yes No    Take  1 capsule by mouth.    Blood Glucose Monitoring Suppl (OneTouch Verio Flex System) w/Device kit   Yes No    See Admin Instructions. for testing    brimonidine-timolol (COMBIGAN) 0.2-0.5 % ophthalmic solution   Yes No    Administer 1 drop to both eyes Every 12 (Twelve) Hours.    Patient not taking:  Reported on 7/11/2024    calcium carbonate-cholecalciferol 500-400 MG-UNIT tablet tablet   Yes No    Take  by mouth Daily.    cetirizine (zyrTEC) 10 MG tablet   Yes No    Take 1 tablet by mouth Daily.    Patient not taking:  Reported on 7/11/2024    Cholecalciferol 250 MCG (69544 UT) capsule   Yes No    Take 5,000 Units by mouth Daily.    CINNAMON PO   Yes No    Take  by mouth.    Patient not taking:  Reported on 7/11/2024    clobetasol (TEMOVATE) 0.05 % ointment   No No    Apply to affected area BID x 2 wks then daily x 2 wks then 2-3x/wk    Continuous Blood Gluc Sensor (FreeStyle Malina 2 Sensor) misc   Yes No    CHANGE SENSOR EVERY 14 DAYS    desloratadine (CLARINEX) 5 MG tablet   Yes No    Take 1 tablet by mouth Daily.    Patient not taking:  Reported on 7/11/2024    dextromethorphan-guaifenesin (ROBITUSSIN-DM)  MG/5ML syrup   Yes No    TAKE 10ML EVERY 4 HOURS AS NEEDED    diazePAM (VALIUM) 5 MG tablet   Yes No    TAKE 1 TABLET one hour prior TO procedure    Patient not taking:  Reported on 7/11/2024    dorzolamide (TRUSOPT) 2 % ophthalmic solution   Yes No    INSTILL ONE DROP IN EACH EYE TWICE DAILY    Patient not taking:  Reported on 7/11/2024    ezetimibe (ZETIA) 10 MG tablet   Yes No    Take 1 tablet by mouth Daily.    Flovent  MCG/ACT inhaler   Yes No    Inhale 2 puffs 2 (Two) Times a Day.    Patient not taking:  Reported on 7/11/2024    furosemide (Lasix) 20 MG tablet   No No    Take 1 tablet by mouth Daily.    glipizide (GLUCOTROL XL) 5 MG ER tablet   Yes No    Patient not taking:  Reported on 7/11/2024    ipratropium (ATROVENT) 0.03 % nasal spray   Yes No    INHALE ONE SPRAY IN EACH NOSTRIL TWICE  DAILY    Patient not taking:  Reported on 7/11/2024    ipratropium (ATROVENT) 0.06 % nasal spray   Yes No    INHALE 1-2 SPRAYS IN EACH NOSTRIL TWICE DAILY    Patient not taking:  Reported on 7/11/2024    Januvia 100 MG tablet   Yes No    Take 1 tablet by mouth Daily.    Patient not taking:  Reported on 7/11/2024    Lancets (OneTouch Delica Plus [id number]) misc   Yes No    USE AS DIRECTED EVERY DAY    meclizine (ANTIVERT) 12.5 MG tablet   Yes No    Take 1 tablet by mouth 3 (Three) Times a Day As Needed.    metoprolol succinate XL (TOPROL-XL) 50 MG 24 hr tablet   Yes No    Take 1 tablet by mouth Daily.    nitrofurantoin, macrocrystal-monohydrate, (MACROBID) 100 MG capsule   No No    Take 1 capsule by mouth 2 (Two) Times a Day.    nitroglycerin (NITROSTAT) 0.4 MG SL tablet   Yes No    Place 1 tablet under the tongue.    OneTouch Verio test strip   Yes No    Daily. for testing    pantoprazole (PROTONIX) 40 MG EC tablet   Yes No    Take 1 tablet by mouth.    Rhopressa 0.02 % solution   Yes No    Administer 1 drop to both eyes Every Night.    simethicone (MYLICON) 80 MG chewable tablet   Yes No    Chew 1 tablet Every 6 (Six) Hours As Needed for Flatulence.    Triamcinolone Acetonide (NASACORT) 55 MCG/ACT nasal inhaler   Yes No    2 sprays into the nostril(s) as directed by provider Daily.    Patient not taking:  Reported on 7/11/2024    Vibegron (Gemtesa) 75 MG tablet   No No    Take 1 tablet by mouth Daily.    Patient not taking:  Reported on 7/11/2024          ED Medications:    Medications   sodium chloride 0.9 % flush 10 mL (has no administration in time range)   furosemide (LASIX) injection 40 mg (40 mg Intravenous Given 7/13/24 1738)   ketorolac (TORADOL) injection 15 mg (15 mg Intravenous Given 7/13/24 1938)   orphenadrine (NORFLEX) injection 60 mg (60 mg Intravenous Given 7/13/24 1940)     Vital Signs:  Temp:  [97.7 °F (36.5 °C)] 97.7 °F (36.5 °C)  Heart Rate:  [104-117] 107  Resp:  [18] 18  BP: (116-130)/(69-76)  "130/76        07/13/24  1706   Weight: 57.6 kg (127 lb)     Body mass index is 22.5 kg/m².    Physical Exam:     Most recent vital Signs: /76   Pulse 107   Temp 97.7 °F (36.5 °C) (Oral)   Resp 18   Ht 160 cm (63\")   Wt 57.6 kg (127 lb)   SpO2 95%   BMI 22.50 kg/m²     Physical Exam  Constitutional:       General: She is not in acute distress.     Appearance: She is normal weight.   HENT:      Mouth/Throat:      Mouth: Mucous membranes are moist.      Pharynx: Oropharynx is clear.   Eyes:      Extraocular Movements: Extraocular movements intact.      Pupils: Pupils are equal, round, and reactive to light.   Cardiovascular:      Rate and Rhythm: Regular rhythm. Tachycardia present.      Pulses: Normal pulses.      Heart sounds: No murmur heard.     No gallop.   Pulmonary:      Effort: Pulmonary effort is normal.      Breath sounds: No rhonchi or rales.   Abdominal:      General: Bowel sounds are normal. There is no distension.      Tenderness: There is no abdominal tenderness.   Musculoskeletal:      Right lower leg: Edema present.      Left lower leg: Edema present.   Skin:     General: Skin is warm.      Capillary Refill: Capillary refill takes less than 2 seconds.      Findings: No bruising or lesion.   Neurological:      General: No focal deficit present.      Mental Status: She is alert. Mental status is at baseline.      Motor: Weakness present.         Laboratory data:    I have reviewed the labs done in the emergency room.    Results from last 7 days   Lab Units 07/13/24  1718   SODIUM mmol/L 142   POTASSIUM mmol/L 4.1   CHLORIDE mmol/L 101   CO2 mmol/L 27.5   BUN mg/dL 28*   CREATININE mg/dL 0.77   CALCIUM mg/dL 9.2   BILIRUBIN mg/dL 0.6   ALK PHOS U/L 93   ALT (SGPT) U/L 21   AST (SGOT) U/L 19   GLUCOSE mg/dL 195*     Results from last 7 days   Lab Units 07/13/24  1718   WBC 10*3/mm3 16.25*   HEMOGLOBIN g/dL 13.7   HEMATOCRIT % 41.3   PLATELETS 10*3/mm3 240         Results from last 7 days   Lab " Units 07/13/24  1918 07/13/24  1718   CK TOTAL U/L  --  77   HSTROP T ng/L 9 12     Results from last 7 days   Lab Units 07/13/24  1718   PROBNP pg/mL 95.5                 Results from last 7 days   Lab Units 07/13/24  1839   COLOR UA  Yellow   GLUCOSE UA  Negative   KETONES UA  Negative   BLOOD UA  Negative   LEUKOCYTES UA  Negative   PH, URINE  6.5   BILIRUBIN UA  Negative   UROBILINOGEN UA  0.2 E.U./dL       Pain Management Panel           No data to display                EKG:      Appears to be a sinus rhythm, rate of 100, right bundle branch block noted.  There are nonspecific T wave changes.    Radiology:    No radiology results for the last 3 days    Assessment:    Acute on chronic heart failure midrange ejection fraction/diastolic exacerbation, POA  Chest pain  Tachycardia, A-fib history?  Uncontrolled type 2 diabetes  Hyperthyroidism  Prior breast cancer  GERD  Hypertension    Plan:    Admit for observation    CHF/chest pain:  Recent echo with midrange ejection fraction noted.  Was not taking diuretics outpatient.  Placed back on Lasix.  Continue the metoprolol.  She is due to see cardiology outpatient, however with ongoing symptoms concerning for possible angina will have Dr. Morrow see in consultation.  Troponin stable.  Telemetry monitoring.  She also has outpatient Holter monitor on currently.    Arrhythmia:  Patient reports prior history of tachycardia, unsure if actually atrial fibrillation.  She has been on metoprolol for multiple years.  EKG normal here in sinus rhythm.  She has outpatient Holter monitor currently on.    Diabetes:  Patient not taking anything at home for this.  Notes had been diet controlled, understands A1c is elevated now.  Recommend she take medication for this    Hyperthyroidism:  Follows with endocrinology, is known to have thyroid nodules.  Will check TSH labs.  Obviously can contribute to heart disease as well as arrhythmia.    Further recommendations depend upon clinical  course.  Plan of care discussed with the patient    Risk Assessment: High  DVT Prophylaxis: Heparin  Code Status: Full  Diet: Diabetic cardiac fluid restriction    Advance Care Planning  ACP discussion was held with the patient during this visit. Patient does not have an advance directive, declines further assistance.           Brianna Patel DO  07/13/24  21:06 EDT    Dictated utilizing Dragon dictation.    Electronically signed by Brianna Patel DO at 07/13/24 2346       Current Facility-Administered Medications   Medication Dose Route Frequency Provider Last Rate Last Admin    acetaminophen (TYLENOL) tablet 650 mg  650 mg Oral Q4H PRN Brian Morrow MD   650 mg at 07/15/24 1143    Or    acetaminophen (TYLENOL) 160 MG/5ML oral solution 650 mg  650 mg Oral Q4H PRN Brian Morrow MD        Or    acetaminophen (TYLENOL) suppository 650 mg  650 mg Rectal Q4H PRN Brian Morrow MD        acetaminophen (TYLENOL) tablet 650 mg  650 mg Oral Q4H PRN Brian Morrow MD        ALPRAZolam (XANAX) tablet 0.25 mg  0.25 mg Oral TID PRN Brian Morrow MD        aluminum-magnesium hydroxide-simethicone (MAALOX MAX) 400-400-40 MG/5ML suspension 15 mL  15 mL Oral Q6H PRN Brian Morrow MD        [START ON 7/16/2024] aspirin EC tablet 81 mg  81 mg Oral Daily Brian Morrow MD        atorvastatin (LIPITOR) tablet 80 mg  80 mg Oral Nightly Brian Morrow MD   80 mg at 07/14/24 2111    sennosides-docusate (PERICOLACE) 8.6-50 MG per tablet 2 tablet  2 tablet Oral BID PRN Brian Morrow MD        And    polyethylene glycol (MIRALAX) packet 17 g  17 g Oral Daily PRN Brian Morrow MD        And    bisacodyl (DULCOLAX) EC tablet 5 mg  5 mg Oral Daily PRN Brian Morrow MD        And    bisacodyl (DULCOLAX) suppository 10 mg  10 mg Rectal Daily PRN Brian Morrow MD        dextrose (D50W) (25 g/50 mL) IV injection 25 g  25 g Intravenous Q15 Min PRN Brian Morrow MD         dextrose (GLUTOSE) oral gel 15 g  15 g Oral Q15 Min PRN Breeding, Brian KATHLEEN MD        diphenhydrAMINE (BENADRYL) capsule 25 mg  25 mg Oral Nightly PRN Breeding, Brian KATHLEEN MD        diphenhydrAMINE (BENADRYL) injection 25 mg  25 mg Intravenous Q6H PRN Breeding, Brian KATHLEEN MD        Enoxaparin Sodium (LOVENOX) syringe 40 mg  40 mg Subcutaneous Daily Breeding, Brian KATHLEEN MD   40 mg at 07/14/24 1235    furosemide (LASIX) tablet 40 mg  40 mg Oral Daily Breeding, Brian KATHLEEN MD   40 mg at 07/15/24 0827    glucagon (GLUCAGEN) injection 1 mg  1 mg Intramuscular Q15 Min PRN Breeding, Brian KATHLEEN MD        guaiFENesin-dextromethorphan (ROBITUSSIN DM) 100-10 MG/5ML syrup 5 mL  5 mL Oral Q6H PRN Breeding, Brian KATHLEEN MD   5 mL at 07/14/24 1509    HYDROcodone-acetaminophen (NORCO) 5-325 MG per tablet 1 tablet  1 tablet Oral Q4H PRN Breeding, Brian KATHLEEN MD        Insulin Lispro (humaLOG) injection 2-7 Units  2-7 Units Subcutaneous 4x Daily AC & at Bedtime Breeding, Brian KATHLEEN MD   2 Units at 07/15/24 1142    metoprolol succinate XL (TOPROL-XL) 24 hr tablet 50 mg  50 mg Oral Nightly Breeding, Brian KATHLEEN MD   50 mg at 07/14/24 2106    morphine injection 4 mg  4 mg Intravenous Q1H PRN Breeding, Brian KATHLEEN MD        And    naloxone (NARCAN) injection 0.4 mg  0.4 mg Intravenous Q5 Min PRN Breeding, Brian KATHLEEN MD        nitroglycerin (NITROSTAT) SL tablet 0.4 mg  0.4 mg Sublingual Q5 Min PRN Breeding, Brian KATHLEEN MD        ondansetron ODT (ZOFRAN-ODT) disintegrating tablet 4 mg  4 mg Oral Q6H PRN Breeding, Brian KATHLEEN MD        Or    ondansetron (ZOFRAN) injection 4 mg  4 mg Intravenous Q6H PRN Breeding, Brian KATHLEEN MD        ondansetron ODT (ZOFRAN-ODT) disintegrating tablet 4 mg  4 mg Oral Q6H PRN Breeding, Brian KATHLEEN MD        Or    ondansetron (ZOFRAN) injection 4 mg  4 mg Intravenous Q6H PRN Brian Morrow MD        pantoprazole (PROTONIX) EC tablet 40 mg  40 mg Oral Q AM Brian Morrow MD   40 mg at 07/15/24 0508    Pharmacy to Dose enoxaparin (LOVENOX)    Does not apply Continuous PRN Brian Morrow MD        sodium chloride 0.9 % flush 10 mL  10 mL Intravenous PRN Brian Morrow MD        sodium chloride 0.9 % flush 10 mL  10 mL Intravenous Q12H Brian Morrow MD   10 mL at 07/15/24 0827    sodium chloride 0.9 % flush 10 mL  10 mL Intravenous PRN Brian Morrow MD        sodium chloride 0.9 % infusion 40 mL  40 mL Intravenous PRN Brian Morrow MD        sodium chloride 0.9 % infusion  100 mL/hr Intravenous Continuous Brian Morrow  mL/hr at 07/15/24 1143 100 mL/hr at 07/15/24 1143    temazepam (RESTORIL) capsule 15 mg  15 mg Oral Nightly PRN Brian Morrow MD            Physical Therapy Notes (most recent note)        Poppy Savage, PT at 24 1458  Version 1 of 1         Patient Name: Aster Craft  : 1945    MRN: [id number]                              Today's Date: 2024       Admit Date: 2024    Visit Dx:     ICD-10-CM ICD-9-CM   1. Chest pain, atypical  R07.89 786.59   2. Acute respiratory failure with hypoxia  J96.01 518.81   3. Hypervolemia, unspecified hypervolemia type  E87.70 276.69     Patient Active Problem List   Diagnosis    Thickened endometrium    Right lower quadrant abdominal pain    Fatty (change of) liver, not elsewhere classified    A-fib    Breast CA    Essential hypertension    GERD (gastroesophageal reflux disease)    Hyperthyroidism    (HFpEF) heart failure with preserved ejection fraction    Acute on chronic heart failure with preserved ejection fraction (HFpEF)    Acute on chronic combined systolic and diastolic CHF (congestive heart failure)    Type 2 diabetes mellitus without complication, without long-term current use of insulin     Past Medical History:   Diagnosis Date    Arthritis     Breast cancer     RIGHT BREAST STAGE 3    Diabetes mellitus     Elevated cholesterol     GERD (gastroesophageal reflux disease) 2021    History of cancer chemotherapy     Hypertension      Hyperthyroidism 05/19/2022    Kidney stone     Lichen sclerosus     Shingles     Thickened endometrium 2018    Urinary incontinence     Urinary tract infection      Past Surgical History:   Procedure Laterality Date    CATARACT EXTRACTION Right 01/10/2023    CERVICAL CONE BIOPSY      CHOLECYSTECTOMY  03/2010    COLONOSCOPY N/A 07/06/2018    Procedure: COLONOSCOPY;  Surgeon: Trenton Lyons MD;  Location: [facility] OR;  Service: Gastroenterology    DILATATION AND CURETTAGE      ENDOSCOPY N/A 07/06/2018    Procedure: ESOPHAGOGASTRODUODENOSCOPY;  Surgeon: Trenton Lyons MD;  Location: [facility] OR;  Service: Gastroenterology    MASTECTOMY Right 10/1998    MASTECTOMY Left 07/2013    TUBAL ABDOMINAL LIGATION        General Information       Row Name 07/14/24 1458          Physical Therapy Time and Intention    Document Type evaluation  -TW     Mode of Treatment physical therapy  -TW       Row Name 07/14/24 1458          General Information    Patient Profile Reviewed yes  -TW     Prior Level of Function independent:;all household mobility  pt does not use any assistive device at home for ambulation but has noticed she isn't walking as much due to shortness of breath and leg cramps recently.  -TW     Existing Precautions/Restrictions fall;oxygen therapy device and L/min;cardiac  -TW     Barriers to Rehab previous functional deficit  -TW       Row Name 07/14/24 1458          Living Environment    People in Home child(popeye), adult  -TW       Row Name 07/14/24 1458          Home Main Entrance    Number of Stairs, Main Entrance none  -TW       Row Name 07/14/24 1458          Stairs Within Home, Primary    Number of Stairs, Within Home, Primary none  -TW       Row Name 07/14/24 1458          Cognition    Orientation Status (Cognition) oriented x 4;verbal cues/prompts needed for orientation  -TW       Row Name 07/14/24 1458          Safety Issues, Functional Mobility    Safety Issues Affecting Function (Mobility) safety precaution  awareness;safety precautions follow-through/compliance;insight into deficits/self-awareness;sequencing abilities  -TW     Impairments Affecting Function (Mobility) balance;endurance/activity tolerance;shortness of breath;strength  -TW               User Key  (r) = Recorded By, (t) = Taken By, (c) = Cosigned By      Initials Name Provider Type    TW Poppy Savage, BRAULIO Physical Therapist                   Mobility       Row Name 07/14/24 1458          Bed Mobility    Bed Mobility supine-sit  -TW     Supine-Sit [place] (Bed Mobility) contact guard;verbal cues  -TW     Assistive Device (Bed Mobility) head of bed elevated  -TW     Comment, (Bed Mobility) pt was able to sit on EOB with supervision and was able to assist with changing her gown without any loss of balance.  -TW       Row Name 07/14/24 1458          Transfers    Comment, (Transfers) pt education on use of FWW  -TW       Row Name 07/14/24 1458          Bed-Chair Transfer    Bed-Chair [place] (Transfers) contact guard;verbal cues  -TW     Assistive Device (Bed-Chair Transfers) walker, front-wheeled  -TW       Row Name 07/14/24 1458          Sit-Stand Transfer    Sit-Stand [place] (Transfers) contact guard;verbal cues  -TW     Assistive Device (Sit-Stand Transfers) walker, front-wheeled  -TW       Row Name 07/14/24 1458          Gait/Stairs (Locomotion)    Patient was able to Ambulate no, other medical factors prevent ambulation  -TW     Reason Patient was unable to Ambulate Other (Comment)  pt kept having non-productive coughing that contributed to shortness of breath so pt transferred to chair only this date  -TW               User Key  (r) = Recorded By, (t) = Taken By, (c) = Cosigned By      Initials Name Provider Type    TW Poppy Savage PT Physical Therapist                   Obj/Interventions       Row Name 07/14/24 1458          Range of Motion Comprehensive    Comment, General Range of Motion BLE grossly WFLs  -TW       Row Name 07/14/24  1458          Strength Comprehensive (MMT)    Comment, General Manual Muscle Testing (MMT) Assessment BLE grossly 3/5 to 4-/5  -TW       Row Name 07/14/24 1458          Balance    Balance Assessment sitting static balance;sitting dynamic balance;sit to stand dynamic balance;standing static balance;standing dynamic balance  -TW     Static Sitting Balance standby assist  -TW     Dynamic Sitting Balance standby assist  -TW     Position, Sitting Balance unsupported;sitting edge of bed  -TW     Sit to Stand Dynamic Balance contact guard;verbal cues  -TW     Static Standing Balance contact guard  -TW     Dynamic Standing Balance contact guard  -TW     Position/Device Used, Standing Balance supported;walker, front-wheeled  -TW               User Key  (r) = Recorded By, (t) = Taken By, (c) = Cosigned By      Initials Name Provider Type    TW Poppy Savage, PT Physical Therapist                   Goals/Plan       Row Name 07/14/24 1458          Bed Mobility Goal 1 (PT)    Activity/Assistive Device (Bed Mobility Goal 1, PT) bed mobility activities, all  -TW     [place] Level/Cues Needed (Bed Mobility Goal 1, PT) modified independence  -TW     Time Frame (Bed Mobility Goal 1, PT) short term goal (STG);3 days  -TW     Progress/Outcomes (Bed Mobility Goal 1, PT) goal ongoing  -TW       Row Name 07/14/24 1458          Transfer Goal 1 (PT)    Activity/Assistive Device (Transfer Goal 1, PT) sit-to-stand/stand-to-sit;bed-to-chair/chair-to-bed;toilet;walker, rolling  -TW     [place] Level/Cues Needed (Transfer Goal 1, PT) supervision required  -TW     Time Frame (Transfer Goal 1, PT) long term goal (LTG);10 days  -TW     Progress/Outcome (Transfer Goal 1, PT) goal ongoing  -TW       Row Name 07/14/24 1458          Gait Training Goal 1 (PT)    Activity/Assistive Device (Gait Training Goal 1, PT) gait (walking locomotion);assistive device use;increase endurance/gait distance;decrease fall risk  -TW     Lamar Level  (Gait Training Goal 1, PT) standby assist  -TW     Distance (Gait Training Goal 1, PT) 75 ft  -TW     Time Frame (Gait Training Goal 1, PT) long term goal (LTG);10 days  -TW     Strategies/Barriers (Gait Training Goal 1, PT) O2 sat above 90%  -TW     Progress/Outcome (Gait Training Goal 1, PT) goal ongoing  -TW       Row Name 07/14/24 1452          Patient Education Goal (PT)    Activity (Patient Education Goal, PT) BLE ther ex 1 x 10  -TW     [place]/Cues/Accuracy (Memory Goal 2, PT) demonstrates adequately  -TW     Time Frame (Patient Education Goal, PT) 10 days;long term goal (LTG)  -TW     Progress/Outcome (Patient Education Goal, PT) goal ongoing  -TW       Row Name 07/14/24 1451          Therapy Assessment/Plan (PT)    Planned Therapy Interventions (PT) balance training;bed mobility training;gait training;patient/family education;transfer training;strengthening  -TW               User Key  (r) = Recorded By, (t) = Taken By, (c) = Cosigned By      Initials Name Provider Type    TW Poppy Savage, PT Physical Therapist                   Clinical Impression       Row Name 07/14/24 4312          Pain    Pretreatment Pain Rating 0/10 - no pain  -TW     Posttreatment Pain Rating 0/10 - no pain  -TW     Pain Location - chest  -TW     Pre/Posttreatment Pain Comment no c/o chest pain of LE cramping but pt did express discomfort in chest due to frequent coughing. Pt encouraged to splint with pillow while coughtng.  -TW     Pain Intervention(s) Other (Comment)  -TW       Row Name 07/14/24 8363          Plan of Care Review    Plan of Care Reviewed With patient;grandchild(popeye)  -TW     Outcome Evaluation PT evaluation completed this p.m. Initially pt was expressing she was too tired to participate in PT evaluation. Pt's great-granddaughter present and encouraged pt to at least get OOB and pt agreed. It was noted that pt had frequent non-productive cough throughout assessment and this was her only c/o discomfort during  evaluation. Pt was O x 4 and on 2 L NC O2 throughout session (O2 sat 91-93%). Pt provided CGA to come to sit on EOB and she was able to sit on EOB with supervision and pt assisted with changing her gown without any loss of balance. CGA and cues for sit to stand with FWW and to pivot to chair. Pt did express she felt safer using FWW and would be appropriate for a rollator upon d/c to assist pt with ambulation balance and endurance. Pt presents with deficits in strength, endurance, and balance. She is expected to improve her functional mobility with continued PT services prior to d/c.  -TW       Row Name 07/14/24 1458          Therapy Assessment/Plan (PT)    Patient/Family Therapy Goals Statement (PT) to return home with family to assist.  -TW     Rehab Potential (PT) good, to achieve stated therapy goals  -TW     Criteria for Skilled Interventions Met (PT) yes;meets criteria;skilled treatment is necessary  -TW     Therapy Frequency (PT) 5 times/wk  -TW     Predicted Duration of Therapy Intervention (PT) 10 days  -TW       Row Name 07/14/24 1458          Vital Signs    Pretreatment Heart Rate (beats/min) 90  -TW     Posttreatment Heart Rate (beats/min) 93  -TW     Pre SpO2 (%) 93  -TW     O2 Delivery Pre Treatment supplemental O2  2 L  -TW     Intra SpO2 (%) 91  -TW     Post SpO2 (%) 93  -TW     O2 Delivery Post Treatment supplemental O2  2 L  -TW     Pre Patient Position Supine  -TW     Intra Patient Position Standing  -TW     Post Patient Position Sitting  -TW       Row Name 07/14/24 1458          Positioning and Restraints    Pre-Treatment Position in bed  -TW     Post Treatment Position chair  -TW     In Chair notified nsg;sitting;call light within reach;with family/caregiver;encouraged to call for assist  -TW               User Key  (r) = Recorded By, (t) = Taken By, (c) = Cosigned By      Initials Name Provider Type    TW Poppy Savage, PT Physical Therapist                   Outcome Measures       Row Name  07/14/24 1458 07/14/24 0800       How much help from another person do you currently need...    Turning from your back to your side while in flat bed without using bedrails? 4  -TW 4  -ML    Moving from lying on back to sitting on the side of a flat bed without bedrails? 3  -TW 4  -ML    Moving to and from a bed to a chair (including a wheelchair)? 3  -TW 3  -ML    Standing up from a chair using your arms (e.g., wheelchair, bedside chair)? 3  -TW 3  -ML    Climbing 3-5 steps with a railing? 3  -TW 3  -ML    To walk in hospital room? 3  -TW 3  -ML    AM-PAC 6 Clicks Score (PT) 19  -TW 20  -ML    Highest Level of Mobility Goal 6 --> Walk 10 steps or more  -TW 6 --> Walk 10 steps or more  -ML      Row Name 07/14/24 1458          Functional Assessment    Outcome Measure Options AM-PAC 6 Clicks Basic Mobility (PT)  -TW               User Key  (r) = Recorded By, (t) = Taken By, (c) = Cosigned By      Initials Name Provider Type    TW Poppy Savage, PT Physical Therapist    Yolie Alarcon RN Registered Nurse                                 Physical Therapy Education       Title: PT OT SLP Therapies (In Progress)       Topic: Physical Therapy (In Progress)       Point: Mobility training (Done)       Learning Progress Summary             Patient Acceptance, E, VU by TW at 7/14/2024 1458    Comment: Pt and her great graddaughter educated on benefits of rollator for ambulation   Family Acceptance, E, VU by  at 7/14/2024 1458    Comment: Pt and her great graddaughter educated on benefits of rollator for ambulation                         Point: Home exercise program (Not Started)       Learner Progress:  Not documented in this visit.              Point: Body mechanics (Not Started)       Learner Progress:  Not documented in this visit.              Point: Precautions (Not Started)       Learner Progress:  Not documented in this visit.                              User Key       Initials Effective Dates Name Provider Type  Discipline    TW 06/16/21 -  Poppy Savage PT Physical Therapist PT                  PT Recommendation and Plan  Planned Therapy Interventions (PT): balance training, bed mobility training, gait training, patient/family education, transfer training, strengthening  Plan of Care Reviewed With: patient, grandchild(popeye)  Outcome Evaluation: PT evaluation completed this p.m. Initially pt was expressing she was too tired to participate in PT evaluation. Pt's great-granddaughter present and encouraged pt to at least get OOB and pt agreed. It was noted that pt had frequent non-productive cough throughout assessment and this was her only c/o discomfort during evaluation. Pt was O x 4 and on 2 L NC O2 throughout session (O2 sat 91-93%). Pt provided CGA to come to sit on EOB and she was able to sit on EOB with supervision and pt assisted with changing her gown without any loss of balance. CGA and cues for sit to stand with FWW and to pivot to chair. Pt did express she felt safer using FWW and would be appropriate for a rollator upon d/c to assist pt with ambulation balance and endurance. Pt presents with deficits in strength, endurance, and balance. She is expected to improve her functional mobility with continued PT services prior to d/c.     Time Calculation:   PT Evaluation Complexity  History, PT Evaluation Complexity: 3 or more personal factors and/or comorbidities  Examination of Body Systems (PT Eval Complexity): total of 3 or more elements  Clinical Presentation (PT Evaluation Complexity): stable  Clinical Decision Making (PT Evaluation Complexity): low complexity  Overall Complexity (PT Evaluation Complexity): low complexity     PT Charges       Row Name 07/14/24 1458             Time Calculation    Stop Time 1458  -TW      PT Received On 07/14/24 -TW      PT Goal Re-Cert Due Date 07/24/24 -TW                User Key  (r) = Recorded By, (t) = Taken By, (c) = Cosigned By      Initials Name Provider Type    NEGIN Savage  Poppy, BRAULIO Physical Therapist                  Therapy Charges for Today       Code Description Service Date Service Provider Modifiers Qty    87025982368 HC PT EVAL LOW COMPLEXITY 3 7/14/2024 Poppy Savage, PT GP 1            PT G-Codes  Outcome Measure Options: AM-PAC 6 Clicks Basic Mobility (PT)  AM-PAC 6 Clicks Score (PT): 19  PT Discharge Summary  Anticipated Discharge Disposition (PT): home with assist    Poppy Savage PT  7/14/2024      Electronically signed by Poppy Savage PT at 07/14/24 153          Occupational Therapy Notes (most recent note)        Radha Lozano, OT at 07/15/24 0932          OT eval held pending heart cath. Will follow up tomorrow.     Electronically signed by Radha Lozano OT at 07/15/24 8786

## 2024-07-15 NOTE — CONSULTS
Diabetes Education    Patient Name:  Aster Craft  YOB: 1945  MRN: [id number]  Admit Date:  7/13/2024        DM education referral received related to noncompliance.  Pt. Is currently bathing.  Will reattempt to follow up today.      Electronically signed by:  Ronna Gan RN  07/15/24 10:16 EDT

## 2024-07-16 VITALS
DIASTOLIC BLOOD PRESSURE: 65 MMHG | TEMPERATURE: 98.9 F | HEART RATE: 67 BPM | HEIGHT: 63 IN | BODY MASS INDEX: 20.86 KG/M2 | OXYGEN SATURATION: 99 % | WEIGHT: 117.73 LBS | RESPIRATION RATE: 16 BRPM | SYSTOLIC BLOOD PRESSURE: 114 MMHG

## 2024-07-16 LAB
ANION GAP SERPL CALCULATED.3IONS-SCNC: 9.2 MMOL/L (ref 5–15)
BUN SERPL-MCNC: 26 MG/DL (ref 8–23)
BUN/CREAT SERPL: 44.1 (ref 7–25)
CALCIUM SPEC-SCNC: 9.1 MG/DL (ref 8.6–10.5)
CHLORIDE SERPL-SCNC: 100 MMOL/L (ref 98–107)
CO2 SERPL-SCNC: 29.8 MMOL/L (ref 22–29)
CREAT SERPL-MCNC: 0.59 MG/DL (ref 0.57–1)
DEPRECATED RDW RBC AUTO: 43.6 FL (ref 37–54)
EGFRCR SERPLBLD CKD-EPI 2021: 93 ML/MIN/1.73
ERYTHROCYTE [DISTWIDTH] IN BLOOD BY AUTOMATED COUNT: 12.6 % (ref 12.3–15.4)
GLUCOSE BLDC GLUCOMTR-MCNC: 150 MG/DL (ref 70–130)
GLUCOSE BLDC GLUCOMTR-MCNC: 167 MG/DL (ref 70–130)
GLUCOSE SERPL-MCNC: 157 MG/DL (ref 65–99)
HCT VFR BLD AUTO: 38.6 % (ref 34–46.6)
HGB BLD-MCNC: 12.7 G/DL (ref 12–15.9)
MCH RBC QN AUTO: 31.1 PG (ref 26.6–33)
MCHC RBC AUTO-ENTMCNC: 32.9 G/DL (ref 31.5–35.7)
MCV RBC AUTO: 94.4 FL (ref 79–97)
PLATELET # BLD AUTO: 230 10*3/MM3 (ref 140–450)
PMV BLD AUTO: 10.1 FL (ref 6–12)
POTASSIUM SERPL-SCNC: 3.7 MMOL/L (ref 3.5–5.2)
RBC # BLD AUTO: 4.09 10*6/MM3 (ref 3.77–5.28)
SODIUM SERPL-SCNC: 139 MMOL/L (ref 136–145)
WBC NRBC COR # BLD AUTO: 6.96 10*3/MM3 (ref 3.4–10.8)

## 2024-07-16 PROCEDURE — 97110 THERAPEUTIC EXERCISES: CPT

## 2024-07-16 PROCEDURE — 80048 BASIC METABOLIC PNL TOTAL CA: CPT | Performed by: INTERNAL MEDICINE

## 2024-07-16 PROCEDURE — 82948 REAGENT STRIP/BLOOD GLUCOSE: CPT | Performed by: INTERNAL MEDICINE

## 2024-07-16 PROCEDURE — 85027 COMPLETE CBC AUTOMATED: CPT | Performed by: INTERNAL MEDICINE

## 2024-07-16 PROCEDURE — 97116 GAIT TRAINING THERAPY: CPT

## 2024-07-16 PROCEDURE — 99238 HOSP IP/OBS DSCHRG MGMT 30/<: CPT | Performed by: FAMILY MEDICINE

## 2024-07-16 PROCEDURE — 63710000001 INSULIN LISPRO (HUMAN) PER 5 UNITS: Performed by: INTERNAL MEDICINE

## 2024-07-16 PROCEDURE — 25010000002 ENOXAPARIN PER 10 MG: Performed by: INTERNAL MEDICINE

## 2024-07-16 PROCEDURE — 97166 OT EVAL MOD COMPLEX 45 MIN: CPT

## 2024-07-16 RX ORDER — ATORVASTATIN CALCIUM 80 MG/1
80 TABLET, FILM COATED ORAL NIGHTLY
Start: 2024-07-16 | End: 2024-10-14

## 2024-07-16 RX ORDER — ASPIRIN 81 MG/1
81 TABLET ORAL DAILY
Start: 2024-07-17

## 2024-07-16 RX ADMIN — GUAIFENESIN AND DEXTROMETHORPHAN 5 ML: 100; 10 SYRUP ORAL at 14:21

## 2024-07-16 RX ADMIN — GUAIFENESIN AND DEXTROMETHORPHAN 5 ML: 100; 10 SYRUP ORAL at 08:14

## 2024-07-16 RX ADMIN — ASPIRIN 81 MG: 81 TABLET, COATED ORAL at 08:14

## 2024-07-16 RX ADMIN — INSULIN LISPRO 2 UNITS: 100 INJECTION, SOLUTION INTRAVENOUS; SUBCUTANEOUS at 08:14

## 2024-07-16 RX ADMIN — Medication 10 ML: at 08:15

## 2024-07-16 RX ADMIN — PANTOPRAZOLE SODIUM 40 MG: 40 TABLET, DELAYED RELEASE ORAL at 05:34

## 2024-07-16 RX ADMIN — INSULIN LISPRO 2 UNITS: 100 INJECTION, SOLUTION INTRAVENOUS; SUBCUTANEOUS at 12:55

## 2024-07-16 RX ADMIN — SENNOSIDES AND DOCUSATE SODIUM 2 TABLET: 50; 8.6 TABLET ORAL at 08:16

## 2024-07-16 RX ADMIN — FUROSEMIDE 40 MG: 40 TABLET ORAL at 08:14

## 2024-07-16 RX ADMIN — ENOXAPARIN SODIUM 40 MG: 100 INJECTION SUBCUTANEOUS at 08:15

## 2024-07-16 NOTE — CASE MANAGEMENT/SOCIAL WORK
Case Management Discharge Note      Final Note: Pt discharged to [facility] via [facility]. Pt signed waiver and was instructed to give it to the . No other needs at discharge.         Selected Continued Care - Discharged on 7/16/2024 Admission date: 7/13/2024 - Discharge disposition: Rehab Facility or Unit (DC - External)      Destination Coordination complete.      Service Provider Selected Services Address Phone Fax Patient Preferred    [facility] [address] [address] [phone] --              Durable Medical Equipment    No services have been selected for the patient.                Dialysis/Infusion    No services have been selected for the patient.                Home Medical Care    No services have been selected for the patient.                Therapy    No services have been selected for the patient.                Community Resources    No services have been selected for the patient.                Community & DME    No services have been selected for the patient.                    Selected Continued Care - Episodes Includes continued care and service providers with selected services from the active episodes listed below      Heart Failure Episode start date: 7/11/2024   There are no active outsourced providers for this episode.                 Transportation Services  W/C Van: [facility]    Final Discharge Disposition Code: 03 - skilled nursing facility (SNF)

## 2024-07-16 NOTE — CASE MANAGEMENT/SOCIAL WORK
DORINAP; STR at [facility]. Met with pt at bedside. She was up in the recliner after morning care. PT/OT about to work with her. No immediate needs. CM continues to follow.

## 2024-07-16 NOTE — PLAN OF CARE
Goal Outcome Evaluation:  Plan of Care Reviewed With: patient        Progress: no change  Outcome Evaluation: OT chanel completed this date. Patient is reclined in chair, denies pain at rest, is Ox4. Patient on 2L O2 satting 98%. Patient states she doesn't normally wear O2 so removed prior to mobility. Patient reports she lives alone in a one level home, is normally ind with ADLs, IADLs, HH and community mobility, and driving. Patient has a cane, but doesn't use it. Patient's daughter has been staying with her for last two weeks d/t weakness. Patient performed sit to stand with CGA, walked 32' using RW with CGA. Patient requires CGA-Min A for ADLs d/t generalized weakness and deconditioning. Patient satting 94% on room air upon returning to chair and rebounded to 100% within one minute of rest. Notified RN patient on room air and maintaining sats. Patient is expected to benefit from continued OT services prior to DC and would benefit from STR to address above mentioned deficits in order to facilitate safe return to PLOF.      Anticipated Discharge Disposition (OT): inpatient rehabilitation facility

## 2024-07-16 NOTE — CASE MANAGEMENT/SOCIAL WORK
Case Management/Social Work    Patient Name:  Aster Craft  YOB: 1945  MRN: [id number]  Admit Date:  7/13/2024        YANELY unable to start auth with [employer] for STR at [facility] due to needing OT eval. Team updated. SW following.     11:34 EDT YANELY submitted auth to Leah/Jag and auth was approved for initial 7 days. YANELY updated facility to see if pt can admit today.     11:44 EDT [facility] states pt able to admit today. YANELY updated team.       Electronically signed by:  CECY Guido  07/16/24 08:50 EDT

## 2024-07-16 NOTE — DISCHARGE INSTRUCTIONS
- Continue aspirin and statin as prescribed  -Take metformin as prescribed  -Follow-up with your primary care provider in 2 to 3 days for recheck and continued care.  Discuss diabetes and thyroid disease with your PCP.  -Follow-up with your cardiologist as an outpatient as scheduled

## 2024-07-16 NOTE — THERAPY EVALUATION
Patient Name: Aster Craft  : 1945    MRN: [id number]                              Today's Date: 2024       Admit Date: 2024    Visit Dx:     ICD-10-CM ICD-9-CM   1. Chest pain, atypical  R07.89 786.59   2. Acute respiratory failure with hypoxia  J96.01 518.81   3. Hypervolemia, unspecified hypervolemia type  E87.70 276.69     Patient Active Problem List   Diagnosis    Thickened endometrium    Right lower quadrant abdominal pain    Fatty (change of) liver, not elsewhere classified    A-fib    Breast CA    Essential hypertension    GERD (gastroesophageal reflux disease)    Hyperthyroidism    (HFpEF) heart failure with preserved ejection fraction    Acute on chronic heart failure with preserved ejection fraction (HFpEF)    Acute on chronic combined systolic and diastolic CHF (congestive heart failure)    Type 2 diabetes mellitus without complication, without long-term current use of insulin    Chest pain, atypical    Acute on chronic diastolic heart failure     Past Medical History:   Diagnosis Date    Arthritis     Breast cancer     RIGHT BREAST STAGE 3    Diabetes mellitus     Elevated cholesterol     GERD (gastroesophageal reflux disease) 2021    History of cancer chemotherapy     Hypertension     Hyperthyroidism 2022    Kidney stone     Lichen sclerosus     Shingles     Thickened endometrium     Urinary incontinence     Urinary tract infection      Past Surgical History:   Procedure Laterality Date    CARDIAC CATHETERIZATION N/A 7/15/2024    Procedure: Coronary angiography;  Surgeon: Brian Morrow MD;  Location: [facility] CATH INVASIVE LOCATION;  Service: Cardiology;  Laterality: N/A;    CATARACT EXTRACTION Right 01/10/2023    CERVICAL CONE BIOPSY      CHOLECYSTECTOMY  2010    COLONOSCOPY N/A 2018    Procedure: COLONOSCOPY;  Surgeon: Trenton Lyons MD;  Location: [facility] OR;  Service: Gastroenterology    DILATATION AND CURETTAGE      ENDOSCOPY N/A 2018     Procedure: ESOPHAGOGASTRODUODENOSCOPY;  Surgeon: Trenton Lyons MD;  Location: [facility];  Service: Gastroenterology    MASTECTOMY Right 10/1998    MASTECTOMY Left 07/2013    TUBAL ABDOMINAL LIGATION        General Information       Row Name 07/16/24 1002          OT Time and Intention    Document Type evaluation  -SD     Mode of Treatment occupational therapy  -SD       Row Name 07/16/24 1002          General Information    Patient Profile Reviewed yes  -SD     Prior Level of Function independent:;all household mobility;community mobility;ADL's;home management;driving  patient states her daughter has been staying with her for the last couple of weeks due to increased weakness. Only DME is a cane, but patient didn't use it  -SD     Barriers to Rehab previous functional deficit  -SD       Row Name 07/16/24 1002          Living Environment    People in Home child(popeye), adult  -SD       Row Name 07/16/24 1002          Home Main Entrance    Number of Stairs, Main Entrance none  -SD       Row Name 07/16/24 1002          Stairs Within Home, Primary    Number of Stairs, Within Home, Primary none  -SD       Row Name 07/16/24 1002          Cognition    Orientation Status (Cognition) oriented x 4  -SD       Row Name 07/16/24 1002          Safety Issues, Functional Mobility    Safety Issues Affecting Function (Mobility) safety precautions follow-through/compliance;safety precaution awareness  -SD     Impairments Affecting Function (Mobility) balance;endurance/activity tolerance;strength  -SD               User Key  (r) = Recorded By, (t) = Taken By, (c) = Cosigned By      Initials Name Provider Type    SD Radha Lozano OT Occupational Therapist                     Mobility/ADL's       Row Name 07/16/24 1003          Bed Mobility    Comment, (Bed Mobility) in chair  -SD       Row Name 07/16/24 1003          Transfers    Transfers sit-stand transfer  -SD       Row Name 07/16/24 1003          Sit-Stand Transfer    Sit-Stand  [place] (Transfers) contact guard  -SD     Assistive Device (Sit-Stand Transfers) walker, front-wheeled  -SD       Row Name 07/16/24 1003          Functional Mobility    Functional Mobility- Ind. Level contact guard assist  -SD     Functional Mobility- Device walker, front-wheeled  -SD     Functional Mobility-Distance (Feet) 32  -SD     Functional Mobility- Safety Issues balance decreased during turns  -SD       Row Name 07/16/24 1003          Activities of Daily Living    BADL Assessment/Intervention bathing;upper body dressing;lower body dressing;grooming;feeding;toileting  -SD       Row Name 07/16/24 1003          Bathing Assessment/Intervention    [place] Level (Bathing) minimum assist (75% patient effort)  -SD       Row Name 07/16/24 1003          Upper Body Dressing Assessment/Training    [place] Level (Upper Body Dressing) standby assist  -SD       Row Name 07/16/24 1003          Lower Body Dressing Assessment/Training    [place] Level (Lower Body Dressing) minimum assist (75% patient effort)  -SD       Row Name 07/16/24 1003          Grooming Assessment/Training    [place] Level (Grooming) contact guard assist  -SD     Position (Grooming) sink side;supported standing  -SD       Row Name 07/16/24 1003          Self-Feeding Assessment/Training    [place] Level (Feeding) supervision  -SD       Row Name 07/16/24 1003          Toileting Assessment/Training    [place] Level (Toileting) minimum assist (75% patient effort)  -SD     Assistive Devices (Toileting) commode, bedside without drop arms;raised toilet seat  -SD               User Key  (r) = Recorded By, (t) = Taken By, (c) = Cosigned By      Initials Name Provider Type    Radha Quintanilla OT Occupational Therapist                   Obj/Interventions       Row Name 07/16/24 1004          Sensory Assessment (Somatosensory)    Sensory Assessment (Somatosensory) sensation intact  -SD       Row Name 07/16/24 1004           Range of Motion Comprehensive    General Range of Motion bilateral upper extremity ROM WFL  -SD       Row Name 07/16/24 1004          Strength Comprehensive (MMT)    General Manual Muscle Testing (MMT) Assessment upper extremity strength deficits identified  -SD     Comment, General Manual Muscle Testing (MMT) Assessment 3+/5  -SD               User Key  (r) = Recorded By, (t) = Taken By, (c) = Cosigned By      Initials Name Provider Type    SD Blake, Radha, OT Occupational Therapist                   Goals/Plan       Row Name 07/16/24 1009          Transfer Goal 1 (OT)    Activity/Assistive Device (Transfer Goal 1, OT) sit-to-stand/stand-to-sit;walker, rolling  -SD     [place] Level/Cues Needed (Transfer Goal 1, OT) modified independence  -SD     Time Frame (Transfer Goal 1, OT) long term goal (LTG)  -SD     Progress/Outcome (Transfer Goal 1, OT) goal ongoing  -SD       Row Name 07/16/24 1009          Bathing Goal 1 (OT)    Activity/Device (Bathing Goal 1, OT) bathing skills, all  -SD     [place] Level/Cues Needed (Bathing Goal 1, OT) standby assist  -SD     Time Frame (Bathing Goal 1, OT) 2 weeks  -SD     Progress/Outcomes (Bathing Goal 1, OT) goal ongoing  -SD       Row Name 07/16/24 1009          Dressing Goal 1 (OT)    Activity/Device (Dressing Goal 1, OT) lower body dressing  -SD     [place]/Cues Needed (Dressing Goal 1, OT) standby assist  -SD     Time Frame (Dressing Goal 1, OT) 2 weeks  -SD     Progress/Outcome (Dressing Goal 1, OT) goal ongoing  -SD       Row Name 07/16/24 1009          Toileting Goal 1 (OT)    Activity/Device (Toileting Goal 1, OT) toileting skills, all;raised toilet seat  -SD     [place] Level/Cues Needed (Toileting Goal 1, OT) standby assist  -SD     Time Frame (Toileting Goal 1, OT) 2 weeks  -SD     Progress/Outcome (Toileting Goal 1, OT) goal ongoing  -SD       Row Name 07/16/24 1009          Strength Goal 1 (OT)    Strength Goal 1 (OT) Patient to perform UB  ther ex, demonstrating appropriate activity pacing while maintaining O2 sats; progressing in reps/resistance as tolerated  -SD     Time Frame (Strength Goal 1, OT) long term goal (LTG)  -SD     Progress/Outcome (Strength Goal 1, OT) goal ongoing  -SD       Row Name 07/16/24 1009          Therapy Assessment/Plan (OT)    Planned Therapy Interventions (OT) activity tolerance training;adaptive equipment training;BADL retraining;patient/caregiver education/training;ROM/therapeutic exercise;strengthening exercise;transfer/mobility retraining  -SD               User Key  (r) = Recorded By, (t) = Taken By, (c) = Cosigned By      Initials Name Provider Type    Radha Quintanilla OT Occupational Therapist                   Clinical Impression       Row Name 07/16/24 1004          Pain Assessment    Pretreatment Pain Rating 0/10 - no pain  -SD     Posttreatment Pain Rating 0/10 - no pain  -SD       Row Name 07/16/24 1004          Plan of Care Review    Plan of Care Reviewed With patient  -SD     Progress no change  -SD     Outcome Evaluation OT eval completed this date. Patient is reclined in chair, denies pain at rest, is Ox4. Patient on 2L O2 satting 98%. Patient states she doesn't normally wear O2 so removed prior to mobility. Patient reports she lives alone in a one level home, is normally ind with ADLs, IADLs, HH and community mobility, and driving. Patient has a cane, but doesn't use it. Patient's daughter has been staying with her for last two weeks d/t weakness. Patient performed sit to stand with CGA, walked 32' using RW with CGA. Patient requires CGA-Min A for ADLs d/t generalized weakness and deconditioning. Patient satting 94% on room air upon returning to chair and rebounded to 100% within one minute of rest. Notified RN patient on room air and maintaining sats. Patient is expected to benefit from continued OT services prior to DC and would benefit from STR to address above mentioned deficits in order to  facilitate safe return to PLOF.  -SD       Row Name 07/16/24 1004          Therapy Assessment/Plan (OT)    Patient/Family Therapy Goal Statement (OT) rehab  -SD     Rehab Potential (OT) good, to achieve stated therapy goals  -SD     Criteria for Skilled Therapeutic Interventions Met (OT) skilled treatment is necessary  -SD     Therapy Frequency (OT) daily  Mon-Fri  -SD       Row Name 07/16/24 1004          Therapy Plan Review/Discharge Plan (OT)    Anticipated Discharge Disposition (OT) inpatient rehabilitation facility  -SD       Row Name 07/16/24 1004          Vital Signs    Pre SpO2 (%) 98  -SD     O2 Delivery Pre Treatment supplemental O2  -SD     Intra SpO2 (%) 94  -SD     O2 Delivery Intra Treatment room air  -SD     Post SpO2 (%) 100  -SD     O2 Delivery Post Treatment room air  -SD       [facility] Name 07/16/24 1004          Positioning and Restraints    Pre-Treatment Position sitting in chair/recliner  -SD     Post Treatment Position chair  -SD     In Chair sitting;call light within reach;encouraged to call for assist;notified nsg;with PT  -SD               User Key  (r) = Recorded By, (t) = Taken By, (c) = Cosigned By      Initials Name Provider Type    Radha Quintanilla OT Occupational Therapist                   Outcome Measures       Row Name 07/16/24 1011          How much help from another is currently needed...    Putting on and taking off regular lower body clothing? 3  -SD     Bathing (including washing, rinsing, and drying) 3  -SD     Toileting (which includes using toilet bed pan or urinal) 3  -SD     Putting on and taking off regular upper body clothing 3  -SD     Taking care of personal grooming (such as brushing teeth) 3  -SD     Eating meals 4  -SD     AM-PAC 6 Clicks Score (OT) 19  -SD       Row Name 07/16/24 1011          Functional Assessment    Outcome Measure Options AM-PAC 6 Clicks Daily Activity (OT)  -SD               User Key  (r) = Recorded By, (t) = Taken By, (c) = Cosigned By       Initials Name Provider Type    Radha Quintanilla OT Occupational Therapist                    Occupational Therapy Education       Title: PT OT SLP Therapies (In Progress)       Topic: Occupational Therapy (In Progress)       Point: ADL training (Done)       Description:   Instruct learner(s) on proper safety adaptation and remediation techniques during self care or transfers.   Instruct in proper use of assistive devices.                  Learning Progress Summary             Patient Acceptance, E,TB, VU by SD at 7/16/2024 1011    Comment: OT POC                         Point: Home exercise program (Not Started)       Description:   Instruct learner(s) on appropriate technique for monitoring, assisting and/or progressing therapeutic exercises/activities.                  Learner Progress:  Not documented in this visit.              Point: Precautions (Not Started)       Description:   Instruct learner(s) on prescribed precautions during self-care and functional transfers.                  Learner Progress:  Not documented in this visit.              Point: Body mechanics (Not Started)       Description:   Instruct learner(s) on proper positioning and spine alignment during self-care, functional mobility activities and/or exercises.                  Learner Progress:  Not documented in this visit.                              User Key       Initials Effective Dates Name Provider Type Discipline    SD 06/16/21 -  Radha Lozano OT Occupational Therapist OT                  OT Recommendation and Plan  Planned Therapy Interventions (OT): activity tolerance training, adaptive equipment training, BADL retraining, patient/caregiver education/training, ROM/therapeutic exercise, strengthening exercise, transfer/mobility retraining  Therapy Frequency (OT): daily (Mon-Fri)  Plan of Care Review  Plan of Care Reviewed With: patient  Progress: no change  Outcome Evaluation: OT chanel completed this date. Patient is reclined  in chair, denies pain at rest, is Ox4. Patient on 2L O2 satting 98%. Patient states she doesn't normally wear O2 so removed prior to mobility. Patient reports she lives alone in a one level home, is normally ind with ADLs, IADLs, HH and community mobility, and driving. Patient has a cane, but doesn't use it. Patient's daughter has been staying with her for last two weeks d/t weakness. Patient performed sit to stand with CGA, walked 32' using RW with CGA. Patient requires CGA-Min A for ADLs d/t generalized weakness and deconditioning. Patient satting 94% on room air upon returning to chair and rebounded to 100% within one minute of rest. Notified RN patient on room air and maintaining sats. Patient is expected to benefit from continued OT services prior to DC and would benefit from STR to address above mentioned deficits in order to facilitate safe return to PLOF.     Time Calculation:   Evaluation Complexity (OT)  Review Occupational Profile/Medical/Therapy History Complexity: expanded/moderate complexity  Assessment, Occupational Performance/Identification of Deficit Complexity: 3-5 performance deficits  Clinical Decision Making Complexity (OT): detailed assessment/moderate complexity  Overall Complexity of Evaluation (OT): moderate complexity     Time Calculation- OT       Row Name 07/16/24 1012             Time Calculation- OT    OT Start Time 0945  -SD      OT Received On 07/16/24  -SD      OT Goal Re-Cert Due Date 07/26/24  -SD         Untimed Charges    OT Eval/Re-eval Minutes 45  -SD         Total Minutes    Untimed Charges Total Minutes 45  -SD       Total Minutes 45  -SD                User Key  (r) = Recorded By, (t) = Taken By, (c) = Cosigned By      Initials Name Provider Type    Radha Quintanilla OT Occupational Therapist                  Therapy Charges for Today       Code Description Service Date Service Provider Modifiers Qty    [id number] HC OT EVAL MOD COMPLEXITY 3 7/16/2024 Radha oLzano  OT GO 1                 Radha Lozano, OT  7/16/2024

## 2024-07-16 NOTE — DISCHARGE PLACEMENT REQUEST
"[facility] Documentation     Vanessa Craft (79 y.o. Female)       Date of Birth   1945    Social Security Number       Address   [address] [place] [id number]    Home Phone   [phone]    MRN   [id number]       [facility]   [facility]    Marital Status                               Admission Date   7/13/24    Admission Type   Emergency    Admitting Provider   Brianna Patel DO    Attending Provider   German Mae MD    Department, Room/Bed   [facility] TELEMETRY 3, 320/1       Discharge Date       Discharge Disposition       Discharge Destination                                 Attending Provider: German Mae MD    Allergies: Caffeine    Isolation: None   Infection: None   Code Status: CPR    Ht: 160 cm (63\")   Wt: 53.4 kg (117 lb 11.6 oz)    Admission Cmt: None   Principal Problem: Chest pain, atypical [R07.89]                   Active Insurance as of 7/13/2024       Primary Coverage       Payor Plan Insurance Group Employer/Plan Group    ANTHEM MEDICARE REPLACEMENT ANTHEM MEDICARE ADVANTAGE [id number]       Payor Plan Address Payor Plan Phone Number Payor Plan Fax Number Effective Dates    [address] [phone]  9/1/2020 - None Entered    [facility] [id number]         Subscriber Name Subscriber Birth Date Member ID       VANESSA CRAFT 1945 [id number]               Secondary Coverage       Payor Plan Insurance Group Employer/Plan Group    [place] [id number]       Payor Plan Address Payor Plan Phone Number Payor Plan Fax Number Effective Dates    [employer] [address] [address] [phone]  11/1/2022 - None Entered    [facility] [id number]         Subscriber Name Subscriber Birth Date Member ID       VANESSA CRAFT 1945 [id number]                     Emergency Contacts        (Rel.) Home Phone Work Phone Mobile Phone    Guillermina Gar (Grandchild) -- -- [phone]    CraftChilo simons (Son) -- -- " [phone]    ROMI CRAFT (Son) [phone] -- --    TERRIE GOMEZ (Grandchild) [phone] -- --                 History & Physical        Brianna Patel DO at 24 2106            [facility]   HISTORY AND PHYSICAL      Name:  Aster Craft   Age:  79 y.o.  Sex:  female  :  1945  MRN:  [id number]   Visit Number:  [id number]  Admission Date:  2024  Date Of Service:  24  Primary Care Physician:  Yolie Cotto MD    Chief Complaint:     Shortness of breath, chest pain    History Of Presenting Illness:      The patient is a 79-year-old female with a history of hypertension, diabetes not on medication, hypothyroidism, reported cardiomyopathy, with recent diagnosis of heart failure reduced ejection fraction, who presented to the emergency room with complaints of shortness of breath and chest pain.  Patient states in the past she had seen Dr. Palacios, had been on metoprolol due to reported cardiomyopathy.  She is unsure if she was ever diagnosed with atrial fibrillation.  She was asked in the hospital about 2 weeks ago due to breathing difficulty, was diuresed, and had outpatient cardiology follow-up.  She is due to see cardiology this week.  She noted today while outside at a yard sale she developed symptoms of chest pain, pressure, heaviness in her chest and associated shortness of breath.  She went inside to rest, cool down, but continued to have symptoms and was brought to the ER.  Currently is feeling better.  She is not currently taking diuretics at home.  She is not on any medications for diabetes, she wants to talk with her PCP about that.  She notes a stress test performed many years ago, no recent cardiac procedures or testing other than echocardiogram at last hospital stay.    In the ER, the patient was overall hemodynamically stable.  She was borderline hypoxic with ambulation and is currently on 2 L of oxygen.  She was given IV Lasix.   Her labs are largely nonactionable.  She has been on Macrobid due to recent UTI with clear urine sample tonight.  Chest x-ray with some mild interstitial edema.  EKG and troponins reassuring.  Admitted for observation.    Review Of Systems:    All systems were reviewed and negative except as mentioned in history of presenting illness, assessment and plan.    Past Medical History: Patient  has a past medical history of Arthritis, Breast cancer, Diabetes mellitus, Elevated cholesterol, GERD (gastroesophageal reflux disease) (08/23/2021), History of cancer chemotherapy, Hypertension, Hyperthyroidism (05/19/2022), Kidney stone, Lichen sclerosus, Shingles, Thickened endometrium (2018), Urinary incontinence, and Urinary tract infection.    Past Surgical History: Patient  has a past surgical history that includes Mastectomy (Right, 10/1998); Mastectomy (Left, 07/2013); Cholecystectomy (03/2010); Dilation and curettage of uterus; Cervical cone biopsy; Esophagogastroduodenoscopy (N/A, 07/06/2018); Colonoscopy (N/A, 07/06/2018); Tubal ligation; and Cataract Extraction (Right, 01/10/2023).    Social History: Patient  reports that she has never smoked. She has never been exposed to tobacco smoke. She has never used smokeless tobacco. She reports that she does not drink alcohol and does not use drugs.    Family History:  Patient's family history has been reviewed and found to be noncontributory.     Allergies:      Caffeine    Home Medications:    Prior to Admission Medications       Prescriptions Last Dose Informant Patient Reported? Taking?    albuterol sulfate  (90 Base) MCG/ACT inhaler   Yes No    TWO PUFFS EVERY SIX HOURS AS NEEDED    Patient not taking:  Reported on 7/11/2024    azelastine (ASTELIN) 0.1 % nasal spray   Yes No    INHALE ONE SPRAY IN EACH NOSTRIL TWICE DAILY    Patient not taking:  Reported on 7/11/2024    benzonatate (TESSALON) 200 MG capsule   Yes No    Take 1 capsule by mouth.    Blood Glucose  Monitoring Suppl (OneTouch Verio Flex System) w/Device kit   Yes No    See Admin Instructions. for testing    brimonidine-timolol (COMBIGAN) 0.2-0.5 % ophthalmic solution   Yes No    Administer 1 drop to both eyes Every 12 (Twelve) Hours.    Patient not taking:  Reported on 7/11/2024    calcium carbonate-cholecalciferol 500-400 MG-UNIT tablet tablet   Yes No    Take  by mouth Daily.    cetirizine (zyrTEC) 10 MG tablet   Yes No    Take 1 tablet by mouth Daily.    Patient not taking:  Reported on 7/11/2024    Cholecalciferol 250 MCG (75475 UT) capsule   Yes No    Take 5,000 Units by mouth Daily.    CINNAMON PO   Yes No    Take  by mouth.    Patient not taking:  Reported on 7/11/2024    clobetasol (TEMOVATE) 0.05 % ointment   No No    Apply to affected area BID x 2 wks then daily x 2 wks then 2-3x/wk    Continuous Blood Gluc Sensor (FreeStyle Malina 2 Sensor) misc   Yes No    CHANGE SENSOR EVERY 14 DAYS    desloratadine (CLARINEX) 5 MG tablet   Yes No    Take 1 tablet by mouth Daily.    Patient not taking:  Reported on 7/11/2024    dextromethorphan-guaifenesin (ROBITUSSIN-DM)  MG/5ML syrup   Yes No    TAKE 10ML EVERY 4 HOURS AS NEEDED    diazePAM (VALIUM) 5 MG tablet   Yes No    TAKE 1 TABLET one hour prior TO procedure    Patient not taking:  Reported on 7/11/2024    dorzolamide (TRUSOPT) 2 % ophthalmic solution   Yes No    INSTILL ONE DROP IN EACH EYE TWICE DAILY    Patient not taking:  Reported on 7/11/2024    ezetimibe (ZETIA) 10 MG tablet   Yes No    Take 1 tablet by mouth Daily.    Flovent  MCG/ACT inhaler   Yes No    Inhale 2 puffs 2 (Two) Times a Day.    Patient not taking:  Reported on 7/11/2024    furosemide (Lasix) 20 MG tablet   No No    Take 1 tablet by mouth Daily.    glipizide (GLUCOTROL XL) 5 MG ER tablet   Yes No    Patient not taking:  Reported on 7/11/2024    ipratropium (ATROVENT) 0.03 % nasal spray   Yes No    INHALE ONE SPRAY IN EACH NOSTRIL TWICE DAILY    Patient not taking:  Reported  on 7/11/2024    ipratropium (ATROVENT) 0.06 % nasal spray   Yes No    INHALE 1-2 SPRAYS IN EACH NOSTRIL TWICE DAILY    Patient not taking:  Reported on 7/11/2024    Januvia 100 MG tablet   Yes No    Take 1 tablet by mouth Daily.    Patient not taking:  Reported on 7/11/2024    Lancets (OneTouch Delica Plus [id number]) misc   Yes No    USE AS DIRECTED EVERY DAY    meclizine (ANTIVERT) 12.5 MG tablet   Yes No    Take 1 tablet by mouth 3 (Three) Times a Day As Needed.    metoprolol succinate XL (TOPROL-XL) 50 MG 24 hr tablet   Yes No    Take 1 tablet by mouth Daily.    nitrofurantoin, macrocrystal-monohydrate, (MACROBID) 100 MG capsule   No No    Take 1 capsule by mouth 2 (Two) Times a Day.    nitroglycerin (NITROSTAT) 0.4 MG SL tablet   Yes No    Place 1 tablet under the tongue.    OneTouch Verio test strip   Yes No    Daily. for testing    pantoprazole (PROTONIX) 40 MG EC tablet   Yes No    Take 1 tablet by mouth.    Rhopressa 0.02 % solution   Yes No    Administer 1 drop to both eyes Every Night.    simethicone (MYLICON) 80 MG chewable tablet   Yes No    Chew 1 tablet Every 6 (Six) Hours As Needed for Flatulence.    Triamcinolone Acetonide (NASACORT) 55 MCG/ACT nasal inhaler   Yes No    2 sprays into the nostril(s) as directed by provider Daily.    Patient not taking:  Reported on 7/11/2024    Vibegron (Gemtesa) 75 MG tablet   No No    Take 1 tablet by mouth Daily.    Patient not taking:  Reported on 7/11/2024          ED Medications:    Medications   sodium chloride 0.9 % flush 10 mL (has no administration in time range)   furosemide (LASIX) injection 40 mg (40 mg Intravenous Given 7/13/24 1738)   ketorolac (TORADOL) injection 15 mg (15 mg Intravenous Given 7/13/24 1938)   orphenadrine (NORFLEX) injection 60 mg (60 mg Intravenous Given 7/13/24 1940)     Vital Signs:  Temp:  [97.7 °F (36.5 °C)] 97.7 °F (36.5 °C)  Heart Rate:  [104-117] 107  Resp:  [18] 18  BP: (116-130)/(69-76) 130/76        07/13/24  1706   Weight:  "57.6 kg (127 lb)     Body mass index is 22.5 kg/m².    Physical Exam:     Most recent vital Signs: /76   Pulse 107   Temp 97.7 °F (36.5 °C) (Oral)   Resp 18   Ht 160 cm (63\")   Wt 57.6 kg (127 lb)   SpO2 95%   BMI 22.50 kg/m²     Physical Exam  Constitutional:       General: She is not in acute distress.     Appearance: She is normal weight.   HENT:      Mouth/Throat:      Mouth: Mucous membranes are moist.      Pharynx: Oropharynx is clear.   Eyes:      Extraocular Movements: Extraocular movements intact.      Pupils: Pupils are equal, round, and reactive to light.   Cardiovascular:      Rate and Rhythm: Regular rhythm. Tachycardia present.      Pulses: Normal pulses.      Heart sounds: No murmur heard.     No gallop.   Pulmonary:      Effort: Pulmonary effort is normal.      Breath sounds: No rhonchi or rales.   Abdominal:      General: Bowel sounds are normal. There is no distension.      Tenderness: There is no abdominal tenderness.   Musculoskeletal:      Right lower leg: Edema present.      Left lower leg: Edema present.   Skin:     General: Skin is warm.      Capillary Refill: Capillary refill takes less than 2 seconds.      Findings: No bruising or lesion.   Neurological:      General: No focal deficit present.      Mental Status: She is alert. Mental status is at baseline.      Motor: Weakness present.         Laboratory data:    I have reviewed the labs done in the emergency room.    Results from last 7 days   Lab Units 07/13/24  1718   SODIUM mmol/L 142   POTASSIUM mmol/L 4.1   CHLORIDE mmol/L 101   CO2 mmol/L 27.5   BUN mg/dL 28*   CREATININE mg/dL 0.77   CALCIUM mg/dL 9.2   BILIRUBIN mg/dL 0.6   ALK PHOS U/L 93   ALT (SGPT) U/L 21   AST (SGOT) U/L 19   GLUCOSE mg/dL 195*     Results from last 7 days   Lab Units 07/13/24  1718   WBC 10*3/mm3 16.25*   HEMOGLOBIN g/dL 13.7   HEMATOCRIT % 41.3   PLATELETS 10*3/mm3 240         Results from last 7 days   Lab Units 07/13/24  1918 07/13/24  1718 "   CK TOTAL U/L  --  77   HSTROP T ng/L 9 12     Results from last 7 days   Lab Units 07/13/24  1718   PROBNP pg/mL 95.5                 Results from last 7 days   Lab Units 07/13/24  1839   COLOR UA  Yellow   GLUCOSE UA  Negative   KETONES UA  Negative   BLOOD UA  Negative   LEUKOCYTES UA  Negative   PH, URINE  6.5   BILIRUBIN UA  Negative   UROBILINOGEN UA  0.2 E.U./dL       Pain Management Panel           No data to display                EKG:      Appears to be a sinus rhythm, rate of 100, right bundle branch block noted.  There are nonspecific T wave changes.    Radiology:    No radiology results for the last 3 days    Assessment:    Acute on chronic heart failure midrange ejection fraction/diastolic exacerbation, POA  Chest pain  Tachycardia, A-fib history?  Uncontrolled type 2 diabetes  Hyperthyroidism  Prior breast cancer  GERD  Hypertension    Plan:    Admit for observation    CHF/chest pain:  Recent echo with midrange ejection fraction noted.  Was not taking diuretics outpatient.  Placed back on Lasix.  Continue the metoprolol.  She is due to see cardiology outpatient, however with ongoing symptoms concerning for possible angina will have Dr. Morrow see in consultation.  Troponin stable.  Telemetry monitoring.  She also has outpatient Holter monitor on currently.    Arrhythmia:  Patient reports prior history of tachycardia, unsure if actually atrial fibrillation.  She has been on metoprolol for multiple years.  EKG normal here in sinus rhythm.  She has outpatient Holter monitor currently on.    Diabetes:  Patient not taking anything at home for this.  Notes had been diet controlled, understands A1c is elevated now.  Recommend she take medication for this    Hyperthyroidism:  Follows with endocrinology, is known to have thyroid nodules.  Will check TSH labs.  Obviously can contribute to heart disease as well as arrhythmia.    Further recommendations depend upon clinical course.  Plan of care discussed with  the patient    Risk Assessment: High  DVT Prophylaxis: Heparin  Code Status: Full  Diet: Diabetic cardiac fluid restriction    Advance Care Planning  ACP discussion was held with the patient during this visit. Patient does not have an advance directive, declines further assistance.           Brianna Patel DO  07/13/24  21:06 EDT    Dictated utilizing Dragon dictation.    Electronically signed by Brianna Patel DO at 07/13/24 6456       Current Facility-Administered Medications   Medication Dose Route Frequency Provider Last Rate Last Admin    acetaminophen (TYLENOL) tablet 650 mg  650 mg Oral Q4H PRN Brian Morrow MD   650 mg at 07/15/24 1143    Or    acetaminophen (TYLENOL) 160 MG/5ML oral solution 650 mg  650 mg Oral Q4H PRN Brian Morrow MD        Or    acetaminophen (TYLENOL) suppository 650 mg  650 mg Rectal Q4H PRN Brian Morrow MD        acetaminophen (TYLENOL) tablet 650 mg  650 mg Oral Q4H PRN Brian Morrow MD        ALPRAZolam (XANAX) tablet 0.25 mg  0.25 mg Oral TID PRN Brian Morrow MD        aluminum-magnesium hydroxide-simethicone (MAALOX MAX) 400-400-40 MG/5ML suspension 15 mL  15 mL Oral Q6H PRN Brian Morrow MD        aspirin EC tablet 81 mg  81 mg Oral Daily Brian Morrow MD   81 mg at 07/16/24 0814    atorvastatin (LIPITOR) tablet 80 mg  80 mg Oral Nightly Brian Morrow MD   80 mg at 07/15/24 2109    sennosides-docusate (PERICOLACE) 8.6-50 MG per tablet 2 tablet  2 tablet Oral BID PRN Brian Morrow MD   2 tablet at 07/16/24 0816    And    polyethylene glycol (MIRALAX) packet 17 g  17 g Oral Daily PRN Brian Morrow MD        And    bisacodyl (DULCOLAX) EC tablet 5 mg  5 mg Oral Daily PRN Brian Morrow MD        And    bisacodyl (DULCOLAX) suppository 10 mg  10 mg Rectal Daily PRN Brian Morrow MD        dextrose (D50W) (25 g/50 mL) IV injection 25 g  25 g Intravenous Q15 Min PRN Brian Morrow MD        dextrose (GLUTOSE)  oral gel 15 g  15 g Oral Q15 Min PRN Breeding, Brian KATHLEEN MD        diphenhydrAMINE (BENADRYL) capsule 25 mg  25 mg Oral Nightly PRN Breeding, Brian KATHLEEN MD        diphenhydrAMINE (BENADRYL) injection 25 mg  25 mg Intravenous Q6H PRN Breeding, Brian KATHLEEN MD        Enoxaparin Sodium (LOVENOX) syringe 40 mg  40 mg Subcutaneous Daily Breeding, Brian KATHLEEN MD   40 mg at 07/16/24 0815    furosemide (LASIX) tablet 40 mg  40 mg Oral Daily Breeding, Brian KATHLEEN MD   40 mg at 07/16/24 0814    glucagon (GLUCAGEN) injection 1 mg  1 mg Intramuscular Q15 Min PRN Breeding, Brian KATHLEEN MD        guaiFENesin-dextromethorphan (ROBITUSSIN DM) 100-10 MG/5ML syrup 5 mL  5 mL Oral Q6H PRN Breeding, Brian KATHLEEN MD   5 mL at 07/16/24 0814    HYDROcodone-acetaminophen (NORCO) 5-325 MG per tablet 1 tablet  1 tablet Oral Q4H PRN Breeding, Brian KATHLEEN MD        Insulin Lispro (humaLOG) injection 2-7 Units  2-7 Units Subcutaneous 4x Daily AC & at Bedtime Breeding, Brian KATHLEEN MD   2 Units at 07/16/24 0814    metoprolol succinate XL (TOPROL-XL) 24 hr tablet 50 mg  50 mg Oral Nightly Breeding, Brian KATHLEEN MD   50 mg at 07/15/24 2109    morphine injection 4 mg  4 mg Intravenous Q1H PRN Breeding, Brian KATHLEEN MD        And    naloxone (NARCAN) injection 0.4 mg  0.4 mg Intravenous Q5 Min PRN Breeding, Brian KATHLEEN MD        nitroglycerin (NITROSTAT) SL tablet 0.4 mg  0.4 mg Sublingual Q5 Min PRN Breeding, Brian KATHLEEN MD        ondansetron ODT (ZOFRAN-ODT) disintegrating tablet 4 mg  4 mg Oral Q6H PRN Breeding, Brian KATHLEEN MD        Or    ondansetron (ZOFRAN) injection 4 mg  4 mg Intravenous Q6H PRN Breeding, Brian KATHLEEN MD        ondansetron ODT (ZOFRAN-ODT) disintegrating tablet 4 mg  4 mg Oral Q6H PRN Breeding, Brian KATHLEEN MD        Or    ondansetron (ZOFRAN) injection 4 mg  4 mg Intravenous Q6H PRN Brian Morrow MD        pantoprazole (PROTONIX) EC tablet 40 mg  40 mg Oral Q AM Brian Morrow MD   40 mg at 07/16/24 0534    Pharmacy to Dose enoxaparin (LOVENOX)   Does not apply  Continuous PRN Brian Morrow MD        sodium chloride 0.9 % flush 10 mL  10 mL Intravenous PRN Brian Morrow MD        sodium chloride 0.9 % flush 10 mL  10 mL Intravenous Q12H Brian Morrow MD   10 mL at 24 0815    sodium chloride 0.9 % flush 10 mL  10 mL Intravenous PRN Brian Morrow MD        sodium chloride 0.9 % infusion 40 mL  40 mL Intravenous PRN Brian Morrow MD        temazepam (RESTORIL) capsule 15 mg  15 mg Oral Nightly PRN Brian Morrow MD            Physician Progress Notes (most recent note)        Trae See DO at 07/15/24 1204              [facility]    Name:  Aster Craft   Age:  79 y.o.  Sex:  female  :  1945  MRN:  [id number]   Visit Number:  [id number]  Admission Date:  2024  Date Of Service:  07/15/24  Primary Care Physician:  Yolie Cotto MD     LOS: 0 days :    Chief Complaint:      Shortness of breath, chest pain     Subjective:    Patient was seen postcardiac cath.  Resting comfortably in bed, granddaughter at bedside.  Discussed discharge planning.  Patient and granddaughter concerned about her going home and living alone.  Requested short-term rehab placement.    Hospital Course:    The patient is a 79-year-old female with a history of hypertension, diabetes not on medication, hypothyroidism, reported cardiomyopathy, with recent diagnosis of heart failure reduced ejection fraction, who presented to the emergency room with complaints of shortness of breath and chest pain.  Patient states in the past she had seen Dr. Palacios, had been on metoprolol due to reported cardiomyopathy.  She is unsure if she was ever diagnosed with atrial fibrillation.  She was asked in the hospital about 2 weeks ago due to breathing difficulty, was diuresed, and had outpatient cardiology follow-up.  She is due to see cardiology this week.  She noted today while outside at a yard sale she developed symptoms of  chest pain, pressure, heaviness in her chest and associated shortness of breath.  She went inside to rest, cool down, but continued to have symptoms and was brought to the ER.  Currently is feeling better.  She is not currently taking diuretics at home.  She is not on any medications for diabetes, she wants to talk with her PCP about that.  She notes a stress test performed many years ago, no recent cardiac procedures or testing other than echocardiogram at last hospital stay.     In the ER, the patient was overall hemodynamically stable.  She was borderline hypoxic with ambulation and is currently on 2 L of oxygen.  She was given IV Lasix.  Her labs are largely nonactionable.  She has been on Macrobid due to recent UTI with clear urine sample tonight.  Chest x-ray with some mild interstitial edema.  EKG and troponins reassuring.  Admitted for observation.    Review of Systems:     All systems were reviewed and negative except as mentioned in subjective, assessment and plan.    Vital Signs:    Temp:  [97.7 °F (36.5 °C)-98.4 °F (36.9 °C)] 97.8 °F (36.6 °C)  Heart Rate:  [67-88] 69  Resp:  [14-18] 16  BP: ()/(53-78) 111/73    Intake and output:    I/O last 3 completed shifts:  In: 435 [P.O.:435]  Out: 2700 [Urine:2700]  No intake/output data recorded.    Physical Examination:    General Appearance:  Alert and cooperative.    Head:  Atraumatic and normocephalic.   Eyes: Conjunctivae and sclerae normal, no icterus. No pallor.   Throat: No oral lesions, no thrush, oral mucosa moist.   Neck: Supple, trachea midline, no thyromegaly.   Lungs:   Breath sounds heard bilaterally equally.  No wheezing or crackles. No Pleural rub or bronchial breathing.   Heart:  Normal S1 and S2, no murmur, no gallop, no rub. No JVD.   Abdomen:   Normal bowel sounds, no masses, no organomegaly. Soft, nontender, nondistended, no rebound tenderness.   Extremities: Supple, no edema, no cyanosis, no clubbing.   Skin: No bleeding or rash.    Neurologic: Alert and oriented x 3. No facial asymmetry. Moves all four limbs. No tremors.      Laboratory results:    Results from last 7 days   Lab Units 07/15/24  0612 07/14/24  0610 07/13/24  1718   SODIUM mmol/L 140 137 142   POTASSIUM mmol/L 3.5 3.5 4.1   CHLORIDE mmol/L 99 98 101   CO2 mmol/L 28.9 26.7 27.5   BUN mg/dL 22 29* 28*   CREATININE mg/dL 0.46* 0.51* 0.77   CALCIUM mg/dL 8.9 8.7 9.2   BILIRUBIN mg/dL  --   --  0.6   ALK PHOS U/L  --   --  93   ALT (SGPT) U/L  --   --  21   AST (SGOT) U/L  --   --  19   GLUCOSE mg/dL 130* 188* 195*     Results from last 7 days   Lab Units 07/15/24  0612 07/14/24  0610 07/13/24  1718   WBC 10*3/mm3 12.39*  --  16.25*   HEMOGLOBIN g/dL 13.0 12.9 13.7   HEMATOCRIT % 39.0 38.4 41.3   PLATELETS 10*3/mm3 213  --  240         Results from last 7 days   Lab Units 07/13/24  1918 07/13/24  1718   CK TOTAL U/L  --  77   HSTROP T ng/L 9 12     Results from last 7 days   Lab Units 07/13/24  1812 07/13/24  1800   BLOODCX  No growth at 24 hours No growth at 24 hours     Recent Labs     06/29/24  1924   PHART 7.394   EYK5IAS 44.1   PO2ART 72.5*   EGR9HTJ 26.9   BASEEXCESS 1.6      I have reviewed the patient's laboratory results.    Radiology results:    Cardiac Catheterization/Vascular Study    Result Date: 7/15/2024  Angiographically near normal coronary arteries Noncardiac chest pain     XR Chest 1 View    Result Date: 7/14/2024  PROCEDURE: XR CHEST 1 VW-  HISTORY: SOA Triage Protocol, shortness of breath and chest pressure beginning today.  COMPARISON: June 29, 2024.  FINDINGS: The heart is mildly enlarged, stable.. The lungs are clear. The mediastinum is unremarkable. There is no pneumothorax.  There are no acute osseous abnormalities.      Impression: No acute cardiopulmonary process.      This report was signed and finalized on 7/14/2024 8:45 AM by Iraida Kruse MD.     I have reviewed the patient's radiology reports.    Medication Review:     I have reviewed the patient's  "active and prn medications.     Problem List:      Chest pain, atypical    GERD (gastroesophageal reflux disease)    Hyperthyroidism    Acute on chronic combined systolic and diastolic CHF (congestive heart failure)    Type 2 diabetes mellitus without complication, without long-term current use of insulin    Acute on chronic diastolic heart failure      Assessment:    Acute on chronic heart failure midrange ejection fraction/diastolic exacerbation, POA  Chest pain  Tachycardia, A-fib history?  Uncontrolled type 2 diabetes  Hyperthyroidism  Prior breast cancer  GERD  Hypertension    Plan:    CHF/  Non-Cardiac chest pain:  -Recent echo with midrange ejection fraction noted.    -Was not taking diuretics outpatient.  Placed back on Lasix.    -Continue the metoprolol.     -Troponin stable.    -Telemetry monitoring.  She also has outpatient Holter monitor on currently.  -Dr. Morrow cardiology consulted.  Patient underwent a cardiac catheterization 7/15/2024.  Angiographically \"near normal\" coronary arteries.  Recommend to optimize medical therapy and evaluate for noncardiac causes of chest pain.  -Highly suspicious patient has underlying anxiety.     Arrhythmia:  Patient reports prior history of tachycardia, unsure if actually atrial fibrillation.  She has been on metoprolol for multiple years.  EKG normal here in sinus rhythm.  She has outpatient Holter monitor currently on.     Diabetes:  Patient not taking anything at home for this.  Notes had been diet controlled, understands A1c is elevated now.  Recommend she take medication for this     Hyperthyroidism:  Follows with endocrinology, is known to have thyroid nodules.  Will check TSH labs.  Obviously can contribute to heart disease as well as arrhythmia.     PT/OT consulted.  Further recommendations depend upon clinical course.  Plan of care discussed with the patient       DVT Prophylaxis: Lovenox  Code Status: Full  Diet: Diabetic cardiac fluid " restriction  Discharge Plan: Short-term rehab    Trae See DO  07/15/24  12:05 EDT    Dictated utilizing Dragon dictation.        Electronically signed by rTae See DO at 07/15/24 1208          Physical Therapy Notes (all)        Poppy Savage, PT at 247  Version 1 of          Goal Outcome Evaluation:  Plan of Care Reviewed With: patient, grandchild(popeye)           Outcome Evaluation: PT evaluation completed this p.m. Initially pt was expressing she was too tired to participate in PT evaluation. Pt's great-granddaughter present and encouraged pt to at least get OOB and pt agreed. It was noted that pt had frequent non-productive cough throughout assessment and this was her only c/o discomfort during evaluation. Pt was O x 4 and on 2 L NC O2 throughout session (O2 sat 91-93%). Pt provided CGA to come to sit on EOB and she was able to sit on EOB with supervision and pt assisted with changing her gown without any loss of balance. CGA and cues for sit to stand with FWW and to pivot to chair. Pt did express she felt safer using FWW and would be appropriate for a rollator upon d/c to assist pt with ambulation balance and endurance. Pt presents with deficits in strength, endurance, and balance. She is expected to improve her functional mobility with continued PT services prior to d/c.      Anticipated Discharge Disposition (PT): home with assist                          Electronically signed by Poppy Savage PT at 24 1538       Poppy Savage PT at 24 0814  Version 1 of 1         Patient Name: Aster Craft  : 1945    MRN: [id number]                              Today's Date: 2024       Admit Date: 2024    Visit Dx:     ICD-10-CM ICD-9-CM   1. Chest pain, atypical  R07.89 786.59   2. Acute respiratory failure with hypoxia  J96.01 518.81   3. Hypervolemia, unspecified hypervolemia type  E87.70 276.69     Patient Active Problem List   Diagnosis    Thickened endometrium     Right lower quadrant abdominal pain    Fatty (change of) liver, not elsewhere classified    A-fib    Breast CA    Essential hypertension    GERD (gastroesophageal reflux disease)    Hyperthyroidism    (HFpEF) heart failure with preserved ejection fraction    Acute on chronic heart failure with preserved ejection fraction (HFpEF)    Acute on chronic combined systolic and diastolic CHF (congestive heart failure)    Type 2 diabetes mellitus without complication, without long-term current use of insulin     Past Medical History:   Diagnosis Date    Arthritis     Breast cancer     RIGHT BREAST STAGE 3    Diabetes mellitus     Elevated cholesterol     GERD (gastroesophageal reflux disease) 08/23/2021    History of cancer chemotherapy     Hypertension     Hyperthyroidism 05/19/2022    Kidney stone     Lichen sclerosus     Shingles     Thickened endometrium 2018    Urinary incontinence     Urinary tract infection      Past Surgical History:   Procedure Laterality Date    CATARACT EXTRACTION Right 01/10/2023    CERVICAL CONE BIOPSY      CHOLECYSTECTOMY  03/2010    COLONOSCOPY N/A 07/06/2018    Procedure: COLONOSCOPY;  Surgeon: Trenton Lyons MD;  Location: [facility];  Service: Gastroenterology    DILATATION AND CURETTAGE      ENDOSCOPY N/A 07/06/2018    Procedure: ESOPHAGOGASTRODUODENOSCOPY;  Surgeon: Trenton Lyons MD;  Location: [facility];  Service: Gastroenterology    MASTECTOMY Right 10/1998    MASTECTOMY Left 07/2013    TUBAL ABDOMINAL LIGATION        General Information       Row Name 07/14/24 1458          Physical Therapy Time and Intention    Document Type evaluation  -TW     Mode of Treatment physical therapy  -TW       Row Name 07/14/24 1458          General Information    Patient Profile Reviewed yes  -TW     Prior Level of Function independent:;all household mobility  pt does not use any assistive device at home for ambulation but has noticed she isn't walking as much due to shortness of breath and leg cramps  recently.  -TW     Existing Precautions/Restrictions fall;oxygen therapy device and L/min;cardiac  -TW     Barriers to Rehab previous functional deficit  -       Row Name 07/14/24 1458          Living Environment    People in Home child(popeye), adult  -       Row Name 07/14/24 1458          Home Main Entrance    Number of Stairs, Main Entrance none  -       Row Name 07/14/24 1458          Stairs Within Home, Primary    Number of Stairs, Within Home, Primary none  -[facility] Name 07/14/24 1458          Cognition    Orientation Status (Cognition) oriented x 4;verbal cues/prompts needed for orientation  -       Row Name 07/14/24 1458          Safety Issues, Functional Mobility    Safety Issues Affecting Function (Mobility) safety precaution awareness;safety precautions follow-through/compliance;insight into deficits/self-awareness;sequencing abilities  -TW     Impairments Affecting Function (Mobility) balance;endurance/activity tolerance;shortness of breath;strength  -TW               User Key  (r) = Recorded By, (t) = Taken By, (c) = Cosigned By      Initials Name Provider Type    TW Poppy Savage, PT Physical Therapist                   Mobility       Row Name 07/14/24 1458          Bed Mobility    Bed Mobility supine-sit  -TW     Supine-Sit [place] (Bed Mobility) contact guard;verbal cues  -TW     Assistive Device (Bed Mobility) head of bed elevated  -TW     Comment, (Bed Mobility) pt was able to sit on EOB with supervision and was able to assist with changing her gown without any loss of balance.  -[facility] Name 07/14/24 1458          Transfers    Comment, (Transfers) pt education on use of FWW  -[facility] Name 07/14/24 1458          Bed-Chair Transfer    Bed-Chair [place] (Transfers) contact guard;verbal cues  -TW     Assistive Device (Bed-Chair Transfers) walker, front-wheeled  -       Row Name 07/14/24 1458          Sit-Stand Transfer    Sit-Stand [place] (Transfers) contact  guard;verbal cues  -TW     Assistive Device (Sit-Stand Transfers) walker, front-wheeled  -TW       Row Name 07/14/24 1458          Gait/Stairs (Locomotion)    Patient was able to Ambulate no, other medical factors prevent ambulation  -TW     Reason Patient was unable to Ambulate Other (Comment)  pt kept having non-productive coughing that contributed to shortness of breath so pt transferred to chair only this date  -TW               User Key  (r) = Recorded By, (t) = Taken By, (c) = Cosigned By      Initials Name Provider Type    TW Poppy Savage, PT Physical Therapist                   Obj/Interventions       Row Name 07/14/24 1458          Range of Motion Comprehensive    Comment, General Range of Motion BLE grossly WFLs  -TW       Row Name 07/14/24 1458          Strength Comprehensive (MMT)    Comment, General Manual Muscle Testing (MMT) Assessment BLE grossly 3/5 to 4-/5  -TW       Row Name 07/14/24 1455          Balance    Balance Assessment sitting static balance;sitting dynamic balance;sit to stand dynamic balance;standing static balance;standing dynamic balance  -TW     Static Sitting Balance standby assist  -TW     Dynamic Sitting Balance standby assist  -TW     Position, Sitting Balance unsupported;sitting edge of bed  -TW     Sit to Stand Dynamic Balance contact guard;verbal cues  -TW     Static Standing Balance contact guard  -TW     Dynamic Standing Balance contact guard  -TW     Position/Device Used, Standing Balance supported;walker, front-wheeled  -TW               User Key  (r) = Recorded By, (t) = Taken By, (c) = Cosigned By      Initials Name Provider Type    TW Poppy Savage, PT Physical Therapist                   Goals/Plan       Row Name 07/14/24 1452          Bed Mobility Goal 1 (PT)    Activity/Assistive Device (Bed Mobility Goal 1, PT) bed mobility activities, all  -TW     [place] Level/Cues Needed (Bed Mobility Goal 1, PT) modified independence  -TW     Time Frame (Bed Mobility Goal  1, PT) short term goal (STG);3 days  -TW     Progress/Outcomes (Bed Mobility Goal 1, PT) goal ongoing  -TW       Row Name 07/14/24 1458          Transfer Goal 1 (PT)    Activity/Assistive Device (Transfer Goal 1, PT) sit-to-stand/stand-to-sit;bed-to-chair/chair-to-bed;toilet;walker, rolling  -TW     [place] Level/Cues Needed (Transfer Goal 1, PT) supervision required  -TW     Time Frame (Transfer Goal 1, PT) long term goal (LTG);10 days  -TW     Progress/Outcome (Transfer Goal 1, PT) goal ongoing  -TW       Row Name 07/14/24 1458          Gait Training Goal 1 (PT)    Activity/Assistive Device (Gait Training Goal 1, PT) gait (walking locomotion);assistive device use;increase endurance/gait distance;decrease fall risk  -TW     [place] Level (Gait Training Goal 1, PT) standby assist  -TW     Distance (Gait Training Goal 1, PT) 75 ft  -TW     Time Frame (Gait Training Goal 1, PT) long term goal (LTG);10 days  -TW     Strategies/Barriers (Gait Training Goal 1, PT) O2 sat above 90%  -TW     Progress/Outcome (Gait Training Goal 1, PT) goal ongoing  -TW       Row Name 07/14/24 1458          Patient Education Goal (PT)    Activity (Patient Education Goal, PT) BLE ther ex 1 x 10  -TW     [place]/Cues/Accuracy (Memory Goal 2, PT) demonstrates adequately  -TW     Time Frame (Patient Education Goal, PT) 10 days;long term goal (LTG)  -TW     Progress/Outcome (Patient Education Goal, PT) goal ongoing  -TW       Row Name 07/14/24 1458          Therapy Assessment/Plan (PT)    Planned Therapy Interventions (PT) balance training;bed mobility training;gait training;patient/family education;transfer training;strengthening  -TW               User Key  (r) = Recorded By, (t) = Taken By, (c) = Cosigned By      Initials Name Provider Type    TW Poppy Savage, PT Physical Therapist                   Clinical Impression       Row Name 07/14/24 1453          Pain    Pretreatment Pain Rating 0/10 - no pain  -TW     Posttreatment  Pain Rating 0/10 - no pain  -TW     Pain Location - chest  -TW     Pre/Posttreatment Pain Comment no c/o chest pain of LE cramping but pt did express discomfort in chest due to frequent coughing. Pt encouraged to splint with pillow while coughtng.  -TW     Pain Intervention(s) Other (Comment)  -TW       Row Name 07/14/24 5292          Plan of Care Review    Plan of Care Reviewed With patient;grandchild(popeye)  -TW     Outcome Evaluation PT evaluation completed this p.m. Initially pt was expressing she was too tired to participate in PT evaluation. Pt's great-granddaughter present and encouraged pt to at least get OOB and pt agreed. It was noted that pt had frequent non-productive cough throughout assessment and this was her only c/o discomfort during evaluation. Pt was O x 4 and on 2 L NC O2 throughout session (O2 sat 91-93%). Pt provided CGA to come to sit on EOB and she was able to sit on EOB with supervision and pt assisted with changing her gown without any loss of balance. CGA and cues for sit to stand with FWW and to pivot to chair. Pt did express she felt safer using FWW and would be appropriate for a rollator upon d/c to assist pt with ambulation balance and endurance. Pt presents with deficits in strength, endurance, and balance. She is expected to improve her functional mobility with continued PT services prior to d/c.  -TW       Row Name 07/14/24 9585          Therapy Assessment/Plan (PT)    Patient/Family Therapy Goals Statement (PT) to return home with family to assist.  -TW     Rehab Potential (PT) good, to achieve stated therapy goals  -TW     Criteria for Skilled Interventions Met (PT) yes;meets criteria;skilled treatment is necessary  -TW     Therapy Frequency (PT) 5 times/wk  -TW     Predicted Duration of Therapy Intervention (PT) 10 days  -TW       Row Name 07/14/24 0911          Vital Signs    Pretreatment Heart Rate (beats/min) 90  -TW     Posttreatment Heart Rate (beats/min) 93  -TW     Pre SpO2  (%) 93  -TW     O2 Delivery Pre Treatment supplemental O2  2 L  -TW     Intra SpO2 (%) 91  -TW     Post SpO2 (%) 93  -TW     O2 Delivery Post Treatment supplemental O2  2 L  -TW     Pre Patient Position Supine  -TW     Intra Patient Position Standing  -TW     Post Patient Position Sitting  -TW       Row Name 07/14/24 1458          Positioning and Restraints    Pre-Treatment Position in bed  -TW     Post Treatment Position chair  -TW     In Chair notified nsg;sitting;call light within reach;with family/caregiver;encouraged to call for assist  -TW               User Key  (r) = Recorded By, (t) = Taken By, (c) = Cosigned By      Initials Name Provider Type    TW Poppy Savage, PT Physical Therapist                   Outcome Measures       Row Name 07/14/24 1458 07/14/24 0800       How much help from another person do you currently need...    Turning from your back to your side while in flat bed without using bedrails? 4  -TW 4  -ML    Moving from lying on back to sitting on the side of a flat bed without bedrails? 3  -TW 4  -ML    Moving to and from a bed to a chair (including a wheelchair)? 3  -TW 3  -ML    Standing up from a chair using your arms (e.g., wheelchair, bedside chair)? 3  -TW 3  -ML    Climbing 3-5 steps with a railing? 3  -TW 3  -ML    To walk in hospital room? 3  -TW 3  -ML    AM-PAC 6 Clicks Score (PT) 19  -TW 20  -ML    Highest Level of Mobility Goal 6 --> Walk 10 steps or more  -TW 6 --> Walk 10 steps or more  -ML      Row Name 07/14/24 1458          Functional Assessment    Outcome Measure Options AM-PAC 6 Clicks Basic Mobility (PT)  -TW               User Key  (r) = Recorded By, (t) = Taken By, (c) = Cosigned By      Initials Name Provider Type    Poppy Ruff PT Physical Therapist    Yolie Alarcon, RN Registered Nurse                                 Physical Therapy Education       Title: PT OT SLP Therapies (In Progress)       Topic: Physical Therapy (In Progress)       Point: Mobility  training (Done)       Learning Progress Summary             Patient Acceptance, E, VU by TW at 7/14/2024 2200    Comment: Pt and her great graddaughter educated on benefits of rollator for ambulation   Family Acceptance, E, VU by TW at 7/14/2024 9287    Comment: Pt and her great graddaughter educated on benefits of rollator for ambulation                         Point: Home exercise program (Not Started)       Learner Progress:  Not documented in this visit.              Point: Body mechanics (Not Started)       Learner Progress:  Not documented in this visit.              Point: Precautions (Not Started)       Learner Progress:  Not documented in this visit.                              User Key       Initials Effective Dates Name Provider Type Discipline    TW 06/16/21 -  Poppy Savage, PT Physical Therapist PT                  PT Recommendation and Plan  Planned Therapy Interventions (PT): balance training, bed mobility training, gait training, patient/family education, transfer training, strengthening  Plan of Care Reviewed With: patient, grandchild(popeye)  Outcome Evaluation: PT evaluation completed this p.m. Initially pt was expressing she was too tired to participate in PT evaluation. Pt's great-granddaughter present and encouraged pt to at least get OOB and pt agreed. It was noted that pt had frequent non-productive cough throughout assessment and this was her only c/o discomfort during evaluation. Pt was O x 4 and on 2 L NC O2 throughout session (O2 sat 91-93%). Pt provided CGA to come to sit on EOB and she was able to sit on EOB with supervision and pt assisted with changing her gown without any loss of balance. CGA and cues for sit to stand with FWW and to pivot to chair. Pt did express she felt safer using FWW and would be appropriate for a rollator upon d/c to assist pt with ambulation balance and endurance. Pt presents with deficits in strength, endurance, and balance. She is expected to improve her  functional mobility with continued PT services prior to d/c.     Time Calculation:   PT Evaluation Complexity  History, PT Evaluation Complexity: 3 or more personal factors and/or comorbidities  Examination of Body Systems (PT Eval Complexity): total of 3 or more elements  Clinical Presentation (PT Evaluation Complexity): stable  Clinical Decision Making (PT Evaluation Complexity): low complexity  Overall Complexity (PT Evaluation Complexity): low complexity     PT Charges       Row Name 24 1458             Time Calculation    Stop Time   -      PT Received On 24      PT Goal Re-Cert Due Date 24                User Key  (r) = Recorded By, (t) = Taken By, (c) = Cosigned By      Initials Name Provider Type    TW Poppy Savage, PT Physical Therapist                  Therapy Charges for Today       Code Description Service Date Service Provider Modifiers Qty    02216909746 HC PT EVAL LOW COMPLEXITY 3 2024 Poppy Savage PT GP 1            PT G-Codes  Outcome Measure Options: AM-PAC 6 Clicks Basic Mobility (PT)  AM-PAC 6 Clicks Score (PT): 19  PT Discharge Summary  Anticipated Discharge Disposition (PT): home with assist    Poppy Savage PT  2024      Electronically signed by Poppy Savage PT at 24 1539          Occupational Therapy Notes (all)        Radha Lozano, OT at 24 1012          Patient Name: Aster Craft  : 1945    MRN: [id number]                              Today's Date: 2024       Admit Date: 2024    Visit Dx:     ICD-10-CM ICD-9-CM   1. Chest pain, atypical  R07.89 786.59   2. Acute respiratory failure with hypoxia  J96.01 518.81   3. Hypervolemia, unspecified hypervolemia type  E87.70 276.69     Patient Active Problem List   Diagnosis    Thickened endometrium    Right lower quadrant abdominal pain    Fatty (change of) liver, not elsewhere classified    A-fib    Breast CA    Essential hypertension    GERD (gastroesophageal  reflux disease)    Hyperthyroidism    (HFpEF) heart failure with preserved ejection fraction    Acute on chronic heart failure with preserved ejection fraction (HFpEF)    Acute on chronic combined systolic and diastolic CHF (congestive heart failure)    Type 2 diabetes mellitus without complication, without long-term current use of insulin    Chest pain, atypical    Acute on chronic diastolic heart failure     Past Medical History:   Diagnosis Date    Arthritis     Breast cancer     RIGHT BREAST STAGE 3    Diabetes mellitus     Elevated cholesterol     GERD (gastroesophageal reflux disease) 08/23/2021    History of cancer chemotherapy     Hypertension     Hyperthyroidism 05/19/2022    Kidney stone     Lichen sclerosus     Shingles     Thickened endometrium 2018    Urinary incontinence     Urinary tract infection      Past Surgical History:   Procedure Laterality Date    CARDIAC CATHETERIZATION N/A 7/15/2024    Procedure: Coronary angiography;  Surgeon: Brian Morrow MD;  Location: [facility] CATH INVASIVE LOCATION;  Service: Cardiology;  Laterality: N/A;    CATARACT EXTRACTION Right 01/10/2023    CERVICAL CONE BIOPSY      CHOLECYSTECTOMY  03/2010    COLONOSCOPY N/A 07/06/2018    Procedure: COLONOSCOPY;  Surgeon: Trenton Lyons MD;  Location: [facility];  Service: Gastroenterology    DILATATION AND CURETTAGE      ENDOSCOPY N/A 07/06/2018    Procedure: ESOPHAGOGASTRODUODENOSCOPY;  Surgeon: Trenton Lyons MD;  Location: [facility];  Service: Gastroenterology    MASTECTOMY Right 10/1998    MASTECTOMY Left 07/2013    TUBAL ABDOMINAL LIGATION        General Information       Row Name 07/16/24 1002          OT Time and Intention    Document Type evaluation  -SD     Mode of Treatment occupational therapy  -SD       Row Name 07/16/24 1002          General Information    Patient Profile Reviewed yes  -SD     Prior Level of Function independent:;all household mobility;community mobility;ADL's;home management;driving  patient  states her daughter has been staying with her for the last couple of weeks due to increased weakness. Only DME is a cane, but patient didn't use it  -SD     Barriers to Rehab previous functional deficit  -SD       Row Name 07/16/24 1002          Living Environment    People in Home child(popeye), adult  -SD       [facility] Name 07/16/24 1002          Home Main Entrance    Number of Stairs, Main Entrance none  -SD       Row Name 07/16/24 1002          Stairs Within Home, Primary    Number of Stairs, Within Home, Primary none  -SD       [facility] Name 07/16/24 1002          Cognition    Orientation Status (Cognition) oriented x 4  -SD       [facility] Name 07/16/24 1002          Safety Issues, Functional Mobility    Safety Issues Affecting Function (Mobility) safety precautions follow-through/compliance;safety precaution awareness  -SD     Impairments Affecting Function (Mobility) balance;endurance/activity tolerance;strength  -SD               User Key  (r) = Recorded By, (t) = Taken By, (c) = Cosigned By      Initials Name Provider Type    SD Radha Lozano OT Occupational Therapist                     Mobility/ADL's       [facility] 07/16/24 1003          Bed Mobility    Comment, (Bed Mobility) in chair  -SD       Row Name 07/16/24 1003          Transfers    Transfers sit-stand transfer  -SD       Row Name 07/16/24 1003          Sit-Stand Transfer    Sit-Stand [place] (Transfers) contact guard  -SD     Assistive Device (Sit-Stand Transfers) walker, front-wheeled  -SD       Row Name 07/16/24 1003          Functional Mobility    Functional Mobility- Ind. Level contact guard assist  -SD     Functional Mobility- Device walker, front-wheeled  -SD     Functional Mobility-Distance (Feet) 32  -SD     Functional Mobility- Safety Issues balance decreased during turns  -SD       Row Name 07/16/24 1003          Activities of Daily Living    BADL Assessment/Intervention bathing;upper body dressing;lower body dressing;grooming;feeding;toileting   -SD       Row Name 07/16/24 1003          Bathing Assessment/Intervention    [place] Level (Bathing) minimum assist (75% patient effort)  -SD       Row Name 07/16/24 1003          Upper Body Dressing Assessment/Training    [place] Level (Upper Body Dressing) standby assist  -SD       Row Name 07/16/24 1003          Lower Body Dressing Assessment/Training    [place] Level (Lower Body Dressing) minimum assist (75% patient effort)  -SD       Row Name 07/16/24 1003          Grooming Assessment/Training    [place] Level (Grooming) contact guard assist  -SD     Position (Grooming) sink side;supported standing  -SD       Row Name 07/16/24 1003          Self-Feeding Assessment/Training    [place] Level (Feeding) supervision  -SD       Row Name 07/16/24 1003          Toileting Assessment/Training    [place] Level (Toileting) minimum assist (75% patient effort)  -SD     Assistive Devices (Toileting) commode, bedside without drop arms;raised toilet seat  -SD               User Key  (r) = Recorded By, (t) = Taken By, (c) = Cosigned By      Initials Name Provider Type    Radha Quintanilla OT Occupational Therapist                   Obj/Interventions       Row Name 07/16/24 1004          Sensory Assessment (Somatosensory)    Sensory Assessment (Somatosensory) sensation intact  -SD       Row Name 07/16/24 1004          Range of Motion Comprehensive    General Range of Motion bilateral upper extremity ROM WFL  -SD       Row Name 07/16/24 1004          Strength Comprehensive (MMT)    General Manual Muscle Testing (MMT) Assessment upper extremity strength deficits identified  -SD     Comment, General Manual Muscle Testing (MMT) Assessment 3+/5  -SD               User Key  (r) = Recorded By, (t) = Taken By, (c) = Cosigned By      Initials Name Provider Type    Radha Quintanilla OT Occupational Therapist                   Goals/Plan       Row Name 07/16/24 1009          Transfer Goal 1 (OT)     Activity/Assistive Device (Transfer Goal 1, OT) sit-to-stand/stand-to-sit;walker, rolling  -SD     [place] Level/Cues Needed (Transfer Goal 1, OT) modified independence  -SD     Time Frame (Transfer Goal 1, OT) long term goal (LTG)  -SD     Progress/Outcome (Transfer Goal 1, OT) goal ongoing  -SD       Row Name 07/16/24 1009          Bathing Goal 1 (OT)    Activity/Device (Bathing Goal 1, OT) bathing skills, all  -SD     [place] Level/Cues Needed (Bathing Goal 1, OT) standby assist  -SD     Time Frame (Bathing Goal 1, OT) 2 weeks  -SD     Progress/Outcomes (Bathing Goal 1, OT) goal ongoing  -SD       Row Name 07/16/24 1009          Dressing Goal 1 (OT)    Activity/Device (Dressing Goal 1, OT) lower body dressing  -SD     [place]/Cues Needed (Dressing Goal 1, OT) standby assist  -SD     Time Frame (Dressing Goal 1, OT) 2 weeks  -SD     Progress/Outcome (Dressing Goal 1, OT) goal ongoing  -SD       Row Name 07/16/24 1009          Toileting Goal 1 (OT)    Activity/Device (Toileting Goal 1, OT) toileting skills, all;raised toilet seat  -SD     [place] Level/Cues Needed (Toileting Goal 1, OT) standby assist  -SD     Time Frame (Toileting Goal 1, OT) 2 weeks  -SD     Progress/Outcome (Toileting Goal 1, OT) goal ongoing  -SD       Row Name 07/16/24 1009          Strength Goal 1 (OT)    Strength Goal 1 (OT) Patient to perform UB ther ex, demonstrating appropriate activity pacing while maintaining O2 sats; progressing in reps/resistance as tolerated  -SD     Time Frame (Strength Goal 1, OT) long term goal (LTG)  -SD     Progress/Outcome (Strength Goal 1, OT) goal ongoing  -SD       Row Name 07/16/24 1009          Therapy Assessment/Plan (OT)    Planned Therapy Interventions (OT) activity tolerance training;adaptive equipment training;BADL retraining;patient/caregiver education/training;ROM/therapeutic exercise;strengthening exercise;transfer/mobility retraining  -SD               User Key  (r) = Recorded  By, (t) = Taken By, (c) = Cosigned By      Initials Name Provider Type    Radha Quintanilla OT Occupational Therapist                   Clinical Impression       Row Name 07/16/24 1004          Pain Assessment    Pretreatment Pain Rating 0/10 - no pain  -SD     Posttreatment Pain Rating 0/10 - no pain  -SD       Row Name 07/16/24 1004          Plan of Care Review    Plan of Care Reviewed With patient  -SD     Progress no change  -SD     Outcome Evaluation OT eval completed this date. Patient is reclined in chair, denies pain at rest, is Ox4. Patient on 2L O2 satting 98%. Patient states she doesn't normally wear O2 so removed prior to mobility. Patient reports she lives alone in a one level home, is normally ind with ADLs, IADLs, HH and community mobility, and driving. Patient has a cane, but doesn't use it. Patient's daughter has been staying with her for last two weeks d/t weakness. Patient performed sit to stand with CGA, walked 32' using RW with CGA. Patient requires CGA-Min A for ADLs d/t generalized weakness and deconditioning. Patient satting 94% on room air upon returning to chair and rebounded to 100% within one minute of rest. Notified RN patient on room air and maintaining sats. Patient is expected to benefit from continued OT services prior to DC and would benefit from STR to address above mentioned deficits in order to facilitate safe return to PLOF.  -SD       Row Name 07/16/24 1004          Therapy Assessment/Plan (OT)    Patient/Family Therapy Goal Statement (OT) rehab  -SD     Rehab Potential (OT) good, to achieve stated therapy goals  -SD     Criteria for Skilled Therapeutic Interventions Met (OT) skilled treatment is necessary  -SD     Therapy Frequency (OT) daily  Mon-Fri  -SD       Row Name 07/16/24 1004          Therapy Plan Review/Discharge Plan (OT)    Anticipated Discharge Disposition (OT) inpatient rehabilitation facility  -SD       Row Name 07/16/24 1004          Vital Signs    Pre SpO2  (%) 98  -SD     O2 Delivery Pre Treatment supplemental O2  -SD     Intra SpO2 (%) 94  -SD     O2 Delivery Intra Treatment room air  -SD     Post SpO2 (%) 100  -SD     O2 Delivery Post Treatment room air  -SD       Row Name 07/16/24 1004          Positioning and Restraints    Pre-Treatment Position sitting in chair/recliner  -SD     Post Treatment Position chair  -SD     In Chair sitting;call light within reach;encouraged to call for assist;notified nsg;with PT  -SD               User Key  (r) = Recorded By, (t) = Taken By, (c) = Cosigned By      Initials Name Provider Type    Radha Quintanilla OT Occupational Therapist                   Outcome Measures       Row Name 07/16/24 1011          How much help from another is currently needed...    Putting on and taking off regular lower body clothing? 3  -SD     Bathing (including washing, rinsing, and drying) 3  -SD     Toileting (which includes using toilet bed pan or urinal) 3  -SD     Putting on and taking off regular upper body clothing 3  -SD     Taking care of personal grooming (such as brushing teeth) 3  -SD     Eating meals 4  -SD     AM-PAC 6 Clicks Score (OT) 19  -SD       Row Name 07/16/24 1011          Functional Assessment    Outcome Measure Options AM-PAC 6 Clicks Daily Activity (OT)  -SD               User Key  (r) = Recorded By, (t) = Taken By, (c) = Cosigned By      Initials Name Provider Type    Radha Quintanilla OT Occupational Therapist                    Occupational Therapy Education       Title: PT OT SLP Therapies (In Progress)       Topic: Occupational Therapy (In Progress)       Point: ADL training (Done)       Description:   Instruct learner(s) on proper safety adaptation and remediation techniques during self care or transfers.   Instruct in proper use of assistive devices.                  Learning Progress Summary             Patient Acceptance, E,TB, VU by SD at 7/16/2024 1011    Comment: OT POC                         Point: Home  exercise program (Not Started)       Description:   Instruct learner(s) on appropriate technique for monitoring, assisting and/or progressing therapeutic exercises/activities.                  Learner Progress:  Not documented in this visit.              Point: Precautions (Not Started)       Description:   Instruct learner(s) on prescribed precautions during self-care and functional transfers.                  Learner Progress:  Not documented in this visit.              Point: Body mechanics (Not Started)       Description:   Instruct learner(s) on proper positioning and spine alignment during self-care, functional mobility activities and/or exercises.                  Learner Progress:  Not documented in this visit.                              User Key       Initials Effective Dates Name Provider Type Discipline    SD 06/16/21 -  Radha Lozano OT Occupational Therapist OT                  OT Recommendation and Plan  Planned Therapy Interventions (OT): activity tolerance training, adaptive equipment training, BADL retraining, patient/caregiver education/training, ROM/therapeutic exercise, strengthening exercise, transfer/mobility retraining  Therapy Frequency (OT): daily (Mon-Fri)  Plan of Care Review  Plan of Care Reviewed With: patient  Progress: no change  Outcome Evaluation: OT chanel completed this date. Patient is reclined in chair, denies pain at rest, is Ox4. Patient on 2L O2 satting 98%. Patient states she doesn't normally wear O2 so removed prior to mobility. Patient reports she lives alone in a one level home, is normally ind with ADLs, IADLs, HH and community mobility, and driving. Patient has a cane, but doesn't use it. Patient's daughter has been staying with her for last two weeks d/t weakness. Patient performed sit to stand with CGA, walked 32' using RW with CGA. Patient requires CGA-Min A for ADLs d/t generalized weakness and deconditioning. Patient satting 94% on room air upon returning to  chair and rebounded to 100% within one minute of rest. Notified RN patient on room air and maintaining sats. Patient is expected to benefit from continued OT services prior to DC and would benefit from STR to address above mentioned deficits in order to facilitate safe return to PLOF.     Time Calculation:   Evaluation Complexity (OT)  Review Occupational Profile/Medical/Therapy History Complexity: expanded/moderate complexity  Assessment, Occupational Performance/Identification of Deficit Complexity: 3-5 performance deficits  Clinical Decision Making Complexity (OT): detailed assessment/moderate complexity  Overall Complexity of Evaluation (OT): moderate complexity     Time Calculation- OT       Row Name 07/16/24 1012             Time Calculation- OT    OT Start Time 0945  -SD      OT Received On 07/16/24  -SD      OT Goal Re-Cert Due Date 07/26/24  -SD         Untimed Charges    OT Eval/Re-eval Minutes 45  -SD         Total Minutes    Untimed Charges Total Minutes 45  -SD       Total Minutes 45  -SD                User Key  (r) = Recorded By, (t) = Taken By, (c) = Cosigned By      Initials Name Provider Type    SD Radha Lozano OT Occupational Therapist                  Therapy Charges for Today       Code Description Service Date Service Provider Modifiers Qty    [id number]  OT EVAL MOD COMPLEXITY 3 7/16/2024 Radha Lozano OT GO 1                 Radha Lozano OT  7/16/2024    Electronically signed by Radha Lozano OT at 07/16/24 1012       Radha Lozano OT at 07/16/24 1011          Goal Outcome Evaluation:  Plan of Care Reviewed With: patient        Progress: no change  Outcome Evaluation: OT eval completed this date. Patient is reclined in chair, denies pain at rest, is Ox4. Patient on 2L O2 satting 98%. Patient states she doesn't normally wear O2 so removed prior to mobility. Patient reports she lives alone in a one level home, is normally ind with ADLs, IADLs, HH and community mobility,  and driving. Patient has a cane, but doesn't use it. Patient's daughter has been staying with her for last two weeks d/t weakness. Patient performed sit to stand with CGA, walked 32' using RW with CGA. Patient requires CGA-Min A for ADLs d/t generalized weakness and deconditioning. Patient satting 94% on room air upon returning to chair and rebounded to 100% within one minute of rest. Notified RN patient on room air and maintaining sats. Patient is expected to benefit from continued OT services prior to DC and would benefit from STR to address above mentioned deficits in order to facilitate safe return to PLOF.      Anticipated Discharge Disposition (OT): inpatient rehabilitation facility                          Electronically signed by Radha Lozano OT at 07/16/24 1011       Radha Lozano, OT at 07/15/24 0923          OT eval held pending heart cath. Will follow up tomorrow.     Electronically signed by Radha Lozano OT at 07/15/24 4428

## 2024-07-16 NOTE — PLAN OF CARE
Goal Outcome Evaluation:  Plan of Care Reviewed With: patient        Progress: improving  Outcome Evaluation: Pt participated in PT treatment session. Pt presents seated in bedside chair, pleasant and agreeable to PT treatment. Pt denies reports of pain at rest. Pt performed STS transfer with RW and CGA for safety. Pt ambulated x34' with RW and CGA for safety. Pt demonstrates increased FF posture, decreased cindi and increased WBing through UE's onto RW. Pt performed seated TE as described in interventions to improve BLE strength and endurance. Pt required increased time and effort to perform exercise d/t fatigue. Following treatment, pt left seated in chair with call light and all needs within reach. Continue with current POC; progress interventions as tolerated.

## 2024-07-16 NOTE — PLAN OF CARE
Problem: Adult Inpatient Plan of Care  Goal: Plan of Care Review  Outcome: Ongoing, Progressing  Flowsheets (Taken 7/16/2024 0300)  Progress: no change  Plan of Care Reviewed With: patient  Goal: Patient-Specific Goal (Individualized)  Outcome: Ongoing, Progressing  Goal: Absence of Hospital-Acquired Illness or Injury  Outcome: Ongoing, Progressing  Intervention: Identify and Manage Fall Risk  Recent Flowsheet Documentation  Taken 7/16/2024 0200 by Trae Tariq, RN  Safety Promotion/Fall Prevention:   activity supervised   assistive device/personal items within reach   fall prevention program maintained   nonskid shoes/slippers when out of bed   safety round/check completed  Intervention: Prevent Skin Injury  Recent Flowsheet Documentation  Taken 7/16/2024 0200 by Trae Tariq, RN  Body Position: side-lying  Intervention: Prevent and Manage VTE (Venous Thromboembolism) Risk  Recent Flowsheet Documentation  Taken 7/16/2024 0200 by Trae Tariq, RN  Activity Management: activity encouraged  Intervention: Prevent Infection  Recent Flowsheet Documentation  Taken 7/16/2024 0200 by Trae Tariq, RN  Infection Prevention:   environmental surveillance performed   equipment surfaces disinfected   single patient room provided   hand hygiene promoted   rest/sleep promoted  Goal: Optimal Comfort and Wellbeing  Outcome: Ongoing, Progressing  Goal: Readiness for Transition of Care  Outcome: Ongoing, Progressing     Problem: Diabetes Comorbidity  Goal: Blood Glucose Level Within Targeted Range  Outcome: Ongoing, Progressing     Problem: Heart Failure Comorbidity  Goal: Maintenance of Heart Failure Symptom Control  Outcome: Ongoing, Progressing     Problem: Hypertension Comorbidity  Goal: Blood Pressure in Desired Range  Outcome: Ongoing, Progressing     Problem: Fall Injury Risk  Goal: Absence of Fall and Fall-Related Injury  Outcome: Ongoing, Progressing  Intervention: Promote Injury-Free Environment  Recent  Flowsheet Documentation  Taken 7/16/2024 0200 by Trae Tariq, RN  Safety Promotion/Fall Prevention:   activity supervised   assistive device/personal items within reach   fall prevention program maintained   nonskid shoes/slippers when out of bed   safety round/check completed   Goal Outcome Evaluation:  Plan of Care Reviewed With: patient        Progress: no change

## 2024-07-16 NOTE — DISCHARGE SUMMARY
[facility]      Name:  Aster Craft   Age:  79 y.o.  Sex:  female  :  1945  MRN:  [id number]   Visit Number:  [id number]    Admission Date:  2024  Date of Discharge:  2024  Primary Care Physician:  Yolie Cotto MD    Important issues to note:    -Started on aspirin, statin and metformin on discharge  -Follow-up with PCP closely for diabetes and hypothyroidism.  -Follow-up with her cardiologist    Discharge Diagnoses:     Acute on chronic heart failure midrange ejection fraction/diastolic exacerbation, POA  Chest pain  Tachycardia  Uncontrolled type 2 diabetes  Hyperthyroidism  Prior breast cancer  GERD  Hypertension    Problem List:     Active Hospital Problems    Diagnosis  POA    **Chest pain, atypical [R07.89]  Yes    Acute on chronic diastolic heart failure [I50.33]  Yes    Acute on chronic combined systolic and diastolic CHF (congestive heart failure) [I50.43]  Yes    Type 2 diabetes mellitus without complication, without long-term current use of insulin [E11.9]  Yes    Hyperthyroidism [E05.90]  Yes    GERD (gastroesophageal reflux disease) [K21.9]  Yes      Resolved Hospital Problems   No resolved problems to display.     Presenting Problem:    Chief Complaint   Patient presents with    Shortness of Breath      Consults:     Consulting Physician(s)         Provider   Role Specialty     Breeding, Brian KATHLEEN MD      Consulting Physician Cardiology          Procedures Performed:    Procedure(s):  Coronary angiography    History of presenting illness/Hospital Course:    The patient is a 79-year-old female with a history of hypertension, diabetes not on medication, hypothyroidism, reported cardiomyopathy, with recent diagnosis of heart failure reduced ejection fraction, who presented to the emergency room with complaints of shortness of breath and chest pain.  Patient states in the past she had seen Dr. Palacios, had been on metoprolol due to  "reported cardiomyopathy.  She is unsure if she was ever diagnosed with atrial fibrillation.  She was asked in the hospital about 2 weeks ago due to breathing difficulty, was diuresed, and had outpatient cardiology follow-up.  She is due to see cardiology this week.  She noted today while outside at a yard sale she developed symptoms of chest pain, pressure, heaviness in her chest and associated shortness of breath.  She went inside to rest, cool down, but continued to have symptoms and was brought to the ER.  Currently is feeling better.  She is not currently taking diuretics at home.  She is not on any medications for diabetes, she wants to talk with her PCP about that.  She notes a stress test performed many years ago, no recent cardiac procedures or testing other than echocardiogram at last hospital stay.     In the ER, the patient was overall hemodynamically stable.  She was borderline hypoxic with ambulation and is currently on 2 L of oxygen.  She was given IV Lasix.  Her labs are largely nonactionable.  She has been on Macrobid due to recent UTI with clear urine sample tonight.  Chest x-ray with some mild interstitial edema.  EKG and troponins reassuring.  Admitted for observation.     CHF/  Non-Cardiac chest pain:  -Recent echo with midrange ejection fraction noted.    -Was not taking diuretics outpatient.  Placed back on Lasix.    -Continue the metoprolol.     -Troponin stable.    -Telemetry monitoring.  She also has outpatient Holter monitor on currently.  -Dr. Morrow cardiology consulted.  Patient underwent a cardiac catheterization 7/15/2024.  Angiographically \"near normal\" coronary arteries.  Recommend to optimize medical therapy and evaluate for noncardiac causes of chest pain.  -Highly suspicious patient has underlying anxiety.  -Started on aspirin and statin  -Continued on home Lasix     Arrhythmia:  -Patient reports prior history of tachycardia, unsure if actually atrial fibrillation.  She has been " on metoprolol for multiple years.  EKG normal here in sinus rhythm.  She has outpatient Holter monitor currently on.  -Follow-up with her cardiologist     Diabetes:  -Patient not taking anything at home for this.  Notes had been diet controlled, understands A1c is elevated now.  A1c was 8.9  -Start the patient on metformin on discharge  -Follow-up with PCP     Hyperthyroidism:  -Follows with endocrinology, is known to have thyroid nodules.  check TSH labs.  Obviously can contribute to heart disease as well as arrhythmia.  -Follow-up closely with PCP on that    Patient was seen and examined on the day of discharge.  Patient sitting up in the chair and appears comfortable with no distress.  Appears generally weak but otherwise denies any symptoms or pain today.  She reports overall doing better now.  Patient is accepted for short-term rehab at [place].  No other acute complaints today.  Patient tolerating p.o. well.  Patient instructed on management and plan in details.  Instructed on following up with her cardiologist.  Diabetic diet and started on metformin.  Patient to follow-up closely on her diabetes and hypothyroidism with her PCP. Patient to follow-up with PCP in 2 to 3 days for recheck and continued care. Clear return precautions discussed.  Patient verbalized understanding and agreeable to plan.  All questions were answered to the patient's satisfaction.  Patient has reached maximum medical improvement of inpatient hospital stay. Patient is discharged in stable condition.      Vital Signs:    Temp:  [97.4 °F (36.3 °C)-98.9 °F (37.2 °C)] 98.9 °F (37.2 °C)  Heart Rate:  [67-88] 67  Resp:  [12-16] 16  BP: ()/(54-67) 114/65    Physical Exam:    General Appearance:  Alert and cooperative.  No acute distress.   Head:  Atraumatic and normocephalic.   Eyes: Conjunctivae and sclerae normal, no icterus. No pallor.   Ears:  Ears with no abnormalities noted.   Throat: No oral lesions, no thrush, oral mucosa moist.    Neck: Supple, trachea midline, no thyromegaly.   Back:   No kyphoscoliosis present. No tenderness to palpation.   Lungs:   Breath sounds heard bilaterally equally.  No crackles or wheezing. No Pleural rub or bronchial breathing.   Heart:  Normal S1 and S2, no murmur, no gallop, no rub. No JVD.   Abdomen:   Normal bowel sounds, no masses, no organomegaly. Soft, nontender, nondistended, no rebound tenderness.   Extremities: Supple, no edema, no cyanosis, no clubbing.   Pulses: Pulses palpable bilaterally.   Skin: No bleeding or rash.   Neurologic: Alert and oriented x 3. No facial asymmetry. Moves all four limbs. No tremors.     Pertinent Lab Results:     Results from last 7 days   Lab Units 07/16/24  0536 07/15/24  0612 07/14/24  0610 07/13/24  1718   SODIUM mmol/L 139 140 137 142   POTASSIUM mmol/L 3.7 3.5 3.5 4.1   CHLORIDE mmol/L 100 99 98 101   CO2 mmol/L 29.8* 28.9 26.7 27.5   BUN mg/dL 26* 22 29* 28*   CREATININE mg/dL 0.59 0.46* 0.51* 0.77   CALCIUM mg/dL 9.1 8.9 8.7 9.2   BILIRUBIN mg/dL  --   --   --  0.6   ALK PHOS U/L  --   --   --  93   ALT (SGPT) U/L  --   --   --  21   AST (SGOT) U/L  --   --   --  19   GLUCOSE mg/dL 157* 130* 188* 195*     Results from last 7 days   Lab Units 07/16/24  0536 07/15/24  0612 07/14/24  0610 07/13/24  1718   WBC 10*3/mm3 6.96 12.39*  --  16.25*   HEMOGLOBIN g/dL 12.7 13.0 12.9 13.7   HEMATOCRIT % 38.6 39.0 38.4 41.3   PLATELETS 10*3/mm3 230 213  --  240         Results from last 7 days   Lab Units 07/13/24  1918 07/13/24  1718   CK TOTAL U/L  --  77   HSTROP T ng/L 9 12     Results from last 7 days   Lab Units 07/13/24  1718   PROBNP pg/mL 95.5                 Results from last 7 days   Lab Units 07/13/24  1812 07/13/24  1800   BLOODCX  No growth at 2 days No growth at 2 days       Pertinent Radiology Results:    Imaging Results (All)       Procedure Component Value Units Date/Time    XR Chest 1 View [[id number]] Collected: 07/14/24 0844     Updated: 07/14/24 0847     Narrative:      PROCEDURE: XR CHEST 1 VW-     HISTORY: SOA Triage Protocol, shortness of breath and chest pressure  beginning today.     COMPARISON: June 29, 2024.     FINDINGS: The heart is mildly enlarged, stable.. The lungs are clear.  The mediastinum is unremarkable. There is no pneumothorax.  There are no  acute osseous abnormalities.       Impression:      No acute cardiopulmonary process.                 This report was signed and finalized on 7/14/2024 8:45 AM by Iraida Kruse MD.               Echo:    Results for orders placed during the hospital encounter of 06/29/24    Adult Transthoracic Echo Complete W/ Cont if Necessary Per Protocol    Interpretation Summary    Left ventricular systolic function is low normal. Calculated left ventricular 3D EF = 46% Left ventricular ejection fraction appears to be 46 - 50%.    The left ventricular cavity is borderline dilated.    Left ventricular diastolic function is consistent with (grade I) impaired relaxation.    Condition on Discharge:      Stable.    Code status during the hospital stay:    Code Status and Medical Interventions:   Ordered at: 07/13/24 2230     Code Status (Patient has no pulse and is not breathing):    CPR (Attempt to Resuscitate)     Medical Interventions (Patient has pulse or is breathing):    Full Support     Discharge Disposition:    Rehab Facility or Unit (DC - External)    Discharge Medications:       Discharge Medications        New Medications        Instructions Start Date   aspirin 81 MG EC tablet   81 mg, Oral, Daily   Start Date: July 17, 2024     atorvastatin 80 MG tablet  Commonly known as: LIPITOR   80 mg, Oral, Nightly             Changes to Medications        Instructions Start Date   cholecalciferol 25 MCG (1000 UT) tablet  Commonly known as: VITAMIN D3  What changed: Another medication with the same name was removed. Continue taking this medication, and follow the directions you see here.   1,000 Units, Oral, Daily       ipratropium 0.03 % nasal spray  Commonly known as: ATROVENT  What changed: Another medication with the same name was removed. Continue taking this medication, and follow the directions you see here.   1 spray into the nostril(s) as directed by provider 2 (Two) Times a Day As Needed.             Continue These Medications        Instructions Start Date   albuterol sulfate  (90 Base) MCG/ACT inhaler  Commonly known as: PROVENTIL HFA;VENTOLIN HFA;PROAIR HFA   2 puffs Every 6 (Six) Hours As Needed.      azelastine 0.1 % nasal spray  Commonly known as: ASTELIN   1 spray into the nostril(s) as directed by provider 2 (Two) Times a Day.      desloratadine 5 MG tablet  Commonly known as: CLARINEX   5 mg, Oral, Daily PRN      dextromethorphan-guaifenesin  MG/5ML syrup  Commonly known as: ROBITUSSIN-DM   TAKE 10ML EVERY 4 HOURS AS NEEDED      ezetimibe 10 MG tablet  Commonly known as: ZETIA   10 mg, Oral, Daily      Flovent  MCG/ACT inhaler  Generic drug: fluticasone   2 puffs, Inhalation, 2 Times Daily PRN      FreeStyle Malina 2 Sensor misc   CHANGE SENSOR EVERY 14 DAYS      furosemide 20 MG tablet  Commonly known as: Lasix   20 mg, Oral, Daily      Gemtesa 75 MG tablet  Generic drug: Vibegron   75 mg, Oral, Daily      meclizine 12.5 MG tablet  Commonly known as: ANTIVERT   12.5 mg, Oral, 3 Times Daily PRN      metoprolol succinate XL 50 MG 24 hr tablet  Commonly known as: TOPROL-XL   50 mg, Oral, Daily      nitroglycerin 0.4 MG SL tablet  Commonly known as: NITROSTAT   0.4 mg, Sublingual, Every 5 Minutes PRN      OneTouch Delica Plus [id number] misc   USE AS DIRECTED EVERY DAY      OneTouch Verio Flex System w/Device kit   See Admin Instructions, for testing      OneTouch Verio test strip  Generic drug: glucose blood   Daily, for testing      pantoprazole 40 MG EC tablet  Commonly known as: PROTONIX   40 mg, Oral, Daily      Rhopressa 0.02 % solution  Generic drug: Netarsudil Dimesylate   1 drop, Both  Eyes, Nightly      simethicone 80 MG chewable tablet  Commonly known as: MYLICON   80 mg, Oral, Every 6 Hours PRN      Triamcinolone Acetonide 55 MCG/ACT nasal inhaler  Commonly known as: NASACORT   2 sprays, Nasal, Daily             Stop These Medications      benzonatate 200 MG capsule  Commonly known as: TESSALON     brimonidine-timolol 0.2-0.5 % ophthalmic solution  Commonly known as: COMBIGAN     Calcium Carbonate 500 MG chewable tablet     calcium carbonate-cholecalciferol 500-400 MG-UNIT tablet tablet     cetirizine 10 MG tablet  Commonly known as: zyrTEC     CINNAMON PO     clobetasol 0.05 % ointment  Commonly known as: TEMOVATE     diazePAM 5 MG tablet  Commonly known as: VALIUM     dorzolamide 2 % ophthalmic solution  Commonly known as: TRUSOPT     glipizide 5 MG ER tablet  Commonly known as: GLUCOTROL XL     Januvia 100 MG tablet  Generic drug: SITagliptin     nitrofurantoin (macrocrystal-monohydrate) 100 MG capsule  Commonly known as: MACROBID            Discharge Diet:   Diabetic    Activity at Discharge:   As tolerated    Follow-up Appointments:     Contact information for follow-up providers       Yolie Cotto MD .    Specialty: Family Medicine  Why: Hypothyroidism, diabetes  Contact information:  [address] [facility] [id number] [phone]                       Contact information for after-discharge care       Destination       [facility] .    Service: Skilled Nursing  Contact information:  [address] [facility] [id number] [phone]                                 Future Appointments   Date Time Provider Department Center   7/18/2024  3:00 PM Teressa Garcia APRN  [facility]   8/8/2024 12:40 PM Brennan Cheng MD [facility] [place] [facility]   2/13/2025  2:30 PM Aliza Thao APRN [facility] [place] [facility]     Test Results Pending at Discharge:    Pending Labs       Order Current Status    Blood Culture - Blood, Arm, Left Preliminary result     Blood Culture - Blood, Hand, Left Preliminary result               German Mae MD  07/16/24  13:01 EDT    Time: I spent 27 minutes on this discharge activity which included: face-to-face encounter with the patient, reviewing the data in the system, coordination of the care with the nursing staff as well as consultants, documentation, and entering orders.     Dictated utilizing Dragon dictation.

## 2024-07-16 NOTE — PLAN OF CARE
Problem: Adult Inpatient Plan of Care  Goal: Plan of Care Review  Outcome: Ongoing, Progressing  Goal: Patient-Specific Goal (Individualized)  Outcome: Ongoing, Progressing  Goal: Absence of Hospital-Acquired Illness or Injury  Outcome: Ongoing, Progressing  Intervention: Identify and Manage Fall Risk  Recent Flowsheet Documentation  Taken 7/15/2024 2200 by Lucía Smith RN  Safety Promotion/Fall Prevention: safety round/check completed  Taken 7/15/2024 2000 by Lucía Smith RN  Safety Promotion/Fall Prevention: safety round/check completed  Intervention: Prevent Skin Injury  Recent Flowsheet Documentation  Taken 7/15/2024 2200 by Lucía Smith RN  Body Position: sitting up in bed  Taken 7/15/2024 2000 by Lucía Smith RN  Body Position:   tilted   left  Intervention: Prevent and Manage VTE (Venous Thromboembolism) Risk  Recent Flowsheet Documentation  Taken 7/15/2024 2200 by Lucía Smith RN  Activity Management: activity encouraged  Taken 7/15/2024 2000 by Lucía Smith RN  Activity Management: activity encouraged  Goal: Optimal Comfort and Wellbeing  Outcome: Ongoing, Progressing  Goal: Readiness for Transition of Care  Outcome: Ongoing, Progressing     Problem: Diabetes Comorbidity  Goal: Blood Glucose Level Within Targeted Range  Outcome: Ongoing, Progressing     Problem: Heart Failure Comorbidity  Goal: Maintenance of Heart Failure Symptom Control  Outcome: Ongoing, Progressing     Problem: Hypertension Comorbidity  Goal: Blood Pressure in Desired Range  Outcome: Ongoing, Progressing     Problem: Fall Injury Risk  Goal: Absence of Fall and Fall-Related Injury  Outcome: Ongoing, Progressing  Intervention: Promote Injury-Free Environment  Recent Flowsheet Documentation  Taken 7/15/2024 2200 by Lucía Smith RN  Safety Promotion/Fall Prevention: safety round/check completed  Taken 7/15/2024 2000 by Lucía Smith RN  Safety Promotion/Fall Prevention: safety round/check completed   Goal Outcome  Evaluation:

## 2024-07-17 ENCOUNTER — NURSING HOME (OUTPATIENT)
Dept: FAMILY MEDICINE CLINIC | Facility: CLINIC | Age: 79
End: 2024-07-17
Payer: MEDICARE

## 2024-07-17 VITALS
RESPIRATION RATE: 18 BRPM | DIASTOLIC BLOOD PRESSURE: 66 MMHG | TEMPERATURE: 97.9 F | BODY MASS INDEX: 21.61 KG/M2 | HEART RATE: 74 BPM | OXYGEN SATURATION: 97 % | WEIGHT: 122 LBS | SYSTOLIC BLOOD PRESSURE: 118 MMHG

## 2024-07-17 DIAGNOSIS — E11.9 TYPE 2 DIABETES MELLITUS WITHOUT COMPLICATION, WITHOUT LONG-TERM CURRENT USE OF INSULIN: ICD-10-CM

## 2024-07-17 DIAGNOSIS — I50.32 CHRONIC HEART FAILURE WITH PRESERVED EJECTION FRACTION: Primary | Chronic | ICD-10-CM

## 2024-07-17 DIAGNOSIS — E04.2 MULTIPLE THYROID NODULES: ICD-10-CM

## 2024-07-17 DIAGNOSIS — I48.91 ATRIAL FIBRILLATION, UNSPECIFIED TYPE: ICD-10-CM

## 2024-07-17 DIAGNOSIS — E05.90 HYPERTHYROIDISM: ICD-10-CM

## 2024-07-17 PROBLEM — I50.43 ACUTE ON CHRONIC COMBINED SYSTOLIC AND DIASTOLIC CHF (CONGESTIVE HEART FAILURE): Status: RESOLVED | Noted: 2024-07-13 | Resolved: 2024-07-17

## 2024-07-17 PROBLEM — I50.33 ACUTE ON CHRONIC DIASTOLIC HEART FAILURE: Status: RESOLVED | Noted: 2024-07-15 | Resolved: 2024-07-17

## 2024-07-17 PROBLEM — I50.33 ACUTE ON CHRONIC HEART FAILURE WITH PRESERVED EJECTION FRACTION (HFPEF): Status: RESOLVED | Noted: 2024-07-02 | Resolved: 2024-07-17

## 2024-07-17 PROCEDURE — 99309 SBSQ NF CARE MODERATE MDM 30: CPT | Performed by: NURSE PRACTITIONER

## 2024-07-17 NOTE — LETTER
Nursing Home Follow Up Note      Justin Thomas DO []   MAIKEL Jamison [x]  [address] [place] [address]  Phone: [phone]  Fax: [phone] Van Alcantar MD []    Catarino Perkins DO []   [address] [place] [address]  Phone: [phone]  Fax: [phone]     PATIENT NAME: Aster Craft                                                                          YOB: 1945           DATE OF SERVICE: 07/17/2024  [place]ACILITY:  [place]   [] [place]   [x] [facility]   [] [facility]   [] Other ______________________________________________________________________      CHIEF COMPLAINT:    Medication and chart review.       HISTORY OF PRESENT ILLNESS:     79-year-old female with a history of breast ca, hypertension, diabetes, hyperthyroidism with thyroid nodules, following endocrinology,  reported cardiomyopathy, with recent diagnosis of heart failure reduced ejection fraction. She initially presented to the emergency room with complaints of shortness of breath and chest pain. Patient follows Dr. Palacios and is on Metoprolol for cardiomyopathy. She is unsure if she was ever diagnosed with atrial fibrillation. She was treated in the hospital about 2 weeks prior as well due to breathing difficulty, was diuresed, She was due to see Cardiology this week but hospitalized. She was not taking diuretics at home. Was not on any medications for diabetes. A1c 8.9 in hospital.     Cardiac Cath performed on 7/15 and normal. She has Holter Monitor on now and will follow up with Cardiology. No chest pain or palpitations.     She was admitted here to facility for rehabilitation and strengthening. Adjusting well to facility. No complaints or signs and symptoms of any distress.       PAST MEDICAL & SURGICAL HISTORY:   Past Medical History:   Diagnosis Date    Arthritis     Breast cancer     RIGHT BREAST STAGE 3    Diabetes mellitus     Elevated cholesterol     GERD (gastroesophageal reflux  disease) 08/23/2021    History of cancer chemotherapy     Hypertension     Hyperthyroidism 05/19/2022    Kidney stone     Lichen sclerosus     Shingles     Thickened endometrium 2018    Urinary incontinence     Urinary tract infection       Past Surgical History:   Procedure Laterality Date    CARDIAC CATHETERIZATION N/A 7/15/2024    Procedure: Coronary angiography;  Surgeon: Brian Morrow MD;  Location: [facility] CATH INVASIVE LOCATION;  Service: Cardiology;  Laterality: N/A;    CATARACT EXTRACTION Right 01/10/2023    CERVICAL CONE BIOPSY      CHOLECYSTECTOMY  03/2010    COLONOSCOPY N/A 07/06/2018    Procedure: COLONOSCOPY;  Surgeon: Trenton Lyons MD;  Location: [facility] OR;  Service: Gastroenterology    DILATATION AND CURETTAGE      ENDOSCOPY N/A 07/06/2018    Procedure: ESOPHAGOGASTRODUODENOSCOPY;  Surgeon: Trenton Lyons MD;  Location: [facility];  Service: Gastroenterology    MASTECTOMY Right 10/1998    MASTECTOMY Left 07/2013    TUBAL ABDOMINAL LIGATION           MEDICATIONS:  I have reviewed and reconciled the patients medication list in the patients chart at the skilled nursing facility today.      ALLERGIES:    Allergies   Allergen Reactions    Caffeine Other (See Comments)     No caffeine per doctors recommendation         SOCIAL HISTORY:    Social History     Socioeconomic History    Marital status:    Tobacco Use    Smoking status: Never     Passive exposure: Never    Smokeless tobacco: Never   Vaping Use    Vaping status: Never Used   Substance and Sexual Activity    Alcohol use: No    Drug use: No    Sexual activity: Not Currently       FAMILY HISTORY:    Family History   Problem Relation Age of Onset    Breast cancer Mother     Diabetes Mother     Cancer Mother     Ovarian cancer Maternal Aunt     Cancer Maternal Aunt        REVIEW OF SYSTEMS:    Review of Systems   Constitutional:  Negative for activity change, appetite change, chills, diaphoresis, fatigue, fever, unexpected weight gain and  unexpected weight loss.   HENT:  Negative for congestion, ear pain, mouth sores, nosebleeds, sore throat and trouble swallowing.    Eyes:  Negative for pain, discharge, redness, itching and visual disturbance.   Respiratory:  Negative for apnea, cough, choking, chest tightness, shortness of breath, wheezing and stridor.    Cardiovascular:  Negative for chest pain, palpitations and leg swelling.   Gastrointestinal:  Negative for abdominal distention, abdominal pain, blood in stool, constipation, diarrhea, nausea, vomiting, GERD and indigestion.   Endocrine: Negative for polydipsia and polyuria.   Genitourinary:  Negative for decreased urine volume, difficulty urinating, dysuria, flank pain, frequency, hematuria, urgency and urinary incontinence.   Musculoskeletal:  Positive for arthralgias. Negative for back pain, gait problem, joint swelling and myalgias.   Skin:  Negative for color change, dry skin, rash and skin lesions.   Neurological:  Positive for weakness and memory problem. Negative for dizziness and confusion.   Psychiatric/Behavioral:  Negative for behavioral problems, dysphoric mood, hallucinations, sleep disturbance and depressed mood. The patient is not nervous/anxious.          PHYSICAL EXAMINATION:   VITAL SIGNS:  Vitals:    07/17/24 1328   BP: 118/66   Pulse: 74   Resp: 18   Temp: 97.9 °F (36.6 °C)   SpO2: 97%   Weight: 55.3 kg (122 lb)        Physical Exam  Vitals and nursing note reviewed.   Constitutional:       Appearance: She is well-developed.   Eyes:      Conjunctiva/sclera: Conjunctivae normal.   Cardiovascular:      Rate and Rhythm: Normal rate and regular rhythm.      Heart sounds: Normal heart sounds.   Pulmonary:      Effort: Pulmonary effort is normal. No respiratory distress.      Breath sounds: Normal breath sounds. No wheezing or rales.   Abdominal:      General: Bowel sounds are normal. There is no distension.      Palpations: Abdomen is soft.      Tenderness: There is no abdominal  "tenderness.   Musculoskeletal:      Cervical back: Normal range of motion.      Right lower leg: No edema.      Left lower leg: No edema.      Comments: NROM all major joints   Skin:     General: Skin is warm and dry.      Findings: No rash.   Neurological:      Mental Status: She is alert and oriented to person, place, and time.   Psychiatric:         Mood and Affect: Mood and affect normal.         Behavior: Behavior normal.         Cognition and Memory: Cognition and memory normal.         RECORDS REVIEW:     I have reviewed records in [facility]    ASSESSMENT     Diagnoses and all orders for this visit:    1. Chronic heart failure with preserved ejection fraction (Primary)    2. Atrial fibrillation, unspecified type    3. Type 2 diabetes mellitus without complication, without long-term current use of insulin    4. Hyperthyroidism    5. Multiple thyroid nodules        PLAN    CHF/A. Fib  -She is wearing Holter monitor currently. Nursing to make follow up appointment with Cardiology.     Type 2 DM  -Hospital recommended starting Metformin. Patient does not want to take Metformin, \"son had a terrible reaction to it, but will try it.\"  Do feel with A1c of 8.9 and CHF, Jardiance would be great choice. Will start Jardiance 10 mg daily and monitor need to increase to 25 mg daily. Monitor glucose bid.     Hyperthyroidism/thyroid nodules  -She follows Endocrinology. Nursing to contact to check on next follow up appointment.     She will be receiving admission labs in am.         [x]  Discussed Patient in detail with nursing/staff, addressed all needs today.     [x]  Plan of Care Reviewed   []  PT/OT Reviewed   [x]  Order Changes  []  Discharge Plans Reviewed  [x]  Advance Directive on file with Nursing Home.   [x]  POA on file with Nursing Home.   [x]  Code Status listed: [x]  Full Code   []  DNR          “I confirm accuracy of unchanged data/findings which have been carried forward from previous visit, as well as I have " updated appropriately those that have changed.”     I spent 40 minutes caring for Aster on this date of service. This time includes time spent by me in the following activities:preparing for the visit, reviewing tests, obtaining and/or reviewing a separately obtained history, performing a medically appropriate examination and/or evaluation , counseling and educating the patient/family/caregiver, ordering medications, tests, or procedures, referring and communicating with other health care professionals , documenting information in the medical record, independently interpreting results and communicating that information with the patient/family/caregiver, and care coordination     Divya Benavides, APRN.

## 2024-07-17 NOTE — PAYOR COMM NOTE
"To:  [place]  From: Lydia Soni RN  Phone: [phone]  Fax: [phone]  NPI: [id number]  TIN: [id number]  Member ID: [id number]   MRN: [id number]    Vanessa Ahuja (79 y.o. Female)       Date of Birth   1945    Social Security Number       Address   [address] [place] [address]    Home Phone   [phone]    MRN   [id number]       [employer]    Marital Status                               Admission Date   7/13/24    Admission Type   Emergency    Admitting Provider   Brianna Patel DO    Attending Provider       [facility]       Discharge Date   7/16/2024    Discharge Disposition   Rehab Facility or Unit (DC - External)    Discharge Destination                                 Attending Provider: (none)   Allergies: Caffeine    Isolation: None   Infection: None   Code Status: Prior    Ht: 160 cm (63\")   Wt: 53.4 kg (117 lb 11.6 oz)    Admission Cmt: None   Principal Problem: Chest pain, atypical [R07.89]                   Active Insurance as of 7/13/2024       Primary Coverage       Payor Plan Insurance Group Employer/Plan Group    ANTHEM MEDICARE REPLACEMENT ANTHEM MEDICARE ADVANTAGE [id number]       Payor Plan Address Payor Plan Phone Number Payor Plan Fax Number Effective Dates    [address] [phone]  9/1/2020 - None Entered    [facility] [address]         Subscriber Name Subscriber Birth Date Member ID       VANESSA AHUJA 1945 [id number]               Secondary Coverage       Payor Plan Insurance Group Employer/Plan Group    [id number]       Payor Plan Address Payor Plan Phone Number Payor Plan Fax Number Effective Dates    [employer] [address] [address] [phone]  11/1/2022 - None Entered    [facility] [id number]         Subscriber Name Subscriber Birth Date Member ID       VANESSA AHUJA 1945 [id number]                     Emergency Contacts  "       (Rel.) Home Phone Work Phone Mobile Phone    Guillermina Gar (Grandchild) -- -- [phone]    Chilo Craft (Son) -- -- [phone]    ROMI CRAFT (Son) [phone] -- --    TERRIE GOMEZ (Grandchild) [phone] -- --                 Discharge Summary        German Mae MD at [address] [place] [address]   DISCHARGE SUMMARY      Name:  Aster Craft   Age:  79 y.o.  Sex:  female  :  1945  MRN:  [id number]   Visit Number:  [id number]    Admission Date:  2024  Date of Discharge:  2024  Primary Care Physician:  Yolie Cotto MD    Important issues to note:    -Started on aspirin, statin and metformin on discharge  -Follow-up with PCP closely for diabetes and hypothyroidism.  -Follow-up with her cardiologist    Discharge Diagnoses:     Acute on chronic heart failure midrange ejection fraction/diastolic exacerbation, POA  Chest pain  Tachycardia  Uncontrolled type 2 diabetes  Hyperthyroidism  Prior breast cancer  GERD  Hypertension    Problem List:     Active Hospital Problems    Diagnosis  POA    **Chest pain, atypical [R07.89]  Yes    Acute on chronic diastolic heart failure [I50.33]  Yes    Acute on chronic combined systolic and diastolic CHF (congestive heart failure) [I50.43]  Yes    Type 2 diabetes mellitus without complication, without long-term current use of insulin [E11.9]  Yes    Hyperthyroidism [E05.90]  Yes    GERD (gastroesophageal reflux disease) [K21.9]  Yes      Resolved Hospital Problems   No resolved problems to display.     Presenting Problem:    Chief Complaint   Patient presents with    Shortness of Breath      Consults:     Consulting Physician(s)         Provider   Role Specialty     Brian Morrow MD      Consulting Physician Cardiology          Procedures Performed:    Procedure(s):  Coronary angiography    History of presenting illness/Hospital Course:    The patient is a 79-year-old female with a  "history of hypertension, diabetes not on medication, hypothyroidism, reported cardiomyopathy, with recent diagnosis of heart failure reduced ejection fraction, who presented to the emergency room with complaints of shortness of breath and chest pain.  Patient states in the past she had seen Dr. Palacios, had been on metoprolol due to reported cardiomyopathy.  She is unsure if she was ever diagnosed with atrial fibrillation.  She was asked in the hospital about 2 weeks ago due to breathing difficulty, was diuresed, and had outpatient cardiology follow-up.  She is due to see cardiology this week.  She noted today while outside at a yard sale she developed symptoms of chest pain, pressure, heaviness in her chest and associated shortness of breath.  She went inside to rest, cool down, but continued to have symptoms and was brought to the ER.  Currently is feeling better.  She is not currently taking diuretics at home.  She is not on any medications for diabetes, she wants to talk with her PCP about that.  She notes a stress test performed many years ago, no recent cardiac procedures or testing other than echocardiogram at last hospital stay.     In the ER, the patient was overall hemodynamically stable.  She was borderline hypoxic with ambulation and is currently on 2 L of oxygen.  She was given IV Lasix.  Her labs are largely nonactionable.  She has been on Macrobid due to recent UTI with clear urine sample tonight.  Chest x-ray with some mild interstitial edema.  EKG and troponins reassuring.  Admitted for observation.     CHF/  Non-Cardiac chest pain:  -Recent echo with midrange ejection fraction noted.    -Was not taking diuretics outpatient.  Placed back on Lasix.    -Continue the metoprolol.     -Troponin stable.    -Telemetry monitoring.  She also has outpatient Holter monitor on currently.  -Dr. Morrow cardiology consulted.  Patient underwent a cardiac catheterization 7/15/2024.  Angiographically \"near " "normal\" coronary arteries.  Recommend to optimize medical therapy and evaluate for noncardiac causes of chest pain.  -Highly suspicious patient has underlying anxiety.  -Started on aspirin and statin  -Continued on home Lasix     Arrhythmia:  -Patient reports prior history of tachycardia, unsure if actually atrial fibrillation.  She has been on metoprolol for multiple years.  EKG normal here in sinus rhythm.  She has outpatient Holter monitor currently on.  -Follow-up with her cardiologist     Diabetes:  -Patient not taking anything at home for this.  Notes had been diet controlled, understands A1c is elevated now.  A1c was 8.9  -Start the patient on metformin on discharge  -Follow-up with PCP     Hyperthyroidism:  -Follows with endocrinology, is known to have thyroid nodules.  check TSH labs.  Obviously can contribute to heart disease as well as arrhythmia.  -Follow-up closely with PCP on that    Patient was seen and examined on the day of discharge.  Patient sitting up in the chair and appears comfortable with no distress.  Appears generally weak but otherwise denies any symptoms or pain today.  She reports overall doing better now.  Patient is accepted for short-term rehab at [facility].  No other acute complaints today.  Patient tolerating p.o. well.  Patient instructed on management and plan in details.  Instructed on following up with her cardiologist.  Diabetic diet and started on metformin.  Patient to follow-up closely on her diabetes and hypothyroidism with her PCP. Patient to follow-up with PCP in 2 to 3 days for recheck and continued care. Clear return precautions discussed.  Patient verbalized understanding and agreeable to plan.  All questions were answered to the patient's satisfaction.  Patient has reached maximum medical improvement of inpatient hospital stay. Patient is discharged in stable condition.      Vital Signs:    Temp:  [97.4 °F (36.3 °C)-98.9 °F (37.2 °C)] 98.9 °F (37.2 °C)  Heart Rate:  " [67-88] 67  Resp:  [12-16] 16  BP: ()/(54-67) 114/65    Physical Exam:    General Appearance:  Alert and cooperative.  No acute distress.   Head:  Atraumatic and normocephalic.   Eyes: Conjunctivae and sclerae normal, no icterus. No pallor.   Ears:  Ears with no abnormalities noted.   Throat: No oral lesions, no thrush, oral mucosa moist.   Neck: Supple, trachea midline, no thyromegaly.   Back:   No kyphoscoliosis present. No tenderness to palpation.   Lungs:   Breath sounds heard bilaterally equally.  No crackles or wheezing. No Pleural rub or bronchial breathing.   Heart:  Normal S1 and S2, no murmur, no gallop, no rub. No JVD.   Abdomen:   Normal bowel sounds, no masses, no organomegaly. Soft, nontender, nondistended, no rebound tenderness.   Extremities: Supple, no edema, no cyanosis, no clubbing.   Pulses: Pulses palpable bilaterally.   Skin: No bleeding or rash.   Neurologic: Alert and oriented x 3. No facial asymmetry. Moves all four limbs. No tremors.     Pertinent Lab Results:     Results from last 7 days   Lab Units 07/16/24  0536 07/15/24  0612 07/14/24  0610 07/13/24  1718   SODIUM mmol/L 139 140 137 142   POTASSIUM mmol/L 3.7 3.5 3.5 4.1   CHLORIDE mmol/L 100 99 98 101   CO2 mmol/L 29.8* 28.9 26.7 27.5   BUN mg/dL 26* 22 29* 28*   CREATININE mg/dL 0.59 0.46* 0.51* 0.77   CALCIUM mg/dL 9.1 8.9 8.7 9.2   BILIRUBIN mg/dL  --   --   --  0.6   ALK PHOS U/L  --   --   --  93   ALT (SGPT) U/L  --   --   --  21   AST (SGOT) U/L  --   --   --  19   GLUCOSE mg/dL 157* 130* 188* 195*     Results from last 7 days   Lab Units 07/16/24  0536 07/15/24  0612 07/14/24  0610 07/13/24  1718   WBC 10*3/mm3 6.96 12.39*  --  16.25*   HEMOGLOBIN g/dL 12.7 13.0 12.9 13.7   HEMATOCRIT % 38.6 39.0 38.4 41.3   PLATELETS 10*3/mm3 230 213  --  240         Results from last 7 days   Lab Units 07/13/24  1918 07/13/24  1718   CK TOTAL U/L  --  77   HSTROP T ng/L 9 12     Results from last 7 days   Lab Units 07/13/24  1718    PROBNP pg/mL 95.5                 Results from last 7 days   Lab Units 07/13/24  1812 07/13/24  1800   BLOODCX  No growth at 2 days No growth at 2 days       Pertinent Radiology Results:    Imaging Results (All)       Procedure Component Value Units Date/Time    XR Chest 1 View [[id number]] Collected: 07/14/24 0844     Updated: 07/14/24 0847    Narrative:      PROCEDURE: XR CHEST 1 VW-     HISTORY: SOA Triage Protocol, shortness of breath and chest pressure  beginning today.     COMPARISON: June 29, 2024.     FINDINGS: The heart is mildly enlarged, stable.. The lungs are clear.  The mediastinum is unremarkable. There is no pneumothorax.  There are no  acute osseous abnormalities.       Impression:      No acute cardiopulmonary process.                 This report was signed and finalized on 7/14/2024 8:45 AM by Iraida Kruse MD.               Echo:    Results for orders placed during the hospital encounter of 06/29/24    Adult Transthoracic Echo Complete W/ Cont if Necessary Per Protocol    Interpretation Summary    Left ventricular systolic function is low normal. Calculated left ventricular 3D EF = 46% Left ventricular ejection fraction appears to be 46 - 50%.    The left ventricular cavity is borderline dilated.    Left ventricular diastolic function is consistent with (grade I) impaired relaxation.    Condition on Discharge:      Stable.    Code status during the hospital stay:    Code Status and Medical Interventions:   Ordered at: 07/13/24 2230     Code Status (Patient has no pulse and is not breathing):    CPR (Attempt to Resuscitate)     Medical Interventions (Patient has pulse or is breathing):    Full Support     Discharge Disposition:    Rehab Facility or Unit (DC - External)    Discharge Medications:       Discharge Medications        New Medications        Instructions Start Date   aspirin 81 MG EC tablet   81 mg, Oral, Daily   Start Date: July 17, 2024     atorvastatin 80 MG tablet  Commonly known as:  LIPITOR   80 mg, Oral, Nightly             Changes to Medications        Instructions Start Date   cholecalciferol 25 MCG (1000 UT) tablet  Commonly known as: VITAMIN D3  What changed: Another medication with the same name was removed. Continue taking this medication, and follow the directions you see here.   1,000 Units, Oral, Daily      ipratropium 0.03 % nasal spray  Commonly known as: ATROVENT  What changed: Another medication with the same name was removed. Continue taking this medication, and follow the directions you see here.   1 spray into the nostril(s) as directed by provider 2 (Two) Times a Day As Needed.             Continue These Medications        Instructions Start Date   albuterol sulfate  (90 Base) MCG/ACT inhaler  Commonly known as: PROVENTIL HFA;VENTOLIN HFA;PROAIR HFA   2 puffs Every 6 (Six) Hours As Needed.      azelastine 0.1 % nasal spray  Commonly known as: ASTELIN   1 spray into the nostril(s) as directed by provider 2 (Two) Times a Day.      desloratadine 5 MG tablet  Commonly known as: CLARINEX   5 mg, Oral, Daily PRN      dextromethorphan-guaifenesin  MG/5ML syrup  Commonly known as: ROBITUSSIN-DM   TAKE 10ML EVERY 4 HOURS AS NEEDED      ezetimibe 10 MG tablet  Commonly known as: ZETIA   10 mg, Oral, Daily      Flovent  MCG/ACT inhaler  Generic drug: fluticasone   2 puffs, Inhalation, 2 Times Daily PRN      FreeStyle Malina 2 Sensor misc   CHANGE SENSOR EVERY 14 DAYS      furosemide 20 MG tablet  Commonly known as: Lasix   20 mg, Oral, Daily      Gemtesa 75 MG tablet  Generic drug: Vibegron   75 mg, Oral, Daily      meclizine 12.5 MG tablet  Commonly known as: ANTIVERT   12.5 mg, Oral, 3 Times Daily PRN      metoprolol succinate XL 50 MG 24 hr tablet  Commonly known as: TOPROL-XL   50 mg, Oral, Daily      nitroglycerin 0.4 MG SL tablet  Commonly known as: NITROSTAT   0.4 mg, Sublingual, Every 5 Minutes PRN      OneTouch Delica Plus [id number] misc   USE AS DIRECTED  EVERY DAY      OneTouch Verio Flex System w/Device kit   See Admin Instructions, for testing      OneTouch Verio test strip  Generic drug: glucose blood   Daily, for testing      pantoprazole 40 MG EC tablet  Commonly known as: PROTONIX   40 mg, Oral, Daily      Rhopressa 0.02 % solution  Generic drug: Netarsudil Dimesylate   1 drop, Both Eyes, Nightly      simethicone 80 MG chewable tablet  Commonly known as: MYLICON   80 mg, Oral, Every 6 Hours PRN      Triamcinolone Acetonide 55 MCG/ACT nasal inhaler  Commonly known as: NASACORT   2 sprays, Nasal, Daily             Stop These Medications      benzonatate 200 MG capsule  Commonly known as: TESSALON     brimonidine-timolol 0.2-0.5 % ophthalmic solution  Commonly known as: COMBIGAN     Calcium Carbonate 500 MG chewable tablet     calcium carbonate-cholecalciferol 500-400 MG-UNIT tablet tablet     cetirizine 10 MG tablet  Commonly known as: zyrTEC     CINNAMON PO     clobetasol 0.05 % ointment  Commonly known as: TEMOVATE     diazePAM 5 MG tablet  Commonly known as: VALIUM     dorzolamide 2 % ophthalmic solution  Commonly known as: TRUSOPT     glipizide 5 MG ER tablet  Commonly known as: GLUCOTROL XL     Januvia 100 MG tablet  Generic drug: SITagliptin     nitrofurantoin (macrocrystal-monohydrate) 100 MG capsule  Commonly known as: MACROBID            Discharge Diet:   Diabetic    Activity at Discharge:   As tolerated    Follow-up Appointments:     Contact information for follow-up providers       Yolie Cotto MD .    Specialty: Family Medicine  Why: Hypothyroidism, diabetes  Contact information:  [address] [facility] [address] [phone]                       Contact information for after-discharge care       Destination       [facility] .    Service: Skilled Nursing  Contact information:  [address] [facility] [address] [phone]                                 Future Appointments   Date Time Provider Department  [place]   7/18/2024  3:00 PM Teressa Garcia APRN  [facility]   8/8/2024 12:40 PM Brennan Cheng MD [facility] ([facility]   2/13/2025  2:30 PM Aliza Thao APRN [facility] ([facility]     Test Results Pending at Discharge:    Pending Labs       Order Current Status    Blood Culture - Blood, Arm, Left Preliminary result    Blood Culture - Blood, Hand, Left Preliminary result               German Mae MD  07/16/24  13:01 EDT    Time: I spent 27 minutes on this discharge activity which included: face-to-face encounter with the patient, reviewing the data in the system, coordination of the care with the nursing staff as well as consultants, documentation, and entering orders.     Dictated utilizing Dragon dictation.      Electronically signed by German Mae MD at 07/16/24 3036

## 2024-07-18 LAB
BACTERIA SPEC AEROBE CULT: NORMAL
BACTERIA SPEC AEROBE CULT: NORMAL

## 2024-07-18 NOTE — PROGRESS NOTES
Nursing Home Follow Up Note      Justin Thomas DO []   MAIKEL Jamison [x]  [address] [place] [address]  Phone: [phone]  Fax: [phone] Van Alcantar MD []    Catarino Perkins DO []   [address] [place] [address]  Phone: [phone]  Fax: [phone]     PATIENT NAME: Aster Craft                                                                          YOB: 1945           DATE OF SERVICE: 07/17/2024  FACILITY:  [][place]   [] [place]   [x] [facility]   [] [facility]   [] Other ______________________________________________________________________      CHIEF COMPLAINT:    Medication and chart review.       HISTORY OF PRESENT ILLNESS:     79-year-old female with a history of breast ca, hypertension, diabetes, hyperthyroidism with thyroid nodules, following endocrinology,  reported cardiomyopathy, with recent diagnosis of heart failure reduced ejection fraction. She initially presented to the emergency room with complaints of shortness of breath and chest pain. Patient follows Dr. Palacios and is on Metoprolol for cardiomyopathy. She is unsure if she was ever diagnosed with atrial fibrillation. She was treated in the hospital about 2 weeks prior as well due to breathing difficulty, was diuresed, She was due to see Cardiology this week but hospitalized. She was not taking diuretics at home. Was not on any medications for diabetes. A1c 8.9 in hospital.     Cardiac Cath performed on 7/15 and normal. She has Holter Monitor on now and will follow up with Cardiology. No chest pain or palpitations.     She was admitted here to facility for rehabilitation and strengthening. Adjusting well to facility. No complaints or signs and symptoms of any distress.       PAST MEDICAL & SURGICAL HISTORY:   Past Medical History:   Diagnosis Date    Arthritis     Breast cancer     RIGHT BREAST STAGE 3    Diabetes mellitus     Elevated cholesterol     GERD (gastroesophageal reflux  disease) 08/23/2021    History of cancer chemotherapy     Hypertension     Hyperthyroidism 05/19/2022    Kidney stone     Lichen sclerosus     Shingles     Thickened endometrium 2018    Urinary incontinence     Urinary tract infection       Past Surgical History:   Procedure Laterality Date    CARDIAC CATHETERIZATION N/A 7/15/2024    Procedure: Coronary angiography;  Surgeon: Brian Morrow MD;  Location: [facility] CATH INVASIVE LOCATION;  Service: Cardiology;  Laterality: N/A;    CATARACT EXTRACTION Right 01/10/2023    CERVICAL CONE BIOPSY      CHOLECYSTECTOMY  03/2010    COLONOSCOPY N/A 07/06/2018    Procedure: COLONOSCOPY;  Surgeon: Trenton Lyons MD;  Location: [facility] OR;  Service: Gastroenterology    DILATATION AND CURETTAGE      ENDOSCOPY N/A 07/06/2018    Procedure: ESOPHAGOGASTRODUODENOSCOPY;  Surgeon: Trenton Lyons MD;  Location: [facility];  Service: Gastroenterology    MASTECTOMY Right 10/1998    MASTECTOMY Left 07/2013    TUBAL ABDOMINAL LIGATION           MEDICATIONS:  I have reviewed and reconciled the patients medication list in the patients chart at the skilled nursing facility today.      ALLERGIES:    Allergies   Allergen Reactions    Caffeine Other (See Comments)     No caffeine per doctors recommendation         SOCIAL HISTORY:    Social History     Socioeconomic History    Marital status:    Tobacco Use    Smoking status: Never     Passive exposure: Never    Smokeless tobacco: Never   Vaping Use    Vaping status: Never Used   Substance and Sexual Activity    Alcohol use: No    Drug use: No    Sexual activity: Not Currently       FAMILY HISTORY:    Family History   Problem Relation Age of Onset    Breast cancer Mother     Diabetes Mother     Cancer Mother     Ovarian cancer Maternal Aunt     Cancer Maternal Aunt        REVIEW OF SYSTEMS:    Review of Systems   Constitutional:  Negative for activity change, appetite change, chills, diaphoresis, fatigue, fever, unexpected weight gain and  unexpected weight loss.   HENT:  Negative for congestion, ear pain, mouth sores, nosebleeds, sore throat and trouble swallowing.    Eyes:  Negative for pain, discharge, redness, itching and visual disturbance.   Respiratory:  Negative for apnea, cough, choking, chest tightness, shortness of breath, wheezing and stridor.    Cardiovascular:  Negative for chest pain, palpitations and leg swelling.   Gastrointestinal:  Negative for abdominal distention, abdominal pain, blood in stool, constipation, diarrhea, nausea, vomiting, GERD and indigestion.   Endocrine: Negative for polydipsia and polyuria.   Genitourinary:  Negative for decreased urine volume, difficulty urinating, dysuria, flank pain, frequency, hematuria, urgency and urinary incontinence.   Musculoskeletal:  Positive for arthralgias. Negative for back pain, gait problem, joint swelling and myalgias.   Skin:  Negative for color change, dry skin, rash and skin lesions.   Neurological:  Positive for weakness and memory problem. Negative for dizziness and confusion.   Psychiatric/Behavioral:  Negative for behavioral problems, dysphoric mood, hallucinations, sleep disturbance and depressed mood. The patient is not nervous/anxious.          PHYSICAL EXAMINATION:   VITAL SIGNS:  Vitals:    07/17/24 1328   BP: 118/66   Pulse: 74   Resp: 18   Temp: 97.9 °F (36.6 °C)   SpO2: 97%   Weight: 55.3 kg (122 lb)        Physical Exam  Vitals and nursing note reviewed.   Constitutional:       Appearance: She is well-developed.   Eyes:      Conjunctiva/sclera: Conjunctivae normal.   Cardiovascular:      Rate and Rhythm: Normal rate and regular rhythm.      Heart sounds: Normal heart sounds.   Pulmonary:      Effort: Pulmonary effort is normal. No respiratory distress.      Breath sounds: Normal breath sounds. No wheezing or rales.   Abdominal:      General: Bowel sounds are normal. There is no distension.      Palpations: Abdomen is soft.      Tenderness: There is no abdominal  "tenderness.   Musculoskeletal:      Cervical back: Normal range of motion.      Right lower leg: No edema.      Left lower leg: No edema.      Comments: NROM all major joints   Skin:     General: Skin is warm and dry.      Findings: No rash.   Neurological:      Mental Status: She is alert and oriented to person, place, and time.   Psychiatric:         Mood and Affect: Mood and affect normal.         Behavior: Behavior normal.         Cognition and Memory: Cognition and memory normal.         RECORDS REVIEW:     I have reviewed records in [facility]    ASSESSMENT     Diagnoses and all orders for this visit:    1. Chronic heart failure with preserved ejection fraction (Primary)    2. Atrial fibrillation, unspecified type    3. Type 2 diabetes mellitus without complication, without long-term current use of insulin    4. Hyperthyroidism    5. Multiple thyroid nodules        PLAN    CHF/A. Fib  -She is wearing Holter monitor currently. Nursing to make follow up appointment with Cardiology.     Type 2 DM  -Hospital recommended starting Metformin. Patient does not want to take Metformin, \"son had a terrible reaction to it, but will try it.\"  Do feel with A1c of 8.9 and CHF, Jardiance would be great choice. Will start Jardiance 10 mg daily and monitor need to increase to 25 mg daily. Monitor glucose bid.     Hyperthyroidism/thyroid nodules  -She follows Endocrinology. Nursing to contact to check on next follow up appointment.     She will be receiving admission labs in am.         [x]  Discussed Patient in detail with nursing/staff, addressed all needs today.     [x]  Plan of Care Reviewed   []  PT/OT Reviewed   [x]  Order Changes  []  Discharge Plans Reviewed  [x]  Advance Directive on file with Nursing Home.   [x]  POA on file with Nursing Home.   [x]  Code Status listed: [x]  Full Code   []  DNR          “I confirm accuracy of unchanged data/findings which have been carried forward from previous visit, as well as I have " updated appropriately those that have changed.”     I spent 40 minutes caring for Aster on this date of service. This time includes time spent by me in the following activities:preparing for the visit, reviewing tests, obtaining and/or reviewing a separately obtained history, performing a medically appropriate examination and/or evaluation , counseling and educating the patient/family/caregiver, ordering medications, tests, or procedures, referring and communicating with other health care professionals , documenting information in the medical record, independently interpreting results and communicating that information with the patient/family/caregiver, and care coordination     Divya eBnavides, APRN.

## 2024-07-25 ENCOUNTER — DISEASE STATE MANAGEMENT VISIT (OUTPATIENT)
Dept: PHARMACY | Facility: HOSPITAL | Age: 79
End: 2024-07-25
Payer: MEDICARE

## 2024-07-25 VITALS
SYSTOLIC BLOOD PRESSURE: 131 MMHG | DIASTOLIC BLOOD PRESSURE: 79 MMHG | HEIGHT: 63 IN | OXYGEN SATURATION: 93 % | BODY MASS INDEX: 21.44 KG/M2 | RESPIRATION RATE: 16 BRPM | TEMPERATURE: 97.8 F | HEART RATE: 84 BPM | WEIGHT: 121 LBS

## 2024-07-25 DIAGNOSIS — I50.22 HEART FAILURE WITH MILDLY REDUCED EJECTION FRACTION (HFMREF): Primary | ICD-10-CM

## 2024-07-25 PROCEDURE — G0463 HOSPITAL OUTPT CLINIC VISIT: HCPCS

## 2024-07-25 RX ORDER — SENNOSIDES A AND B 8.6 MG/1
1 TABLET, FILM COATED ORAL AS NEEDED
COMMUNITY

## 2024-07-25 RX ORDER — FUROSEMIDE 20 MG/1
20 TABLET ORAL DAILY PRN
Start: 2024-07-25

## 2024-07-25 RX ORDER — METOPROLOL SUCCINATE 100 MG/1
100 TABLET, EXTENDED RELEASE ORAL DAILY
Qty: 30 TABLET | Refills: 0 | Status: SHIPPED | OUTPATIENT
Start: 2024-07-25

## 2024-07-25 RX ORDER — SPIRONOLACTONE 25 MG/1
25 TABLET ORAL DAILY
Qty: 30 TABLET | Refills: 0 | Status: SHIPPED | OUTPATIENT
Start: 2024-07-25

## 2024-07-25 RX ORDER — BISACODYL 10 MG
10 SUPPOSITORY, RECTAL RECTAL AS NEEDED
COMMUNITY

## 2024-07-25 NOTE — PROGRESS NOTES
"             [facility] Heart Failure Clinic Follow Up Note    Aster Craft  [id number]  2024    Primary Care Provider: Yolie Cotto MD   Referring Provider: Teressa Garcia A*    Chief Complaint: Follow-up of CHF    History of Present Illness:   Mrs. Aster Craft is a 79 y.o. female who presents to the HF Clinic for follow-up.  Since her last HF clinic visit, the patient was admitted with SOB and chest pain.  Coronary angiography showed \"near normal\" arteries. She was treated with IV lasix and discharged on 5 days of PO Lasix.  Her CXR showed no acute cardiopulmonary process.  proBNP was within normal limits.  The patient is a current resident of [facility] and is accompanied by an aid today.  The patient specifically denies chest pain, dyspnea, cough, and lower extremity edema.  She does, however, report fatigue.  She states she did some physical therapy this morning and is trying to get her strength back.  She offers no other complaints or concerns at this time.     Past Cardiac Testin. Last Coronary Angio: 7/15/2024  Angiographically \"near-normal\" coronary arteries   2. Prior Stress Testing: None  3. Last Echo: 2024    Left ventricular systolic function is low normal. Calculated left ventricular 3D EF = 46% Left ventricular ejection fraction appears to be 46 - 50%.    The left ventricular cavity is borderline dilated.    Left ventricular diastolic function is consistent with (grade I) impaired relaxation.  4. Prior Holter Monitor: PENDING    Review of Systems:   Review of Systems  As Noted in HPI.   I have reviewed and confirmed the accuracy of the ROS as documented by the MA/LPN/RN MAIKEL Branch    I have reviewed and/or updated the patient's past medical, past surgical, family, social history, problem list and allergies as appropriate.     Medications:     Current Outpatient Medications:     albuterol sulfate  (90 Base) MCG/ACT inhaler, " 2 puffs Every 6 (Six) Hours As Needed., Disp: , Rfl:     aspirin 81 MG EC tablet, Take 1 tablet by mouth Daily., Disp: , Rfl:     atorvastatin (LIPITOR) 80 MG tablet, Take 1 tablet by mouth Every Night for 90 days., Disp: , Rfl:     azelastine (ASTELIN) 0.1 % nasal spray, 1 spray into the nostril(s) as directed by provider 2 (Two) Times a Day., Disp: , Rfl:     bisacodyl (Dulcolax) 10 MG suppository, Insert 1 suppository into the rectum As Needed for Constipation., Disp: , Rfl:     Blood Glucose Monitoring Suppl (OneTouch Verio Flex System) w/Device kit, See Admin Instructions. for testing, Disp: , Rfl:     cholecalciferol (VITAMIN D3) 25 MCG (1000 UT) tablet, Take 1 tablet by mouth Daily., Disp: , Rfl:     Continuous Blood Gluc Sensor (FreeStyle Malina 2 Sensor) misc, CHANGE SENSOR EVERY 14 DAYS, Disp: , Rfl:     ezetimibe (ZETIA) 10 MG tablet, Take 1 tablet by mouth Daily., Disp: , Rfl:     Flovent  MCG/ACT inhaler, Inhale 2 puffs 2 (Two) Times a Day As Needed., Disp: , Rfl:     furosemide (Lasix) 20 MG tablet, Take 1 tablet by mouth Daily As Needed (For weight gain > 2-3 lbs overnight)., Disp: , Rfl:     ipratropium (ATROVENT) 0.03 % nasal spray, 1 spray into the nostril(s) as directed by provider 2 (Two) Times a Day As Needed., Disp: , Rfl:     Lancets (OneTouch Delica Plus [id number]) misc, USE AS DIRECTED EVERY DAY, Disp: , Rfl:     meclizine (ANTIVERT) 12.5 MG tablet, Take 1 tablet by mouth 3 (Three) Times a Day As Needed., Disp: , Rfl:     metFORMIN (GLUCOPHAGE) 500 MG tablet, Take 1 tablet by mouth 2 (Two) Times a Day With Meals., Disp: , Rfl:     nitroglycerin (NITROSTAT) 0.4 MG SL tablet, Place 1 tablet under the tongue Every 5 (Five) Minutes As Needed for Chest Pain., Disp: , Rfl:     OneTouch Verio test strip, Daily. for testing, Disp: , Rfl:     pantoprazole (PROTONIX) 40 MG EC tablet, Take 1 tablet by mouth Daily., Disp: , Rfl:     Rhopressa 0.02 % solution, Administer 1 drop to both eyes Every  "Night., Disp: , Rfl:     senna 8.6 MG tablet, Take 1 tablet by mouth As Needed for Constipation., Disp: , Rfl:     simethicone (MYLICON) 80 MG chewable tablet, Chew 1 tablet Every 6 (Six) Hours As Needed for Flatulence., Disp: , Rfl:     Triamcinolone Acetonide (NASACORT) 55 MCG/ACT nasal inhaler, 2 sprays into the nostril(s) as directed by provider Daily., Disp: , Rfl:     Vibegron (Gemtesa) 75 MG tablet, Take 1 tablet by mouth Daily., Disp: 30 tablet, Rfl: 11    metoprolol succinate XL (Toprol XL) 100 MG 24 hr tablet, Take 1 tablet by mouth Daily., Disp: 30 tablet, Rfl: 0    spironolactone (ALDACTONE) 25 MG tablet, Take 1 tablet by mouth Daily., Disp: 30 tablet, Rfl: 0    Physical Exam:  Vital Signs:   Vitals:    07/25/24 1318   BP: 131/79   BP Location: Right arm   Patient Position: Sitting   Cuff Size: Adult   Pulse: 84   Resp: 16   Temp: 97.8 °F (36.6 °C)   TempSrc: Infrared   SpO2: 93%  Comment: RA   Weight: 54.9 kg (121 lb)   Height: 160 cm (63\")     Body mass index is 21.43 kg/m².    Physical Exam  Vitals and nursing note reviewed.   Constitutional:       General: She is not in acute distress.     Comments: Presents in wheelchair today, but states she is ambulatory at [facility]   HENT:      Head: Normocephalic and atraumatic.   Neck:      Trachea: Trachea normal.   Cardiovascular:      Rate and Rhythm: Normal rate and regular rhythm.      Pulses: Normal pulses.      Heart sounds: Normal heart sounds. No murmur heard.     No friction rub. No gallop.   Pulmonary:      Effort: Pulmonary effort is normal.      Breath sounds: Normal breath sounds.   Musculoskeletal:      Cervical back: Neck supple.      Right lower leg: No edema.      Left lower leg: No edema.   Skin:     General: Skin is warm and dry.   Neurological:      Mental Status: She is alert and oriented to person, place, and time.   Psychiatric:         Mood and Affect: Mood normal.         Behavior: Behavior normal. Behavior is " cooperative.         Thought Content: Thought content does not include suicidal ideation.         Results Review:   I reviewed the patient's new clinical results.    Procedures    Assessment / Plan:   Diagnoses and all orders for this visit:    1. Heart failure with mildly reduced ejection fraction (HFmrEF) (Primary)  Stage C  NYHA functional class II  CHANGE Lasix to daily PRN for edema  UPTITRATE metoprolol to 100 mg daily  START spironolactone 25 mg daily  Plan for BMP at facility next week (Tuesday or Wednesday, printed order given to aid with instructions to call our clinic with results)  Further instructions scanned to chart for facility    2. Hypertension  Continue elevated GDMT      Preventative Cardiology:   Tobacco Cessation: N/A   Advance Care Planning: ACP discussion was declined by the patient. Patient has an advance directive in EMR which is still valid.      Follow Up:   Return in about 2 weeks (around 8/8/2024) for DSM.      Thank you for allowing me to participate in the care of your patient. Please to not hesitate to contact me with additional questions or concerns.     Teressa Garcia, APRN
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
## 2024-07-26 ENCOUNTER — TELEPHONE (OUTPATIENT)
Dept: SURGERY | Facility: CLINIC | Age: 79
End: 2024-07-26

## 2024-07-29 NOTE — TELEPHONE ENCOUNTER
Needs to reschedule EGD.  Patient in nursing facility and transportation took patient to the wrong location causing her to miss her appointment.

## 2024-08-05 ENCOUNTER — TELEPHONE (OUTPATIENT)
Dept: PHARMACY | Facility: HOSPITAL | Age: 79
End: 2024-08-05
Payer: MEDICARE

## 2024-08-05 ENCOUNTER — LAB (OUTPATIENT)
Dept: LAB | Facility: HOSPITAL | Age: 79
End: 2024-08-05
Payer: MEDICARE

## 2024-08-05 DIAGNOSIS — I50.22 HEART FAILURE WITH MILDLY REDUCED EJECTION FRACTION (HFMREF): ICD-10-CM

## 2024-08-05 LAB
ANION GAP SERPL CALCULATED.3IONS-SCNC: 12.2 MMOL/L (ref 5–15)
BUN SERPL-MCNC: 19 MG/DL (ref 8–23)
BUN/CREAT SERPL: 28.8 (ref 7–25)
CALCIUM SPEC-SCNC: 9.2 MG/DL (ref 8.6–10.5)
CHLORIDE SERPL-SCNC: 102 MMOL/L (ref 98–107)
CO2 SERPL-SCNC: 26.8 MMOL/L (ref 22–29)
CREAT SERPL-MCNC: 0.66 MG/DL (ref 0.57–1)
EGFRCR SERPLBLD CKD-EPI 2021: 90.5 ML/MIN/1.73
GLUCOSE SERPL-MCNC: 291 MG/DL (ref 65–99)
POTASSIUM SERPL-SCNC: 4.7 MMOL/L (ref 3.5–5.2)
SODIUM SERPL-SCNC: 141 MMOL/L (ref 136–145)

## 2024-08-05 PROCEDURE — 80048 BASIC METABOLIC PNL TOTAL CA: CPT

## 2024-08-05 PROCEDURE — 36415 COLL VENOUS BLD VENIPUNCTURE: CPT

## 2024-08-05 NOTE — TELEPHONE ENCOUNTER
Called patient to remind her to go to the lab for BMP following spironolactone initiation 7/25/2024. Patient plans to go to [facility] outpatient lab today. Order is placed.

## 2024-08-06 ENCOUNTER — TELEPHONE (OUTPATIENT)
Dept: PHARMACY | Facility: HOSPITAL | Age: 79
End: 2024-08-06
Payer: MEDICARE

## 2024-08-06 NOTE — TELEPHONE ENCOUNTER
Spoke with patient regarding lab results. Discussed that kidney function is stable and though potassium did increase as expected with starting spironolactone, it is still WNL. Confirmed patient is not taking any additional K supplement. Patient did not have any questions at this time and will follow up as scheduled on 8/8.    Joslyn/Ana Laura -- NINI

## 2024-08-08 ENCOUNTER — TELEPHONE (OUTPATIENT)
Dept: CARDIOLOGY | Facility: CLINIC | Age: 79
End: 2024-08-08
Payer: MEDICARE

## 2024-08-08 ENCOUNTER — DISEASE STATE MANAGEMENT VISIT (OUTPATIENT)
Dept: PHARMACY | Facility: HOSPITAL | Age: 79
End: 2024-08-08
Payer: MEDICARE

## 2024-08-08 VITALS
RESPIRATION RATE: 16 BRPM | HEART RATE: 78 BPM | DIASTOLIC BLOOD PRESSURE: 69 MMHG | HEIGHT: 63 IN | SYSTOLIC BLOOD PRESSURE: 121 MMHG | WEIGHT: 122 LBS | OXYGEN SATURATION: 93 % | TEMPERATURE: 97 F | BODY MASS INDEX: 21.62 KG/M2

## 2024-08-08 DIAGNOSIS — I50.32 CHRONIC HEART FAILURE WITH PRESERVED EJECTION FRACTION: Primary | Chronic | ICD-10-CM

## 2024-08-08 RX ORDER — HYDROCORTISONE 25 MG/G
CREAM TOPICAL AS NEEDED
COMMUNITY

## 2024-08-08 RX ORDER — FLUTICASONE PROPIONATE 50 MCG
2 SPRAY, SUSPENSION (ML) NASAL DAILY
COMMUNITY

## 2024-08-08 RX ORDER — BRIMONIDINE TARTRATE AND TIMOLOL MALEATE 2; 5 MG/ML; MG/ML
SOLUTION OPHTHALMIC EVERY 12 HOURS
COMMUNITY

## 2024-08-08 RX ORDER — CHLORAL HYDRATE 500 MG
CAPSULE ORAL
COMMUNITY

## 2024-08-08 RX ORDER — DICYCLOMINE HYDROCHLORIDE 10 MG/1
10 CAPSULE ORAL AS NEEDED
COMMUNITY

## 2024-08-08 RX ORDER — MONTELUKAST SODIUM 10 MG/1
10 TABLET ORAL NIGHTLY
COMMUNITY

## 2024-08-08 RX ORDER — LEVOCETIRIZINE DIHYDROCHLORIDE 5 MG/1
5 TABLET, FILM COATED ORAL EVERY EVENING
COMMUNITY

## 2024-08-08 NOTE — TELEPHONE ENCOUNTER
Rand, this patient went to [facility] with her monitor on and she states that they were going to mail it back for her.  I can't see that it has been received and the patient has now been discharged back to home.    Would you see if anyone knows anything about this at their facility?

## 2024-08-08 NOTE — PROGRESS NOTES
"             [facility] Follow Up Note    Aster Craft  [id number]  2024    Primary Care Provider: Yolie Cotto MD   Referring Provider: Kerley, Brian Joseph, DO    Chief Complaint: Follow-up CHF    History of Present Illness:   Mrs. Aster Craft is a 79 y.o. female who presents to the HF Clinic for follow up.    the patient was admitted with SOB and chest pain.  Coronary angiography showed \"near normal\" arteries. She was treated with IV lasix and discharged on 5 days of PO Lasix.  Her CXR showed no acute cardiopulmonary process.  proBNP was within normal limits.  The patient is a current resident of [facility] and is accompanied by an aid today.  The patient specifically denies chest pain, dyspnea, cough, and lower extremity edema.  She does, however, report fatigue.  She states she did some physical therapy this morning and is trying to get her strength back.  She offers no other complaints or concerns at this time.      Past Cardiac Testin. Last Coronary Angio: 7/15/2024  Angiographically \"near-normal\" coronary arteries   2. Prior Stress Testing: None  3. Last Echo: 2024    Left ventricular systolic function is low normal. Calculated left ventricular 3D EF = 46% Left ventricular ejection fraction appears to be 46 - 50%.    The left ventricular cavity is borderline dilated.    Left ventricular diastolic function is consistent with (grade I) impaired relaxation.  4. Prior Holter Monitor: PENDING      Review of Systems:   Review of Systems  As Noted in HPI.   I have reviewed and confirmed the accuracy of the ROS as documented by the MA/CHEYENNE/RN MAIKEL Branch    I have reviewed and/or updated the patient's past medical, past surgical, family, social history, problem list and allergies as appropriate.     Medications:     Current Outpatient Medications:     albuterol sulfate  (90 Base) MCG/ACT inhaler, 2 puffs Every 6 (Six) Hours " As Needed., Disp: , Rfl:     aspirin 81 MG EC tablet, Take 1 tablet by mouth Daily., Disp: , Rfl:     atorvastatin (LIPITOR) 80 MG tablet, Take 1 tablet by mouth Every Night for 90 days., Disp: , Rfl:     azelastine (ASTELIN) 0.1 % nasal spray, 1 spray into the nostril(s) as directed by provider 2 (Two) Times a Day., Disp: , Rfl:     Blood Glucose Monitoring Suppl (OneTouch Verio Flex System) w/Device kit, See Admin Instructions. for testing, Disp: , Rfl:     brimonidine-timolol (COMBIGAN) 0.2-0.5 % ophthalmic solution, Every 12 (Twelve) Hours., Disp: , Rfl:     Calcium Carb-Cholecalciferol (CALCIUM 500 + D PO), Take  by mouth., Disp: , Rfl:     cholecalciferol (VITAMIN D3) 25 MCG (1000 UT) tablet, Take 1 tablet by mouth Daily., Disp: , Rfl:     Continuous Blood Gluc Sensor (FreeStyle Malina 2 Sensor) misc, CHANGE SENSOR EVERY 14 DAYS, Disp: , Rfl:     dicyclomine (BENTYL) 10 MG capsule, Take 1 capsule by mouth As Needed for Abdominal Cramping., Disp: , Rfl:     ezetimibe (ZETIA) 10 MG tablet, Take 1 tablet by mouth Daily., Disp: , Rfl:     Flovent  MCG/ACT inhaler, Inhale 2 puffs 2 (Two) Times a Day As Needed., Disp: , Rfl:     fluticasone (FLONASE) 50 MCG/ACT nasal spray, 2 sprays into the nostril(s) as directed by provider Daily., Disp: , Rfl:     furosemide (Lasix) 20 MG tablet, Take 1 tablet by mouth Daily As Needed (For weight gain > 2-3 lbs overnight)., Disp: , Rfl:     Hydrocortisone, Perianal, (ANUSOL-HC) 2.5 % rectal cream, Insert  into the rectum As Needed for Hemorrhoids., Disp: , Rfl:     ipratropium (ATROVENT) 0.03 % nasal spray, 1 spray into the nostril(s) as directed by provider 2 (Two) Times a Day As Needed., Disp: , Rfl:     Lancets (OneTouch Delica Plus [id number]) misc, USE AS DIRECTED EVERY DAY, Disp: , Rfl:     levocetirizine (XYZAL) 5 MG tablet, Take 1 tablet by mouth Every Evening., Disp: , Rfl:     meclizine (ANTIVERT) 12.5 MG tablet, Take 1 tablet by mouth 3 (Three) Times a Day As  Needed., Disp: , Rfl:     metFORMIN (GLUCOPHAGE) 500 MG tablet, Take 1 tablet by mouth 2 (Two) Times a Day With Meals., Disp: , Rfl:     metoprolol succinate XL (Toprol XL) 100 MG 24 hr tablet, Take 1 tablet by mouth Daily., Disp: 30 tablet, Rfl: 0    montelukast (SINGULAIR) 10 MG tablet, Take 1 tablet by mouth Every Night., Disp: , Rfl:     mupirocin (BACTROBAN) 2 % ointment, Apply 1 Application topically to the appropriate area as directed 3 (Three) Times a Day., Disp: , Rfl:     nitroglycerin (NITROSTAT) 0.4 MG SL tablet, Place 1 tablet under the tongue Every 5 (Five) Minutes As Needed for Chest Pain., Disp: , Rfl:     Omega-3 Fatty Acids (fish oil) 1000 MG capsule capsule, Take  by mouth Daily With Breakfast., Disp: , Rfl:     OneTouch Verio test strip, Daily. for testing, Disp: , Rfl:     pantoprazole (PROTONIX) 40 MG EC tablet, Take 1 tablet by mouth Daily., Disp: , Rfl:     polyethyl glycol-propyl glycol (SYSTANE) 0.4-0.3 % solution ophthalmic solution (artificial tears), Every 1 (One) Hour As Needed., Disp: , Rfl:     Rhopressa 0.02 % solution, Administer 1 drop to both eyes Every Night., Disp: , Rfl:     senna 8.6 MG tablet, Take 1 tablet by mouth As Needed for Constipation., Disp: , Rfl:     simethicone (MYLICON) 80 MG chewable tablet, Chew 1 tablet Every 6 (Six) Hours As Needed for Flatulence., Disp: , Rfl:     SITagliptin (JANUVIA) 100 MG tablet, Take 1 tablet by mouth Daily., Disp: , Rfl:     spironolactone (ALDACTONE) 25 MG tablet, Take 1 tablet by mouth Daily., Disp: 30 tablet, Rfl: 0    Triamcinolone Acetonide (NASACORT) 55 MCG/ACT nasal inhaler, 2 sprays into the nostril(s) as directed by provider Daily., Disp: , Rfl:     Vibegron (Gemtesa) 75 MG tablet, Take 1 tablet by mouth Daily., Disp: 30 tablet, Rfl: 11    bisacodyl (Dulcolax) 10 MG suppository, Insert 1 suppository into the rectum As Needed for Constipation. (Patient not taking: Reported on 8/8/2024), Disp: , Rfl:     Physical Exam:       "07/25/24 1318   BP: 131/79   BP Location: Right arm   Patient Position: Sitting   Cuff Size: Adult   Pulse: 84   Resp: 16   Temp: 97.8 °F (36.6 °C)   TempSrc: Infrared   SpO2: 93%  Comment: RA   Weight: 54.9 kg (121 lb)   Height: 160 cm (63\")         Body mass index is 21.43 kg/m².  Vital Signs:   Vitals:    08/08/24 1439   BP: 121/69   BP Location: Left arm   Patient Position: Sitting   Cuff Size: Adult   Pulse: 78   Resp: 16   Temp: 97 °F (36.1 °C)   TempSrc: Infrared   SpO2: 93%  Comment: RA   Weight: 55.3 kg (122 lb)   Height: 160 cm (63\")     Body mass index is 21.61 kg/m².    Physical Exam  Vitals and nursing note reviewed.   Constitutional:       General: She is not in acute distress.  HENT:      Head: Normocephalic and atraumatic.   Eyes:      Extraocular Movements: Extraocular movements intact.   Neck:      Trachea: Trachea normal.   Cardiovascular:      Rate and Rhythm: Normal rate and regular rhythm.      Pulses: Normal pulses.      Heart sounds: Normal heart sounds. No murmur heard.     No friction rub. No gallop.   Pulmonary:      Effort: Pulmonary effort is normal.      Breath sounds: Normal breath sounds.   Musculoskeletal:      Cervical back: Neck supple.      Right lower leg: No edema.      Left lower leg: No edema.   Skin:     General: Skin is warm and dry.   Neurological:      Mental Status: She is alert and oriented to person, place, and time.   Psychiatric:         Mood and Affect: Mood normal.         Behavior: Behavior normal. Behavior is cooperative.         Thought Content: Thought content does not include suicidal ideation.         Results Review:   I reviewed the patient's new clinical results.    Procedures    Assessment / Plan:     1. 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 " Hypertension  Some improvement after introduction of spironolactone  START low-dose Entresto as part of GDMT    Preventative Cardiology:   Tobacco Cessation: N/A   Advance Care Planning: ACP discussion was declined by the patient. Patient does not have an advance directive, declines further assistance.     Follow Up:   Return in about 2 weeks (around 8/22/2024) for DSM.      Thank you for allowing me to participate in the care of your patient. Please to not hesitate to contact me with additional questions or concerns.     Teressa Garcia, APRN

## 2024-08-09 NOTE — TELEPHONE ENCOUNTER
I have contacted facility to see if they have the patient's diary. They stated a phone call will be returned when they review her chart.

## 2024-08-22 ENCOUNTER — LAB REQUISITION (OUTPATIENT)
Dept: LAB | Facility: HOSPITAL | Age: 79
End: 2024-08-22
Payer: MEDICARE

## 2024-08-22 ENCOUNTER — DISEASE STATE MANAGEMENT VISIT (OUTPATIENT)
Dept: PHARMACY | Facility: HOSPITAL | Age: 79
End: 2024-08-22
Payer: MEDICARE

## 2024-08-22 VITALS
DIASTOLIC BLOOD PRESSURE: 74 MMHG | TEMPERATURE: 97.3 F | WEIGHT: 121 LBS | BODY MASS INDEX: 21.44 KG/M2 | OXYGEN SATURATION: 96 % | SYSTOLIC BLOOD PRESSURE: 124 MMHG | HEIGHT: 63 IN | RESPIRATION RATE: 16 BRPM | HEART RATE: 79 BPM

## 2024-08-22 DIAGNOSIS — R53.1 WEAKNESS: ICD-10-CM

## 2024-08-22 DIAGNOSIS — I50.32 CHRONIC HEART FAILURE WITH PRESERVED EJECTION FRACTION: Primary | Chronic | ICD-10-CM

## 2024-08-22 DIAGNOSIS — I50.32 CHRONIC DIASTOLIC (CONGESTIVE) HEART FAILURE: ICD-10-CM

## 2024-08-22 LAB
ANION GAP SERPL CALCULATED.3IONS-SCNC: 12.8 MMOL/L (ref 5–15)
BUN SERPL-MCNC: 18 MG/DL (ref 8–23)
BUN/CREAT SERPL: 21.4 (ref 7–25)
CALCIUM SPEC-SCNC: 8.9 MG/DL (ref 8.6–10.5)
CHLORIDE SERPL-SCNC: 102 MMOL/L (ref 98–107)
CO2 SERPL-SCNC: 20.2 MMOL/L (ref 22–29)
CREAT SERPL-MCNC: 0.84 MG/DL (ref 0.57–1)
EGFRCR SERPLBLD CKD-EPI 2021: 71.7 ML/MIN/1.73
GLUCOSE SERPL-MCNC: 125 MG/DL (ref 65–99)
POTASSIUM SERPL-SCNC: 4.4 MMOL/L (ref 3.5–5.2)
SODIUM SERPL-SCNC: 135 MMOL/L (ref 136–145)

## 2024-08-22 PROCEDURE — 80048 BASIC METABOLIC PNL TOTAL CA: CPT | Performed by: NURSE PRACTITIONER

## 2024-08-22 PROCEDURE — G0463 HOSPITAL OUTPT CLINIC VISIT: HCPCS

## 2024-08-22 RX ORDER — METOPROLOL SUCCINATE 100 MG/1
150 TABLET, EXTENDED RELEASE ORAL DAILY
Start: 2024-08-22

## 2024-08-22 RX ORDER — GLIPIZIDE 5 MG/1
5 TABLET ORAL DAILY
COMMUNITY

## 2024-08-22 NOTE — PATIENT INSTRUCTIONS
AS WE DISCUSSED, START ENTRESTO TWICE A DAY.    INCREASE METOPROLOL TO 1.5 TABLETS A DAY IN THE MORNING    START TAKING YOUR BLOOD PRESSURE ABOUT 1 HOUR AFTER YOUR MORNING MEDICATIONS.  RECORD THIS ON YOUR BLOOD PRESSURE LOG. INCLUDE YOUR HEART RATE.  BRING THIS BACK TO YOUR FOLLOW-UP APPOINTMENT.

## 2024-08-22 NOTE — PROGRESS NOTES
Ambulatory Care Clinic  Heart Failure Pharmacist Note     Patient Name: Aster Craft  :[id number]  Age: 79 y.o.  Sex: female  Referring Provider: Kerley, Brian Joseph, DO   Primary Cardiologist: John Howard  Encounter Provider:  MAIKEL Branch    HPI: Patient presents for follow up visit in heart failure clinic for CHF (LVmEF=46%) Echo completed on 24 during hospital admission. PMH significant for AFib, CHF, T2DM, HTN and Hx of Breast cancer. Since last Psychiatric HF clinic visit on  patient was admitted to hospital on -. She received IV Lasix. She was started on ASA metformin and atorvastatin at discharge. Patient was given 5 days of oral lasix 20mg daily which has been continued by Dr. Perkins. Patient was discharged to [facility].    She denies chest pain, dizziness, palpitations, lightheadedness, abdominal or lower extremity swelling. She takes HR at home and it has recently been consistent in the 70s. She was previously given Entresto 24-26 mg to start on 24 but did not start due to COVID infection. She has recently felt tired and says that she is having kidney problems. She will be receiving heart monitor today.     Heart Failure GDMT:    Drug Class   Drug   Dose Last Dose Adjustment   Additional Titration   Notes   ACEi/ARB/ARNI Entresto 24-26 mg Initiated 24  Rx sent  but now being started on    Beta Blocker Metoprolol Succinate 150mg 24     MRA Spironolactone 25mg 24     SGLT2i Discontinue Jardiance  24 N/A Hx of UTI     Other CV Meds:  Lasix 20mg daily  Atorvastatin 80 mg  Ezetimibe 10 mg  Lasix 20 mg PRN  Aspirin 81 mg    Medication Use:  Adherence:   Past hx of medication use/intolerance:  Affordability:      Diet:  Breakfast:  Lunch:  Dinner:  Snacks/Desserts:  Drinks:      Social Determinants:    Social Determinants of Health     Tobacco Use: Low Risk  (2024)    Patient History     Smoking Tobacco Use: Never     Smokeless  Tobacco Use: Never     Passive Exposure: Never   Recent Concern: Tobacco Use - Medium Risk (6/4/2024)    Received from [facility]    Patient History     Smoking Tobacco Use: Former     Smokeless Tobacco Use: Never     Passive Exposure: Not on file   Alcohol Use: Not At Risk (7/13/2024)    AUDIT-C     Frequency of Alcohol Consumption: Never     Average Number of Drinks: Patient does not drink     Frequency of Binge Drinking: Never   Financial Resource Strain: Low Risk  (7/14/2024)    Overall Financial Resource Strain (CARDIA)     Difficulty of Paying Living Expenses: Not hard at all   Food Insecurity: No Food Insecurity (7/14/2024)    Hunger Vital Sign     Worried About Running Out of Food in the Last Year: Never true     Ran Out of Food in the Last Year: Never true   Transportation Needs: No Transportation Needs (7/14/2024)    PRAPARE - Transportation     Lack of Transportation (Medical): No     Lack of Transportation (Non-Medical): No   Physical Activity: Inactive (7/14/2024)    Exercise Vital Sign     Days of Exercise per Week: 0 days     Minutes of Exercise per Session: 0 min   Stress: No Stress Concern Present (7/14/2024)    Citizen of [place] of Occupational Health - Occupational Stress Questionnaire     Feeling of Stress : Only a little   Social Connections: Not At Risk (7/14/2024)    Family and Community Support     Help with Day-to-Day Activities: I don't need any help     Lonely or Isolated: Never   Interpersonal Safety: Not At Risk (7/13/2024)    Abuse Screen     Unsafe at Home or Work/School: no     Feels Threatened by Someone?: no     Does Anyone Keep You from Contacting Others or Doint Things Outside the Home?: no     Physical Sign of Abuse Present: no   Depression: Not at risk (7/14/2024)    PHQ-2     PHQ-2 Score: 0   Housing Stability: Not At Risk (7/14/2024)    Housing Stability     Current Living Arrangements: home     Potentially Unsafe Housing Conditions: none   Utilities: At Risk (7/14/2024)     "[facility] Utilities     Threatened with loss of utilities: Yes   Health Literacy: Not At Risk (7/14/2024)    Education     Help with school or training?: No     Preferred Language: English   Employment: Not At Risk (7/14/2024)    Employment     Do you want help finding or keeping work or a job?: I do not need or want help   Disabilities: Not At Risk (7/13/2024)    Disabilities     Concentrating, Remembering, or Making Decisions Difficulty: no     Doing Errands Independently Difficulty: no       Immunization Status:  Pneumococcal: no  Influenza: no   COVID-19: Yes    OBJECTIVE:  /74 (BP Location: Right arm, Patient Position: Sitting, Cuff Size: Adult)   Pulse 79   Temp 97.3 °F (36.3 °C) (Infrared)   Resp 16   Ht 160 cm (63\")   Wt 54.9 kg (121 lb)   SpO2 96% Comment: RA  BMI 21.43 kg/m²     Body mass index is 21.43 kg/m².  Wt Readings from Last 1 Encounters:   08/22/24 54.9 kg (121 lb)       Home BP Readings:   10-yr ASCVD risk: The ASCVD Risk score (Sayra MARTINES, et al., 2019) failed to calculate for the following reasons:    Cannot find a previous HDL lab    Cannot find a previous total cholesterol lab    Lab Review:  Renal Function: Estimated Creatinine Clearance: 61.9 mL/min (by C-G formula based on SCr of 0.66 mg/dL).    Lab Results   Component Value Date    PROBNP 95.5 07/13/2024       Results for orders placed during the hospital encounter of 06/29/24    Adult Transthoracic Echo Complete W/ Cont if Necessary Per Protocol    Interpretation Summary    Left ventricular systolic function is low normal. Calculated left ventricular 3D EF = 46% Left ventricular ejection fraction appears to be 46 - 50%.    The left ventricular cavity is borderline dilated.    Left ventricular diastolic function is consistent with (grade I) impaired relaxation.      6 MINUTE WALK                      ASSESSMENT/PLAN:  HFmEF (EF = 46%)  Increase Metoprolol XL to 150 mg QD  Continue Spironolactone 25mg daily  Begin Entresto 24-26 mg " today  Follow-up appt in 2 weeks for Entresto follow-up and BMP  Continue lasix PRN for 2-3lb weight gain in 24 hours or 5lb in one week.  Advised patient to call clinic with 2-3 lb weight gain in 24 hrs or >5 lb weight gain in 1 week  Patient will continue blood pressure, HR, and daily weight log at [facility] and bring to her appointment to proceed with further GDMT.      Alejandro Harding, Pharmacy Intern  [facility]  08/22/24  10:21 EDT

## 2024-08-22 NOTE — PROGRESS NOTES
"             [facility] Heart Failure Clinic Follow Up Note    Aster Craft  [id number]  2024    Primary Care Provider: Yolie Cotto MD   Referring Provider: Kerley, Brian Joseph, DO    Chief Complaint: Follow-up CHF    History of Present Illness:   Mrs. Aster Craft is a 79 y.o. female who presents to the HF Clinic for follow up.   The patient has a past medical history of     Past Cardiac Testin. Last Coronary Angio: 7/15/2024  Angiographically \"near-normal\" coronary arteries   2. Prior Stress Testing: None  3. Last Echo: 2024    Left ventricular systolic function is low normal. Calculated left ventricular 3D EF = 46% Left ventricular ejection fraction appears to be 46 - 50%.    The left ventricular cavity is borderline dilated.    Left ventricular diastolic function is consistent with (grade I) impaired relaxation.  4. Prior Holter Monitor: PENDING    Review of Systems:   Review of Systems  As Noted in HPI.   I have reviewed and confirmed the accuracy of the ROS as documented by the MA/LPN/RN MAIKEL Branch    I have reviewed and/or updated the patient's past medical, past surgical, family, social history, problem list and allergies as appropriate.     Medications:     Current Outpatient Medications:     albuterol sulfate  (90 Base) MCG/ACT inhaler, 2 puffs Every 6 (Six) Hours As Needed., Disp: , Rfl:     aspirin 81 MG EC tablet, Take 1 tablet by mouth Daily., Disp: , Rfl:     atorvastatin (LIPITOR) 80 MG tablet, Take 1 tablet by mouth Every Night for 90 days., Disp: , Rfl:     azelastine (ASTELIN) 0.1 % nasal spray, 1 spray into the nostril(s) as directed by provider 2 (Two) Times a Day., Disp: , Rfl:     Blood Glucose Monitoring Suppl (OneTouch Verio Flex System) w/Device kit, See Admin Instructions. for testing, Disp: , Rfl:     brimonidine-timolol (COMBIGAN) 0.2-0.5 % ophthalmic solution, Every 12 (Twelve) Hours., Disp: , Rfl:     cholecalciferol " (VITAMIN D3) 25 MCG (1000 UT) tablet, Take 1 tablet by mouth Daily., Disp: , Rfl:     Continuous Blood Gluc Sensor (FreeStyle Malina 2 Sensor) misc, CHANGE SENSOR EVERY 14 DAYS, Disp: , Rfl:     ezetimibe (ZETIA) 10 MG tablet, Take 1 tablet by mouth Daily., Disp: , Rfl:     Flovent  MCG/ACT inhaler, Inhale 2 puffs 2 (Two) Times a Day As Needed., Disp: , Rfl:     fluticasone (FLONASE) 50 MCG/ACT nasal spray, 2 sprays into the nostril(s) as directed by provider Daily., Disp: , Rfl:     furosemide (Lasix) 20 MG tablet, Take 1 tablet by mouth Daily As Needed (For weight gain > 2-3 lbs overnight)., Disp: , Rfl:     glipizide (GLUCOTROL) 5 MG tablet, Take 1 tablet by mouth Daily., Disp: , Rfl:     ipratropium (ATROVENT) 0.03 % nasal spray, 1 spray into the nostril(s) as directed by provider 2 (Two) Times a Day As Needed., Disp: , Rfl:     Lancets (OneTouch Delica Plus [id number]) misc, USE AS DIRECTED EVERY DAY, Disp: , Rfl:     meclizine (ANTIVERT) 12.5 MG tablet, Take 1 tablet by mouth 3 (Three) Times a Day As Needed., Disp: , Rfl:     metFORMIN (GLUCOPHAGE) 500 MG tablet, Take 1 tablet by mouth 2 (Two) Times a Day With Meals., Disp: , Rfl:     metoprolol succinate XL (Toprol XL) 100 MG 24 hr tablet, Take 1 tablet by mouth Daily., Disp: 30 tablet, Rfl: 0    mupirocin (BACTROBAN) 2 % ointment, Apply 1 Application topically to the appropriate area as directed 3 (Three) Times a Day., Disp: , Rfl:     nitroglycerin (NITROSTAT) 0.4 MG SL tablet, Place 1 tablet under the tongue Every 5 (Five) Minutes As Needed for Chest Pain., Disp: , Rfl:     OneTouch Verio test strip, Daily. for testing, Disp: , Rfl:     pantoprazole (PROTONIX) 40 MG EC tablet, Take 1 tablet by mouth Daily., Disp: , Rfl:     polyethyl glycol-propyl glycol (SYSTANE) 0.4-0.3 % solution ophthalmic solution (artificial tears), Every 1 (One) Hour As Needed., Disp: , Rfl:     Rhopressa 0.02 % solution, Administer 1 drop to both eyes Every Night., Disp: ,  Rfl:     senna 8.6 MG tablet, Take 1 tablet by mouth As Needed for Constipation., Disp: , Rfl:     simethicone (MYLICON) 80 MG chewable tablet, Chew 1 tablet Every 6 (Six) Hours As Needed for Flatulence., Disp: , Rfl:     spironolactone (ALDACTONE) 25 MG tablet, Take 1 tablet by mouth Daily., Disp: 30 tablet, Rfl: 0    Triamcinolone Acetonide (NASACORT) 55 MCG/ACT nasal inhaler, 2 sprays into the nostril(s) as directed by provider Daily., Disp: , Rfl:     Vibegron (Gemtesa) 75 MG tablet, Take 1 tablet by mouth Daily., Disp: 30 tablet, Rfl: 11    bisacodyl (Dulcolax) 10 MG suppository, Insert 1 suppository into the rectum As Needed for Constipation. (Patient not taking: Reported on 8/8/2024), Disp: , Rfl:     Calcium Carb-Cholecalciferol (CALCIUM 500 + D PO), Take  by mouth. (Patient not taking: Reported on 8/22/2024), Disp: , Rfl:     dicyclomine (BENTYL) 10 MG capsule, Take 1 capsule by mouth As Needed for Abdominal Cramping. (Patient not taking: Reported on 8/22/2024), Disp: , Rfl:     Hydrocortisone, Perianal, (ANUSOL-HC) 2.5 % rectal cream, Insert  into the rectum As Needed for Hemorrhoids. (Patient not taking: Reported on 8/22/2024), Disp: , Rfl:     levocetirizine (XYZAL) 5 MG tablet, Take 1 tablet by mouth Every Evening. (Patient not taking: Reported on 8/22/2024), Disp: , Rfl:     montelukast (SINGULAIR) 10 MG tablet, Take 1 tablet by mouth Every Night. (Patient not taking: Reported on 8/22/2024), Disp: , Rfl:     Omega-3 Fatty Acids (fish oil) 1000 MG capsule capsule, Take  by mouth Daily With Breakfast. (Patient not taking: Reported on 8/22/2024), Disp: , Rfl:     sacubitril-valsartan (ENTRESTO) 24-26 MG tablet, Take 1 tablet by mouth 2 (Two) Times a Day. (Patient not taking: Reported on 8/22/2024), Disp: 60 tablet, Rfl: 0    SITagliptin (JANUVIA) 100 MG tablet, Take 1 tablet by mouth Daily. (Patient not taking: Reported on 8/22/2024), Disp: , Rfl:     Physical Exam:      07/25/24 1318    BP: 131/79   BP  "Location: Right arm   Patient Position: Sitting   Cuff Size: Adult   Pulse: 84   Resp: 16   Temp: 97.8 °F (36.6 °C)   TempSrc: Infrared   SpO2: 93%  Comment: RA   Weight: 54.9 kg (121 lb)   Height: 160 cm (63\")         Body mass index is 21.43 kg/m².  Vital Signs:   Vitals       Vitals:     08/08/24 1439   BP: 121/69   BP Location: Left arm   Patient Position: Sitting   Cuff Size: Adult   Pulse: 78   Resp: 16   Temp: 97 °F (36.1 °C)   TempSrc: Infrared   SpO2: 93%  Comment: RA   Weight: 55.3 kg (122 lb)   Height: 160 cm (63\")         Body mass index is 21.61 kg/m².    Vital Signs:   Vitals:    08/22/24 1451   BP: 124/74   BP Location: Right arm   Patient Position: Sitting   Cuff Size: Adult   Pulse: 79   Resp: 16   Temp: 97.3 °F (36.3 °C)   TempSrc: Infrared   SpO2: 96%  Comment: RA   Weight: 54.9 kg (121 lb)   Height: 160 cm (63\")     Body mass index is 21.43 kg/m².    Physical Exam    Results Review:   {Results Review:08968::\"I reviewed the patient's new clinical results.\"}    Procedures    Assessment / Plan:   Diagnoses and all orders for this visit:    1. Weakness (Primary)  -     Holter Monitor - 72 Hour Up To 15 Days; Future    2. Chronic heart failure with preserved ejection fraction  -     Holter Monitor - 72 Hour Up To 15 Days; Future        1. 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 Hypertension  Some improvement after introduction of spironolactone  START low-dose Entresto as part of GDMT    Preventative Cardiology:   Tobacco Cessation: {BSCTobacco :00038}   Advance Care Planning: {Advance Directive Status:76652}     Follow Up:   No follow-ups on file.      Thank you for allowing me to participate in the care of your patient. Please to not hesitate to contact me with additional questions or " concerns.     Teressa Garcia, APRN     Planning: ACP discussion was declined by the patient. Patient has an advance directive in EMR which is still valid.      Follow Up:   Return in about 2 weeks (around 9/5/2024) for DSM.      Thank you for allowing me to participate in the care of your patient. Please to not hesitate to contact me with additional questions or concerns.     MAIKEL Jennings

## 2024-08-26 ENCOUNTER — TELEPHONE (OUTPATIENT)
Dept: CARDIOLOGY | Facility: CLINIC | Age: 79
End: 2024-08-26
Payer: MEDICARE

## 2024-08-26 NOTE — TELEPHONE ENCOUNTER
This patient has been seen in the HF Clinic, but canceled her recent appt with cardiologist.  Please make her a follow-up appt for after her cardiac monitor study.  She needs to establish care.  I just saw a history of afib on an office note from .  Please call her to schedule this.

## 2024-09-03 ENCOUNTER — TELEPHONE (OUTPATIENT)
Dept: SURGERY | Facility: CLINIC | Age: 79
End: 2024-09-03

## 2024-09-05 ENCOUNTER — TELEPHONE (OUTPATIENT)
Dept: CARDIOLOGY | Facility: CLINIC | Age: 79
End: 2024-09-05
Payer: MEDICARE

## 2024-09-05 ENCOUNTER — DISEASE STATE MANAGEMENT VISIT (OUTPATIENT)
Dept: PHARMACY | Facility: HOSPITAL | Age: 79
End: 2024-09-05
Payer: MEDICARE

## 2024-09-05 VITALS
WEIGHT: 124 LBS | HEIGHT: 63 IN | HEART RATE: 77 BPM | SYSTOLIC BLOOD PRESSURE: 112 MMHG | OXYGEN SATURATION: 93 % | DIASTOLIC BLOOD PRESSURE: 66 MMHG | RESPIRATION RATE: 16 BRPM | TEMPERATURE: 97.8 F | BODY MASS INDEX: 21.97 KG/M2

## 2024-09-05 NOTE — TELEPHONE ENCOUNTER
Please change this patient's appointment next week to be with a cardiologist.  She has only seen me in the HF Clinic and only saw Dr. Morrow one time when she was admitted.  Thanks.

## 2024-09-05 NOTE — PROGRESS NOTES
[facility] Heart Failure Clinic Follow Up Note    Aster Craft  [id number]  09/05/2024    Primary Care Provider: Yolie Cotto MD   Referring Provider: Kerley, Brian Joseph, DO    Chief Complaint: Follow-up CHF    History of Present Illness:   Mrs. Aster Craft is a 79 y.o. female who presents to the HF Clinic for follow up.  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  The patient reports having one low blood pressure (90/60) within the past week or so.  This was concerning to her so she stopped Entresto and the uptitrated dose of metoprolol.  She reports her SBP this morning was 100s/70s.  She denies chest pain, dyspnea, palpitations, dizziness, and lower extremity edema.  She reports feeling better after having covid, but reports cough productive of clear to white sputum.  She reports using 3 different nasal sprays and an allergy pill.  She offers no other complaints or concerns at this time.    Past Cardiac Testing:     Review of Systems:   Review of Systems  As Noted in HPI.   I have reviewed and confirmed the accuracy of the ROS as documented by the MA/LPN/RN MAIKEL Branch    I have reviewed and/or updated the patient's past medical, past surgical, family, social history, problem list and allergies as appropriate.     Medications:     Current Outpatient Medications:     albuterol sulfate  (90 Base) MCG/ACT inhaler, 2 puffs Every 6 (Six) Hours As Needed., Disp: , Rfl:     aspirin 81 MG EC tablet, Take 1 tablet by mouth Daily., Disp: , Rfl:     atorvastatin (LIPITOR) 80 MG tablet, Take 1 tablet by mouth Every Night for 90 days., Disp: , Rfl:     azelastine (ASTELIN) 0.1 % nasal spray, 1 spray into the nostril(s) as directed by provider 2 (Two) Times a Day., Disp: , Rfl:     Blood Glucose Monitoring Suppl  ([employer] Verio Flex System) w/Device kit, See Admin Instructions. for testing, Disp: , Rfl:     brimonidine-timolol (COMBIGAN) 0.2-0.5 % ophthalmic solution, Every 12 (Twelve) Hours., Disp: , Rfl:     cholecalciferol (VITAMIN D3) 25 MCG (1000 UT) tablet, Take 1 tablet by mouth Daily., Disp: , Rfl:     Continuous Blood Gluc Sensor (FreeStyle Malina 2 Sensor) misc, CHANGE SENSOR EVERY 14 DAYS, Disp: , Rfl:     ezetimibe (ZETIA) 10 MG tablet, Take 1 tablet by mouth Daily., Disp: , Rfl:     Flovent  MCG/ACT inhaler, Inhale 2 puffs 2 (Two) Times a Day As Needed., Disp: , Rfl:     fluticasone (FLONASE) 50 MCG/ACT nasal spray, 2 sprays into the nostril(s) as directed by provider Daily., Disp: , Rfl:     furosemide (Lasix) 20 MG tablet, Take 1 tablet by mouth Daily As Needed (For weight gain > 2-3 lbs overnight)., Disp: , Rfl:     glipizide (GLUCOTROL) 5 MG tablet, Take 1 tablet by mouth Daily., Disp: , Rfl:     ipratropium (ATROVENT) 0.03 % nasal spray, 1 spray into the nostril(s) as directed by provider 2 (Two) Times a Day As Needed., Disp: , Rfl:     Lancets (OneTouch Delica Plus [id number]) misc, USE AS DIRECTED EVERY DAY, Disp: , Rfl:     meclizine (ANTIVERT) 12.5 MG tablet, Take 1 tablet by mouth 3 (Three) Times a Day As Needed., Disp: , Rfl:     metFORMIN (GLUCOPHAGE) 500 MG tablet, Take 1 tablet by mouth 2 (Two) Times a Day With Meals., Disp: , Rfl:     metoprolol succinate XL (Toprol XL) 100 MG 24 hr tablet, Take 1.5 tablets by mouth Daily., Disp: , Rfl:     mupirocin (BACTROBAN) 2 % ointment, Apply 1 Application topically to the appropriate area as directed 3 (Three) Times a Day., Disp: , Rfl:     nitroglycerin (NITROSTAT) 0.4 MG SL tablet, Place 1 tablet under the tongue Every 5 (Five) Minutes As Needed for Chest Pain., Disp: , Rfl:     OneTouch Verio test strip, Daily. for testing, Disp: , Rfl:     pantoprazole (PROTONIX) 40 MG EC tablet, Take 1 tablet by mouth Daily., Disp: , Rfl:     polyethyl  "glycol-propyl glycol (SYSTANE) 0.4-0.3 % solution ophthalmic solution (artificial tears), Every 1 (One) Hour As Needed., Disp: , Rfl:     Rhopressa 0.02 % solution, Administer 1 drop to both eyes Every Night., Disp: , Rfl:     sacubitril-valsartan (ENTRESTO) 24-26 MG tablet, Take 1 tablet by mouth 2 (Two) Times a Day., Disp: , Rfl:     senna 8.6 MG tablet, Take 1 tablet by mouth As Needed for Constipation., Disp: , Rfl:     simethicone (MYLICON) 80 MG chewable tablet, Chew 1 tablet Every 6 (Six) Hours As Needed for Flatulence., Disp: , Rfl:     spironolactone (ALDACTONE) 25 MG tablet, Take 1 tablet by mouth Daily., Disp: 30 tablet, Rfl: 0    Triamcinolone Acetonide (NASACORT) 55 MCG/ACT nasal inhaler, 2 sprays into the nostril(s) as directed by provider Daily., Disp: , Rfl:     Vibegron (Gemtesa) 75 MG tablet, Take 1 tablet by mouth Daily., Disp: 30 tablet, Rfl: 11    Physical Exam:  Vital Signs:   Vitals:    09/05/24 1444   BP: 112/66   BP Location: Right arm   Patient Position: Sitting   Cuff Size: Adult   Pulse: 77   Resp: 16   Temp: 97.8 °F (36.6 °C)   TempSrc: Infrared   SpO2: 93%  Comment: RA   Weight: 56.2 kg (124 lb)   Height: 160 cm (63\")     Body mass index is 21.97 kg/m².    Physical Exam  Vitals and nursing note reviewed.   Constitutional:       General: She is not in acute distress.  HENT:      Head: Normocephalic and atraumatic.   Neck:      Trachea: Trachea normal.   Cardiovascular:      Rate and Rhythm: Normal rate and regular rhythm.      Pulses: Normal pulses.      Heart sounds: Normal heart sounds. No murmur heard.     No friction rub. No gallop.   Pulmonary:      Effort: Pulmonary effort is normal.      Breath sounds: Rhonchi present.   Musculoskeletal:      Cervical back: Neck supple.      Right lower leg: No edema.      Left lower leg: No edema.   Skin:     General: Skin is warm and dry.   Neurological:      Mental Status: She is alert and oriented to person, place, and time.   Psychiatric:    "      Mood and Affect: Mood normal.         Behavior: Behavior normal. Behavior is cooperative.         Thought Content: Thought content does not include suicidal ideation.         Results Review:   I reviewed the patient's new clinical results.    Procedures        1. Heart failure with mildly reduced ejection fraction (HFmrEF) (Primary)  7/24, LVEF 46 - 50%.    Stage C  NYHA functional class II  Euvolemic on exam  No SGLT2i secondary to UTI's  Patient will keep BP log at home  No change to GDMT today     2. Primary hypertension  Acceptable blood pressure today      Patient Instructions   KEEP A BLOOD PRESSURE LOG (ABOUT ONE HOUR AFTER MORNING MEDICATION) AND BRING THIS TO YOU WITH YOU TO FOLLOW-UP APPOINTMENT.     Preventative Cardiology:   Tobacco Cessation: N/A   Advance Care Planning: ACP discussion was declined by the patient. Patient has an advance directive in EMR which is still valid.      Follow Up:   Return in about 1 week (around 9/12/2024).      Thank you for allowing me to participate in the care of your patient. Please to not hesitate to contact me with additional questions or concerns.     MAIKEL Jennings

## 2024-09-05 NOTE — PATIENT INSTRUCTIONS
KEEP A BLOOD PRESSURE LOG (ABOUT ONE HOUR AFTER MORNING MEDICATION) AND BRING THIS TO YOU WITH YOU TO FOLLOW-UP APPOINTMENT.

## 2024-09-05 NOTE — PROGRESS NOTES
Ambulatory Care Clinic  Heart Failure Pharmacist Note     Patient Name: Aster Craft  :[id number]  Age: 79 y.o.  Sex: female  Referring Provider: Kerley, Brian Joseph, DO   Primary Cardiologist: John Howard  Encounter Provider:  MAIKEL Branch    HPI: Patient presents for follow up visit in heart failure clinic for CHF (LVmEF=46%) Echo completed on 24 during hospital admission. PMH significant for AFib, CHF, T2DM, HTN and Hx of Breast cancer.     She denies chest pain, dizziness, palpitations, lightheadedness, abdominal or lower extremity swelling. She takes her BP and HR at home, and was encouraged to bring her log to next appointment. She has not been taking Entresto due to previous Covid infection and bouts of hypotension. Entresto has been discontinued for now and will be reassessed in the future if necessary. She did not increase her metoprolol to 150 mg since last visit due to concerns of it contributing to hypotension, so she has been taking the previously established dose of 100mg. The mechanism of metoprolol was explained and she expressed understanding that it would not cause low blood pressure in the future if we increase it again. She was agreeable to increasing the dose to help control her HR.     Heart Failure GDMT:    Drug Class   Drug   Dose Last Dose Adjustment   Additional Titration   Notes   ACEi/ARB/ARNI  D/c for now due to hypotension   Beta Blocker Metoprolol Succinate 150mg 24     MRA Spironolactone 25mg 24     SGLT2i Discontinue Jardiance  24 N/A Hx of UTI     Other CV Meds:  Lasix 20mg PRN  Atorvastatin 80 mg  Ezetimibe 10 mg  Aspirin 81 mg    Medication Use:  Adherence:   Past hx of medication use/intolerance:  Affordability:      Diet:  Breakfast:  Lunch:  Dinner:  Snacks/Desserts:  Drinks:      Social Determinants:    Social Determinants of Health     Tobacco Use: Low Risk  (2024)    Patient History     Smoking Tobacco Use: Never     Smokeless  Tobacco Use: Never     Passive Exposure: Never   Recent Concern: Tobacco Use - Medium Risk (6/4/2024)    Received from [facility]    Patient History     Smoking Tobacco Use: Former     Smokeless Tobacco Use: Never     Passive Exposure: Not on file   Alcohol Use: Not At Risk (7/13/2024)    AUDIT-C     Frequency of Alcohol Consumption: Never     Average Number of Drinks: Patient does not drink     Frequency of Binge Drinking: Never   Financial Resource Strain: Low Risk  (7/14/2024)    Overall Financial Resource Strain (CARDIA)     Difficulty of Paying Living Expenses: Not hard at all   Food Insecurity: No Food Insecurity (7/14/2024)    Hunger Vital Sign     Worried About Running Out of Food in the Last Year: Never true     Ran Out of Food in the Last Year: Never true   Transportation Needs: No Transportation Needs (7/14/2024)    PRAPARE - Transportation     Lack of Transportation (Medical): No     Lack of Transportation (Non-Medical): No   Physical Activity: Inactive (7/14/2024)    Exercise Vital Sign     Days of Exercise per Week: 0 days     Minutes of Exercise per Session: 0 min   Stress: No Stress Concern Present (7/14/2024)    [employer] - Occupational Stress Questionnaire     Feeling of Stress : Only a little   Social Connections: Not At Risk (7/14/2024)    Family and Community Support     Help with Day-to-Day Activities: I don't need any help     Lonely or Isolated: Never   Interpersonal Safety: Not At Risk (7/13/2024)    Abuse Screen     Unsafe at Home or Work/School: no     Feels Threatened by Someone?: no     Does Anyone Keep You from Contacting Others or Doint Things Outside the Home?: no     Physical Sign of Abuse Present: no   Depression: Not at risk (7/14/2024)    PHQ-2     PHQ-2 Score: 0   Housing Stability: Not At Risk (7/14/2024)    Housing Stability     Current Living Arrangements: home     Potentially Unsafe Housing Conditions: none   Utilities: At Risk (7/14/2024)     "[facility] Utilities     Threatened with loss of utilities: Yes   Health Literacy: Not At Risk (7/14/2024)    Education     Help with school or training?: No     Preferred Language: English   Employment: Not At Risk (7/14/2024)    Employment     Do you want help finding or keeping work or a job?: I do not need or want help   Disabilities: Not At Risk (7/13/2024)    Disabilities     Concentrating, Remembering, or Making Decisions Difficulty: no     Doing Errands Independently Difficulty: no       Immunization Status:  Pneumococcal: no  Influenza: no   COVID-19: Yes    OBJECTIVE:  /66 (BP Location: Right arm, Patient Position: Sitting, Cuff Size: Adult)   Pulse 77   Temp 97.8 °F (36.6 °C) (Infrared)   Resp 16   Ht 160 cm (63\")   Wt 56.2 kg (124 lb)   SpO2 93% Comment: RA  BMI 21.97 kg/m²     Body mass index is 21.97 kg/m².  Wt Readings from Last 1 Encounters:   09/05/24 56.2 kg (124 lb)       Home BP Readings:   10-yr ASCVD risk: The ASCVD Risk score (Sayra MARTINES, et al., 2019) failed to calculate for the following reasons:    Cannot find a previous HDL lab    Cannot find a previous total cholesterol lab    Lab Review:  Renal Function: Estimated Creatinine Clearance: 49.8 mL/min (by C-G formula based on SCr of 0.84 mg/dL).    Lab Results   Component Value Date    PROBNP 95.5 07/13/2024       Results for orders placed during the hospital encounter of 06/29/24    Adult Transthoracic Echo Complete W/ Cont if Necessary Per Protocol    Interpretation Summary    Left ventricular systolic function is low normal. Calculated left ventricular 3D EF = 46% Left ventricular ejection fraction appears to be 46 - 50%.    The left ventricular cavity is borderline dilated.    Left ventricular diastolic function is consistent with (grade I) impaired relaxation.      6 MINUTE WALK                      ASSESSMENT/PLAN:  HFmEF (EF = 46%)  Increase Metoprolol XL to 150 mg QD  Continue Spironolactone 25mg daily  Discontinue Entresto -- " will reassess in the future  Follow-up appt in 2 weeks   Continue lasix PRN for 2-3lb weight gain in 24 hours or 5lb in one week.  Advised patient to call clinic with 2-3 lb weight gain in 24 hrs or >5 lb weight gain in 1 week  Patient will continue blood pressure, HR, and daily weight log at [facility] and bring to her appointment to proceed with further GDMT.      Alejandro Harding, Pharmacy Intern  [facility]  09/05/24  10:21 EDT

## 2024-09-10 NOTE — PROGRESS NOTES
[facility] Cardiology Office Follow Up Note    Aster Craft  [id number]  2024    Primary Care Provider: Yolie Cotto MD    Chief Complaint   Patient presents with    Follow-up    Chest Pain     Subjective     History of Present Illness:   Aster Craft is a 79 y.o. female with heart failure with mildly reduced ejection fraction, nonischemic cardiomyopathy, coronary atherosclerosis, reported history of atrial fibrillation, hypertension, and history of breast cancer status post bilateral mastectomy who presents to the Cardiology Clinic for follow up of a holter monitor.  She has been seen by Teressa Garcia multiple times in the heart failure clinic and was initially seen by Dr. Morrow in the hospital in 2024, at which time she complained of chest pain and had a new diagnosis of heart failure.  She underwent coronary angiography that showed near normal coronaries.  Her medical therapy for heart failure has been optimized over the past month and she now presents for regular follow-up with a cardiologist for her heart failure.  She actually has no acute complaints.  She has just finished wearing a heart monitor because she was apparently having some palpitations and does carry history of atrial fibrillation but has no history of stroke.  Says that her weight has been stable.  Around 120, ±1 to 2 pounds.  Blood pressures have been running within normal range and heart rates are between 70 and 80 bpm.  Overall, she says she feels good and has no chest pain, dyspnea, orthopnea, or leg swelling.  Uses Lasix only as needed.    Past Cardiac Testin. Last Coronary Angio: 7/15/24  Angiographically near normal coronary arteries  Noncardiac chest pain    2. Last Echo: 24    Left ventricular systolic function is low normal. Calculated left ventricular 3D EF = 46% Left ventricular ejection fraction appears to be 46 - 50%.    The left ventricular cavity is borderline dilated.    Left  ventricular diastolic function is consistent with (grade I) impaired relaxation.    3. Prior Stress Testing: NA    4. Prior Holter Monitor: 8/22/24  No sustained supraventricular or ventricular arrhythmia  10 brief episodes of nonsustained SVT.  Longest lasting for 8 beats.  Asymptomatic.    Review of Systems:  Review of Systems   Constitutional: Negative.    Respiratory: Negative.     Cardiovascular: Negative.    Gastrointestinal: Negative.    Musculoskeletal: Negative.    Neurological: Negative.        I have reviewed and/or updated the patient's past medical, past surgical, family, social history, problem list and allergies as appropriate.       Current Outpatient Medications:     albuterol sulfate  (90 Base) MCG/ACT inhaler, 2 puffs Every 6 (Six) Hours As Needed., Disp: , Rfl:     aspirin 81 MG EC tablet, Take 1 tablet by mouth Daily., Disp: , Rfl:     atorvastatin (LIPITOR) 80 MG tablet, Take 0.5 tablets by mouth Every Night for 90 days., Disp: , Rfl:     azelastine (ASTELIN) 0.1 % nasal spray, 1 spray into the nostril(s) as directed by provider 2 (Two) Times a Day., Disp: , Rfl:     Blood Glucose Monitoring Suppl (OneTouch Verio Flex System) w/Device kit, See Admin Instructions. for testing, Disp: , Rfl:     brimonidine-timolol (COMBIGAN) 0.2-0.5 % ophthalmic solution, Every 12 (Twelve) Hours., Disp: , Rfl:     Continuous Blood Gluc Sensor (FreeStyle Malina 2 Sensor) misc, CHANGE SENSOR EVERY 14 DAYS, Disp: , Rfl:     ezetimibe (ZETIA) 10 MG tablet, Take 1 tablet by mouth Daily., Disp: , Rfl:     furosemide (Lasix) 20 MG tablet, Take 1 tablet by mouth Daily As Needed (For weight gain > 2-3 lbs overnight)., Disp: , Rfl:     glipizide (GLUCOTROL) 5 MG tablet, Take 1 tablet by mouth Daily., Disp: , Rfl:     Lancets (OneTouch Delica Plus [id number]) misc, USE AS DIRECTED EVERY DAY, Disp: , Rfl:     meclizine (ANTIVERT) 12.5 MG tablet, Take 1 tablet by mouth 3 (Three) Times a Day As Needed., Disp: , Rfl:      metFORMIN (GLUCOPHAGE) 500 MG tablet, Take 1 tablet by mouth 2 (Two) Times a Day With Meals., Disp: , Rfl:     metoprolol succinate XL (Toprol XL) 100 MG 24 hr tablet, Take 1.5 tablets by mouth Daily., Disp: , Rfl:     nitrofurantoin, macrocrystal-monohydrate, (MACROBID) 100 MG capsule, Take 1 capsule by mouth 2 (Two) Times a Day., Disp: , Rfl:     nitroglycerin (NITROSTAT) 0.4 MG SL tablet, Place 1 tablet under the tongue Every 5 (Five) Minutes As Needed for Chest Pain., Disp: , Rfl:     OneTouch Verio test strip, Daily. for testing, Disp: , Rfl:     pantoprazole (PROTONIX) 40 MG EC tablet, Take 1 tablet by mouth Daily., Disp: , Rfl:     polyethyl glycol-propyl glycol (SYSTANE) 0.4-0.3 % solution ophthalmic solution (artificial tears), Every 1 (One) Hour As Needed., Disp: , Rfl:     Rhopressa 0.02 % solution, Administer 1 drop to both eyes Every Night., Disp: , Rfl:     senna 8.6 MG tablet, Take 1 tablet by mouth As Needed for Constipation., Disp: , Rfl:     simethicone (MYLICON) 80 MG chewable tablet, Chew 1 tablet Every 6 (Six) Hours As Needed for Flatulence., Disp: , Rfl:     spironolactone (ALDACTONE) 25 MG tablet, Take 1 tablet by mouth Daily., Disp: 30 tablet, Rfl: 0    Triamcinolone Acetonide (NASACORT) 55 MCG/ACT nasal inhaler, 2 sprays into the nostril(s) as directed by provider Daily., Disp: , Rfl:     cholecalciferol (VITAMIN D3) 25 MCG (1000 UT) tablet, Take 1 tablet by mouth Daily. (Patient not taking: Reported on 9/11/2024), Disp: , Rfl:     Flovent  MCG/ACT inhaler, Inhale 2 puffs 2 (Two) Times a Day As Needed. (Patient not taking: Reported on 9/11/2024), Disp: , Rfl:     ipratropium (ATROVENT) 0.03 % nasal spray, 1 spray into the nostril(s) as directed by provider 2 (Two) Times a Day As Needed. (Patient not taking: Reported on 9/11/2024), Disp: , Rfl:     mupirocin (BACTROBAN) 2 % ointment, Apply 1 Application topically to the appropriate area as directed As Needed. (Patient not taking:  "Reported on 9/11/2024), Disp: , Rfl:        Objective     Vitals:  Vitals:    09/11/24 1404   BP: 118/62   BP Location: Left arm   Patient Position: Sitting   Pulse: 81   SpO2: 94%   Weight: 55.3 kg (122 lb)   Height: 160 cm (62.99\")       Physical Exam:  Vitals reviewed.   Constitutional:       Appearance: Healthy appearance. Not in distress.   Neck:      Vascular: No JVD.   Pulmonary:      Effort: Pulmonary effort is normal.      Breath sounds: Normal breath sounds.   Cardiovascular:      Normal rate. Regular rhythm. Normal S1. Normal S2.       Murmurs: There is no murmur.      No gallop.  No click. No rub.   Pulses:     Intact distal pulses.   Edema:     Peripheral edema absent.   Abdominal:      General: There is no distension.      Palpations: Abdomen is soft.      Tenderness: There is no abdominal tenderness.   Skin:     General: Skin is warm and dry.         Results Review:   I reviewed the patient's new clinical results.  I reviewed the patient's new imaging results and agree with the interpretation.  I reviewed the patient's other test results and agree with the interpretation  I personally viewed and interpreted the patient's EKG/Telemetry data    Basic Metabolic Panel (08/22/2024 15:13)  TSH (07/13/2024 19:18)  BNP (07/13/2024 17:18)  Hemoglobin A1c (07/01/2024 06:00)  D-dimer, Quantitative (06/29/2024 16:34)  TSH (06/04/2024 16:09)  T4, FREE (06/04/2024 16:09)    Procedures        Assessment & Plan   Diagnoses and all orders for this visit:    1. Heart failure with mildly reduced ejection fraction (HFmrEF) (Primary)  LVEF 45-50%.  Nonobstructive CAD on cath.  Clinically, appears euvolemic and compensated today.    Not on SGLT2 inhibitor due to UTIs. Some previous discussion about Entresto but she is currently not on any ACE inhibitor, ARB, or ARNI.   Dry weight is around 120 pounds.  -- Considering her clinical stability, no indication to change her medical therapy at this time, especially with low " "normal blood pressures per home blood pressure log.  -- Continue current dose of metoprolol succinate and spironolactone  -- Lasix as needed based on weight and symptoms    2. Atherosclerosis of native coronary artery of native heart without angina pectoris  Coronary angiography earlier this year showed \"near normal\" coronary arteries.  There were mild luminal irregularities noted however.  Patient reports she had significant abdominal and side cramps on statin so she stopped it and the symptoms resolved.  -- Advised that she resume aspirin 81 and decrease dose of atorvastatin to 40.    3. Atrial fibrillation, unspecified type  4. Paroxysmal SVT (supraventricular tachycardia)  Apparently carries diagnosis of atrial fibrillation from the past but has no history of stroke.  This would make her [id number] at least 7.  Recent Holter monitor personally reviewed.  It shows no evidence of atrial fibrillation over the course of 2 weeks, only few paroxysms of SVT.  Patient is currently not symptomatic with any palpitations on her beta-blocker.  -- We discussed anticoagulation with Xarelto for stroke prophylaxis.  Patient had apparently been offered warfarin years ago but did not want to take it.  She is somewhat open to considering Xarelto but does not want to start that right now.  Would rather be on aspirin while she thinks about it.  I informed her of the increased risk of stroke and patient is aware of the risk and accepting of it.  Advised that she can call back anytime before her next visit if she changes her mind regarding anticoagulation.  Would start Xarelto 20 at that point.    5. Essential hypertension  Controlled.  Goal less than 130/80.  Home BP log shows readings are consistently around 120s over 70 or below.  -- Continue medical therapy as above    6.  Type 2 diabetes mellitus without complication, without long-term current use of insulin  Diabetes poorly controlled with an A1c of almost 9.  -- Needs to get " this under control with primary care.  Cannot tolerate SGLT2 inhibitor due to history of UTIs.        Plan   No orders of the defined types were placed in this encounter.    Medications:  -     aspirin 81 MG EC tablet; Take 1 tablet by mouth Daily.  -     atorvastatin (LIPITOR) 80 MG tablet; Take 0.5 tablets by mouth Every Night for 90 days.    Preventative Cardiology:   Tobacco Cessation: N/A  Obstructive Sleep Apnea Screening: Deffered  AAA Screening: Deffered  Peripheral Arterial Disease Screening: Deffered       Follow Up:   Return in about 3 months (around 12/11/2024).    Thank you for allowing me to participate in the care of your patient. Please to not hesitate to contact me with additional questions or concerns.     I spent 40 minutes evaluating, treating, counseling, coordinating patient care, reviewing old/outside records, and documenting. This excludes time spent on separately billable services and procedures.     John Howard MD  09/11/2024  16:21 EDT

## 2024-09-11 ENCOUNTER — OFFICE VISIT (OUTPATIENT)
Dept: CARDIOLOGY | Facility: CLINIC | Age: 79
End: 2024-09-11
Payer: MEDICARE

## 2024-09-11 VITALS
HEIGHT: 63 IN | DIASTOLIC BLOOD PRESSURE: 62 MMHG | OXYGEN SATURATION: 94 % | BODY MASS INDEX: 21.62 KG/M2 | WEIGHT: 122 LBS | HEART RATE: 81 BPM | SYSTOLIC BLOOD PRESSURE: 118 MMHG

## 2024-09-11 DIAGNOSIS — I48.91 ATRIAL FIBRILLATION, UNSPECIFIED TYPE: Chronic | ICD-10-CM

## 2024-09-11 DIAGNOSIS — I50.22 HEART FAILURE WITH MILDLY REDUCED EJECTION FRACTION (HFMREF): Primary | Chronic | ICD-10-CM

## 2024-09-11 DIAGNOSIS — E11.9 TYPE 2 DIABETES MELLITUS WITHOUT COMPLICATION, WITHOUT LONG-TERM CURRENT USE OF INSULIN: Chronic | ICD-10-CM

## 2024-09-11 DIAGNOSIS — I25.10 ATHEROSCLEROSIS OF NATIVE CORONARY ARTERY OF NATIVE HEART WITHOUT ANGINA PECTORIS: Chronic | ICD-10-CM

## 2024-09-11 DIAGNOSIS — I47.10 PAROXYSMAL SVT (SUPRAVENTRICULAR TACHYCARDIA): Chronic | ICD-10-CM

## 2024-09-11 DIAGNOSIS — I10 ESSENTIAL HYPERTENSION: Chronic | ICD-10-CM

## 2024-09-11 RX ORDER — NITROFURANTOIN 25; 75 MG/1; MG/1
100 CAPSULE ORAL 2 TIMES DAILY
COMMUNITY

## 2024-09-11 RX ORDER — ASPIRIN 81 MG/1
81 TABLET ORAL DAILY
Start: 2024-09-11

## 2024-09-11 RX ORDER — ATORVASTATIN CALCIUM 80 MG/1
40 TABLET, FILM COATED ORAL NIGHTLY
Start: 2024-09-11 | End: 2024-12-10

## 2024-09-12 ENCOUNTER — APPOINTMENT (OUTPATIENT)
Dept: CT IMAGING | Facility: HOSPITAL | Age: 79
End: 2024-09-12
Payer: MEDICARE

## 2024-09-12 ENCOUNTER — APPOINTMENT (OUTPATIENT)
Dept: GENERAL RADIOLOGY | Facility: HOSPITAL | Age: 79
End: 2024-09-12
Payer: MEDICARE

## 2024-09-12 ENCOUNTER — HOSPITAL ENCOUNTER (EMERGENCY)
Facility: HOSPITAL | Age: 79
Discharge: HOME | End: 2024-09-12
Attending: STUDENT IN AN ORGANIZED HEALTH CARE EDUCATION/TRAINING PROGRAM
Payer: MEDICARE

## 2024-09-12 VITALS
WEIGHT: 120 LBS | RESPIRATION RATE: 18 BRPM | OXYGEN SATURATION: 92 % | SYSTOLIC BLOOD PRESSURE: 131 MMHG | HEIGHT: 63 IN | BODY MASS INDEX: 21.26 KG/M2 | DIASTOLIC BLOOD PRESSURE: 72 MMHG | HEART RATE: 93 BPM | TEMPERATURE: 98.3 F

## 2024-09-12 DIAGNOSIS — R09.89 CHEST CONGESTION: ICD-10-CM

## 2024-09-12 DIAGNOSIS — R06.02 SHORTNESS OF BREATH: ICD-10-CM

## 2024-09-12 DIAGNOSIS — R05.1 ACUTE COUGH: Primary | ICD-10-CM

## 2024-09-12 LAB
ALBUMIN SERPL-MCNC: 4.5 G/DL (ref 3.5–5.2)
ALBUMIN/GLOB SERPL: 1.4 G/DL
ALP SERPL-CCNC: 93 U/L (ref 39–117)
ALT SERPL W P-5'-P-CCNC: 17 U/L (ref 1–33)
ANION GAP SERPL CALCULATED.3IONS-SCNC: 9.8 MMOL/L (ref 5–15)
AST SERPL-CCNC: 18 U/L (ref 1–32)
BASOPHILS # BLD AUTO: 0.02 10*3/MM3 (ref 0–0.2)
BASOPHILS NFR BLD AUTO: 0.2 % (ref 0–1.5)
BILIRUB SERPL-MCNC: 0.6 MG/DL (ref 0–1.2)
BUN SERPL-MCNC: 20 MG/DL (ref 8–23)
BUN/CREAT SERPL: 31.7 (ref 7–25)
CALCIUM SPEC-SCNC: 9.3 MG/DL (ref 8.6–10.5)
CHLORIDE SERPL-SCNC: 101 MMOL/L (ref 98–107)
CO2 SERPL-SCNC: 27.2 MMOL/L (ref 22–29)
CREAT SERPL-MCNC: 0.63 MG/DL (ref 0.57–1)
DEPRECATED RDW RBC AUTO: 44.6 FL (ref 37–54)
EGFRCR SERPLBLD CKD-EPI 2021: 91.5 ML/MIN/1.73
EOSINOPHIL # BLD AUTO: 0.06 10*3/MM3 (ref 0–0.4)
EOSINOPHIL NFR BLD AUTO: 0.5 % (ref 0.3–6.2)
ERYTHROCYTE [DISTWIDTH] IN BLOOD BY AUTOMATED COUNT: 12.8 % (ref 12.3–15.4)
FLUAV SUBTYP SPEC NAA+PROBE: NOT DETECTED
FLUBV RNA ISLT QL NAA+PROBE: NOT DETECTED
GLOBULIN UR ELPH-MCNC: 3.2 GM/DL
GLUCOSE SERPL-MCNC: 169 MG/DL (ref 65–99)
HCT VFR BLD AUTO: 41 % (ref 34–46.6)
HGB BLD-MCNC: 13.4 G/DL (ref 12–15.9)
HOLD SPECIMEN: NORMAL
HOLD SPECIMEN: NORMAL
IMM GRANULOCYTES # BLD AUTO: 0.04 10*3/MM3 (ref 0–0.05)
IMM GRANULOCYTES NFR BLD AUTO: 0.3 % (ref 0–0.5)
LYMPHOCYTES # BLD AUTO: 1.28 10*3/MM3 (ref 0.7–3.1)
LYMPHOCYTES NFR BLD AUTO: 10.4 % (ref 19.6–45.3)
MCH RBC QN AUTO: 30.9 PG (ref 26.6–33)
MCHC RBC AUTO-ENTMCNC: 32.7 G/DL (ref 31.5–35.7)
MCV RBC AUTO: 94.5 FL (ref 79–97)
MONOCYTES # BLD AUTO: 0.37 10*3/MM3 (ref 0.1–0.9)
MONOCYTES NFR BLD AUTO: 3 % (ref 5–12)
NEUTROPHILS NFR BLD AUTO: 10.59 10*3/MM3 (ref 1.7–7)
NEUTROPHILS NFR BLD AUTO: 85.6 % (ref 42.7–76)
NRBC BLD AUTO-RTO: 0 /100 WBC (ref 0–0.2)
NT-PROBNP SERPL-MCNC: 87.9 PG/ML (ref 0–1800)
PLATELET # BLD AUTO: 235 10*3/MM3 (ref 140–450)
PMV BLD AUTO: 9.5 FL (ref 6–12)
POTASSIUM SERPL-SCNC: 3.8 MMOL/L (ref 3.5–5.2)
PROT SERPL-MCNC: 7.7 G/DL (ref 6–8.5)
RBC # BLD AUTO: 4.34 10*6/MM3 (ref 3.77–5.28)
SARS-COV-2 RNA RESP QL NAA+PROBE: NOT DETECTED
SODIUM SERPL-SCNC: 138 MMOL/L (ref 136–145)
TROPONIN T SERPL HS-MCNC: 8 NG/L
WBC NRBC COR # BLD AUTO: 12.36 10*3/MM3 (ref 3.4–10.8)
WHOLE BLOOD HOLD COAG: NORMAL
WHOLE BLOOD HOLD SPECIMEN: NORMAL

## 2024-09-12 PROCEDURE — 85025 COMPLETE CBC W/AUTO DIFF WBC: CPT | Performed by: STUDENT IN AN ORGANIZED HEALTH CARE EDUCATION/TRAINING PROGRAM

## 2024-09-12 PROCEDURE — 99284 EMERGENCY DEPT VISIT MOD MDM: CPT

## 2024-09-12 PROCEDURE — 87636 SARSCOV2 & INF A&B AMP PRB: CPT | Performed by: STUDENT IN AN ORGANIZED HEALTH CARE EDUCATION/TRAINING PROGRAM

## 2024-09-12 PROCEDURE — 84484 ASSAY OF TROPONIN QUANT: CPT | Performed by: STUDENT IN AN ORGANIZED HEALTH CARE EDUCATION/TRAINING PROGRAM

## 2024-09-12 PROCEDURE — 80053 COMPREHEN METABOLIC PANEL: CPT | Performed by: STUDENT IN AN ORGANIZED HEALTH CARE EDUCATION/TRAINING PROGRAM

## 2024-09-12 PROCEDURE — 71250 CT THORAX DX C-: CPT

## 2024-09-12 PROCEDURE — 93005 ELECTROCARDIOGRAM TRACING: CPT

## 2024-09-12 PROCEDURE — 83880 ASSAY OF NATRIURETIC PEPTIDE: CPT | Performed by: STUDENT IN AN ORGANIZED HEALTH CARE EDUCATION/TRAINING PROGRAM

## 2024-09-12 PROCEDURE — 93005 ELECTROCARDIOGRAM TRACING: CPT | Performed by: STUDENT IN AN ORGANIZED HEALTH CARE EDUCATION/TRAINING PROGRAM

## 2024-09-12 PROCEDURE — 71045 X-RAY EXAM CHEST 1 VIEW: CPT

## 2024-09-12 RX ORDER — BENZONATATE 100 MG/1
100 CAPSULE ORAL ONCE
Status: COMPLETED | OUTPATIENT
Start: 2024-09-12 | End: 2024-09-12

## 2024-09-12 RX ORDER — ACETAMINOPHEN 325 MG/1
975 TABLET ORAL ONCE
Status: COMPLETED | OUTPATIENT
Start: 2024-09-12 | End: 2024-09-12

## 2024-09-12 RX ORDER — PANTOPRAZOLE SODIUM 40 MG/1
40 TABLET, DELAYED RELEASE ORAL ONCE
Status: COMPLETED | OUTPATIENT
Start: 2024-09-12 | End: 2024-09-12

## 2024-09-12 RX ORDER — SODIUM CHLORIDE 0.9 % (FLUSH) 0.9 %
10 SYRINGE (ML) INJECTION AS NEEDED
Status: DISCONTINUED | OUTPATIENT
Start: 2024-09-12 | End: 2024-09-12 | Stop reason: HOSPADM

## 2024-09-12 RX ORDER — BENZONATATE 100 MG/1
100 CAPSULE ORAL 3 TIMES DAILY PRN
Qty: 10 CAPSULE | Refills: 0 | Status: SHIPPED | OUTPATIENT
Start: 2024-09-12 | End: 2024-09-23

## 2024-09-12 RX ADMIN — ACETAMINOPHEN 975 MG: 325 TABLET, FILM COATED ORAL at 20:11

## 2024-09-12 RX ADMIN — PANTOPRAZOLE SODIUM 40 MG: 40 TABLET, DELAYED RELEASE ORAL at 18:54

## 2024-09-12 RX ADMIN — BENZONATATE 100 MG: 100 CAPSULE ORAL at 17:06

## 2024-09-12 NOTE — ED PROVIDER NOTES
[facility] EMERGENCY DEPARTMENT  Emergency Department Encounter  Emergency Medicine Physician Note       Pt Name: Aster Craft  MRN: [id number]  Pt :   1945  Room Number:    Date of encounter:  2024  PCP: Yolie Cotto MD  ED Physician: Kaden Wheat,     HPI:  Aster Craft is a 79 y.o. female who presents to the ED with cough, chest congestion, and shortness of breath.  Symptoms started gradually this morning.  Reports cough is productive with clear, thick sputum.  Reports associated body aches.  No fever, chest pain, abdominal pain, problems urination problems, leg pain or swelling.  Duration constant.  No modifying factors.  Patient reports that she had COVID 4 weeks ago, but completely recovered.    Past medical history of breast cancer in remission status post mastectomy/chemotherapy, CHF, A-fib.  No current anticoagulation use.  No history of MI or previous stents.    PAST MEDICAL HISTORY  Past Medical History:   Diagnosis Date    Arthritis     Atherosclerosis of native coronary artery of native heart without angina pectoris 2024    Breast cancer     RIGHT BREAST STAGE 3    Diabetes mellitus     Elevated cholesterol     GERD (gastroesophageal reflux disease) 2021    History of cancer chemotherapy     Hypertension     Hyperthyroidism 2022    Kidney stone     Lichen sclerosus     Shingles     Thickened endometrium 2018    Urinary incontinence     Urinary tract infection      Current Outpatient Medications   Medication Instructions    albuterol sulfate  (90 Base) MCG/ACT inhaler 2 puffs Every 6 (Six) Hours As Needed.    aspirin 81 mg, Oral, Daily    atorvastatin (LIPITOR) 40 mg, Oral, Nightly    azelastine (ASTELIN) 0.1 % nasal spray 1 spray into the nostril(s) as directed by provider 2 (Two) Times a Day.    benzonatate (TESSALON) 100 mg, Oral, 3 Times Daily PRN    Blood Glucose Monitoring Suppl (OneTouch Verio Flex System) w/Device kit See  Admin Instructions, for testing    brimonidine-timolol (COMBIGAN) 0.2-0.5 % ophthalmic solution Every 12 Hours    cholecalciferol (VITAMIN D3) 1,000 Units, Daily    Continuous Blood Gluc Sensor (FreeStyle Malina 2 Sensor) [facility] CHANGE SENSOR EVERY 14 DAYS    ezetimibe (ZETIA) 10 mg, Oral, Daily    Flovent  MCG/ACT inhaler 2 puffs, 2 Times Daily PRN    furosemide (LASIX) 20 mg, Oral, Daily PRN    glipizide (GLUCOTROL) 5 mg, Oral, Daily    ipratropium (ATROVENT) 0.03 % nasal spray 1 spray into the nostril(s) as directed by provider 2 (Two) Times a Day As Needed.    Lancets (OneTouch Delica Plus [id number]) misc USE AS DIRECTED EVERY DAY    meclizine (ANTIVERT) 12.5 mg, Oral, 3 Times Daily PRN    metFORMIN (GLUCOPHAGE) 500 mg, Oral, 2 Times Daily With Meals    metoprolol succinate XL (TOPROL XL) 150 mg, Oral, Daily    mupirocin (BACTROBAN) 2 % ointment 1 Application, As Needed    nitrofurantoin (macrocrystal-monohydrate) (MACROBID) 100 mg, Oral, 2 Times Daily    nitroglycerin (NITROSTAT) 0.4 mg, Sublingual, Every 5 Minutes PRN    OneTouch Verio test strip Daily, for testing    pantoprazole (PROTONIX) 40 mg, Oral, Daily    polyethyl glycol-propyl glycol (SYSTANE) 0.4-0.3 % solution ophthalmic solution (artificial tears) Every 1 Hour PRN    Rhopressa 0.02 % solution 1 drop, Both Eyes, Nightly    senna 8.6 MG tablet 1 tablet, Oral, As Needed    simethicone (MYLICON) 80 mg, Oral, Every 6 Hours PRN    spironolactone (ALDACTONE) 25 mg, Oral, Daily    Triamcinolone Acetonide (NASACORT) 55 MCG/ACT nasal inhaler 2 sprays, Nasal, Daily      PAST SURGICAL HISTORY  Past Surgical History:   Procedure Laterality Date    CARDIAC CATHETERIZATION N/A 7/15/2024    Procedure: Coronary angiography;  Surgeon: Brian Morrow MD;  Location: [facility] INVASIVE LOCATION;  Service: Cardiology;  Laterality: N/A;    CATARACT EXTRACTION Right 01/10/2023    CERVICAL CONE BIOPSY      CHOLECYSTECTOMY  03/2010    COLONOSCOPY N/A 07/06/2018     Procedure: COLONOSCOPY;  Surgeon: Trenton Lyons MD;  Location: [facility] OR;  Service: Gastroenterology    DILATATION AND CURETTAGE      ENDOSCOPY N/A 07/06/2018    Procedure: ESOPHAGOGASTRODUODENOSCOPY;  Surgeon: Trenton Lyons MD;  Location: [facility] OR;  Service: Gastroenterology    MASTECTOMY Right 10/1998    MASTECTOMY Left 07/2013    TUBAL ABDOMINAL LIGATION         FAMILY HISTORY  Family History   Problem Relation Age of Onset    Breast cancer Mother     Diabetes Mother     Cancer Mother     Ovarian cancer Maternal Aunt     Cancer Maternal Aunt        SOCIAL HISTORY  Social History     Socioeconomic History    Marital status:    Tobacco Use    Smoking status: Never     Passive exposure: Never    Smokeless tobacco: Never   Vaping Use    Vaping status: Never Used   Substance and Sexual Activity    Alcohol use: No    Drug use: No    Sexual activity: Not Currently     ALLERGIES  Caffeine and Jardiance [empagliflozin]    REVIEW OF SYSTEMS  All systems reviewed and negative except for those discussed in HPI.     PHYSICAL EXAM  ED Triage Vitals [09/12/24 1445]   Temp Heart Rate Resp BP SpO2   98.3 °F (36.8 °C) 82 18 128/77 95 %      Temp src Heart Rate Source Patient Position BP Location FiO2 (%)   Oral Monitor Sitting Left arm --     I have reviewed the triage vital signs and nursing notes.    General: Alert.  Nontoxic appearance.  Actively coughing, but in no acute distress.  Head: Normocephalic.  Atraumatic.  Eyes: No scleral icterus.  ENT: Moist mucous membranes.  Cardiovascular: Regular rate and rhythm.  No murmurs.  No rubs.  2+ distal pulses bilaterally.  Respiratory: Equal breath sounds bilaterally.  No rales.  No rhonchi.  No wheezing.  GI: Abdomen is soft.  Nondistended.  Nontender to palpation.  No rebound.  No guarding.  No CVA tenderness.  MSK: Moves all 4 extremities.  Neurologic: Oriented x 3.  No focal deficits.  Skin: No erythema.  No edema. No pallor. No cyanosis.  Psych: Normal mood and  affect.    LAB RESULTS  Recent Results (from the past 24 hour(s))   Comprehensive Metabolic Panel    Collection Time: 09/12/24  3:52 PM    Specimen: Blood   Result Value Ref Range    Glucose 169 (H) 65 - 99 mg/dL    BUN 20 8 - 23 mg/dL    Creatinine 0.63 0.57 - 1.00 mg/dL    Sodium 138 136 - 145 mmol/L    Potassium 3.8 3.5 - 5.2 mmol/L    Chloride 101 98 - 107 mmol/L    CO2 27.2 22.0 - 29.0 mmol/L    Calcium 9.3 8.6 - 10.5 mg/dL    Total Protein 7.7 6.0 - 8.5 g/dL    Albumin 4.5 3.5 - 5.2 g/dL    ALT (SGPT) 17 1 - 33 U/L    AST (SGOT) 18 1 - 32 U/L    Alkaline Phosphatase 93 39 - 117 U/L    Total Bilirubin 0.6 0.0 - 1.2 mg/dL    Globulin 3.2 gm/dL    A/G Ratio 1.4 g/dL    BUN/Creatinine Ratio 31.7 (H) 7.0 - 25.0    Anion Gap 9.8 5.0 - 15.0 mmol/L    eGFR 91.5 >60.0 mL/min/1.73   BNP    Collection Time: 09/12/24  3:52 PM    Specimen: Blood   Result Value Ref Range    proBNP 87.9 0.0 - 1,800.0 pg/mL   Single High Sensitivity Troponin T    Collection Time: 09/12/24  3:52 PM    Specimen: Blood   Result Value Ref Range    HS Troponin T 8 <14 ng/L   Green Top (Gel)    Collection Time: 09/12/24  3:52 PM   Result Value Ref Range    Extra Tube Hold for add-ons.    Gold Top - SST    Collection Time: 09/12/24  3:52 PM   Result Value Ref Range    Extra Tube Hold for add-ons.    Lavender Top    Collection Time: 09/12/24  3:53 PM   Result Value Ref Range    Extra Tube hold for add-on    Light Blue Top    Collection Time: 09/12/24  3:53 PM   Result Value Ref Range    Extra Tube Hold for add-ons.    CBC Auto Differential    Collection Time: 09/12/24  3:53 PM    Specimen: Blood   Result Value Ref Range    WBC 12.36 (H) 3.40 - 10.80 10*3/mm3    RBC 4.34 3.77 - 5.28 10*6/mm3    Hemoglobin 13.4 12.0 - 15.9 g/dL    Hematocrit 41.0 34.0 - 46.6 %    MCV 94.5 79.0 - 97.0 fL    MCH 30.9 26.6 - 33.0 pg    MCHC 32.7 31.5 - 35.7 g/dL    RDW 12.8 12.3 - 15.4 %    RDW-SD 44.6 37.0 - 54.0 fl    MPV 9.5 6.0 - 12.0 fL    Platelets 235 140 - 450  10*3/mm3    Neutrophil % 85.6 (H) 42.7 - 76.0 %    Lymphocyte % 10.4 (L) 19.6 - 45.3 %    Monocyte % 3.0 (L) 5.0 - 12.0 %    Eosinophil % 0.5 0.3 - 6.2 %    Basophil % 0.2 0.0 - 1.5 %    Immature Grans % 0.3 0.0 - 0.5 %    Neutrophils, Absolute 10.59 (H) 1.70 - 7.00 10*3/mm3    Lymphocytes, Absolute 1.28 0.70 - 3.10 10*3/mm3    Monocytes, Absolute 0.37 0.10 - 0.90 10*3/mm3    Eosinophils, Absolute 0.06 0.00 - 0.40 10*3/mm3    Basophils, Absolute 0.02 0.00 - 0.20 10*3/mm3    Immature Grans, Absolute 0.04 0.00 - 0.05 10*3/mm3    nRBC 0.0 0.0 - 0.2 /100 WBC   COVID-19 and FLU A/B PCR, 1 HR TAT - Swab, Nasopharynx    Collection Time: 09/12/24  4:20 PM    Specimen: Nasopharynx; Swab   Result Value Ref Range    COVID19 Not Detected Not Detected - Ref. Range    Influenza A PCR Not Detected Not Detected    Influenza B PCR Not Detected Not Detected       RADIOLOGY  CT Chest Without Contrast Diagnostic    Result Date: 9/12/2024  FINAL REPORT TECHNIQUE: null CLINICAL HISTORY: Shortness of breath, cough, leukocytosis COMPARISON: null FINDINGS: CT chest without IV contrast. COMPARISON: None FINDINGS: Heterogeneous nodular appearing thyroid gland with dystrophic calcifications on the left. There is mass effect upon the esophagus at the level of the thoracic inlet by the left lobe of the thyroid. No supraclavicular or axillary lymphadenopathy. Mild ascending aortic ectasia measuring up to 3.6 cm. Coronary artery calcifications present within the LAD and circumflex. Cardiomegaly. No pericardial effusion. Normal esophagus. Calcified mediastinal lymph nodes. Cholecystectomy clips. Nonobstructing 5 mm right renal calculus. Splenic granulomas present. Small splenule present. No pleural effusion. Linear atelectasis versus scarring along the left lung base. Calcified granulomas present in the left lower lobe. Multiple bilateral pulmonary nodules. For example right lower lobe pulmonary nodule measuring 6 mm (series 2, image 61). Right  middle lobe pulmonary nodule measuring 3 mm (series 2, image 55). Right upper lobe pulmonary nodule measuring 3 mm (series 2, image 29). Left upper lobe pulmonary nodule measuring 3 mm (series 2, image 28). Left lower lobe pulmonary nodule measuring 5 mm (series 2, image 44). Prominent thoracic kyphosis. Moderate upper to mid spondylosis. Dextrocurvature of the mid thoracic spine. No acute fracture identified.     IMPRESSION: 1. No acute intrathoracic findings. No evidence of pneumonia. 2. Linear atelectasis versus scarring along the left lung base. 3. Multiple bilateral pulmonary nodules measuring up to 6 mm in the right lower lobe. Recommend comparison with prior cross-sectional imaging if available. If none available, recommend short-term follow-up imaging in 3-6 months. 4. Nodular enlarged left lobe of the thyroid has mass effect upon the esophagus at the thoracic inlet. Recommend correlation with nonemergent thyroid ultrasound if not already performed. 5. Coronary artery atherosclerosis. Mild ascending aortic ectasia measuring up to 3.6 cm. Authenticated and Electronically Signed by Khanh Branch MD on 09/12/2024 07:12:17 PM    XR Chest 1 View    Result Date: 9/12/2024  PROCEDURE: XR CHEST 1 VW-  HISTORY: SOA Triage Protocol and cough today.  COMPARISON: July 13, 2024..  FINDINGS: The heart is mildly enlarged, stable.. Lungs are clear.. The mediastinum is unremarkable. There is no pneumothorax.  There are no acute osseous abnormalities.      Cardiomegaly with no acute infiltrate..      This report was signed and finalized on 9/12/2024 4:44 PM by Iraida Kruse MD.       PROCEDURES  Procedures    RISK STRATIFICATION    MEDICAL DECISION MAKING  79 y.o. female with past medical history listed above who presents with cough, congestion, shortness of breath.    Vital signs within normal limits.  Pulse ox 95% on room air.    Based on clinical presentation and physical exam, differential diagnosis includes, but is not  limited to, viral URI, bronchitis, pneumonia, doubt cardiac etiology.    At least 3 different tests have been ordered on this patient.    Please see ED course below for my interpretation of the ED workup.  ED Course as of 09/12/24 2015   u Sep 12, 2024   1616 ECG 12 Lead ED Triage Standing Order; SOA  EKG per my interpretation normal sinus rhythm heart rate 81, normal axis, right bundle branch block, QRS duration 126 ms, no STEMI.   [JS]   2010 I reviewed the labs listed above. Notable findings are highlighted below.    Old laboratory data was reviewed from the medical records and compared to today's results.   [JS]   2010 CBC & Differential(!) [JS]   2010 WBC(!): 12.36 [JS]   2010 Comprehensive Metabolic Panel(!) [JS]   2010 Glucose(!): 169 [JS]   2010 HS Troponin T: 8 [JS]   2010 COVID19: Not Detected [JS]   2010 Influenza A PCR: Not Detected [JS]   2010 Influenza B PCR: Not Detected [JS]   2010 I have independently reviewed and interpreted the imaging listed above.  My interpretations are listed below:    - Chest x-ray negative for infiltrate.    -CT chest negative for infiltrate.     [JS]      ED Course User Index  [JS] Kaden Wheat DO     Medications administered in ED:  Medications   sodium chloride 0.9 % flush 10 mL (has no administration in time range)   benzonatate (TESSALON) capsule 100 mg (100 mg Oral Given 9/12/24 1706)   pantoprazole (PROTONIX) EC tablet 40 mg (40 mg Oral Given 9/12/24 1854)   acetaminophen (TYLENOL) tablet 975 mg (975 mg Oral Given 9/12/24 2011)     On re-evaluation, patient resting comfortably.  States symptoms have improved following therapy. Vital signs remained stable on room air.  Patient was ambulatory in the ED with steady gait.  Able to tolerate oral intake appropriately.    I discussed the findings of the ED workup with the patient at bedside. Patient was also informed of incidental findings on imaging listed above. No clinical indication for admission.  I recommended  outpatient follow-up with PCP.  Patient was deemed medically stable for discharge with close outpatient follow-up and strict ED return precautions. Patient agreeable with plan and disposition.    Home medications were reviewed.  Prescriptions for discharge: Tessalon Perles    Chronic conditions affecting care: None    Social determinants of health impacting treatment or disposition: None    REPEAT VITAL SIGNS  AS OF 20:15 EDT VITALS:  BP - 121/69  HR - 95  TEMP - 98.3 °F (36.8 °C) (Oral)  O2 SATS - 94%    DIAGNOSIS  Final diagnoses:   Acute cough   Chest congestion   Shortness of breath     DISPOSITION  ED Disposition       ED Disposition   Discharge    Condition   Stable    Comment   --               PATIENT REFERRALS  Yolie Cotto MD  [address] [facility] [address] [phone]      PCP.  Tomorrow for previously scheduled visit.            Please note that portions of this document were completed with voice recognition software.        Kaden Wheat,   09/13/24 4702

## 2024-09-23 ENCOUNTER — OFFICE VISIT (OUTPATIENT)
Dept: UROLOGY | Facility: CLINIC | Age: 79
End: 2024-09-23
Payer: MEDICARE

## 2024-09-23 ENCOUNTER — DISEASE STATE MANAGEMENT VISIT (OUTPATIENT)
Dept: PHARMACY | Facility: HOSPITAL | Age: 79
End: 2024-09-23
Payer: MEDICARE

## 2024-09-23 VITALS
HEIGHT: 63 IN | OXYGEN SATURATION: 97 % | RESPIRATION RATE: 18 BRPM | SYSTOLIC BLOOD PRESSURE: 116 MMHG | WEIGHT: 120 LBS | HEART RATE: 81 BPM | BODY MASS INDEX: 21.26 KG/M2 | DIASTOLIC BLOOD PRESSURE: 76 MMHG

## 2024-09-23 VITALS
BODY MASS INDEX: 21.62 KG/M2 | HEIGHT: 63 IN | HEART RATE: 73 BPM | TEMPERATURE: 97.8 F | DIASTOLIC BLOOD PRESSURE: 71 MMHG | OXYGEN SATURATION: 94 % | SYSTOLIC BLOOD PRESSURE: 119 MMHG | WEIGHT: 122 LBS | RESPIRATION RATE: 16 BRPM

## 2024-09-23 DIAGNOSIS — N20.0 KIDNEY STONE: Primary | ICD-10-CM

## 2024-09-23 DIAGNOSIS — I10 ESSENTIAL HYPERTENSION: ICD-10-CM

## 2024-09-23 DIAGNOSIS — N39.41 URGE INCONTINENCE OF URINE: ICD-10-CM

## 2024-09-23 DIAGNOSIS — I50.32 CHRONIC HEART FAILURE WITH PRESERVED EJECTION FRACTION: Primary | Chronic | ICD-10-CM

## 2024-09-23 DIAGNOSIS — N95.8 GENITOURINARY SYNDROME OF MENOPAUSE: ICD-10-CM

## 2024-09-23 PROBLEM — J30.9 ALLERGIC RHINITIS, UNSPECIFIED: Status: ACTIVE | Noted: 2024-07-16

## 2024-09-23 LAB
BILIRUB BLD-MCNC: NEGATIVE MG/DL
CLARITY, POC: CLEAR
COLOR UR: ABNORMAL
EXPIRATION DATE: ABNORMAL
GLUCOSE UR STRIP-MCNC: ABNORMAL MG/DL
KETONES UR QL: NEGATIVE
LEUKOCYTE EST, POC: NEGATIVE
Lab: ABNORMAL
NITRITE UR-MCNC: NEGATIVE MG/ML
PH UR: 5.5 [PH] (ref 5–8)
PROT UR STRIP-MCNC: NEGATIVE MG/DL
RBC # UR STRIP: NEGATIVE /UL
SP GR UR: 1.03 (ref 1–1.03)
UROBILINOGEN UR QL: NORMAL

## 2024-09-23 PROCEDURE — 1159F MED LIST DOCD IN RCRD: CPT | Performed by: NURSE PRACTITIONER

## 2024-09-23 PROCEDURE — 3078F DIAST BP <80 MM HG: CPT | Performed by: NURSE PRACTITIONER

## 2024-09-23 PROCEDURE — 99214 OFFICE O/P EST MOD 30 MIN: CPT | Performed by: NURSE PRACTITIONER

## 2024-09-23 PROCEDURE — 99213 OFFICE O/P EST LOW 20 MIN: CPT | Performed by: NURSE PRACTITIONER

## 2024-09-23 PROCEDURE — 1160F RVW MEDS BY RX/DR IN RCRD: CPT | Performed by: NURSE PRACTITIONER

## 2024-09-23 PROCEDURE — 3074F SYST BP LT 130 MM HG: CPT | Performed by: NURSE PRACTITIONER

## 2024-09-23 PROCEDURE — 81003 URINALYSIS AUTO W/O SCOPE: CPT | Performed by: NURSE PRACTITIONER

## 2024-09-23 RX ORDER — VIBEGRON 75 MG/1
75 TABLET, FILM COATED ORAL DAILY
Qty: 30 TABLET | Refills: 11 | Status: SHIPPED | OUTPATIENT
Start: 2024-09-23

## 2024-09-26 ENCOUNTER — DOCUMENTATION (OUTPATIENT)
Dept: UROLOGY | Facility: CLINIC | Age: 79
End: 2024-09-26
Payer: MEDICARE

## 2024-09-26 ENCOUNTER — TELEPHONE (OUTPATIENT)
Dept: SURGERY | Facility: CLINIC | Age: 79
End: 2024-09-26
Payer: MEDICARE

## 2024-10-30 ENCOUNTER — TELEPHONE (OUTPATIENT)
Dept: UROLOGY | Facility: CLINIC | Age: 79
End: 2024-10-30

## 2024-10-30 NOTE — TELEPHONE ENCOUNTER
Caller: VANESSA    Relationship to patient: SELF    Best call back number: [phone]    Patient is needing: PT WAS SEEN AT URGENT CARE AND DOES HAVE ANOTHER UTI. PT WAS PLACED ON ANTIBIOTICS BUT WANTED TO MAKE YOU AWARE.  LET PT KNOW IF YOU WISH TO SEE

## 2024-11-04 ENCOUNTER — DISEASE STATE MANAGEMENT VISIT (OUTPATIENT)
Dept: PHARMACY | Facility: HOSPITAL | Age: 79
End: 2024-11-04
Payer: MEDICARE

## 2024-11-04 VITALS
WEIGHT: 127 LBS | HEART RATE: 81 BPM | DIASTOLIC BLOOD PRESSURE: 70 MMHG | OXYGEN SATURATION: 92 % | BODY MASS INDEX: 22.5 KG/M2 | SYSTOLIC BLOOD PRESSURE: 136 MMHG

## 2024-11-04 PROCEDURE — G0463 HOSPITAL OUTPT CLINIC VISIT: HCPCS

## 2024-11-04 PROCEDURE — 99213 OFFICE O/P EST LOW 20 MIN: CPT | Performed by: NURSE PRACTITIONER

## 2024-11-04 RX ORDER — ONDANSETRON 4 MG/1
4 TABLET, ORALLY DISINTEGRATING ORAL AS NEEDED
COMMUNITY
Start: 2024-08-31

## 2024-11-04 RX ORDER — CEPHALEXIN 500 MG/1
500 CAPSULE ORAL EVERY 12 HOURS
COMMUNITY
Start: 2024-10-30

## 2024-11-04 RX ORDER — IBUPROFEN 800 MG/1
800 TABLET, FILM COATED ORAL AS NEEDED
COMMUNITY
Start: 2024-08-02

## 2024-11-04 RX ORDER — SPIRONOLACTONE 25 MG/1
25 TABLET ORAL DAILY
Qty: 90 TABLET | Refills: 1 | Status: SHIPPED | OUTPATIENT
Start: 2024-11-04

## 2024-11-04 RX ORDER — METOPROLOL SUCCINATE 100 MG/1
150 TABLET, EXTENDED RELEASE ORAL DAILY
Qty: 135 TABLET | Refills: 1 | Status: SHIPPED | OUTPATIENT
Start: 2024-11-04

## 2024-11-04 RX ORDER — BRAN 5 G
2 WAFER ORAL 3 TIMES DAILY
COMMUNITY

## 2024-11-04 RX ORDER — DESLORATADINE 5 MG/1
5 TABLET ORAL DAILY
COMMUNITY

## 2024-11-04 RX ORDER — NAPROXEN 500 MG/1
250 TABLET ORAL AS NEEDED
COMMUNITY

## 2024-11-04 NOTE — PROGRESS NOTES
[facility]  Heart Failure Pharmacist Note     Patient Name: Aster Craft  :1945  Age: 79 y.o.  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 PMH otherwise significant for AFib,T2DM, HTN, breast cancer, and recurrent UTI.    In clinic today, patient reports she is feeling well. She denies SOB, swelling, cough, chest pain, palpitations, dizziness or lightheadedness. She reports adherence to all HF medications and denies any issues at this time. BP elevated in clinic today however patient presented home log with SBPs 110s-120s consistently. She declines initiation of Entresto or losartan at this time.     Heart Failure GDMT:    Drug Class   Drug   Dose Last Dose Adjustment   Additional Titration   Notes   ACEi/ARB/ARNI Previous Entresto trial (hypoTN; pt declines retrial as of 24)     Beta Blocker Metoprolol Succinate 150mg 24     MRA Spironolactone 25mg 24     SGLT2i Discontinue Jardiance - recurrent UTI   N/A Hx of UTI     Other CV Meds:  Lasix 20mg PRN  Atorvastatin 80 mg  Ezetimibe 10 mg  Aspirin 81 mg    Medication Use:  Adherence: good  Past hx of medication use/intolerance:  Affordability:    Social Determinants:    Social Drivers of Health     Tobacco Use: Low Risk  (2024)    Patient History     Smoking Tobacco Use: Never     Smokeless Tobacco Use: Never     Passive Exposure: Never   Recent Concern: Tobacco Use - Medium Risk (2024)    Received from [facility]    Patient History     Smoking Tobacco Use: Former     Smokeless Tobacco Use: Never     Passive Exposure: Not on file   Alcohol Use: Not At Risk (2024)    AUDIT-C     Frequency of Alcohol Consumption: Never     Average Number of Drinks: Patient does not drink     Frequency of Binge Drinking: Never   Financial  Resource Strain: Low Risk  (7/14/2024)    Overall Financial Resource Strain (CARDIA)     Difficulty of Paying Living Expenses: Not hard at all   Food Insecurity: No Food Insecurity (7/14/2024)    Hunger Vital Sign     Worried About Running Out of Food in the Last Year: Never true     Ran Out of Food in the Last Year: Never true   Transportation Needs: No Transportation Needs (7/14/2024)    PRAPARE - Transportation     Lack of Transportation (Medical): No     Lack of Transportation (Non-Medical): No   Physical Activity: Inactive (7/14/2024)    Exercise Vital Sign     Days of Exercise per Week: 0 days     Minutes of Exercise per Session: 0 min   Stress: No Stress Concern Present (7/14/2024)    [place] [employer] - Occupational Stress Questionnaire     Feeling of Stress : Only a little   Social Connections: Not At Risk (7/14/2024)    Family and Community Support     Help with Day-to-Day Activities: I don't need any help     Lonely or Isolated: Never   Interpersonal Safety: Not At Risk (9/12/2024)    Abuse Screen     Unsafe at Home or Work/School: no     Feels Threatened by Someone?: no     Does Anyone Keep You from Contacting Others or Doint Things Outside the Home?: no     Physical Sign of Abuse Present: no   Depression: Not at risk (7/14/2024)    PHQ-2     PHQ-2 Score: 0   Housing Stability: Not At Risk (7/14/2024)    Housing Stability     Current Living Arrangements: home     Potentially Unsafe Housing Conditions: none   Utilities: At Risk (7/14/2024)    AHC Utilities     Threatened with loss of utilities: Yes   Health Literacy: Not At Risk (7/14/2024)    Education     Help with school or training?: No     Preferred Language: English   Employment: Not At Risk (7/14/2024)    Employment     Do you want help finding or keeping work or a job?: I do not need or want help   Disabilities: Not At Risk (7/13/2024)    Disabilities     Concentrating, Remembering, or Making Decisions Difficulty: no      Doing Errands Independently Difficulty: no       Immunization Status:  Pneumococcal: no  Influenza: no   COVID-19: Yes    OBJECTIVE:  /70 (BP Location: Left arm, Patient Position: Sitting, Cuff Size: Adult)   Pulse 81   Wt 57.6 kg (127 lb)   SpO2 92%   BMI 22.50 kg/m²     Body mass index is 22.5 kg/m².  Wt Readings from Last 1 Encounters:   11/04/24 57.6 kg (127 lb)       Home BP Readings:   10-yr ASCVD risk: The ASCVD Risk score (Sayra MARTINES, et al., 2019) failed to calculate for the following reasons:    Cannot find a previous HDL lab    Cannot find a previous total cholesterol lab    Lab Review:  Renal Function: CrCl cannot be calculated (Patient's most recent lab result is older than the maximum 30 days allowed.).    Lab Results   Component Value Date    PROBNP 87.9 09/12/2024       Results for orders placed during the hospital encounter of 06/29/24    Adult Transthoracic Echo Complete W/ Cont if Necessary Per Protocol    Interpretation Summary    Left ventricular systolic function is low normal. Calculated left ventricular 3D EF = 46% Left ventricular ejection fraction appears to be 46 - 50%.    The left ventricular cavity is borderline dilated.    Left ventricular diastolic function is consistent with (grade I) impaired relaxation.      ASSESSMENT/PLAN:  HFmEF (EF = 46%)  Continue metoprolol  mg QD  Continue spironolactone 25 mg QD  Patient declines re-trial of Entresto or other RAAS inhibition at this time  Previous hypotension? with Entresto 24/26 mg  Defer Jardiance due to hx of recurrent UTI  Continue Lasix PRN for 2-3lb weight gain in 24 hours or 5lb in one week.  Advised patient to call clinic with 2-3 lb weight gain in 24 hrs or >5 lb weight gain in 1 week.  Patient will continue blood pressure, HR, and daily weight log and bring to her appointment to proceed with further GDMT.    Trae De Paz [facility]  11/04/24  10:21 EDT

## 2024-11-04 NOTE — PROGRESS NOTES
[facility] Follow Up Note    Aster Craft  [id number]  11/04/2024    Primary Care Provider: Yolie Cotto MD   Referring Provider: Kerley, Brian Joseph, DO    Chief Complaint: Follow-up CHF    History of Present Illness:   Mrs. Aster Craft is a 79 y.o. female who presents to the HF Clinic for follow up.  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  She presents today for follow-up.  The patient reports feeling well from a cardiac standpoint today.  She specifically denies chest pain and dyspnea.  She denies palpitations, dizziness, and lower extremity edema.  She reports home SBP's in the 110s.  She offers no other complaints or concerns at this time.    Review of Systems:   Review of Systems  As Noted in HPI.   I have reviewed and confirmed the accuracy of the ROS as documented by the MA/LPN/RN MAIKEL Branch    I have reviewed and/or updated the patient's past medical, past surgical, family, social history, problem list and allergies as appropriate.     Medications:     Current Outpatient Medications:     albuterol sulfate  (90 Base) MCG/ACT inhaler, 2 puffs Every 6 (Six) Hours As Needed., Disp: , Rfl:     azelastine (ASTELIN) 0.1 % nasal spray, Administer 1 spray into the nostril(s) as directed by provider 2 (Two) Times a Day., Disp: , Rfl:     Blood Glucose Monitoring Suppl (OneTouch Verio Flex System) w/Device kit, See Admin Instructions. for testing, Disp: , Rfl:     brimonidine-timolol (COMBIGAN) 0.2-0.5 % ophthalmic solution, Every 12 (Twelve) Hours., Disp: , Rfl:     cephalexin (KEFLEX) 500 MG capsule, Take 1 capsule by mouth Every 12 (Twelve) Hours., Disp: , Rfl:     Continuous Blood Gluc Sensor (FreeStyle Malina 2 Sensor) misc, CHANGE SENSOR EVERY 14 DAYS, Disp: ,  Rfl:     ezetimibe (ZETIA) 10 MG tablet, Take 1 tablet by mouth Daily., Disp: , Rfl:     Flovent  MCG/ACT inhaler, Inhale 2 puffs 2 (Two) Times a Day As Needed., Disp: , Rfl:     furosemide (Lasix) 20 MG tablet, Take 1 tablet by mouth Daily As Needed (For weight gain > 2-3 lbs overnight)., Disp: , Rfl:     ibuprofen (ADVIL,MOTRIN) 800 MG tablet, Take 1 tablet by mouth As Needed., Disp: , Rfl:     ipratropium (ATROVENT) 0.03 % nasal spray, Administer 1 spray into the nostril(s) as directed by provider 2 (Two) Times a Day As Needed., Disp: , Rfl:     Lancets (OneTouch Delica Plus [id number]) misc, USE AS DIRECTED EVERY DAY, Disp: , Rfl:     meclizine (ANTIVERT) 12.5 MG tablet, Take 1 tablet by mouth 3 (Three) Times a Day As Needed., Disp: , Rfl:     metFORMIN (GLUCOPHAGE) 500 MG tablet, Take 1 tablet by mouth 2 (Two) Times a Day With Meals., Disp: , Rfl:     metoprolol succinate XL (Toprol XL) 100 MG 24 hr tablet, Take 1.5 tablets by mouth Daily., Disp: , Rfl:     nitroglycerin (NITROSTAT) 0.4 MG SL tablet, Place 1 tablet under the tongue Every 5 (Five) Minutes As Needed for Chest Pain., Disp: , Rfl:     ondansetron ODT (ZOFRAN-ODT) 4 MG disintegrating tablet, Take 1 tablet by mouth As Needed., Disp: , Rfl:     OneTouch Verio test strip, Daily. for testing, Disp: , Rfl:     pantoprazole (PROTONIX) 40 MG EC tablet, Take 1 tablet by mouth Daily., Disp: , Rfl:     polyethyl glycol-propyl glycol (SYSTANE) 0.4-0.3 % solution ophthalmic solution (artificial tears), Every 1 (One) Hour As Needed., Disp: , Rfl:     Rhopressa 0.02 % solution, Administer 1 drop to both eyes Every Night., Disp: , Rfl:     senna 8.6 MG tablet, Take 1 tablet by mouth As Needed for Constipation., Disp: , Rfl:     simethicone (MYLICON) 80 MG chewable tablet, Chew 1 tablet Every 6 (Six) Hours As Needed for Flatulence., Disp: , Rfl:     spironolactone (ALDACTONE) 25 MG tablet, Take 1 tablet by mouth Daily., Disp: 30 tablet, Rfl: 0    Vibegron  (Gemtesa) 75 MG tablet, Take 1 tablet by mouth Daily., Disp: 30 tablet, Rfl: 11    Vitamins-Lipotropics (Lipoflavonoid) tablet, Take 2 tablets by mouth 3 times a day. For tinitus, Disp: , Rfl:     aspirin 81 MG EC tablet, Take 1 tablet by mouth Daily. (Patient not taking: Reported on 11/4/2024), Disp: , Rfl:     atorvastatin (LIPITOR) 80 MG tablet, Take 0.5 tablets by mouth Every Night for 90 days. (Patient not taking: Reported on 11/4/2024), Disp: , Rfl:     cholecalciferol (VITAMIN D3) 25 MCG (1000 UT) tablet, Take 1 tablet by mouth Daily. (Patient not taking: Reported on 9/11/2024), Disp: , Rfl:     desloratadine (CLARINEX) 5 MG tablet, Take 1 tablet by mouth Daily., Disp: , Rfl:     glipizide (GLUCOTROL) 5 MG tablet, Take 1 tablet by mouth Daily. (Patient not taking: Reported on 11/4/2024), Disp: , Rfl:     naproxen (NAPROSYN) 500 MG tablet, Take 0.5 tablets by mouth As Needed., Disp: , Rfl:     Triamcinolone Acetonide (NASACORT) 55 MCG/ACT nasal inhaler, Administer 2 sprays into the nostril(s) as directed by provider Daily. (Patient not taking: Reported on 11/4/2024), Disp: , Rfl:     Physical Exam:  Vital Signs:   Vitals:    11/04/24 1430   BP: 155/80   Pulse: 81   SpO2: 92%   Weight: 57.6 kg (127 lb)     Body mass index is 22.5 kg/m².    Physical Exam  Vitals and nursing note reviewed.   Constitutional:       General: She is not in acute distress.  HENT:      Head: Normocephalic and atraumatic.   Neck:      Trachea: Trachea normal.   Cardiovascular:      Rate and Rhythm: Normal rate and regular rhythm.      Pulses: Normal pulses.      Heart sounds: Normal heart sounds. No murmur heard.     No friction rub. No gallop.   Pulmonary:      Effort: Pulmonary effort is normal.      Breath sounds: Normal breath sounds.   Musculoskeletal:      Cervical back: Neck supple.      Right lower leg: No edema.      Left lower leg: No edema.   Skin:     General: Skin is warm and dry.   Neurological:      Mental Status: She is  alert and oriented to person, place, and time.   Psychiatric:         Mood and Affect: Mood normal.         Behavior: Behavior normal. Behavior is cooperative.         Thought Content: Thought content does not include suicidal ideation.         Results Review:   I reviewed the patient's new clinical results.    Procedures    Assessment / Plan:     1. Heart failure with mildly reduced ejection fraction (HFmrEF) (Primary)  7/24, LVEF 46 - 50%.    Stage C  NYHA functional class II  Euvolemic on exam  No SGLT2i secondary to UTI's  Patient declines ACE/ARB/ARNI  Plan for BP recheck in 1 month     2. Primary hypertension  Improved on recheck  In-office reading significantly elevated compared to home log  Follow-up 1 month; patient will bring her home BP cuff    Preventative Cardiology:   Tobacco Cessation: N/A   Advance Care Planning: ACP discussion was declined by the patient. Patient has an advance directive in EMR which is still valid.      Follow Up:   No follow-ups on file.      Thank you for allowing me to participate in the care of your patient. Please to not hesitate to contact me with additional questions or concerns.     MAIKEL Jennings

## 2024-11-26 ENCOUNTER — TELEPHONE (OUTPATIENT)
Dept: UROLOGY | Facility: CLINIC | Age: 79
End: 2024-11-26

## 2024-11-26 NOTE — TELEPHONE ENCOUNTER
Caller: VANESSA AHUJA    Relationship to patient: SELF    Best call back number: [phone]    Patient is needing: PT IS HAVING UTI SYMTHOMS.  PT COULD NOT MAKE 31 DEC APPT DUE TO ANOTHER APPT.  R/S 1 YEAR TO 1/2/25.  PT SAYS SHE NEEDS TO BE SEEN SOONER DUE TO PAIN AND UTI SYMTHOMS.

## 2024-11-27 ENCOUNTER — OFFICE VISIT (OUTPATIENT)
Dept: UROLOGY | Facility: CLINIC | Age: 79
End: 2024-11-27
Payer: MEDICARE

## 2024-11-27 VITALS
SYSTOLIC BLOOD PRESSURE: 124 MMHG | WEIGHT: 127 LBS | TEMPERATURE: 98.3 F | DIASTOLIC BLOOD PRESSURE: 74 MMHG | OXYGEN SATURATION: 97 % | HEART RATE: 82 BPM | BODY MASS INDEX: 22.5 KG/M2 | HEIGHT: 63 IN

## 2024-11-27 DIAGNOSIS — B96.29 UTI DUE TO EXTENDED-SPECTRUM BETA LACTAMASE (ESBL) PRODUCING ESCHERICHIA COLI: ICD-10-CM

## 2024-11-27 DIAGNOSIS — N39.0 UTI DUE TO EXTENDED-SPECTRUM BETA LACTAMASE (ESBL) PRODUCING ESCHERICHIA COLI: ICD-10-CM

## 2024-11-27 DIAGNOSIS — Z16.12 UTI DUE TO EXTENDED-SPECTRUM BETA LACTAMASE (ESBL) PRODUCING ESCHERICHIA COLI: ICD-10-CM

## 2024-11-27 DIAGNOSIS — N20.0 KIDNEY STONE: Primary | ICD-10-CM

## 2024-11-27 LAB
BILIRUB BLD-MCNC: NEGATIVE MG/DL
CLARITY, POC: CLEAR
COLOR UR: YELLOW
EXPIRATION DATE: ABNORMAL
GLUCOSE UR STRIP-MCNC: ABNORMAL MG/DL
KETONES UR QL: NEGATIVE
LEUKOCYTE EST, POC: ABNORMAL
Lab: ABNORMAL
NITRITE UR-MCNC: POSITIVE MG/ML
PH UR: 5.5 [PH] (ref 5–8)
PROT UR STRIP-MCNC: ABNORMAL MG/DL
RBC # UR STRIP: ABNORMAL /UL
SP GR UR: 1.03 (ref 1–1.03)
UROBILINOGEN UR QL: ABNORMAL

## 2024-11-27 RX ORDER — GRANULES FOR ORAL 3 G/1
3 POWDER ORAL ONCE
Qty: 3 G | Refills: 0 | Status: SHIPPED | OUTPATIENT
Start: 2024-11-27 | End: 2024-11-27

## 2024-11-27 RX ORDER — NITROFURANTOIN 25; 75 MG/1; MG/1
100 CAPSULE ORAL NIGHTLY
Qty: 30 CAPSULE | Refills: 3 | Status: SHIPPED | OUTPATIENT
Start: 2024-11-27

## 2024-11-27 NOTE — PROGRESS NOTES
Office Visit General Established Female Patient     Patient Name: Aster Craft  : 1945   MRN: [id number]     Chief Complaint:   Chief Complaint   Patient presents with    Follow-up     UTI        Referring Provider: No ref. provider found    History of Present Illness: Aster Craft is a 79 y.o. female with history below in assessment, who presents for follow up.   She has a history of Esteban recently treated by [facility] in [facility] and we received cultures form the office showing ESBL E. Coli susceptible to nitrofurantoin  She does complain of intermittent mild right flank pain and bilateral low back pain      Subjective      Review of System:   As noted in HPI     Past Medical History:   Past Medical History:   Diagnosis Date    Arthritis     Atherosclerosis of native coronary artery of native heart without angina pectoris 2024    Breast cancer     RIGHT BREAST STAGE 3    Diabetes mellitus     Elevated cholesterol     GERD (gastroesophageal reflux disease) 2021    History of cancer chemotherapy     Hypertension     Hyperthyroidism 2022    Kidney stone     Lichen sclerosus     Shingles     Thickened endometrium     Urinary incontinence     Urinary tract infection        Past Surgical History:   Past Surgical History:   Procedure Laterality Date    CARDIAC CATHETERIZATION N/A 7/15/2024    Procedure: Coronary angiography;  Surgeon: Brian Morrow MD;  Location: [facility] CATH INVASIVE LOCATION;  Service: Cardiology;  Laterality: N/A;    CATARACT EXTRACTION Right 01/10/2023    CERVICAL CONE BIOPSY      CHOLECYSTECTOMY  2010    COLONOSCOPY N/A 2018    Procedure: COLONOSCOPY;  Surgeon: Trenton Lyons MD;  Location: [facility];  Service: Gastroenterology    DILATATION AND CURETTAGE      ENDOSCOPY N/A 2018    Procedure: ESOPHAGOGASTRODUODENOSCOPY;  Surgeon: Trenton Lyons MD;  Location: [facility] OR;  Service: Gastroenterology    MASTECTOMY Right 10/1998    MASTECTOMY Left  07/2013    TUBAL ABDOMINAL LIGATION         Family History:   Family History   Problem Relation Age of Onset    Breast cancer Mother     Diabetes Mother     Cancer Mother     Ovarian cancer Maternal Aunt     Cancer Maternal Aunt        Social History:   Social History     Socioeconomic History    Marital status:    Tobacco Use    Smoking status: Never     Passive exposure: Never    Smokeless tobacco: Never   Vaping Use    Vaping status: Never Used   Substance and Sexual Activity    Alcohol use: No    Drug use: No    Sexual activity: Not Currently       Medications:     Current Outpatient Medications:     albuterol sulfate  (90 Base) MCG/ACT inhaler, 2 puffs Every 6 (Six) Hours As Needed., Disp: , Rfl:     azelastine (ASTELIN) 0.1 % nasal spray, Administer 1 spray into the nostril(s) as directed by provider 2 (Two) Times a Day., Disp: , Rfl:     Blood Glucose Monitoring Suppl (OneTouch Verio Flex System) w/Device kit, See Admin Instructions. for testing, Disp: , Rfl:     brimonidine-timolol (COMBIGAN) 0.2-0.5 % ophthalmic solution, Every 12 (Twelve) Hours., Disp: , Rfl:     cephalexin (KEFLEX) 500 MG capsule, Take 1 capsule by mouth Every 12 (Twelve) Hours., Disp: , Rfl:     Continuous Blood Gluc Sensor (FreeStyle Malina 2 Sensor) misc, CHANGE SENSOR EVERY 14 DAYS, Disp: , Rfl:     desloratadine (CLARINEX) 5 MG tablet, Take 1 tablet by mouth Daily., Disp: , Rfl:     ezetimibe (ZETIA) 10 MG tablet, Take 1 tablet by mouth Daily., Disp: , Rfl:     Flovent  MCG/ACT inhaler, Inhale 2 puffs 2 (Two) Times a Day As Needed., Disp: , Rfl:     furosemide (Lasix) 20 MG tablet, Take 1 tablet by mouth Daily As Needed (For weight gain > 2-3 lbs overnight)., Disp: , Rfl:     ibuprofen (ADVIL,MOTRIN) 800 MG tablet, Take 1 tablet by mouth As Needed., Disp: , Rfl:     ipratropium (ATROVENT) 0.03 % nasal spray, Administer 1 spray into the nostril(s) as directed by provider 2 (Two) Times a Day As Needed., Disp: , Rfl:      Lancets (OneTouch Delica Plus [id number]) misc, USE AS DIRECTED EVERY DAY, Disp: , Rfl:     meclizine (ANTIVERT) 12.5 MG tablet, Take 1 tablet by mouth 3 (Three) Times a Day As Needed., Disp: , Rfl:     metFORMIN (GLUCOPHAGE) 500 MG tablet, Take 1 tablet by mouth 2 (Two) Times a Day With Meals., Disp: , Rfl:     metoprolol succinate XL (Toprol XL) 100 MG 24 hr tablet, Take 1.5 tablets by mouth Daily., Disp: 135 tablet, Rfl: 1    naproxen (NAPROSYN) 500 MG tablet, Take 0.5 tablets by mouth As Needed., Disp: , Rfl:     nitroglycerin (NITROSTAT) 0.4 MG SL tablet, Place 1 tablet under the tongue Every 5 (Five) Minutes As Needed for Chest Pain., Disp: , Rfl:     ondansetron ODT (ZOFRAN-ODT) 4 MG disintegrating tablet, Take 1 tablet by mouth As Needed., Disp: , Rfl:     OneTouch Verio test strip, Daily. for testing, Disp: , Rfl:     pantoprazole (PROTONIX) 40 MG EC tablet, Take 1 tablet by mouth Daily., Disp: , Rfl:     polyethyl glycol-propyl glycol (SYSTANE) 0.4-0.3 % solution ophthalmic solution (artificial tears), Every 1 (One) Hour As Needed., Disp: , Rfl:     Rhopressa 0.02 % solution, Administer 1 drop to both eyes Every Night., Disp: , Rfl:     senna 8.6 MG tablet, Take 1 tablet by mouth As Needed for Constipation., Disp: , Rfl:     simethicone (MYLICON) 80 MG chewable tablet, Chew 1 tablet Every 6 (Six) Hours As Needed for Flatulence., Disp: , Rfl:     spironolactone (ALDACTONE) 25 MG tablet, Take 1 tablet by mouth Daily., Disp: 90 tablet, Rfl: 1    Vibegron (Gemtesa) 75 MG tablet, Take 1 tablet by mouth Daily., Disp: 30 tablet, Rfl: 11    Vitamins-Lipotropics (Lipoflavonoid) tablet, Take 2 tablets by mouth 3 times a day. For tinitus, Disp: , Rfl:     aspirin 81 MG EC tablet, Take 1 tablet by mouth Daily. (Patient not taking: Reported on 11/27/2024), Disp: , Rfl:     atorvastatin (LIPITOR) 80 MG tablet, Take 0.5 tablets by mouth Every Night for 90 days. (Patient not taking: Reported on 11/27/2024), Disp: ,  "Rfl:     cholecalciferol (VITAMIN D3) 25 MCG (1000 UT) tablet, Take 1 tablet by mouth Daily. (Patient not taking: Reported on 9/11/2024), Disp: , Rfl:     fosfomycin (MONUROL) 3 g pack, Take 3 g by mouth 1 (One) Time for 1 dose. Mix with water and drink one time dose, Disp: 3 g, Rfl: 0    glipizide (GLUCOTROL) 5 MG tablet, Take 1 tablet by mouth Daily. (Patient not taking: Reported on 11/27/2024), Disp: , Rfl:     nitrofurantoin, macrocrystal-monohydrate, (Macrobid) 100 MG capsule, Take 1 capsule by mouth Every Night., Disp: 30 capsule, Rfl: 3    Triamcinolone Acetonide (NASACORT) 55 MCG/ACT nasal inhaler, Administer 2 sprays into the nostril(s) as directed by provider Daily. (Patient not taking: Reported on 11/4/2024), Disp: , Rfl:     Allergies:   Allergies   Allergen Reactions    Caffeine Other (See Comments)     No caffeine per doctors recommendation    Jardiance [Empagliflozin] Other (See Comments)     UTI       Objective     Physical Exam:   Vital Signs:   Visit Vitals  /74 (BP Location: Right arm, Patient Position: Sitting, Cuff Size: Adult)   Pulse 82   Temp 98.3 °F (36.8 °C) (Temporal)   Ht 160 cm (62.99\")   Wt 57.6 kg (127 lb)   SpO2 97%   BMI 22.50 kg/m²      Body mass index is 22.5 kg/m².     Physical Exam  Vitals and nursing note reviewed.   Constitutional:       General: She is not in acute distress.     Appearance: Normal appearance. She is not ill-appearing, toxic-appearing or diaphoretic.   Pulmonary:      Effort: Pulmonary effort is normal. No respiratory distress.   Skin:     General: Skin is warm and dry.   Neurological:      General: No focal deficit present.      Mental Status: She is alert and oriented to person, place, and time.   Psychiatric:         Mood and Affect: Mood normal.         Behavior: Behavior normal.          Labs  Brief Urine Lab Results  (Last result in the past 365 days)        Color   Clarity   Blood   Leuk Est   Nitrite   Protein   CREAT   Urine HCG        11/27/24 " 1106 Yellow   Clear   Small   Trace   Positive   30 mg/dL                   Lab Results   Component Value Date    GLUCOSE 169 (H) 09/12/2024    CALCIUM 9.3 09/12/2024     09/12/2024    K 3.8 09/12/2024    CO2 27.2 09/12/2024     09/12/2024    BUN 20 09/12/2024    CREATININE 0.63 09/12/2024    EGFRIFAFRI >60 05/18/2022    EGFRIFNONA >60 05/18/2022    BCR 31.7 (H) 09/12/2024    ANIONGAP 9.8 09/12/2024       Lab Results   Component Value Date    WBC 12.36 (H) 09/12/2024    HGB 13.4 09/12/2024    HCT 41.0 09/12/2024    MCV 94.5 09/12/2024     09/12/2024       Urine Culture          1/10/2024    11:12   Urine Culture   Urine Culture Final report         Radiographic Studies  CT Chest Without Contrast Diagnostic    Result Date: 9/12/2024  IMPRESSION: 1. No acute intrathoracic findings. No evidence of pneumonia. 2. Linear atelectasis versus scarring along the left lung base. 3. Multiple bilateral pulmonary nodules measuring up to 6 mm in the right lower lobe. Recommend comparison with prior cross-sectional imaging if available. If none available, recommend short-term follow-up imaging in 3-6 months. 4. Nodular enlarged left lobe of the thyroid has mass effect upon the esophagus at the thoracic inlet. Recommend correlation with nonemergent thyroid ultrasound if not already performed. 5. Coronary artery atherosclerosis. Mild ascending aortic ectasia measuring up to 3.6 cm. Authenticated and Electronically Signed by Khanh Branch MD on 09/12/2024 07:12:17 PM    XR Chest 1 View    Result Date: 9/12/2024  Cardiomegaly with no acute infiltrate..      This report was signed and finalized on 9/12/2024 4:44 PM by Iraida Kruse MD.        I have reviewed the above labs and imaging.     PVR  Post-void residual performed with ultrasound scanner by staff and interpreted by me - Janelle     Assessment / Plan      Assessment/Plan:   Diagnoses and all orders for this visit:    1. Kidney stone (Primary)  -     POC Urinalysis  Dipstick, Automated    2. UTI due to extended-spectrum beta lactamase (ESBL) producing Escherichia coli  -     fosfomycin (MONUROL) 3 g pack; Take 3 g by mouth 1 (One) Time for 1 dose. Mix with water and drink one time dose  Dispense: 3 g; Refill: 0  -     nitrofurantoin, macrocrystal-monohydrate, (Macrobid) 100 MG capsule; Take 1 capsule by mouth Every Night.  Dispense: 30 capsule; Refill: 3     80 yo female here for follow up with Bladimir we discussed suppression in the past and she did not want to start at that time. Today we had an in-depth discussion about her larger kidney stone in her right kidney as an underlying cause of her recurrent urinary tract infections and recommendation for surgical management.  Patient states her daughter and son have had kidney stone surgeries and had lots of problems afterwards and she is hesitant for surgery.  Today she declines scheduling surgical intervention and will consider this and we will rediscuss at her next visit.  UA today is concerning for infection with positive nitrites and leukocytes, will empirically start patient on fosfomycin one-time dose and based on her previous culture was susceptible to Macrobid we will have patient start antibiotic suppression nightly and reevaluate in 3 months.    Recommend surgical management of right kidney stone patient declines at this time  Start fosfomycin one-time dose today and in 1 week start Macrobid nightly for suppression  Recommend tight diabetes control and getting A1c to below 7    Follow Up:   Return in about 3 months (around 2/27/2025) for Follow up Susy Thao, MAIKEL, NP-C  [facility] [place]

## 2024-11-30 LAB
BACTERIA UR CULT: ABNORMAL
OTHER ANTIBIOTIC SUSC ISLT: ABNORMAL

## 2024-12-02 ENCOUNTER — DISEASE STATE MANAGEMENT VISIT (OUTPATIENT)
Dept: PHARMACY | Facility: HOSPITAL | Age: 79
End: 2024-12-02
Payer: MEDICARE

## 2024-12-02 VITALS
OXYGEN SATURATION: 94 % | TEMPERATURE: 97.3 F | BODY MASS INDEX: 22.32 KG/M2 | RESPIRATION RATE: 16 BRPM | HEART RATE: 77 BPM | SYSTOLIC BLOOD PRESSURE: 139 MMHG | DIASTOLIC BLOOD PRESSURE: 75 MMHG | HEIGHT: 63 IN | WEIGHT: 126 LBS

## 2024-12-02 PROCEDURE — G0463 HOSPITAL OUTPT CLINIC VISIT: HCPCS

## 2024-12-02 NOTE — PROGRESS NOTES
[facility]  Heart Failure Pharmacist Note     Patient Name: Aster Craft  :1945  Age: 79 y.o.  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 PMH otherwise significant for AFib,T2DM, HTN, breast cancer, and recurrent UTI.    During visit patient reports she has been feeling well and denies any chest pain, dyspnea, or lower extremity edema. In the clinic today, the patient's BP was 139/75 and HR was 77. Patient did not bring BP log to appointment but states BP typically runs in the range of 110s-120s/60s. Patient's GDMT regimen consists of Metoprolol 150mg once daily & Sprinolactone 25mg daily. Metoprolol is being increased to target dose of 200mg daily and new prescription was sent to the patient's home pharmacy. Entresto was previously discontinued due to hypotension. Initiation of Lisinopril 2.5mg daily was recommended at visit to help achieve GDMT, but patient declined at this time. She would like to discuss starting Lisinopril again at her follow up appointment.    Heart Failure GDMT:    Drug Class   Drug   Dose Last Dose Adjustment   Additional Titration   Notes   ACEi/ARB/ARNI -  Previous Entresto trial (hypoTN); pt declines low dose Lisinopril as of 24   Beta Blocker Metoprolol Succinate 200mg 24     MRA Spironolactone 25mg 24     SGLT2i -    Jardiance D/C - Hx of UTI     Other CV Meds:  Lasix 20mg PRN  Atorvastatin 80 mg  Ezetimibe 10 mg  Aspirin 81 mg    Medication Use:  Adherence: good  Past hx of medication use/intolerance:  Affordability:    Social Determinants:    Social Drivers of Health     Tobacco Use: Low Risk  (2024)    Patient History     Smoking Tobacco Use: Never     Smokeless Tobacco Use: Never     Passive Exposure: Never   Recent Concern: Tobacco Use - Medium Risk  (9/24/2024)    Received from [facility]    Patient History     Smoking Tobacco Use: Former     Smokeless Tobacco Use: Never     Passive Exposure: Not on file   Alcohol Use: Not At Risk (7/13/2024)    AUDIT-C     Frequency of Alcohol Consumption: Never     Average Number of Drinks: Patient does not drink     Frequency of Binge Drinking: Never   Financial Resource Strain: Low Risk  (7/14/2024)    Overall Financial Resource Strain (CARDIA)     Difficulty of Paying Living Expenses: Not hard at all   Food Insecurity: No Food Insecurity (7/14/2024)    Hunger Vital Sign     Worried About Running Out of Food in the Last Year: Never true     Ran Out of Food in the Last Year: Never true   Transportation Needs: No Transportation Needs (7/14/2024)    PRAPARE - Transportation     Lack of Transportation (Medical): No     Lack of Transportation (Non-Medical): No   Physical Activity: Inactive (7/14/2024)    Exercise Vital Sign     Days of Exercise per Week: 0 days     Minutes of Exercise per Session: 0 min   Stress: No Stress Concern Present (7/14/2024)    [employer] [place] [employer] - Occupational Stress Questionnaire     Feeling of Stress : Only a little   Social Connections: Not At Risk (7/14/2024)    Family and Community Support     Help with Day-to-Day Activities: I don't need any help     Lonely or Isolated: Never   Interpersonal Safety: Not At Risk (9/12/2024)    Abuse Screen     Unsafe at Home or Work/School: no     Feels Threatened by Someone?: no     Does Anyone Keep You from Contacting Others or Doint Things Outside the Home?: no     Physical Sign of Abuse Present: no   Depression: Not at risk (7/14/2024)    PHQ-2     PHQ-2 Score: 0   Housing Stability: Not At Risk (7/14/2024)    Housing Stability     Current Living Arrangements: home     Potentially Unsafe Housing Conditions: none   Utilities: At Risk (7/14/2024)    [facility] Utilities     Threatened with loss of utilities: Yes   Health Literacy: Not At Risk  "(7/14/2024)    Education     Help with school or training?: No     Preferred Language: English   Employment: Not At Risk (7/14/2024)    Employment     Do you want help finding or keeping work or a job?: I do not need or want help   Disabilities: Not At Risk (7/13/2024)    Disabilities     Concentrating, Remembering, or Making Decisions Difficulty: no     Doing Errands Independently Difficulty: no       Immunization Status:  Pneumococcal: no  Influenza: no   COVID-19: Yes    OBJECTIVE:  /75 (BP Location: Left arm, Patient Position: Sitting, Cuff Size: Adult)   Pulse 77   Temp 97.3 °F (36.3 °C) (Infrared)   Resp 16   Ht 160 cm (63\")   Wt 57.2 kg (126 lb)   SpO2 94% Comment: RA  BMI 22.32 kg/m²     Body mass index is 22.32 kg/m².  Wt Readings from Last 1 Encounters:   12/02/24 57.2 kg (126 lb)       Home BP Readings:   10-yr ASCVD risk: The ASCVD Risk score (Sayra MARTINES, et al., 2019) failed to calculate for the following reasons:    Cannot find a previous HDL lab    Cannot find a previous total cholesterol lab    Lab Review:  Renal Function: CrCl cannot be calculated (Patient's most recent lab result is older than the maximum 30 days allowed.).    Lab Results   Component Value Date    PROBNP 87.9 09/12/2024       Results for orders placed during the hospital encounter of 06/29/24    Adult Transthoracic Echo Complete W/ Cont if Necessary Per Protocol    Interpretation Summary    Left ventricular systolic function is low normal. Calculated left ventricular 3D EF = 46% Left ventricular ejection fraction appears to be 46 - 50%.    The left ventricular cavity is borderline dilated.    Left ventricular diastolic function is consistent with (grade I) impaired relaxation.      ASSESSMENT/PLAN:  HFmEF (EF = 46%)  Increase metoprolol XL to 200 mg QD  Continue spironolactone 25 mg QD  Patient declines Lisinopril 2.5mg for RAAS inhibition at this time   Previous Entresto 24/26mg D/C due to hypotension  Defer Jardiance " due to hx of recurrent UTI  Continue Lasix PRN for 2-3lb weight gain in 24 hours or 5lb in one week.  Advised patient to call clinic with 2-3 lb weight gain in 24 hrs or >5 lb weight gain in 1 week.  Patient will continue blood pressure, HR, and daily weight log and bring to her appointment to proceed with further GDMT.    Rasheed Davis  [facility]  12/02/24  10:21 EDT

## 2024-12-02 NOTE — PATIENT INSTRUCTIONS
INCREASE METOPROLOL XL TO 200MG DAILY    TAKE BLOOD PRESSURE AND HEART RATE ONE HOUR AFTER TAKING YOUR MORNING MEDS. BRING BACK YOUR BLOOD PRESSURE AND HEART RATE LOG TO NET APPOINTMENT    WEIGH YOURSELF EACH MORNING AFTER USING THE RESTROOM WITH THE SAME AMOUNT OF CLOTHING ON.

## 2024-12-02 NOTE — PROGRESS NOTES
[facility] Cardiology Office Follow Up Note    Aster Craft  [id number]  2024    Primary Care Provider: Yolie Cotto MD    Chief Complaint   Patient presents with    Follow-up    Congestive Heart Failure       Subjective     History of Present Illness:   Aster Craft is a 79 y.o. female with failure with mildly reduced ejection fraction, nonischemic cardiomyopathy, coronary atherosclerosis, reported history of atrial fibrillation, hypertension, and history of breast cancer status post bilateral mastectomy who presents to the Cardiology Clinic for a three month follow up.  During her last visit in the cardiology office, we made no changes to her medical therapy.  We did discuss anticoagulation at length but patient declined.  She has been seen by Teressa Garcia in the heart failure clinic multiple times since her last visit and was actually just seen yesterday.  Her metoprolol was switched to 200 mg daily.  She has no cardiac complaints.  Denies any chest pain, dyspnea, palpitations, orthopnea, or leg swelling.  She has not used any Lasix in quite some time.  She is planning to get lithotripsy for a kidney stone.  Over the past few days, she has had significant eye pain and her right eye has become very bloodshot.  She stopped her aspirin due to these ongoing issues and is waiting to hear back from her eye doctor.    Past Cardiac Testin. Last Coronary Angio: 7/15/24  Angiographically near normal coronary arteries  Noncardiac chest pain     2. Last Echo: 24    Left ventricular systolic function is low normal. Calculated left ventricular 3D EF = 46% Left ventricular ejection fraction appears to be 46 - 50%.    The left ventricular cavity is borderline dilated.    Left ventricular diastolic function is consistent with (grade I) impaired relaxation.     3. Prior Stress Testing: NA     4. Prior Holter Monitor: 24  No sustained supraventricular or ventricular  arrhythmia  10 brief episodes of nonsustained SVT.  Longest lasting for 8 beats.  Asymptomatic.    Review of Systems:  Review of Systems   Constitutional: Negative.    Respiratory: Negative.     Cardiovascular: Negative.    Gastrointestinal: Negative.    Musculoskeletal: Negative.    Neurological: Negative.        I have reviewed and/or updated the patient's past medical, past surgical, family, social history, problem list and allergies as appropriate.       Current Outpatient Medications:     albuterol sulfate  (90 Base) MCG/ACT inhaler, 2 puffs Every 6 (Six) Hours As Needed., Disp: , Rfl:     atorvastatin (LIPITOR) 80 MG tablet, Take 0.5 tablets by mouth Every Night for 90 days., Disp: , Rfl:     azelastine (ASTELIN) 0.1 % nasal spray, Administer 1 spray into the nostril(s) as directed by provider 2 (Two) Times a Day., Disp: , Rfl:     Blood Glucose Monitoring Suppl (OneTouch Verio Flex System) w/Device kit, See Admin Instructions. for testing, Disp: , Rfl:     brimonidine-timolol (COMBIGAN) 0.2-0.5 % ophthalmic solution, Every 12 (Twelve) Hours., Disp: , Rfl:     Continuous Blood Gluc Sensor (FreeStyle Malina 2 Sensor) misc, CHANGE SENSOR EVERY 14 DAYS, Disp: , Rfl:     desloratadine (CLARINEX) 5 MG tablet, Take 1 tablet by mouth Daily., Disp: , Rfl:     ezetimibe (ZETIA) 10 MG tablet, Take 1 tablet by mouth Daily., Disp: , Rfl:     Flovent  MCG/ACT inhaler, Inhale 2 puffs 2 (Two) Times a Day As Needed., Disp: , Rfl:     furosemide (Lasix) 20 MG tablet, Take 1 tablet by mouth Daily As Needed (For weight gain > 2-3 lbs overnight)., Disp: , Rfl:     ibuprofen (ADVIL,MOTRIN) 800 MG tablet, Take 1 tablet by mouth As Needed., Disp: , Rfl:     ipratropium (ATROVENT) 0.03 % nasal spray, Administer 1 spray into the nostril(s) as directed by provider 2 (Two) Times a Day As Needed., Disp: , Rfl:     Lancets (OneTouch Delica Plus [id number]) misc, USE AS DIRECTED EVERY DAY, Disp: , Rfl:     meclizine (ANTIVERT)  12.5 MG tablet, Take 1 tablet by mouth 3 (Three) Times a Day As Needed., Disp: , Rfl:     metFORMIN (GLUCOPHAGE) 500 MG tablet, Take 1 tablet by mouth 2 (Two) Times a Day With Meals., Disp: , Rfl:     metoprolol succinate XL (Toprol XL) 100 MG 24 hr tablet, Take 1.5 tablets by mouth Daily. (Patient taking differently: Take 2 tablets by mouth Daily.), Disp: 135 tablet, Rfl: 1    naproxen (NAPROSYN) 500 MG tablet, Take 0.5 tablets by mouth As Needed., Disp: , Rfl:     nitrofurantoin, macrocrystal-monohydrate, (Macrobid) 100 MG capsule, Take 1 capsule by mouth Every Night., Disp: 30 capsule, Rfl: 3    nitroglycerin (NITROSTAT) 0.4 MG SL tablet, Place 1 tablet under the tongue Every 5 (Five) Minutes As Needed for Chest Pain., Disp: , Rfl:     ondansetron ODT (ZOFRAN-ODT) 4 MG disintegrating tablet, Take 1 tablet by mouth As Needed., Disp: , Rfl:     OneTouch Verio test strip, Daily. for testing, Disp: , Rfl:     pantoprazole (PROTONIX) 40 MG EC tablet, Take 1 tablet by mouth Daily., Disp: , Rfl:     polyethyl glycol-propyl glycol (SYSTANE) 0.4-0.3 % solution ophthalmic solution (artificial tears), Every 1 (One) Hour As Needed., Disp: , Rfl:     Rhopressa 0.02 % solution, Administer 1 drop to both eyes Every Night., Disp: , Rfl:     senna 8.6 MG tablet, Take 1 tablet by mouth As Needed for Constipation., Disp: , Rfl:     simethicone (MYLICON) 80 MG chewable tablet, Chew 1 tablet Every 6 (Six) Hours As Needed for Flatulence., Disp: , Rfl:     spironolactone (ALDACTONE) 25 MG tablet, Take 1 tablet by mouth Daily., Disp: 90 tablet, Rfl: 1    Vibegron (Gemtesa) 75 MG tablet, Take 1 tablet by mouth Daily., Disp: 30 tablet, Rfl: 11    Vitamins-Lipotropics (Lipoflavonoid) tablet, Take 2 tablets by mouth 3 times a day. For tinitus - per pt has not been taking x 2-3 wks (12/2/2024), she is getting ready to start them again, Disp: , Rfl:     aspirin 81 MG EC tablet, Take 1 tablet by mouth Daily. (Patient not taking: Reported on  "12/3/2024), Disp: , Rfl:     cephalexin (KEFLEX) 500 MG capsule, Take 1 capsule by mouth Every 12 (Twelve) Hours. (Patient not taking: Reported on 12/3/2024), Disp: , Rfl:     cholecalciferol (VITAMIN D3) 25 MCG (1000 UT) tablet, Take 1 tablet by mouth Daily. (Patient not taking: Reported on 9/11/2024), Disp: , Rfl:     glipizide (GLUCOTROL) 5 MG tablet, Take 1 tablet by mouth Daily. (Patient not taking: Reported on 12/3/2024), Disp: , Rfl:     Triamcinolone Acetonide (NASACORT) 55 MCG/ACT nasal inhaler, Administer 2 sprays into the nostril(s) as directed by provider Daily. (Patient not taking: Reported on 11/4/2024), Disp: , Rfl:        Objective     Vitals:  Vitals:    12/03/24 1451   BP: 112/72   Pulse: 105   Resp: 16   SpO2: 95%   Weight: 57.1 kg (125 lb 12.8 oz)   Height: 157.5 cm (62\")       Physical Exam:  Vitals reviewed.   Constitutional:       Appearance: Healthy appearance. Not in distress.   Cardiovascular:      Normal rate. Regular rhythm. Normal S1. Normal S2.       Murmurs: There is no murmur.      No gallop.  No click. No rub.   Pulses:     Intact distal pulses.   Edema:     Peripheral edema absent.   Skin:     General: Skin is warm and dry.         Results Review:   I reviewed the patient's new clinical results.  I reviewed the patient's new imaging results and agree with the interpretation.  I reviewed the patient's other test results and agree with the interpretation    Procedures        Assessment & Plan   Diagnoses and all orders for this visit:    1. Paroxysmal atrial fibrillation (Primary)  Carries diagnosis but has no history of stroke.    [id number] at least 7.    Recent Holter: no AF over the course of 2 weeks, only few PSVTs.    Historically has declined anticoagulation.  Simply does not want to change medications because she has \"been doing well with this until now.\"  Declines again today.  -- Again informed her of her increased risk of stroke while off anticoagulation with her history " of A-fib.  Again advised that she can call the office anytime if she changes her mind and we will start her on Xarelto and stop aspirin at that point.  -- We will continue conservative management on aspirin alone per patient wishes.  Patient is aware of her increased risk of stroke and its potential debilitating sequelae.    2. Heart failure with mildly reduced ejection fraction (HFmrEF)  LVEF 45-50%.  Nonobstructive CAD on cath.  Not on SGLT2 inhibitor due to UTIs.  Dry weight 120 pounds and remains stable. Euvolemic and compensated on exam.  -- Continue her high-dose beta-blocker and spironolactone.  Would likely not tolerate ACEi, ARB, or ARNI at this time  -- Lasix PRN based on weight and symptoms    3. Atherosclerosis of native coronary artery of native heart without angina pectoris  Coronary angio: mild luminal irregularities.   Asymptomatic from this standpoint.  Has been taking ezetimibe but never started half dose of statin.  Previously had significant myalgias.  -- Probably okay to hold aspirin for now, especially considering possible conjunctival hemorrhage and ongoing eye pain.  Currently actively undergoing evaluation by her ophthalmologist.  -- Would resume aspirin once cleared by ophthalmology  -- Advised again that she resume her atorvastatin but at half dose of 40 mg daily and let us know if she has any significant myalgias.     4. Essential hypertension  Controlled.  Goal less than 130/80.  Borderline hypotensive but denies orthostasis.  -- Continue medical therapy as above          Plan   No orders of the defined types were placed in this encounter.    Medications:  -     atorvastatin (LIPITOR) 80 MG tablet; Take 0.5 tablets by mouth Every Night for 90 days.    Preventative Cardiology:   Tobacco Cessation: N/A  Obstructive Sleep Apnea Screening: Deffered  AAA Screening: Deffered  Peripheral Arterial Disease Screening: Deffered       Follow Up:   Return in about 3 months (around 3/3/2025).    Thank  you for allowing me to participate in the care of your patient. Please to not hesitate to contact me with additional questions or concerns.     John Howard MD  12/03/2024  09:12 EST

## 2024-12-02 NOTE — PROGRESS NOTES
"             [facility] Follow Up Note    Aster Craft  [id number]  12/02/2024    Primary Care Provider: Yolie Cotto MD   Referring Provider: Yolie Cotto MD    Chief Complaint: Follow-up CHF    History of Present Illness:   Mrs. Aster Craft is a 79 y.o. female who presents to the HF Clinic for follow up.  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  She presents today for follow-up.  The patient reports feeling well from a cardiac standpoint today.  She specifically denies chest pain, dyspnea, palpitations, dizziness, and lower extremity edema.  She does report that she woke up a few days ago with red \"painful\" eyes.  She has an appointment to see her eye doctor for evaluation and treatment.  She also reports that she will need cardiac clearance soon for lithotripsy for a large kidney stone which is thought to have contributed to multiple UTIs.  She offers no other complaints or concerns at this time.      Review of Systems:   Review of Systems  As Noted in HPI.   I have reviewed and confirmed the accuracy of the ROS as documented by the MA/MAGDALENON/RN MAIKEL Branch    I have reviewed and/or updated the patient's past medical, past surgical, family, social history, problem list and allergies as appropriate.     Medications:     Current Outpatient Medications:     albuterol sulfate  (90 Base) MCG/ACT inhaler, 2 puffs Every 6 (Six) Hours As Needed., Disp: , Rfl:     azelastine (ASTELIN) 0.1 % nasal spray, Administer 1 spray into the nostril(s) as directed by provider 2 (Two) Times a Day., Disp: , Rfl:     Blood Glucose Monitoring Suppl (OneTouch Verio Flex System) w/Device kit, See Admin Instructions. for testing, Disp: , Rfl:     brimonidine-timolol (COMBIGAN) 0.2-0.5 % ophthalmic " solution, Every 12 (Twelve) Hours., Disp: , Rfl:     Continuous Blood Gluc Sensor (FreeStyle Malina 2 Sensor) misc, CHANGE SENSOR EVERY 14 DAYS, Disp: , Rfl:     desloratadine (CLARINEX) 5 MG tablet, Take 1 tablet by mouth Daily., Disp: , Rfl:     ezetimibe (ZETIA) 10 MG tablet, Take 1 tablet by mouth Daily., Disp: , Rfl:     Flovent  MCG/ACT inhaler, Inhale 2 puffs 2 (Two) Times a Day As Needed., Disp: , Rfl:     furosemide (Lasix) 20 MG tablet, Take 1 tablet by mouth Daily As Needed (For weight gain > 2-3 lbs overnight)., Disp: , Rfl:     ibuprofen (ADVIL,MOTRIN) 800 MG tablet, Take 1 tablet by mouth As Needed., Disp: , Rfl:     ipratropium (ATROVENT) 0.03 % nasal spray, Administer 1 spray into the nostril(s) as directed by provider 2 (Two) Times a Day As Needed., Disp: , Rfl:     Lancets (OneTouch Delica Plus [id number]) misc, USE AS DIRECTED EVERY DAY, Disp: , Rfl:     meclizine (ANTIVERT) 12.5 MG tablet, Take 1 tablet by mouth 3 (Three) Times a Day As Needed., Disp: , Rfl:     metFORMIN (GLUCOPHAGE) 500 MG tablet, Take 1 tablet by mouth 2 (Two) Times a Day With Meals., Disp: , Rfl:     metoprolol succinate XL (Toprol XL) 100 MG 24 hr tablet, Take 1.5 tablets by mouth Daily. (Patient taking differently: Take 2 tablets by mouth Daily.), Disp: 135 tablet, Rfl: 1    naproxen (NAPROSYN) 500 MG tablet, Take 0.5 tablets by mouth As Needed., Disp: , Rfl:     nitroglycerin (NITROSTAT) 0.4 MG SL tablet, Place 1 tablet under the tongue Every 5 (Five) Minutes As Needed for Chest Pain., Disp: , Rfl:     ondansetron ODT (ZOFRAN-ODT) 4 MG disintegrating tablet, Take 1 tablet by mouth As Needed., Disp: , Rfl:     OneTouch Verio test strip, Daily. for testing, Disp: , Rfl:     pantoprazole (PROTONIX) 40 MG EC tablet, Take 1 tablet by mouth Daily., Disp: , Rfl:     polyethyl glycol-propyl glycol (SYSTANE) 0.4-0.3 % solution ophthalmic solution (artificial tears), Every 1 (One) Hour As Needed., Disp: , Rfl:     Rhopressa  0.02 % solution, Administer 1 drop to both eyes Every Night., Disp: , Rfl:     senna 8.6 MG tablet, Take 1 tablet by mouth As Needed for Constipation., Disp: , Rfl:     simethicone (MYLICON) 80 MG chewable tablet, Chew 1 tablet Every 6 (Six) Hours As Needed for Flatulence., Disp: , Rfl:     spironolactone (ALDACTONE) 25 MG tablet, Take 1 tablet by mouth Daily., Disp: 90 tablet, Rfl: 1    Vibegron (Gemtesa) 75 MG tablet, Take 1 tablet by mouth Daily., Disp: 30 tablet, Rfl: 11    aspirin 81 MG EC tablet, Take 1 tablet by mouth Daily. (Patient not taking: Reported on 12/3/2024), Disp: , Rfl:     atorvastatin (LIPITOR) 80 MG tablet, Take 0.5 tablets by mouth Every Night for 90 days., Disp: , Rfl:     cephalexin (KEFLEX) 500 MG capsule, Take 1 capsule by mouth Every 12 (Twelve) Hours. (Patient not taking: Reported on 12/3/2024), Disp: , Rfl:     cholecalciferol (VITAMIN D3) 25 MCG (1000 UT) tablet, Take 1 tablet by mouth Daily. (Patient not taking: Reported on 9/11/2024), Disp: , Rfl:     glipizide (GLUCOTROL) 5 MG tablet, Take 1 tablet by mouth Daily. (Patient not taking: Reported on 12/3/2024), Disp: , Rfl:     nitrofurantoin, macrocrystal-monohydrate, (Macrobid) 100 MG capsule, Take 1 capsule by mouth Every Night., Disp: 30 capsule, Rfl: 3    Triamcinolone Acetonide (NASACORT) 55 MCG/ACT nasal inhaler, Administer 2 sprays into the nostril(s) as directed by provider Daily. (Patient not taking: Reported on 11/4/2024), Disp: , Rfl:     Vitamins-Lipotropics (Lipoflavonoid) tablet, Take 2 tablets by mouth 3 times a day. For tinitus - per pt has not been taking x 2-3 wks (12/2/2024), she is getting ready to start them again, Disp: , Rfl:     Physical Exam:    Vitals:     11/04/24 1430   BP: 155/80   Pulse: 81   SpO2: 92%   Weight: 57.6 kg (127 lb)         Body mass index is 22.5 kg/m².      Vital Signs:   Vitals:    12/02/24 1452   BP: 139/75   BP Location: Left arm   Patient Position: Sitting   Cuff Size: Adult   Pulse: 77  "  Resp: 16   Temp: 97.3 °F (36.3 °C)   TempSrc: Infrared   SpO2: 94%  Comment: RA   Weight: 57.2 kg (126 lb)   Height: 160 cm (63\")     Body mass index is 22.32 kg/m².    Physical Exam  Vitals and nursing note reviewed.   Constitutional:       General: She is not in acute distress.  HENT:      Head: Normocephalic and atraumatic.   Eyes:      Comments: Bilateral scleral erythema where left is worse than right   Neck:      Trachea: Trachea normal.   Cardiovascular:      Rate and Rhythm: Normal rate and regular rhythm.      Pulses: Normal pulses.      Heart sounds: Normal heart sounds. No murmur heard.     No friction rub. No gallop.   Pulmonary:      Effort: Pulmonary effort is normal.      Breath sounds: Normal breath sounds.   Musculoskeletal:      Cervical back: Neck supple.      Right lower leg: No edema.      Left lower leg: No edema.   Skin:     General: Skin is warm and dry.   Neurological:      Mental Status: She is alert and oriented to person, place, and time.   Psychiatric:         Mood and Affect: Mood normal.         Behavior: Behavior normal. Behavior is cooperative.         Thought Content: Thought content does not include suicidal ideation.         Results Review:   I reviewed the patient's new clinical results.    Procedures    Assessment / Plan:     1. Heart failure with mildly reduced ejection fraction (HFmrEF) (Primary)  7/24, LVEF 46 - 50%.    Stage C  NYHA functional class I  Euvolemic on exam  No SGLT2i secondary to UTI's  Patient declines ACE/ARB/ARNI  Uptitrate metoprolol to 200 mg daily     2. Primary hypertension  Patient did not bring her home BP log or BP cuff  Reports systolic readings in the 100s to 110 at home  Uptitrate metoprolol to target dose    Patient Instructions   INCREASE METOPROLOL XL TO 200MG DAILY    TAKE BLOOD PRESSURE AND HEART RATE ONE HOUR AFTER TAKING YOUR MORNING MEDS. BRING BACK YOUR BLOOD PRESSURE AND HEART RATE LOG TO NET APPOINTMENT    WEIGH YOURSELF EACH MORNING " AFTER USING THE RESTROOM WITH THE SAME AMOUNT OF CLOTHING ON.       Preventative Cardiology:   Tobacco Cessation: N/A   Advance Care Planning: ACP discussion was declined by the patient. Patient has an advance directive in EMR which is still valid.      Follow Up:   No follow-ups on file.      Thank you for allowing me to participate in the care of your patient. Please to not hesitate to contact me with additional questions or concerns.     MAIKEL Jennings

## 2024-12-03 ENCOUNTER — OFFICE VISIT (OUTPATIENT)
Dept: CARDIOLOGY | Facility: CLINIC | Age: 79
End: 2024-12-03
Payer: MEDICARE

## 2024-12-03 VITALS
RESPIRATION RATE: 16 BRPM | HEIGHT: 62 IN | HEART RATE: 105 BPM | BODY MASS INDEX: 23.15 KG/M2 | WEIGHT: 125.8 LBS | DIASTOLIC BLOOD PRESSURE: 72 MMHG | OXYGEN SATURATION: 95 % | SYSTOLIC BLOOD PRESSURE: 112 MMHG

## 2024-12-03 DIAGNOSIS — I48.0 PAROXYSMAL ATRIAL FIBRILLATION: Primary | Chronic | ICD-10-CM

## 2024-12-03 DIAGNOSIS — I25.10 ATHEROSCLEROSIS OF NATIVE CORONARY ARTERY OF NATIVE HEART WITHOUT ANGINA PECTORIS: Chronic | ICD-10-CM

## 2024-12-03 DIAGNOSIS — I10 ESSENTIAL HYPERTENSION: Chronic | ICD-10-CM

## 2024-12-03 DIAGNOSIS — I50.22 HEART FAILURE WITH MILDLY REDUCED EJECTION FRACTION (HFMREF): Chronic | ICD-10-CM

## 2024-12-03 PROCEDURE — 99214 OFFICE O/P EST MOD 30 MIN: CPT | Performed by: INTERNAL MEDICINE

## 2024-12-03 PROCEDURE — 3078F DIAST BP <80 MM HG: CPT | Performed by: INTERNAL MEDICINE

## 2024-12-03 PROCEDURE — 3074F SYST BP LT 130 MM HG: CPT | Performed by: INTERNAL MEDICINE

## 2024-12-03 RX ORDER — ATORVASTATIN CALCIUM 80 MG/1
40 TABLET, FILM COATED ORAL NIGHTLY
Start: 2024-12-03 | End: 2025-03-03

## 2024-12-05 ENCOUNTER — TELEPHONE (OUTPATIENT)
Dept: UROLOGY | Facility: CLINIC | Age: 79
End: 2024-12-05

## 2024-12-05 NOTE — TELEPHONE ENCOUNTER
Caller: VANESSA AHUJA    Relationship to patient: SELF    Best call back number: [phone]    Patient is needing: PT WAS PRESCRIBED POWDER MEDICATION.  PHARMACY STATED MEDS WAS $300.  PT STARTED MICROBID AND WANTS TO KNOW IF THIS IS OK OR CAN SHE ASSISTANCE FOR MEDS COST

## 2024-12-26 ENCOUNTER — TELEPHONE (OUTPATIENT)
Dept: UROLOGY | Facility: CLINIC | Age: 79
End: 2024-12-26

## 2024-12-26 NOTE — TELEPHONE ENCOUNTER
Provider: JOYCE BELTRÁN    Caller: VANESSA AHUJA    Relationship to Patient: SELF    Pharmacy: [facility]    Reason for Call: PATIENT CALLED TODAY , SHE HAS LOST HER ANTIBIOTIC MACROBID . SHE IS WANTING TO KNOW IF YOU WANT TO GIVER HER MORE OR WHAT SHE SHOULD DO. LAST DOSE WAS ON DECEMBER 20TH. PLEASE REACH OUT.    BEST NUMBER IS HER CELL AND YOU MAY LEAVE A MESSAGE IF NO ANSWER    When was the patient last seen: 11 2024

## 2025-02-18 ENCOUNTER — OFFICE VISIT (OUTPATIENT)
Dept: UROLOGY | Facility: CLINIC | Age: 80
End: 2025-02-18
Payer: MEDICARE

## 2025-02-18 VITALS
DIASTOLIC BLOOD PRESSURE: 78 MMHG | HEIGHT: 62 IN | BODY MASS INDEX: 23 KG/M2 | OXYGEN SATURATION: 100 % | SYSTOLIC BLOOD PRESSURE: 128 MMHG | HEART RATE: 80 BPM | WEIGHT: 125 LBS | TEMPERATURE: 97.2 F

## 2025-02-18 DIAGNOSIS — B96.29 UTI DUE TO EXTENDED-SPECTRUM BETA LACTAMASE (ESBL) PRODUCING ESCHERICHIA COLI: ICD-10-CM

## 2025-02-18 DIAGNOSIS — N39.0 UTI DUE TO EXTENDED-SPECTRUM BETA LACTAMASE (ESBL) PRODUCING ESCHERICHIA COLI: ICD-10-CM

## 2025-02-18 DIAGNOSIS — Z16.12 UTI DUE TO EXTENDED-SPECTRUM BETA LACTAMASE (ESBL) PRODUCING ESCHERICHIA COLI: ICD-10-CM

## 2025-02-18 DIAGNOSIS — N39.41 URGE INCONTINENCE OF URINE: ICD-10-CM

## 2025-02-18 DIAGNOSIS — N20.0 KIDNEY STONE: ICD-10-CM

## 2025-02-18 LAB
BILIRUB BLD-MCNC: NEGATIVE MG/DL
CLARITY, POC: CLEAR
COLOR UR: YELLOW
EXPIRATION DATE: ABNORMAL
GLUCOSE UR STRIP-MCNC: NEGATIVE MG/DL
KETONES UR QL: NEGATIVE
LEUKOCYTE EST, POC: ABNORMAL
Lab: ABNORMAL
NITRITE UR-MCNC: POSITIVE MG/ML
PH UR: 5.5 [PH] (ref 5–8)
PROT UR STRIP-MCNC: ABNORMAL MG/DL
RBC # UR STRIP: ABNORMAL /UL
SP GR UR: 1.03 (ref 1–1.03)
UROBILINOGEN UR QL: NORMAL

## 2025-02-18 RX ORDER — OSELTAMIVIR PHOSPHATE 75 MG/1
75 CAPSULE ORAL 2 TIMES DAILY
COMMUNITY
Start: 2025-02-06

## 2025-02-18 RX ORDER — VIBEGRON 75 MG/1
75 TABLET, FILM COATED ORAL DAILY
Qty: 30 TABLET | Refills: 11 | Status: SHIPPED | OUTPATIENT
Start: 2025-02-18

## 2025-02-18 RX ORDER — AMOXICILLIN AND CLAVULANATE POTASSIUM 500; 125 MG/1; MG/1
1 TABLET, FILM COATED ORAL 2 TIMES DAILY
Qty: 14 TABLET | Refills: 0 | Status: SHIPPED | OUTPATIENT
Start: 2025-02-18 | End: 2025-02-25

## 2025-02-18 NOTE — PROGRESS NOTES
Office Visit     Patient Name: Aster Craft  : 1945   MRN: [id number]     Chief Complaint:   Chief Complaint   Patient presents with    Follow-up     Kidney stone       Referring Provider: No ref. provider found    Primary Care Provider: Yolie Cotto MD     History of Present Illness  The patient is a 79-year-old female who presents for follow-up of recurrent UTIs and kidney stones.    Recurrent UTIs  - Experiences recurrent UTIs, often associated with kidney stones.  - No use of Azo, Pyridium, or cranberry supplements.  - Observes unusual urine odor.  - Uses vaginal estrogen cream intermittently for severe itching and labial lesions, which have resolved.  - Continues to experience intermittent severe itching.  - Uses estrogen cream for 2-3 days intermittently.  - Takes daily probiotic but unsure of its efficacy.  - Avoids yogurt due to acid reflux.  - Discontinued Macrobid last Thursday or Friday, no current antibiotics.    Kidney Stones  - Bilateral kidney stones: small on left, 8 mm on right.  - Reluctant to surgical intervention.    Supplemental Information  - Continues Gemtesa for frequency, leaking, and incontinence.      Subjective   Review of System:   As noted in HPI.    Past Medical History:   Diagnosis Date    Arthritis     Atherosclerosis of native coronary artery of native heart without angina pectoris 2024    Breast cancer     RIGHT BREAST STAGE 3    Diabetes mellitus     Elevated cholesterol     GERD (gastroesophageal reflux disease) 2021    History of cancer chemotherapy     Hypertension     Hyperthyroidism 2022    Kidney stone     Lichen sclerosus     Shingles     Thickened endometrium 2018    Urinary incontinence     Urinary tract infection      Past Surgical History:   Procedure Laterality Date    CARDIAC CATHETERIZATION N/A 7/15/2024    Procedure: Coronary angiography;  Surgeon: Brian Morrow MD;  Location: [facility] CATH INVASIVE LOCATION;  Service:  Cardiology;  Laterality: N/A;    CATARACT EXTRACTION Right 01/10/2023    CERVICAL CONE BIOPSY      CHOLECYSTECTOMY  03/2010    COLONOSCOPY N/A 07/06/2018    Procedure: COLONOSCOPY;  Surgeon: Trenton Lyons MD;  Location:  [facility];  Service: Gastroenterology    DILATATION AND CURETTAGE      ENDOSCOPY N/A 07/06/2018    Procedure: ESOPHAGOGASTRODUODENOSCOPY;  Surgeon: Trenton Lyons MD;  Location:  [facility] OR;  Service: Gastroenterology    MASTECTOMY Right 10/1998    MASTECTOMY Left 07/2013    TUBAL ABDOMINAL LIGATION       Family History   Problem Relation Age of Onset    Breast cancer Mother     Diabetes Mother     Cancer Mother     Ovarian cancer Maternal Aunt     Cancer Maternal Aunt      Social History     Socioeconomic History    Marital status:    Tobacco Use    Smoking status: Never     Passive exposure: Never    Smokeless tobacco: Never   Vaping Use    Vaping status: Never Used   Substance and Sexual Activity    Alcohol use: No    Drug use: No    Sexual activity: Not Currently       Current Outpatient Medications:     albuterol sulfate  (90 Base) MCG/ACT inhaler, 2 puffs Every 6 (Six) Hours As Needed., Disp: , Rfl:     atorvastatin (LIPITOR) 80 MG tablet, Take 0.5 tablets by mouth Every Night for 90 days., Disp: , Rfl:     azelastine (ASTELIN) 0.1 % nasal spray, Administer 1 spray into the nostril(s) as directed by provider 2 (Two) Times a Day., Disp: , Rfl:     Blood Glucose Monitoring Suppl (OneTouch Verio Flex System) w/Device kit, See Admin Instructions. for testing, Disp: , Rfl:     brimonidine-timolol (COMBIGAN) 0.2-0.5 % ophthalmic solution, Every 12 (Twelve) Hours., Disp: , Rfl:     Continuous Blood Gluc Sensor (FreeStyle Malina 2 Sensor) misc, CHANGE SENSOR EVERY 14 DAYS, Disp: , Rfl:     desloratadine (CLARINEX) 5 MG tablet, Take 1 tablet by mouth Daily., Disp: , Rfl:     ezetimibe (ZETIA) 10 MG tablet, Take 1 tablet by mouth Daily., Disp: , Rfl:     Flovent  MCG/ACT inhaler,  Inhale 2 puffs 2 (Two) Times a Day As Needed., Disp: , Rfl:     furosemide (Lasix) 20 MG tablet, Take 1 tablet by mouth Daily As Needed (For weight gain > 2-3 lbs overnight)., Disp: , Rfl:     ibuprofen (ADVIL,MOTRIN) 800 MG tablet, Take 1 tablet by mouth As Needed., Disp: , Rfl:     ipratropium (ATROVENT) 0.03 % nasal spray, Administer 1 spray into the nostril(s) as directed by provider 2 (Two) Times a Day As Needed., Disp: , Rfl:     Lancets (OneTouch Delica Plus [id number]) misc, USE AS DIRECTED EVERY DAY, Disp: , Rfl:     meclizine (ANTIVERT) 12.5 MG tablet, Take 1 tablet by mouth 3 (Three) Times a Day As Needed., Disp: , Rfl:     metFORMIN (GLUCOPHAGE) 500 MG tablet, Take 1 tablet by mouth 2 (Two) Times a Day With Meals., Disp: , Rfl:     metoprolol succinate XL (Toprol XL) 100 MG 24 hr tablet, Take 1.5 tablets by mouth Daily. (Patient taking differently: Take 2 tablets by mouth Daily.), Disp: 135 tablet, Rfl: 1    naproxen (NAPROSYN) 500 MG tablet, Take 0.5 tablets by mouth As Needed., Disp: , Rfl:     nitrofurantoin, macrocrystal-monohydrate, (Macrobid) 100 MG capsule, Take 1 capsule by mouth Every Night., Disp: 30 capsule, Rfl: 3    nitroglycerin (NITROSTAT) 0.4 MG SL tablet, Place 1 tablet under the tongue Every 5 (Five) Minutes As Needed for Chest Pain., Disp: , Rfl:     ondansetron ODT (ZOFRAN-ODT) 4 MG disintegrating tablet, Take 1 tablet by mouth As Needed., Disp: , Rfl:     OneTouch Verio test strip, Daily. for testing, Disp: , Rfl:     oseltamivir (TAMIFLU) 75 MG capsule, Take 1 capsule by mouth 2 (Two) Times a Day., Disp: , Rfl:     pantoprazole (PROTONIX) 40 MG EC tablet, Take 1 tablet by mouth Daily., Disp: , Rfl:     polyethyl glycol-propyl glycol (SYSTANE) 0.4-0.3 % solution ophthalmic solution (artificial tears), Every 1 (One) Hour As Needed., Disp: , Rfl:     Rhopressa 0.02 % solution, Administer 1 drop to both eyes Every Night., Disp: , Rfl:     senna 8.6 MG tablet, Take 1 tablet by mouth As  "Needed for Constipation., Disp: , Rfl:     simethicone (MYLICON) 80 MG chewable tablet, Chew 1 tablet Every 6 (Six) Hours As Needed for Flatulence., Disp: , Rfl:     spironolactone (ALDACTONE) 25 MG tablet, Take 1 tablet by mouth Daily., Disp: 90 tablet, Rfl: 1    Vibegron (Gemtesa) 75 MG tablet, Take 1 tablet by mouth Daily., Disp: 30 tablet, Rfl: 11    Vitamins-Lipotropics (Lipoflavonoid) tablet, Take 2 tablets by mouth 3 times a day. For tinitus - per pt has not been taking x 2-3 wks (12/2/2024), she is getting ready to start them again, Disp: , Rfl:     amoxicillin-clavulanate (Augmentin) 500-125 MG per tablet, Take 1 tablet by mouth 2 (Two) Times a Day for 7 days., Disp: 14 tablet, Rfl: 0    aspirin 81 MG EC tablet, Take 1 tablet by mouth Daily. (Patient not taking: Reported on 11/4/2024), Disp: , Rfl:     cholecalciferol (VITAMIN D3) 25 MCG (1000 UT) tablet, Take 1 tablet by mouth Daily. (Patient not taking: Reported on 9/11/2024), Disp: , Rfl:     glipizide (GLUCOTROL) 5 MG tablet, Take 1 tablet by mouth Daily. (Patient not taking: Reported on 11/4/2024), Disp: , Rfl:     Triamcinolone Acetonide (NASACORT) 55 MCG/ACT nasal inhaler, Administer 2 sprays into the nostril(s) as directed by provider Daily. (Patient not taking: Reported on 11/4/2024), Disp: , Rfl:     Allergies   Allergen Reactions    Caffeine Other (See Comments)     No caffeine per doctors recommendation    Jardiance [Empagliflozin] Other (See Comments)     UTI     Objective   Visit Vitals  /78 (BP Location: Left arm, Patient Position: Sitting, Cuff Size: Adult)   Pulse 80   Temp 97.2 °F (36.2 °C) (Temporal)   Ht 157.5 cm (62.01\")   Wt 56.7 kg (125 lb)   SpO2 100%   BMI 22.86 kg/m²        Body mass index is 22.86 kg/m².     Physical Exam  Vitals and nursing note reviewed. Exam conducted with a chaperone present.   Constitutional:       General: She is not in acute distress.     Appearance: Normal appearance. She is not ill-appearing. "   Pulmonary:      Effort: Pulmonary effort is normal. No respiratory distress.   Genitourinary:     Comments: Decreased rugation, pale pink vulvar tissue, thin labia.  Raised brown mole like lesion on left vulva. No ulcerations of lesions.    Skin:     General: Skin is warm and dry.   Neurological:      General: No focal deficit present.      Mental Status: She is alert and oriented to person, place, and time.   Psychiatric:         Mood and Affect: Mood normal.         Behavior: Behavior normal.          Labs  Lab Results   Component Value Date    COLORU Yellow 02/18/2025    CLARITYU Clear 02/18/2025    SPECGRAV 1.030 02/18/2025    PHUR 5.5 02/18/2025    LEUKOCYTESUR Small (1+) (A) 02/18/2025    NITRITE Positive (A) 02/18/2025    PROTEINPOCUA 100 mg/dL (A) 02/18/2025    GLUCOSEUR Negative 02/18/2025    KETONESU Negative 02/18/2025    UROBILINOGEN Normal 02/18/2025    BILIRUBINUR Negative 02/18/2025    RBCUR Large (A) 02/18/2025       Urine Culture          11/27/2024    00:00   Urine Culture   Urine Culture Final report      Lab Results   Component Value Date    WBC 12.36 (H) 09/12/2024    HGB 13.4 09/12/2024    HCT 41.0 09/12/2024    MCV 94.5 09/12/2024     09/12/2024     Lab Results   Component Value Date    GLUCOSE 169 (H) 09/12/2024    CALCIUM 9.3 09/12/2024     09/12/2024    K 3.8 09/12/2024    CO2 27.2 09/12/2024     09/12/2024    BUN 20 09/12/2024    BUN 18 08/22/2024    CREATININE 0.63 09/12/2024    CREATININE 0.84 08/22/2024    EGFR 91.5 09/12/2024    EGFR 71.7 08/22/2024    BCR 31.7 (H) 09/12/2024    ANIONGAP 9.8 09/12/2024    ALT 17 09/12/2024    AST 18 09/12/2024       Lab Results   Component Value Date    HGBA1C 8.90 (H) 07/01/2024       Radiographic Studies  Disc brought by patient from [facility]    I have reviewed the above labs and imaging.     PVR  Post-void residual performed with ultrasound by staff and interpreted by me - 0 mL    Assessment / Plan      Diagnoses and  all orders for this visit:    1. UTI due to extended-spectrum beta lactamase (ESBL) producing Escherichia coli  -     amoxicillin-clavulanate (Augmentin) 500-125 MG per tablet; Take 1 tablet by mouth 2 (Two) Times a Day for 7 days.  Dispense: 14 tablet; Refill: 0    2. Kidney stone  -     POC Urinalysis Dipstick, Automated  -     Basic Metabolic Panel  -     PTH, Intact & Calcium; Future  -     [facility] 24-Hour Urine, Kidney Stone -; Future  -     Uric Acid; Future  -     Vitamin D 1,25 Dihydroxy; Future    3. Urge incontinence of urine       Assessment & Plan  1. Recurrent UTIs: Urine positive for infection, very concentrated. E. coli present in past cultures. Likely related to kidney stones.  - Increase water intake  - Continue estrogen cream 2-3 days a week (Mon, Wed, Fri at bedtime)  - Start daily cranberry supplement (325 mg BID or 600 mg QD)  - Send urine culture  - Prescribe Bactrim BID while awaiting culture results  - Monitor blood sugar on Bactrim, watch for hypoglycemia  - Consider gynecology/dermatology referral for left posterior labial lesion  - Infectious disease referral if culture limits oral treatment options    2. Kidney stones: Bilateral stones, 8 mm on right. 11% chance of passing within a year, 30% chance of passing 8 mm stone. No size increase since last imaging.  - Recommend surgery but patient reluctant will revisit at a later date  - Treat current UTI, then decide on preventative measures    3. Urge incontinence  - Continue Gemtesa for frequency, leaking, incontinence  - Refill Gemtesa    Follow-up  - Follow-up in 6 months or sooner if UTI symptoms develop       Return in about 6 months (around 8/18/2025) for Susy Thao, MSN, APRN, FNP-C  [facility] [place]     Patient or patient representative verbalized consent for the use of Ambient Listening during the visit with  MAIKEL Carrasquillo for chart documentation. 2/18/2025  17:15 EST

## 2025-02-19 ENCOUNTER — TELEPHONE (OUTPATIENT)
Dept: UROLOGY | Facility: CLINIC | Age: 80
End: 2025-02-19

## 2025-02-19 NOTE — TELEPHONE ENCOUNTER
Hub staff attempted to follow warm transfer process and was unsuccessful     Caller: Aster Craft     Relationship to patient: SELF    Best call back number:  [phone]     Patient is needing: PT WAS SEEN IN OFFICE YESTERDAY. SHE IS SUPPOSED TO DO TESTS. PT HAS QUESTIONS ABOUT TAKING ANTIBIOTICS.    JOYCE HAD ALSO MENTIONED THAT THE PT'S URINE LOOKED PINK. PT DOES NOTE THAT SHE HAS NOTICED A PINKISH COLOR ON THE KOTEX PAD SHE WEARS FOR LEAKAGE AND THOUGHT IT WAS JUST HER EYES. THAT HAS BEEN GOING ON FOR ABOUT A MONTH.    PLEASE REVIEW AND GIVE PT A CALL BACK.

## 2025-02-19 NOTE — TELEPHONE ENCOUNTER
HUB UNABLE TO WARM TRANSFER      2ND ATTEMPT TO SPEAK WITH CLINICAL STAFF FROM PREVIOUS ENCOUNTER.

## 2025-02-20 NOTE — TELEPHONE ENCOUNTER
Spoke to the patent and she is aware. She thought she wasn't supposed to take her ABX yet. I informed her to start her ABX now     Alec BLOOD

## 2025-03-10 ENCOUNTER — TELEPHONE (OUTPATIENT)
Dept: UROLOGY | Facility: CLINIC | Age: 80
End: 2025-03-10

## 2025-03-10 NOTE — TELEPHONE ENCOUNTER
Patient is having frequency and pressure. Patient stated that this has been going on for a week.     Alec BLOOD

## 2025-03-10 NOTE — TELEPHONE ENCOUNTER
Hub staff attempted to follow warm transfer process and was unsuccessful     Caller: Aster Craft    Relationship to patient: Self    Best call back number: [phone]    Patient is needing: PT IS CURRENTLY HAVING SYMPTOMS OF UTI. PLEASE CALL HER TO ADVISE IF SHE NEEDS APPT OR CAN YOU SEND ORDER FOR URINE TEST. SHE WAS ALSO SUPPOSE TO DO A TEST. SHE HAS NOT RECEIVED THIS. THERE WAS NOT ONE IN THE OFFICE AT THAT TIME AND YOU HAD TO ORDER FOR HER.THANK YOU

## 2025-03-11 ENCOUNTER — OFFICE VISIT (OUTPATIENT)
Dept: UROLOGY | Facility: CLINIC | Age: 80
End: 2025-03-11
Payer: MEDICARE

## 2025-03-11 ENCOUNTER — LAB (OUTPATIENT)
Dept: LAB | Facility: HOSPITAL | Age: 80
End: 2025-03-11
Payer: MEDICARE

## 2025-03-11 VITALS
TEMPERATURE: 98.1 F | WEIGHT: 125 LBS | SYSTOLIC BLOOD PRESSURE: 128 MMHG | HEIGHT: 62 IN | DIASTOLIC BLOOD PRESSURE: 76 MMHG | BODY MASS INDEX: 23 KG/M2

## 2025-03-11 DIAGNOSIS — N95.8 GENITOURINARY SYNDROME OF MENOPAUSE: Primary | ICD-10-CM

## 2025-03-11 DIAGNOSIS — I50.32 CHRONIC HEART FAILURE WITH PRESERVED EJECTION FRACTION: Chronic | ICD-10-CM

## 2025-03-11 DIAGNOSIS — N20.0 KIDNEY STONE: ICD-10-CM

## 2025-03-11 DIAGNOSIS — N76.1 CHRONIC VAGINITIS: ICD-10-CM

## 2025-03-11 LAB
BILIRUB BLD-MCNC: NEGATIVE MG/DL
CALCIUM SPEC-SCNC: 9.1 MG/DL (ref 8.6–10.5)
CLARITY, POC: ABNORMAL
COLOR UR: YELLOW
EXPIRATION DATE: ABNORMAL
GLUCOSE UR STRIP-MCNC: ABNORMAL MG/DL
KETONES UR QL: NEGATIVE
LEUKOCYTE EST, POC: NEGATIVE
Lab: ABNORMAL
NITRITE UR-MCNC: POSITIVE MG/ML
PH UR: 5.5 [PH] (ref 5–8)
PROT UR STRIP-MCNC: ABNORMAL MG/DL
PTH-INTACT SERPL-MCNC: 29.6 PG/ML (ref 15–65)
RBC # UR STRIP: ABNORMAL /UL
SP GR UR: 1.03 (ref 1–1.03)
UROBILINOGEN UR QL: NORMAL

## 2025-03-11 PROCEDURE — 80048 BASIC METABOLIC PNL TOTAL CA: CPT

## 2025-03-11 PROCEDURE — 84550 ASSAY OF BLOOD/URIC ACID: CPT

## 2025-03-11 PROCEDURE — 82652 VIT D 1 25-DIHYDROXY: CPT | Performed by: NURSE PRACTITIONER

## 2025-03-11 PROCEDURE — 83970 ASSAY OF PARATHORMONE: CPT

## 2025-03-11 RX ORDER — SULFAMETHOXAZOLE AND TRIMETHOPRIM 800; 160 MG/1; MG/1
1 TABLET ORAL 2 TIMES DAILY
Qty: 14 TABLET | Refills: 0 | Status: SHIPPED | OUTPATIENT
Start: 2025-03-11 | End: 2025-03-18

## 2025-03-11 RX ORDER — PREDNISONE 20 MG/1
40 TABLET ORAL DAILY
COMMUNITY
Start: 2025-02-20

## 2025-03-11 RX ORDER — ESTRADIOL 0.1 MG/G
CREAM VAGINAL
Qty: 42.5 G | Refills: 3 | Status: SHIPPED | OUTPATIENT
Start: 2025-03-11

## 2025-03-11 NOTE — PROGRESS NOTES
Office Visit     Patient Name: Aster Craft  : 1945   MRN: [id number]     Patient or patient representative verbalized consent for the use of Ambient Listening during the visit with  MAIKEL Carrasquillo for chart documentation. 3/11/2025  14:38 EDT    Chief Complaint:   Chief Complaint   Patient presents with    Urinary Tract Infection     Referring Provider: No ref. provider found    Primary Care Provider: Yolie Cotto MD     History of Present Illness  The patient is a 79-year-old female who presents for a follow-up regarding a urinary tract infection (UTI).    UTI Follow-Up  - Reports daily intake of four 16-ounce bottles of water.  - Previously on Macrobid for 1.5 months, discontinued because it made her not feel normal, started probiotics.    External Itching  - Reports external itching.  - Using coconut oil but not applied recently.  - Applies clobetasol cream intermittently.      Subjective   Review of System:   As noted in HPI.    Past Medical History:   Diagnosis Date    Arthritis     Atherosclerosis of native coronary artery of native heart without angina pectoris 2024    Breast cancer     RIGHT BREAST STAGE 3    Diabetes mellitus     Elevated cholesterol     GERD (gastroesophageal reflux disease) 2021    History of cancer chemotherapy     Hypertension     Hyperthyroidism 2022    Kidney stone     Lichen sclerosus     Shingles     Thickened endometrium 2018    Urinary incontinence     Urinary tract infection      Past Surgical History:   Procedure Laterality Date    CARDIAC CATHETERIZATION N/A 7/15/2024    Procedure: Coronary angiography;  Surgeon: Brian Morrow MD;  Location:  [facility];  Service: Cardiology;  Laterality: N/A;    CATARACT EXTRACTION Right 01/10/2023    CERVICAL CONE BIOPSY      CHOLECYSTECTOMY  2010    COLONOSCOPY N/A 2018    Procedure: COLONOSCOPY;  Surgeon: Trenton Lyons MD;  Location:  [facility];  Service:  Gastroenterology    DILATATION AND CURETTAGE      ENDOSCOPY N/A 07/06/2018    Procedure: ESOPHAGOGASTRODUODENOSCOPY;  Surgeon: Trenton Lyons MD;  Location: [facility];  Service: Gastroenterology    MASTECTOMY Right 10/1998    MASTECTOMY Left 07/2013    TUBAL ABDOMINAL LIGATION       Family History   Problem Relation Age of Onset    Breast cancer Mother     Diabetes Mother     Cancer Mother     Ovarian cancer Maternal Aunt     Cancer Maternal Aunt      Social History     Socioeconomic History    Marital status:    Tobacco Use    Smoking status: Never     Passive exposure: Never    Smokeless tobacco: Never   Vaping Use    Vaping status: Never Used   Substance and Sexual Activity    Alcohol use: No    Drug use: No    Sexual activity: Not Currently       Current Outpatient Medications:     predniSONE (DELTASONE) 20 MG tablet, Take 2 tablets by mouth Daily., Disp: , Rfl:     albuterol sulfate  (90 Base) MCG/ACT inhaler, 2 puffs Every 6 (Six) Hours As Needed., Disp: , Rfl:     aspirin 81 MG EC tablet, Take 1 tablet by mouth Daily. (Patient not taking: Reported on 11/4/2024), Disp: , Rfl:     azelastine (ASTELIN) 0.1 % nasal spray, Administer 1 spray into the nostril(s) as directed by provider 2 (Two) Times a Day., Disp: , Rfl:     Blood Glucose Monitoring Suppl (OneTouch Verio Flex System) w/Device kit, See Admin Instructions. for testing, Disp: , Rfl:     brimonidine-timolol (COMBIGAN) 0.2-0.5 % ophthalmic solution, Every 12 (Twelve) Hours., Disp: , Rfl:     cholecalciferol (VITAMIN D3) 25 MCG (1000 UT) tablet, Take 1 tablet by mouth Daily. (Patient not taking: Reported on 9/11/2024), Disp: , Rfl:     Continuous Blood Gluc Sensor (FreeStyle Malina 2 Sensor) misc, CHANGE SENSOR EVERY 14 DAYS, Disp: , Rfl:     desloratadine (CLARINEX) 5 MG tablet, Take 1 tablet by mouth Daily., Disp: , Rfl:     ezetimibe (ZETIA) 10 MG tablet, Take 1 tablet by mouth Daily., Disp: , Rfl:     Flovent  MCG/ACT inhaler,  Inhale 2 puffs 2 (Two) Times a Day As Needed., Disp: , Rfl:     furosemide (Lasix) 20 MG tablet, Take 1 tablet by mouth Daily As Needed (For weight gain > 2-3 lbs overnight)., Disp: , Rfl:     glipizide (GLUCOTROL) 5 MG tablet, Take 1 tablet by mouth Daily. (Patient not taking: Reported on 11/4/2024), Disp: , Rfl:     ibuprofen (ADVIL,MOTRIN) 800 MG tablet, Take 1 tablet by mouth As Needed., Disp: , Rfl:     ipratropium (ATROVENT) 0.03 % nasal spray, Administer 1 spray into the nostril(s) as directed by provider 2 (Two) Times a Day As Needed., Disp: , Rfl:     Lancets (OneTouch Delica Plus [id number]) misc, USE AS DIRECTED EVERY DAY, Disp: , Rfl:     meclizine (ANTIVERT) 12.5 MG tablet, Take 1 tablet by mouth 3 (Three) Times a Day As Needed., Disp: , Rfl:     metFORMIN (GLUCOPHAGE) 500 MG tablet, Take 1 tablet by mouth 2 (Two) Times a Day With Meals., Disp: , Rfl:     metoprolol succinate XL (Toprol XL) 100 MG 24 hr tablet, Take 1.5 tablets by mouth Daily. (Patient taking differently: Take 2 tablets by mouth Daily.), Disp: 135 tablet, Rfl: 1    naproxen (NAPROSYN) 500 MG tablet, Take 0.5 tablets by mouth As Needed., Disp: , Rfl:     nitrofurantoin, macrocrystal-monohydrate, (Macrobid) 100 MG capsule, Take 1 capsule by mouth Every Night., Disp: 30 capsule, Rfl: 3    nitroglycerin (NITROSTAT) 0.4 MG SL tablet, Place 1 tablet under the tongue Every 5 (Five) Minutes As Needed for Chest Pain., Disp: , Rfl:     ondansetron ODT (ZOFRAN-ODT) 4 MG disintegrating tablet, Take 1 tablet by mouth As Needed., Disp: , Rfl:     OneTouch Verio test strip, Daily. for testing, Disp: , Rfl:     oseltamivir (TAMIFLU) 75 MG capsule, Take 1 capsule by mouth 2 (Two) Times a Day., Disp: , Rfl:     pantoprazole (PROTONIX) 40 MG EC tablet, Take 1 tablet by mouth Daily., Disp: , Rfl:     polyethyl glycol-propyl glycol (SYSTANE) 0.4-0.3 % solution ophthalmic solution (artificial tears), Every 1 (One) Hour As Needed., Disp: , Rfl:     Rhopressa  "0.02 % solution, Administer 1 drop to both eyes Every Night., Disp: , Rfl:     senna 8.6 MG tablet, Take 1 tablet by mouth As Needed for Constipation., Disp: , Rfl:     simethicone (MYLICON) 80 MG chewable tablet, Chew 1 tablet Every 6 (Six) Hours As Needed for Flatulence., Disp: , Rfl:     spironolactone (ALDACTONE) 25 MG tablet, Take 1 tablet by mouth Daily., Disp: 90 tablet, Rfl: 1    sulfamethoxazole-trimethoprim (Bactrim DS) 800-160 MG per tablet, Take 1 tablet by mouth 2 (Two) Times a Day for 7 days., Disp: 14 tablet, Rfl: 0    Triamcinolone Acetonide (NASACORT) 55 MCG/ACT nasal inhaler, Administer 2 sprays into the nostril(s) as directed by provider Daily. (Patient not taking: Reported on 11/4/2024), Disp: , Rfl:     Vibegron (Gemtesa) 75 MG tablet, Take 1 tablet by mouth Daily., Disp: 30 tablet, Rfl: 11    Vitamins-Lipotropics (Lipoflavonoid) tablet, Take 2 tablets by mouth 3 times a day. For tinitus - per pt has not been taking x 2-3 wks (12/2/2024), she is getting ready to start them again, Disp: , Rfl:     Allergies   Allergen Reactions    Caffeine Other (See Comments)     No caffeine per doctors recommendation    Jardiance [Empagliflozin] Other (See Comments)     UTI     Objective   Visit Vitals  /76   Temp 98.1 °F (36.7 °C) (Temporal)   Ht 157.5 cm (62.01\")   Wt 56.7 kg (125 lb)   BMI 22.86 kg/m²        Body mass index is 22.86 kg/m².     Physical Exam  Vitals and nursing note reviewed. Exam conducted with a chaperone present.   Constitutional:       General: She is not in acute distress.     Appearance: Normal appearance. She is not ill-appearing.   Pulmonary:      Effort: Pulmonary effort is normal. No respiratory distress.   Genitourinary:     Exam position: Lithotomy position.      Comments: Decreased rugation, pale pink vulvar tissue, thin labia. Denuded tissue at introitus from 3 o'clock to 8 o'clock. No lesions.  Grade 1 cystocele.  No rectocele.          Skin:     General: Skin is warm and " "dry.   Neurological:      General: No focal deficit present.      Mental Status: She is alert and oriented to person, place, and time.   Psychiatric:         Mood and Affect: Mood normal.         Behavior: Behavior normal.         Labs  Lab Results   Component Value Date    COLORU Yellow 03/11/2025    CLARITYU Slightly Cloudy (A) 03/11/2025    SPECGRAV 1.030 03/11/2025    PHUR 5.5 03/11/2025    LEUKOCYTESUR Negative 03/11/2025    NITRITE Positive (A) 03/11/2025    PROTEINPOCUA 30 mg/dL (A) 03/11/2025    GLUCOSEUR >=1000 mg/dL (3+) (A) 03/11/2025    KETONESU Negative 03/11/2025    UROBILINOGEN Normal 03/11/2025    BILIRUBINUR Negative 03/11/2025    RBCUR Small (A) 03/11/2025      Lab Results   Component Value Date    WBCUA 0-2 (A) 06/13/2018    RBCUA 0-2 01/10/2024    RBCUA 0-2 09/17/2019    BACTERIA Comment 01/10/2024    BACTERIA Comment 09/17/2019    HYALCASTU None Seen 06/13/2018    SQUAMEPIUA 0-2 06/13/2018      Urine Culture          11/27/2024    00:00   Urine Culture   Urine Culture Final report      Lab Results   Component Value Date    WBC 12.36 (H) 09/12/2024    HGB 13.4 09/12/2024    HCT 41.0 09/12/2024    MCV 94.5 09/12/2024     09/12/2024     Lab Results   Component Value Date    GLUCOSE 169 (H) 09/12/2024    CALCIUM 9.3 09/12/2024     09/12/2024    K 3.8 09/12/2024    CO2 27.2 09/12/2024     09/12/2024    BUN 20 09/12/2024    BUN 18 08/22/2024    CREATININE 0.63 09/12/2024    CREATININE 0.84 08/22/2024    EGFR 91.5 09/12/2024    EGFR 71.7 08/22/2024    BCR 31.7 (H) 09/12/2024    ANIONGAP 9.8 09/12/2024    ALT 17 09/12/2024    AST 18 09/12/2024     Lab Results   Component Value Date    HGBA1C 8.90 (H) 07/01/2024     No results found for: \"URICACIDSTN\", \"KYNR8NLGERV\", \"BUQQ5YASIQ\", \"LABMAGN\"  No results found for: \"TASU97RL\", \"CAION\", \"PTH\", \"URICACID\"  Lab Results   Component Value Date    LABPH 5.5 01/10/2024       Lab Results   Component Value Date    ATOPOBIUMV Low - 0 01/10/2024    " BVAB2 Low - 0 01/10/2024    MEGASPHAER Low - 0 01/10/2024    CALBICANSN Negative 01/10/2024    CGLABRATAN Negative 01/10/2024    CPARAPSILOS Negative 02/18/2022    CLUSITANIAE Negative 02/18/2022    CKRUSEI Negative 02/18/2022    TRICHVAG Negative 01/10/2024    CHLAMNAA Negative 01/10/2024    NGONORRHON Negative 01/10/2024       Lab Results   Component Value Date    TSH 0.146 (L) 07/13/2024    FREET4 1.2 09/24/2024         Radiographic Studies  No Images in the past 120 days found..    I have reviewed the above labs and imaging.     Assessment / Plan      Diagnoses and all orders for this visit:    1. Genitourinary syndrome of menopause (Primary)  -     POC Urinalysis Dipstick, Automated  -     Urine Culture - Urine, Urine, Clean Catch  -     Urinalysis With Microscopic - Urine, Clean Catch  -     sulfamethoxazole-trimethoprim (Bactrim DS) 800-160 MG per tablet; Take 1 tablet by mouth 2 (Two) Times a Day for 7 days.  Dispense: 14 tablet; Refill: 0    2. Chronic vaginitis  -     NuSwab VG+ - Swab, Vagina; Future         Assessment & Plan  1.  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  - Discussed benefits of estrogen cream, including reduced UTI symptoms and sepsis risk. Risk discussed will send prescription and patient will decide if she wants to use.   - Advised continued use of coconut oil.  - Alternating clobetasol and estrogen cream for itching.  - Urine microscopy, and urine culture ordered. Specimen collected by In and out catheterization per MA  -On demand Bactrim sent advised against antibiotics unless burning during urination recommend she wait for culture results.    2. Chronic vaginitis: reports external itching  -NuSwab for BV and Yeast ordered       Return for Next scheduled follow up.    Aliza Thao, MSN, APRN, FNP-C  [facility]

## 2025-03-12 ENCOUNTER — TELEPHONE (OUTPATIENT)
Dept: PHARMACY | Facility: HOSPITAL | Age: 80
End: 2025-03-12
Payer: MEDICARE

## 2025-03-12 LAB
ANION GAP SERPL CALCULATED.3IONS-SCNC: 12.1 MMOL/L (ref 5–15)
BUN SERPL-MCNC: 20 MG/DL (ref 8–23)
BUN/CREAT SERPL: 32.3 (ref 7–25)
CALCIUM SPEC-SCNC: 9 MG/DL (ref 8.6–10.5)
CHLORIDE SERPL-SCNC: 106 MMOL/L (ref 98–107)
CO2 SERPL-SCNC: 22.9 MMOL/L (ref 22–29)
CREAT SERPL-MCNC: 0.62 MG/DL (ref 0.57–1)
EGFRCR SERPLBLD CKD-EPI 2021: 91.9 ML/MIN/1.73
GLUCOSE SERPL-MCNC: 153 MG/DL (ref 65–99)
POTASSIUM SERPL-SCNC: 4.2 MMOL/L (ref 3.5–5.2)
SODIUM SERPL-SCNC: 141 MMOL/L (ref 136–145)
URATE SERPL-MCNC: 2.5 MG/DL (ref 2.4–5.7)

## 2025-03-12 NOTE — TELEPHONE ENCOUNTER
Called Aster to let her know that her kidney function and potassium level remain stable. She was asked to complete labs by AMIKEL Rowley with Urology and  josafat GARCIA ordered by MAIKEL Rosas placed in 08/2024. She will let the urology office know her labs have resulted if she does not hear from them soon.

## 2025-03-14 LAB
A VAGINAE DNA VAG QL NAA+PROBE: NORMAL SCORE
BACTERIA UR CULT: ABNORMAL
BACTERIA UR CULT: ABNORMAL
BVAB2 DNA VAG QL NAA+PROBE: NORMAL SCORE
C ALBICANS DNA VAG QL NAA+PROBE: NEGATIVE
C GLABRATA DNA VAG QL NAA+PROBE: NEGATIVE
C TRACH DNA SPEC QL NAA+PROBE: NEGATIVE
GLUCOSE UR QL STRIP: NORMAL
KETONES UR QL STRIP: NORMAL
MEGA1 DNA VAG QL NAA+PROBE: NORMAL SCORE
N GONORRHOEA DNA VAG QL NAA+PROBE: NEGATIVE
OTHER ANTIBIOTIC SUSC ISLT: ABNORMAL
PH UR STRIP: NORMAL [PH]
PROT UR QL STRIP: NORMAL
SP GR UR STRIP: NORMAL
T VAGINALIS DNA VAG QL NAA+PROBE: NEGATIVE

## 2025-03-19 ENCOUNTER — OFFICE VISIT (OUTPATIENT)
Dept: CARDIOLOGY | Facility: CLINIC | Age: 80
End: 2025-03-19
Payer: MEDICARE

## 2025-03-19 VITALS
HEART RATE: 85 BPM | SYSTOLIC BLOOD PRESSURE: 124 MMHG | DIASTOLIC BLOOD PRESSURE: 68 MMHG | RESPIRATION RATE: 18 BRPM | HEIGHT: 62 IN | BODY MASS INDEX: 21.97 KG/M2 | OXYGEN SATURATION: 98 % | WEIGHT: 119.4 LBS

## 2025-03-19 DIAGNOSIS — I48.0 PAROXYSMAL ATRIAL FIBRILLATION: ICD-10-CM

## 2025-03-19 DIAGNOSIS — Z16.12 URINARY TRACT INFECTION DUE TO EXTENDED-SPECTRUM BETA LACTAMASE (ESBL) PRODUCING ESCHERICHIA COLI: Primary | ICD-10-CM

## 2025-03-19 DIAGNOSIS — I10 ESSENTIAL HYPERTENSION: ICD-10-CM

## 2025-03-19 DIAGNOSIS — I50.22 HEART FAILURE WITH MILDLY REDUCED EJECTION FRACTION (HFMREF): ICD-10-CM

## 2025-03-19 DIAGNOSIS — N39.0 URINARY TRACT INFECTION DUE TO EXTENDED-SPECTRUM BETA LACTAMASE (ESBL) PRODUCING ESCHERICHIA COLI: Primary | ICD-10-CM

## 2025-03-19 DIAGNOSIS — B96.29 URINARY TRACT INFECTION DUE TO EXTENDED-SPECTRUM BETA LACTAMASE (ESBL) PRODUCING ESCHERICHIA COLI: Primary | ICD-10-CM

## 2025-03-19 DIAGNOSIS — I25.10 ATHEROSCLEROSIS OF NATIVE CORONARY ARTERY OF NATIVE HEART WITHOUT ANGINA PECTORIS: Primary | ICD-10-CM

## 2025-03-19 RX ORDER — SITAGLIPTIN 25 MG/1
25 TABLET, FILM COATED ORAL DAILY
COMMUNITY
Start: 2025-03-13

## 2025-03-19 RX ORDER — BENZONATATE 200 MG/1
CAPSULE ORAL
COMMUNITY
Start: 2025-03-17

## 2025-03-19 NOTE — PROGRESS NOTES
[facility] Cardiology Office Follow Up Note    Aster Craft  [id number]  2025    Primary Care Provider: Yolie Cotto MD   Referring Provider: No ref. provider found    Chief Complaint: Routine follow-up    History of Present Illness:   Mrs. Aster Craft is a 79 y.o. female who presents to the Cardiology Clinic for routine follow-up. 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 She presents today for follow-up.  The patient reports feeling well from a cardiac standpoint today.  She specifically denies chest pain, dyspnea, palpitations, dizziness, syncope.  She denies orthopnea, PND, and lower extremity edema.  She continues to decline both statin and DOAC therapies.  She offers no other cardiac complaints or concerns at this time.      Past Cardiac Testin. Last Coronary Angio: 7/15/24  Angiographically near normal coronary arteries  Noncardiac chest pain     2. Last Echo: 24    Left ventricular systolic function is low normal. Calculated left ventricular 3D EF = 46% Left ventricular ejection fraction appears to be 46 - 50%.    The left ventricular cavity is borderline dilated.    Left ventricular diastolic function is consistent with (grade I) impaired relaxation.     3. Prior Stress Testing: NA     4. Prior Holter Monitor: 24  No sustained supraventricular or ventricular arrhythmia  10 brief episodes of nonsustained SVT.  Longest lasting for 8 beats.  Asymptomatic.    Review of Systems:   Review of Systems  As Noted in HPI.   I have reviewed and confirmed the accuracy of the ROS as documented by the MA/LPN/RN MAIKEL Branch    I have reviewed and/or updated the patient's past medical, past surgical, family, social history, problem list and allergies as appropriate.     Medications:      Current Outpatient Medications:     albuterol sulfate  (90 Base) MCG/ACT inhaler, 2 puffs Every 6 (Six) Hours As Needed., Disp: , Rfl:     azelastine (ASTELIN) 0.1 % nasal spray, Administer 1 spray into the nostril(s) as directed by provider 2 (Two) Times a Day., Disp: , Rfl:     benzonatate (TESSALON) 200 MG capsule, TAKE 1 CAPSULE THREE TIMES DAILY AS NEEDED, Disp: , Rfl:     Blood Glucose Monitoring Suppl (OneTouch Verio Flex System) w/Device kit, See Admin Instructions. for testing, Disp: , Rfl:     Continuous Blood Gluc Sensor (FreeStyle Malina 2 Sensor) misc, CHANGE SENSOR EVERY 14 DAYS, Disp: , Rfl:     Continuous Glucose  (FreeStyle Malina 2 [place]) device, See Admin Instructions., Disp: , Rfl:     desloratadine (CLARINEX) 5 MG tablet, Take 1 tablet by mouth Daily., Disp: , Rfl:     ezetimibe (ZETIA) 10 MG tablet, Take 1 tablet by mouth Daily., Disp: , Rfl:     Flovent  MCG/ACT inhaler, Inhale 2 puffs 2 (Two) Times a Day As Needed., Disp: , Rfl:     furosemide (Lasix) 20 MG tablet, Take 1 tablet by mouth Daily As Needed (For weight gain > 2-3 lbs overnight)., Disp: , Rfl:     ipratropium (ATROVENT) 0.03 % nasal spray, Administer 1 spray into the nostril(s) as directed by provider 2 (Two) Times a Day As Needed., Disp: , Rfl:     Januvia 25 MG tablet, Take 1 tablet by mouth Daily., Disp: , Rfl:     Lancets (OneTouch Delica Plus [id number]) misc, USE AS DIRECTED EVERY DAY, Disp: , Rfl:     meclizine (ANTIVERT) 12.5 MG tablet, Take 1 tablet by mouth 3 (Three) Times a Day As Needed., Disp: , Rfl:     metoprolol succinate XL (Toprol XL) 100 MG 24 hr tablet, Take 1.5 tablets by mouth Daily. (Patient taking differently: Take 2 tablets by mouth Daily.), Disp: 135 tablet, Rfl: 1    nitrofurantoin, macrocrystal-monohydrate, (Macrobid) 100 MG capsule, Take 1 capsule by mouth Every Night., Disp: 30 capsule, Rfl: 3    nitroglycerin (NITROSTAT) 0.4 MG SL tablet, Place 1 tablet under the tongue Every  "5 (Five) Minutes As Needed for Chest Pain., Disp: , Rfl:     ondansetron ODT (ZOFRAN-ODT) 4 MG disintegrating tablet, Take 1 tablet by mouth As Needed., Disp: , Rfl:     OneTouch Verio test strip, Daily. for testing, Disp: , Rfl:     pantoprazole (PROTONIX) 40 MG EC tablet, Take 1 tablet by mouth Daily., Disp: , Rfl:     simethicone (MYLICON) 80 MG chewable tablet, Chew 1 tablet Every 6 (Six) Hours As Needed for Flatulence., Disp: , Rfl:     spironolactone (ALDACTONE) 25 MG tablet, Take 1 tablet by mouth Daily., Disp: 90 tablet, Rfl: 1    Vibegron (Gemtesa) 75 MG tablet, Take 1 tablet by mouth Daily., Disp: 30 tablet, Rfl: 11    Vitamins-Lipotropics (Lipoflavonoid) tablet, Take 2 tablets by mouth 3 times a day. For tinitus - per pt has not been taking x 2-3 wks (12/2/2024), she is getting ready to start them again, Disp: , Rfl:     aspirin 81 MG EC tablet, Take 1 tablet by mouth Daily. (Patient not taking: Reported on 3/19/2025), Disp: , Rfl:     Physical Exam:     3/19/25 3/11/25 2/18/25 12/3/24   /68 128/76 128/78 112/72   Heart Rate 85 -- 80 105   Resp 18 -- -- 16   Temp -- 98.1 °F (36.7 °C) 97.2 °F (36.2 °C) --   Temp src -- Temporal Temporal --   SpO2 98 % -- 100 % 95 %   Weight 54.2 kg (119 lb 6.4 oz) 56.7 kg (125 lb) 56.7 kg (125 lb) 57.1 kg (125 lb 12.8 oz)     Vital Signs:   Vitals:    03/19/25 1440   BP: 124/68   BP Location: Left arm   Patient Position: Sitting   Cuff Size: Adult   Pulse: 85   Resp: 18   SpO2: 98%   Weight: 54.2 kg (119 lb 6.4 oz)   Height: 157.5 cm (62\")     Body mass index is 21.84 kg/m².    Physical Exam  Vitals and nursing note reviewed.   Constitutional:       General: She is not in acute distress.  HENT:      Head: Normocephalic and atraumatic.   Neck:      Trachea: Trachea normal.   Cardiovascular:      Rate and Rhythm: Normal rate and regular rhythm.      Pulses: Normal pulses.      Heart sounds: Normal heart sounds. No murmur heard.     No friction rub. No gallop. "   Pulmonary:      Effort: Pulmonary effort is normal.      Breath sounds: Normal breath sounds.   Musculoskeletal:      Cervical back: Neck supple.      Right lower leg: No edema.      Left lower leg: No edema.   Skin:     General: Skin is warm and dry.   Neurological:      Mental Status: She is alert and oriented to person, place, and time.   Psychiatric:         Mood and Affect: Mood normal.         Behavior: Behavior normal. Behavior is cooperative.         Thought Content: Thought content does not include suicidal ideation.         Results Review:   I reviewed the patient's new clinical results.    Procedures    Assessment / Plan:   Diagnoses and all orders for this visit:    1. Atherosclerosis of native coronary artery of native heart without angina pectoris (Primary)  Stable and angina free  Patient declines aspirin and statin therapy  The patient acknowledges having been counseled multiple times on the benefits of each and she is comfortable with her decision as well as the associated risks of this.    2. Paroxysmal atrial fibrillation  No symptoms to suggest recurrent  Patient continues to decline DOAC therapy and accepts increased risk of CVA    3. Heart failure with mildly reduced ejection fraction (HFmrEF)  No change to GDMT today  NYHA functional class II  Stage C    4. Essential hypertension  Acceptable blood pressure      Preventative Cardiology:   Tobacco Cessation: N/A   Advance Care Planning: ACP discussion was declined by the patient. Patient has an advance directive in EMR which is still valid.      Follow Up:   Return in about 6 months (around 9/19/2025) for Follow-up with Dr. Morrow, Follow-up with Dr. Allen.      Thank you for allowing me to participate in the care of your patient. Please to not hesitate to contact me with additional questions or concerns.     MAIKEL Jennings

## 2025-03-28 ENCOUNTER — HOSPITAL ENCOUNTER (OUTPATIENT)
Dept: ULTRASOUND IMAGING | Facility: HOSPITAL | Age: 80
Discharge: HOME | End: 2025-03-28
Payer: MEDICARE

## 2025-03-28 ENCOUNTER — OFFICE VISIT (OUTPATIENT)
Dept: INFECTIOUS DISEASES | Facility: CLINIC | Age: 80
End: 2025-03-28
Payer: MEDICARE

## 2025-03-28 VITALS
OXYGEN SATURATION: 95 % | DIASTOLIC BLOOD PRESSURE: 76 MMHG | WEIGHT: 122 LBS | RESPIRATION RATE: 18 BRPM | SYSTOLIC BLOOD PRESSURE: 127 MMHG | HEART RATE: 75 BPM | HEIGHT: 63 IN | BODY MASS INDEX: 21.62 KG/M2

## 2025-03-28 DIAGNOSIS — N39.0 RECURRENT UTI: ICD-10-CM

## 2025-03-28 DIAGNOSIS — B96.29 UTI DUE TO EXTENDED-SPECTRUM BETA LACTAMASE (ESBL) PRODUCING ESCHERICHIA COLI: ICD-10-CM

## 2025-03-28 DIAGNOSIS — Z16.12 UTI DUE TO EXTENDED-SPECTRUM BETA LACTAMASE (ESBL) PRODUCING ESCHERICHIA COLI: ICD-10-CM

## 2025-03-28 DIAGNOSIS — N39.0 UTI DUE TO EXTENDED-SPECTRUM BETA LACTAMASE (ESBL) PRODUCING ESCHERICHIA COLI: ICD-10-CM

## 2025-03-28 DIAGNOSIS — E11.9 TYPE 2 DIABETES MELLITUS WITHOUT COMPLICATION, WITHOUT LONG-TERM CURRENT USE OF INSULIN: ICD-10-CM

## 2025-03-28 DIAGNOSIS — E11.9 TYPE 2 DIABETES MELLITUS WITHOUT COMPLICATION, WITHOUT LONG-TERM CURRENT USE OF INSULIN: Primary | ICD-10-CM

## 2025-03-28 DIAGNOSIS — R10.31 RIGHT LOWER QUADRANT ABDOMINAL PAIN: ICD-10-CM

## 2025-03-28 LAB
BACTERIA UR QL AUTO: ABNORMAL /HPF
BILIRUB UR QL STRIP: NEGATIVE
CLARITY UR: CLEAR
COLOR UR: YELLOW
GLUCOSE UR STRIP-MCNC: NEGATIVE MG/DL
HGB UR QL STRIP.AUTO: ABNORMAL
HOLD SPECIMEN: NORMAL
HYALINE CASTS UR QL AUTO: ABNORMAL /LPF
KETONES UR QL STRIP: NEGATIVE
LEUKOCYTE ESTERASE UR QL STRIP.AUTO: ABNORMAL
NITRITE UR QL STRIP: NEGATIVE
PH UR STRIP.AUTO: 6.5 [PH] (ref 5–8)
PROT UR QL STRIP: ABNORMAL
RBC # UR STRIP: ABNORMAL /HPF
REF LAB TEST METHOD: ABNORMAL
SP GR UR STRIP: 1.02 (ref 1–1.03)
SQUAMOUS #/AREA URNS HPF: ABNORMAL /HPF
UROBILINOGEN UR QL STRIP: ABNORMAL
WBC # UR STRIP: ABNORMAL /HPF

## 2025-03-28 PROCEDURE — 76775 US EXAM ABDO BACK WALL LIM: CPT

## 2025-03-28 PROCEDURE — 81001 URINALYSIS AUTO W/SCOPE: CPT | Performed by: INTERNAL MEDICINE

## 2025-03-28 PROCEDURE — 87086 URINE CULTURE/COLONY COUNT: CPT | Performed by: INTERNAL MEDICINE

## 2025-03-28 NOTE — PROGRESS NOTES
[facility]         Referring Provider: No referring provider defined for this encounter.    Subjective      Chief Complaint  Initial Evaluation (UTI)    History of Present Illness  Aster Craft is a 79 y.o. female who presents today to [facility] for infectious disease NEW evaluation and antibiotic management.    The patient is a 79-year-old female who presents for a follow-up regarding a urinary tract infection (UTI).    UTI Follow-Up  - Reports daily intake of four 16-ounce bottles of water.  - Previously on Macrobid for 1.5 months, discontinued because it made her not feel normal, started probiotics.    External Itching  - Reports external itching.  - Using coconut oil but not applied recently.  - Applies clobetasol cream intermittently.     Was recently diagnosed with ESBL UTI and has previously being on long term suppressive therapy. Continues to have urinary symptoms with frequency and resolved dysuria.      Past Medical History:   Diagnosis Date    Arthritis     Atherosclerosis of native coronary artery of native heart without angina pectoris 9/11/2024    Breast cancer     RIGHT BREAST STAGE 3    Diabetes mellitus     Elevated cholesterol     GERD (gastroesophageal reflux disease) 08/23/2021    History of cancer chemotherapy     Hypertension     Hyperthyroidism 05/19/2022    Kidney stone     Lichen sclerosus     Shingles     Thickened endometrium 2018    Urinary incontinence     Urinary tract infection        Past Surgical History:   Procedure Laterality Date    CARDIAC CATHETERIZATION N/A 7/15/2024    Procedure: Coronary angiography;  Surgeon: Brian Morrow MD;  Location: [facility] CATH INVASIVE LOCATION;  Service: Cardiology;  Laterality: N/A;    CATARACT EXTRACTION Right 01/10/2023    CERVICAL CONE BIOPSY      CHOLECYSTECTOMY  03/2010    COLONOSCOPY N/A 07/06/2018    Procedure: COLONOSCOPY;  Surgeon: Trenton Lyons MD;  Location: [facility] OR;   Service: Gastroenterology    DILATATION AND CURETTAGE      ENDOSCOPY N/A 07/06/2018    Procedure: ESOPHAGOGASTRODUODENOSCOPY;  Surgeon: Trenton Lyons MD;  Location: [facility];  Service: Gastroenterology    MASTECTOMY Right 10/1998    MASTECTOMY Left 07/2013    TUBAL ABDOMINAL LIGATION         Social History     Socioeconomic History    Marital status:    Tobacco Use    Smoking status: Never     Passive exposure: Never    Smokeless tobacco: Never   Vaping Use    Vaping status: Never Used   Substance and Sexual Activity    Alcohol use: No    Drug use: No    Sexual activity: Not Currently       Family History  family history includes Breast cancer in her mother; Cancer in her maternal aunt and mother; Diabetes in her mother; Ovarian cancer in her maternal aunt.    Immunization History   Administered Date(s) Administered    COVID-19 (PFIZER) Purple Cap Monovalent 09/09/2021, 09/30/2021        Allergies  Allergies   Allergen Reactions    Caffeine Other (See Comments)     No caffeine per doctors recommendation    Jardiance [Empagliflozin] Other (See Comments)     UTI       The medication list has been reviewed and updated.   Current Medications    Current Outpatient Medications:     albuterol sulfate  (90 Base) MCG/ACT inhaler, 2 puffs Every 6 (Six) Hours As Needed., Disp: , Rfl:     azelastine (ASTELIN) 0.1 % nasal spray, Administer 1 spray into the nostril(s) as directed by provider 2 (Two) Times a Day., Disp: , Rfl:     benzonatate (TESSALON) 200 MG capsule, TAKE 1 CAPSULE THREE TIMES DAILY AS NEEDED, Disp: , Rfl:     Blood Glucose Monitoring Suppl (OneTouch Verio Flex System) w/Device kit, See Admin Instructions. for testing, Disp: , Rfl:     Continuous Blood Gluc Sensor (FreeStyle Malina 2 Sensor) misc, CHANGE SENSOR EVERY 14 DAYS, Disp: , Rfl:     Continuous Glucose  (FreeStyle Malina 2 [place]) device, See Admin Instructions., Disp: , Rfl:     desloratadine (CLARINEX) 5 MG tablet, Take 1 tablet  by mouth Daily., Disp: , Rfl:     ezetimibe (ZETIA) 10 MG tablet, Take 1 tablet by mouth Daily., Disp: , Rfl:     Flovent  MCG/ACT inhaler, Inhale 2 puffs 2 (Two) Times a Day As Needed., Disp: , Rfl:     furosemide (Lasix) 20 MG tablet, Take 1 tablet by mouth Daily As Needed (For weight gain > 2-3 lbs overnight)., Disp: , Rfl:     ipratropium (ATROVENT) 0.03 % nasal spray, Administer 1 spray into the nostril(s) as directed by provider 2 (Two) Times a Day As Needed., Disp: , Rfl:     Januvia 25 MG tablet, Take 1 tablet by mouth Daily., Disp: , Rfl:     Lancets (OneTouch Delica Plus [id number]) misc, USE AS DIRECTED EVERY DAY, Disp: , Rfl:     meclizine (ANTIVERT) 12.5 MG tablet, Take 1 tablet by mouth 3 (Three) Times a Day As Needed., Disp: , Rfl:     metoprolol succinate XL (Toprol XL) 100 MG 24 hr tablet, Take 1.5 tablets by mouth Daily. (Patient taking differently: Take 2 tablets by mouth Daily.), Disp: 135 tablet, Rfl: 1    nitrofurantoin, macrocrystal-monohydrate, (Macrobid) 100 MG capsule, Take 1 capsule by mouth Every Night., Disp: 30 capsule, Rfl: 3    nitroglycerin (NITROSTAT) 0.4 MG SL tablet, Place 1 tablet under the tongue Every 5 (Five) Minutes As Needed for Chest Pain., Disp: , Rfl:     ondansetron ODT (ZOFRAN-ODT) 4 MG disintegrating tablet, Take 1 tablet by mouth As Needed., Disp: , Rfl:     OneTouch Verio test strip, Daily. for testing, Disp: , Rfl:     pantoprazole (PROTONIX) 40 MG EC tablet, Take 1 tablet by mouth Daily., Disp: , Rfl:     simethicone (MYLICON) 80 MG chewable tablet, Chew 1 tablet Every 6 (Six) Hours As Needed for Flatulence., Disp: , Rfl:     spironolactone (ALDACTONE) 25 MG tablet, Take 1 tablet by mouth Daily., Disp: 90 tablet, Rfl: 1    Vibegron (Gemtesa) 75 MG tablet, Take 1 tablet by mouth Daily., Disp: 30 tablet, Rfl: 11    Vitamins-Lipotropics (Lipoflavonoid) tablet, Take 2 tablets by mouth 3 times a day. For tinitus - per pt has not been taking x 2-3 wks (12/2/2024),  "she is getting ready to start them again, Disp: , Rfl:       Review of Systems    Review of Systems   Constitutional:  Negative for activity change, appetite change, chills, diaphoresis, fatigue and fever.   HENT:  Negative for congestion, dental problem, drooling, ear discharge, ear pain, facial swelling, postnasal drip, sinus pressure, sore throat and swollen glands.    Eyes:  Negative for blurred vision, double vision, pain, discharge and itching.   Respiratory:  Negative for apnea, cough, choking, chest tightness and shortness of breath.    Cardiovascular:  Negative for chest pain, palpitations and leg swelling.   Gastrointestinal:  Negative for abdominal distention, abdominal pain, diarrhea, nausea, rectal pain and vomiting.   Genitourinary:  Positive for difficulty urinating, flank pain, frequency, pelvic pressure and urgency. Negative for dysuria, hematuria and pelvic pain.   Neurological:  Negative for dizziness, tremors, seizures, syncope, speech difficulty and confusion.   Psychiatric/Behavioral:  Negative for agitation and behavioral problems.         Objective     Vital Signs:  /76 (BP Location: Left arm, Patient Position: Sitting, Cuff Size: Adult)   Pulse 75   Resp 18   Ht 160 cm (63\")   Wt 55.3 kg (122 lb)   SpO2 95%   BMI 21.61 kg/m²   Estimated body mass index is 21.61 kg/m² as calculated from the following:    Height as of this encounter: 160 cm (63\").    Weight as of this encounter: 55.3 kg (122 lb).    Physical Exam  Constitutional:       General: She is not in acute distress.     Appearance: Normal appearance. She is not ill-appearing, toxic-appearing or diaphoretic.   HENT:      Head: Normocephalic and atraumatic.      Nose: No congestion or rhinorrhea.      Mouth/Throat:      Pharynx: Oropharynx is clear. No oropharyngeal exudate or posterior oropharyngeal erythema.   Cardiovascular:      Rate and Rhythm: Normal rate and regular rhythm.      Heart sounds: No murmur " "heard.  Pulmonary:      Effort: No respiratory distress.      Breath sounds: No stridor. No wheezing, rhonchi or rales.   Chest:      Chest wall: No tenderness.   Abdominal:      General: There is no distension.      Tenderness: There is no abdominal tenderness. There is right CVA tenderness. There is no left CVA tenderness, guarding or rebound.   Musculoskeletal:         General: No swelling, tenderness or signs of injury.      Cervical back: No rigidity or tenderness.   Skin:     Coloration: Skin is not jaundiced or pale.      Findings: No bruising, erythema or rash.   Neurological:      General: No focal deficit present.      Mental Status: She is alert. Mental status is at baseline.   Psychiatric:         Mood and Affect: Mood normal.         Behavior: Behavior normal.          Result Review :  The following data was reviewed by Elena Velazquez MD     Lab Results  Lab Results   Component Value Date    WBC 12.36 (H) 09/12/2024    HGB 13.4 09/12/2024    HCT 41.0 09/12/2024    MCV 94.5 09/12/2024     09/12/2024     Lab Results   Component Value Date    GLUCOSE 153 (H) 03/11/2025    BUN 20 03/11/2025    CREATININE 0.62 03/11/2025    EGFRIFNONA >60 05/18/2022    EGFRIFAFRI >60 05/18/2022    BCR 32.3 (H) 03/11/2025    K 4.2 03/11/2025    CO2 22.9 03/11/2025    CALCIUM 9.0 03/11/2025    CALCIUM 9.1 03/11/2025    ALBUMIN 4.5 09/12/2024    AST 18 09/12/2024    ALT 17 09/12/2024      No results found for: \"CRP\"     No results found for: \"ACANTHNAEG\", \"AFBCX\", \"BPERTUSSISCX\", \"BLOODCX\"  No results found for: \"BCIDPCR\", \"CXREFLEX\", \"CSFCX\", \"CULTURETIS\"  No results found for: \"CULTURES\", \"HSVCX\", \"URCX\"  No results found for: \"EYECULTURE\", \"GCCX\", \"HSVCULTURE\", \"LABHSV\"  No results found for: \"LEGIONELLA\", \"MRSACX\", \"MUMPSCX\", \"MYCOPLASCX\"  No results found for: \"NOCARDIACX\", \"STOOLCX\"  No results found for: \"THROATCX\", \"UNSTIMCULT\", \"URINECX\", \"CULTURE\", \"VZVCULTUR\"  No results found for: \"VIRALCULTU\", " "\"WOUNDCX\"    Radiology Results              Assessment / Plan        Diagnoses and all orders for this visit:    1. Type 2 diabetes mellitus without complication, without long-term current use of insulin (Primary)  -     Urine Culture - Urine, Urine, Clean Catch; Future  -     Urinalysis With Microscopic - Urine, Clean Catch; Future  -     US Renal Bilateral; Future    2. Right lower quadrant abdominal pain  -     Urine Culture - Urine, Urine, Clean Catch; Future  -     Urinalysis With Microscopic - Urine, Clean Catch; Future  -     US Renal Bilateral; Future    3. UTI due to extended-spectrum beta lactamase (ESBL) producing Escherichia coli  -     Urine Culture - Urine, Urine, Clean Catch; Future  -     Urinalysis With Microscopic - Urine, Clean Catch; Future  -     US Renal Bilateral; Future    4. Recurrent UTI  -     Urine Culture - Urine, Urine, Clean Catch; Future  -     Urinalysis With Microscopic - Urine, Clean Catch; Future  -     US Renal Bilateral; Future      3/11/25  Urine Culture Final report Abnormal    Result 1 Comment Abnormal     Comment: Escherichia coli, identified by an automated biochemical system.  Susceptibility profile is consistent with a probable ESBL.  Multi-Drug Resistant Organism  Greater than 100,000 colony forming units per mL   Susceptibility Testing Comment    Comment:       ** S = Susceptible; I = Intermediate; R = Resistant **                     P = Positive; N = Negative              MICS are expressed in micrograms per mL     Antibiotic                 RSLT#1    RSLT#2    RSLT#3    RSLT#4  Amoxicillin/Clavulanic Acid    S  Ampicillin                     R  Cefazolin                      R  Cefepime                       R  Cefoxitin                      S  Cefpodoxime                    R  Ceftriaxone                    R  Ciprofloxacin                  R  Ertapenem                      S  Gentamicin                     S  Levofloxacin                   I  Meropenem              "         S  Nitrofurantoin                 S  Piperacillin/Tazobactam        S  Tetracycline                   R  Tobramycin                     S  Trimethoprim/Sulfa             S       1.  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    2. Chronic vaginitis: reports external itching       I am mostly concerned about her resistance pattern and the presence of urinary symptoms associated with right flank pain.  Will proceed with a stat bilateral renal ultrasound as well as repeat UA and urine culture since patient has been on Bactrim and decide if patient could benefit from oral versus IV antibiotic therapy to clear her current UTI.  I do agree with Aliza Thao that patient is going to need long-term suppressive therapy after the acute treatment of her UTI especially if she has recurrent UTIs down the road.          Follow Up   Return in about 10 days (around 4/7/2025) for Follow up, With Dr. Velazquez.    Visit Diagnoses:    ICD-10-CM ICD-9-CM   1. Type 2 diabetes mellitus without complication, without long-term current use of insulin  E11.9 250.00   2. Right lower quadrant abdominal pain  R10.31 789.03   3. UTI due to extended-spectrum beta lactamase (ESBL) producing Escherichia coli  N39.0 599.0    B96.29 041.49    Z16.12 V09.1   4. Recurrent UTI  N39.0 599.0       Patient was given instructions and counseling regarding her condition or for health maintenance advice. Please see specific information pulled into the AVS if appropriate.     This document has been electronically signed by Elena Velazquez MD   March 28, 2025 09:33 EDT      No orders of the defined types were placed in this encounter.     Dictated Utilizing Dragon Dictation: Part of this note may be an electronic transcription/translation of spoken language to printed text using the Dragon Dictation System.     No

## 2025-03-29 LAB — BACTERIA SPEC AEROBE CULT: NORMAL

## 2025-04-01 ENCOUNTER — TELEPHONE (OUTPATIENT)
Dept: UROLOGY | Facility: CLINIC | Age: 80
End: 2025-04-01

## 2025-04-01 ENCOUNTER — OFFICE VISIT (OUTPATIENT)
Dept: UROLOGY | Facility: CLINIC | Age: 80
End: 2025-04-01
Payer: MEDICARE

## 2025-04-01 VITALS
WEIGHT: 120 LBS | OXYGEN SATURATION: 95 % | HEIGHT: 64 IN | HEART RATE: 88 BPM | BODY MASS INDEX: 20.49 KG/M2 | DIASTOLIC BLOOD PRESSURE: 60 MMHG | SYSTOLIC BLOOD PRESSURE: 118 MMHG | TEMPERATURE: 98 F

## 2025-04-01 DIAGNOSIS — N39.0 URINARY TRACT INFECTION WITHOUT HEMATURIA, SITE UNSPECIFIED: Primary | ICD-10-CM

## 2025-04-01 LAB
BILIRUB BLD-MCNC: NEGATIVE MG/DL
CLARITY, POC: ABNORMAL
COLOR UR: YELLOW
EXPIRATION DATE: ABNORMAL
GLUCOSE UR STRIP-MCNC: ABNORMAL MG/DL
KETONES UR QL: NEGATIVE
LEUKOCYTE EST, POC: ABNORMAL
Lab: ABNORMAL
NITRITE UR-MCNC: POSITIVE MG/ML
PH UR: 5.5 [PH] (ref 5–8)
PROT UR STRIP-MCNC: ABNORMAL MG/DL
RBC # UR STRIP: ABNORMAL /UL
SP GR UR: 1.03 (ref 1–1.03)
UROBILINOGEN UR QL: ABNORMAL

## 2025-04-01 RX ORDER — SULFAMETHOXAZOLE AND TRIMETHOPRIM 800; 160 MG/1; MG/1
1 TABLET ORAL 2 TIMES DAILY
Qty: 14 TABLET | Refills: 0 | Status: SHIPPED | OUTPATIENT
Start: 2025-04-01 | End: 2025-04-08

## 2025-04-01 NOTE — PROGRESS NOTES
MAIKEL Rowley gave verbal order for catheter in-out clean urine catch. A size 14F silicone temporary in/out catheter inserted per provider prescription. Indication: recurrent UTI. Procedure and purpose of temporary catheter explained to patient. Patient denies allergies to iodine, orthopedic limitations, or previous genitourinary surgeries. Patient verbalized no discomfort during the procedure. Cristela-care provided before and after procedure. Urine drained with 10 mL. Urine is hazy, straw in color, no sediment. Urine was sent for urine culture per provider.

## 2025-04-01 NOTE — PROGRESS NOTES
Office Visit     Patient Name: Aster Craft  : 1945   MRN: [id number]     Patient or patient representative verbalized consent for the use of Ambient Listening during the visit with  MAIKEL Carrasquillo for chart documentation. 2025  13:40 EDT    Chief Complaint:   Chief Complaint   Patient presents with    Urinary Tract Infection     Referring Provider: No ref. provider found    Primary Care Provider: Yolie Cotto MD     History of Present Illness  The patient is a 79-year-old female with a history of recurrent UTIs and kidney stones.    Urinary Tract Infections (UTIs)  - Follow-up with Dr. Cruz on 2025 for UTIs.  - Prescribed estrogen cream but not yet used; continues different topical treatment for itching.  - Received urine test kit but not completed.  - No current antibiotics.  - Reports frequent urination without burning, chest pain when delaying urination.  - Previous in-and-out catheter procedure.  - No allergies to iodine, Betadine, shellfish, latex, or lidocaine jelly.    Kidney Stones  - History of kidney stones, aware of non-invasive surgical options.  - Concerned about stent placement pain, witnessed children's discomfort post-surgery.  - No history of kidney stone surgery.    Cardiac Condition  - Cardiac condition causing extreme fatigue, attributed to medication.    Supplemental information: None.    FAMILY HISTORY  Daughter and 2 sons have had kidney stones.    ALLERGIES  No known allergies.    MEDICATIONS  Past: Bactrim.      Subjective   Review of System:   As noted in HPI.    Past Medical History:   Diagnosis Date    Arthritis     Atherosclerosis of native coronary artery of native heart without angina pectoris 2024    Breast cancer     RIGHT BREAST STAGE 3    Diabetes mellitus     Elevated cholesterol     GERD (gastroesophageal reflux disease) 2021    History of cancer chemotherapy     Hypertension     Hyperthyroidism 2022    Kidney stone      Lichen sclerosus     Shingles     Thickened endometrium 2018    Urinary incontinence     Urinary tract infection      Past Surgical History:   Procedure Laterality Date    CARDIAC CATHETERIZATION N/A 7/15/2024    Procedure: Coronary angiography;  Surgeon: Brian Morrow MD;  Location:  [facility];  Service: Cardiology;  Laterality: N/A;    CATARACT EXTRACTION Right 01/10/2023    CERVICAL CONE BIOPSY      CHOLECYSTECTOMY  03/2010    COLONOSCOPY N/A 07/06/2018    Procedure: COLONOSCOPY;  Surgeon: Trenton Lyons MD;  Location:  [facility];  Service: Gastroenterology    DILATATION AND CURETTAGE      ENDOSCOPY N/A 07/06/2018    Procedure: ESOPHAGOGASTRODUODENOSCOPY;  Surgeon: Trenton Lyons MD;  Location:  [facility];  Service: Gastroenterology    MASTECTOMY Right 10/1998    MASTECTOMY Left 07/2013    TUBAL ABDOMINAL LIGATION       Family History   Problem Relation Age of Onset    Breast cancer Mother     Diabetes Mother     Cancer Mother     Ovarian cancer Maternal Aunt     Cancer Maternal Aunt      Social History     Socioeconomic History    Marital status:    Tobacco Use    Smoking status: Never     Passive exposure: Never    Smokeless tobacco: Never   Vaping Use    Vaping status: Never Used   Substance and Sexual Activity    Alcohol use: No    Drug use: No    Sexual activity: Not Currently       Current Outpatient Medications:     albuterol sulfate  (90 Base) MCG/ACT inhaler, 2 puffs Every 6 (Six) Hours As Needed., Disp: , Rfl:     azelastine (ASTELIN) 0.1 % nasal spray, Administer 1 spray into the nostril(s) as directed by provider 2 (Two) Times a Day., Disp: , Rfl:     benzonatate (TESSALON) 200 MG capsule, TAKE 1 CAPSULE THREE TIMES DAILY AS NEEDED, Disp: , Rfl:     Blood Glucose Monitoring Suppl (OneTouch Verio Flex System) w/Device kit, See Admin Instructions. for testing, Disp: , Rfl:     Continuous Blood Gluc Sensor (FreeStyle Malina 2 Sensor) misc, CHANGE SENSOR EVERY 14  DAYS, Disp: , Rfl:     Continuous Glucose  (FreeStyle Malina 2 [place]) device, See Admin Instructions., Disp: , Rfl:     desloratadine (CLARINEX) 5 MG tablet, Take 1 tablet by mouth Daily., Disp: , Rfl:     ezetimibe (ZETIA) 10 MG tablet, Take 1 tablet by mouth Daily., Disp: , Rfl:     Flovent  MCG/ACT inhaler, Inhale 2 puffs 2 (Two) Times a Day As Needed., Disp: , Rfl:     furosemide (Lasix) 20 MG tablet, Take 1 tablet by mouth Daily As Needed (For weight gain > 2-3 lbs overnight)., Disp: , Rfl:     ipratropium (ATROVENT) 0.03 % nasal spray, Administer 1 spray into the nostril(s) as directed by provider 2 (Two) Times a Day As Needed., Disp: , Rfl:     Lancets (OneTouch Delica Plus Cppehl33G) misc, USE AS DIRECTED EVERY DAY, Disp: , Rfl:     meclizine (ANTIVERT) 12.5 MG tablet, Take 1 tablet by mouth 3 (Three) Times a Day As Needed., Disp: , Rfl:     metoprolol succinate XL (Toprol XL) 100 MG 24 hr tablet, Take 1.5 tablets by mouth Daily. (Patient taking differently: Take 2 tablets by mouth Daily.), Disp: 135 tablet, Rfl: 1    nitroglycerin (NITROSTAT) 0.4 MG SL tablet, Place 1 tablet under the tongue Every 5 (Five) Minutes As Needed for Chest Pain., Disp: , Rfl:     ondansetron ODT (ZOFRAN-ODT) 4 MG disintegrating tablet, Take 1 tablet by mouth As Needed., Disp: , Rfl:     OneTouch Verio test strip, Daily. for testing, Disp: , Rfl:     pantoprazole (PROTONIX) 40 MG EC tablet, Take 1 tablet by mouth Daily., Disp: , Rfl:     simethicone (MYLICON) 80 MG chewable tablet, Chew 1 tablet Every 6 (Six) Hours As Needed for Flatulence., Disp: , Rfl:     spironolactone (ALDACTONE) 25 MG tablet, Take 1 tablet by mouth Daily., Disp: 90 tablet, Rfl: 1    Vibegron (Gemtesa) 75 MG tablet, Take 1 tablet by mouth Daily., Disp: 30 tablet, Rfl: 11    Vitamins-Lipotropics (Lipoflavonoid) tablet, Take 2 tablets by mouth 3 times a day. For tinitus - per pt has not been taking x 2-3 wks (12/2/2024), she is getting ready to  "start them again, Disp: , Rfl:     Januvia 25 MG tablet, Take 1 tablet by mouth Daily. (Patient not taking: Reported on 4/1/2025), Disp: , Rfl:     nitrofurantoin, macrocrystal-monohydrate, (Macrobid) 100 MG capsule, Take 1 capsule by mouth Every Night. (Patient not taking: Reported on 4/1/2025), Disp: 30 capsule, Rfl: 3    sulfamethoxazole-trimethoprim (Bactrim DS) 800-160 MG per tablet, Take 1 tablet by mouth 2 (Two) Times a Day for 7 days., Disp: 14 tablet, Rfl: 0    Allergies   Allergen Reactions    Caffeine Other (See Comments)     No caffeine per doctors recommendation    Jardiance [Empagliflozin] Other (See Comments)     UTI     Objective   Visit Vitals  /60   Pulse 88   Temp 98 °F (36.7 °C)   Ht 161.3 cm (63.5\")   Wt 54.4 kg (120 lb)   SpO2 95%   BMI 20.92 kg/m²        Body mass index is 20.92 kg/m².     Physical Exam  Vitals and nursing note reviewed.   Constitutional:       General: She is not in acute distress.     Appearance: Normal appearance. She is normal weight. She is not ill-appearing.   Pulmonary:      Effort: Pulmonary effort is normal. No respiratory distress.   Skin:     General: Skin is warm and dry.   Neurological:      General: No focal deficit present.      Mental Status: She is alert and oriented to person, place, and time.   Psychiatric:         Mood and Affect: Mood normal.         Behavior: Behavior normal.         Physical Exam         Labs  Lab Results   Component Value Date    COLORU Yellow 04/01/2025    CLARITYU Cloudy (A) 04/01/2025    SPECGRAV 1.030 04/01/2025    PHUR 5.5 04/01/2025    LEUKOCYTESUR Trace (A) 04/01/2025    NITRITE Positive (A) 04/01/2025    PROTEINPOCUA 30 mg/dL (A) 04/01/2025    GLUCOSEUR 500 mg/dL (A) 04/01/2025    KETONESU Negative 04/01/2025    UROBILINOGEN 0.2 E.U./dL 04/01/2025    BILIRUBINUR Negative 04/01/2025    RBCUR Large (A) 04/01/2025      Lab Results   Component Value Date    WBCUA 21-50 (A) 03/28/2025    WBCUA 0-2 (A) 06/13/2018    RBCUA 21-50 " "(A) 03/28/2025    RBCUA 0-2 01/10/2024    BACTERIA 1+ (A) 03/28/2025    BACTERIA Comment 01/10/2024    HYALCASTU None Seen 03/28/2025    HYALCASTU None Seen 06/13/2018    SQUAMEPIUA 0-2 03/28/2025    SQUAMEPIUA 0-2 06/13/2018      Urine Culture          11/27/2024    00:00 3/11/2025    00:00 3/28/2025    09:43   Urine Culture   Urine Culture Final report  Final report  >100,000 CFU/mL Mixed Karina Isolated      Lab Results   Component Value Date    WBC 12.36 (H) 09/12/2024    HGB 13.4 09/12/2024    HCT 41.0 09/12/2024    MCV 94.5 09/12/2024     09/12/2024     Lab Results   Component Value Date    GLUCOSE 153 (H) 03/11/2025    CALCIUM 9.0 03/11/2025    CALCIUM 9.1 03/11/2025     03/11/2025    K 4.2 03/11/2025    CO2 22.9 03/11/2025     03/11/2025    BUN 20 03/11/2025    BUN 20 09/12/2024    CREATININE 0.62 03/11/2025    CREATININE 0.63 09/12/2024    EGFR 91.9 03/11/2025    EGFR 91.5 09/12/2024    BCR 32.3 (H) 03/11/2025    ANIONGAP 12.1 03/11/2025    ALT 17 09/12/2024    AST 18 09/12/2024     Lab Results   Component Value Date    HGBA1C 8.90 (H) 07/01/2024     No results found for: \"URICACIDSTN\", \"IGWA8ZWTWQC\", \"XAVJ0MHQLZ\", \"LABMAGN\"  Lab Results   Component Value Date    PTH 29.6 03/11/2025    URICACID 2.5 03/11/2025     Lab Results   Component Value Date    LABPH CANCELED 03/11/2025       Lab Results   Component Value Date    ATOPOBIUMV Low - 0 03/11/2025    BVAB2 Low - 0 03/11/2025    MEGASPHAER Low - 0 03/11/2025    CALBICANSN Negative 03/11/2025    CGLABRATAN Negative 03/11/2025    CPARAPSILOS Negative 02/18/2022    CLUSITANIAE Negative 02/18/2022    CKRUSEI Negative 02/18/2022    TRICHVAG Negative 03/11/2025    CHLAMNAA Negative 03/11/2025    NGONORRHON Negative 03/11/2025       Lab Results   Component Value Date    TSH 0.146 (L) 07/13/2024    FREET4 1.2 09/24/2024         Radiographic Studies  US Renal Bilateral  Result Date: 3/28/2025  1. Nothing specifically identified to account for the " patient symptoms.   This report was finalized on 3/28/2025 12:58 PM by Dr. Jhony Reid MD.        I have reviewed the above labs and imaging.     PVR  Post-void residual performed with ultrasound by staff and interpreted by me - 0 mL    Assessment / Plan      Diagnoses and all orders for this visit:    1. Urinary tract infection without hematuria, site unspecified (Primary)  -     POC Urinalysis Dipstick, Automated  -     Urine Culture - Urine, Urine, Catheter In/Out  -     sulfamethoxazole-trimethoprim (Bactrim DS) 800-160 MG per tablet; Take 1 tablet by mouth 2 (Two) Times a Day for 7 days.  Dispense: 14 tablet; Refill: 0         Assessment & Plan  1. Recurrent urinary tract infections: Likely due to kidney stones. Most recent culture: mixed cynthia, no specific UTI. UA negative for nitrite on 3/28 today positive  - Consult Dr. Cruz for further management  - In-and-out catheterization today for urine specimen  - urine culture ordered   - recommend patient keep follow up with Dr. Velazquez for Uti management in the mean time I will have her start Bactrim since my most recent culture was susceptible to Bactrim.    2. Kidney stones: CT scan (01/16/2025): 7 x 7.6 mm stone in right kidney, smaller stone in left kidney. Larger stone can be contributing to recurrent UTIs.  - Recommend surgical management if UTIs persist  - Patient continues to decline surgical management of stone due to her daughter and son had such difficulty with stents.  - Recommend patient follow-up with urology if she wishes for stone management      PROCEDURE  In-and-out catheterization performed today for urine specimen.       Return if symptoms worsen or fail to improve.    Aliza Thao, MSN, APRN, FNP-C  [facility]

## 2025-04-01 NOTE — TELEPHONE ENCOUNTER
Patient was instructed to  Bactrim at pharmacy and take as prescribed.  Patient was told to complete litholink after the round of Abx and to call and schedule once it's sent back. Patient understood plan.

## 2025-04-02 ENCOUNTER — TELEPHONE (OUTPATIENT)
Dept: UROLOGY | Facility: CLINIC | Age: 80
End: 2025-04-02

## 2025-04-02 NOTE — TELEPHONE ENCOUNTER
Caller: Aster Craft     Relationship: SELF    Best call back number: [phone]     What is the best time to reach you: ANYTIME    Who are you requesting to speak with (clinical staff, provider,  specific staff member): CLINICAL    Do you know the name of the person who called: INCOMING CALL    What was the call regarding: PT HAS QUESTIONS ABOUT TAKING THE ANTIBIOTICS AND THE 24HR URINE TEST. SHOULD SHE TAKE ANTIBIOTICS FIRST THEN DO THE URINE TEST?    PLEASE GIVE HER CALL BACK.     Is it okay if the provider responds through [facility]: YES BUT PHONE CALL IS PREFERRED

## 2025-04-04 LAB
BACTERIA UR CULT: ABNORMAL
BACTERIA UR CULT: ABNORMAL
OTHER ANTIBIOTIC SUSC ISLT: ABNORMAL

## 2025-04-07 ENCOUNTER — OFFICE VISIT (OUTPATIENT)
Dept: INFECTIOUS DISEASES | Facility: CLINIC | Age: 80
End: 2025-04-07
Payer: MEDICARE

## 2025-04-07 VITALS
DIASTOLIC BLOOD PRESSURE: 75 MMHG | BODY MASS INDEX: 28.53 KG/M2 | WEIGHT: 161 LBS | HEIGHT: 63 IN | HEART RATE: 72 BPM | SYSTOLIC BLOOD PRESSURE: 118 MMHG | OXYGEN SATURATION: 96 % | RESPIRATION RATE: 18 BRPM

## 2025-04-07 DIAGNOSIS — Z16.12 UTI DUE TO EXTENDED-SPECTRUM BETA LACTAMASE (ESBL) PRODUCING ESCHERICHIA COLI: ICD-10-CM

## 2025-04-07 DIAGNOSIS — R10.31 RIGHT LOWER QUADRANT ABDOMINAL PAIN: ICD-10-CM

## 2025-04-07 DIAGNOSIS — N39.0 UTI DUE TO EXTENDED-SPECTRUM BETA LACTAMASE (ESBL) PRODUCING ESCHERICHIA COLI: ICD-10-CM

## 2025-04-07 DIAGNOSIS — B96.29 UTI DUE TO EXTENDED-SPECTRUM BETA LACTAMASE (ESBL) PRODUCING ESCHERICHIA COLI: ICD-10-CM

## 2025-04-07 DIAGNOSIS — N39.0 RECURRENT UTI: Primary | ICD-10-CM

## 2025-04-07 LAB
BACTERIA UR QL AUTO: ABNORMAL /HPF
BILIRUB UR QL STRIP: NEGATIVE
CLARITY UR: CLEAR
COLOR UR: YELLOW
GLUCOSE UR STRIP-MCNC: NEGATIVE MG/DL
HGB UR QL STRIP.AUTO: NEGATIVE
HOLD SPECIMEN: NORMAL
HYALINE CASTS UR QL AUTO: ABNORMAL /LPF
KETONES UR QL STRIP: NEGATIVE
LEUKOCYTE ESTERASE UR QL STRIP.AUTO: ABNORMAL
NITRITE UR QL STRIP: POSITIVE
PH UR STRIP.AUTO: 6 [PH] (ref 5–8)
PROT UR QL STRIP: NEGATIVE
RBC # UR STRIP: ABNORMAL /HPF
REF LAB TEST METHOD: ABNORMAL
SP GR UR STRIP: 1.02 (ref 1–1.03)
SQUAMOUS #/AREA URNS HPF: ABNORMAL /HPF
UROBILINOGEN UR QL STRIP: ABNORMAL
WBC # UR STRIP: ABNORMAL /HPF

## 2025-04-07 PROCEDURE — 3074F SYST BP LT 130 MM HG: CPT | Performed by: INTERNAL MEDICINE

## 2025-04-07 PROCEDURE — G2211 COMPLEX E/M VISIT ADD ON: HCPCS | Performed by: INTERNAL MEDICINE

## 2025-04-07 PROCEDURE — 87086 URINE CULTURE/COLONY COUNT: CPT | Performed by: INTERNAL MEDICINE

## 2025-04-07 PROCEDURE — 87088 URINE BACTERIA CULTURE: CPT | Performed by: INTERNAL MEDICINE

## 2025-04-07 PROCEDURE — 87077 CULTURE AEROBIC IDENTIFY: CPT | Performed by: INTERNAL MEDICINE

## 2025-04-07 PROCEDURE — 81001 URINALYSIS AUTO W/SCOPE: CPT | Performed by: INTERNAL MEDICINE

## 2025-04-07 PROCEDURE — 87186 SC STD MICRODIL/AGAR DIL: CPT | Performed by: INTERNAL MEDICINE

## 2025-04-07 PROCEDURE — 3078F DIAST BP <80 MM HG: CPT | Performed by: INTERNAL MEDICINE

## 2025-04-07 PROCEDURE — 99214 OFFICE O/P EST MOD 30 MIN: CPT | Performed by: INTERNAL MEDICINE

## 2025-04-07 NOTE — PROGRESS NOTES
[facility]         Referring Provider: No referring provider defined for this encounter.    Subjective      Chief Complaint  Follow-up (UTI )    History of Present Illness  Aster Craft is a 79 y.o. female who presents today to [facility] for infectious disease evaluation and antibiotic management.    The patient is a 79-year-old female who presents for a follow-up regarding a urinary tract infection (UTI).    Reports daily intake of four 16-ounce bottles of water.    Was recently diagnosed with ESBL UTI and has previously being on long term suppressive therapy. Continues to have urinary symptoms with frequency and dysuria with pelvic pressure.         Past Medical History:   Diagnosis Date    Arthritis     Atherosclerosis of native coronary artery of native heart without angina pectoris 9/11/2024    Breast cancer     RIGHT BREAST STAGE 3    Diabetes mellitus     Elevated cholesterol     GERD (gastroesophageal reflux disease) 08/23/2021    History of cancer chemotherapy     Hypertension     Hyperthyroidism 05/19/2022    Kidney stone     Lichen sclerosus     Shingles     Thickened endometrium 2018    Urinary incontinence     Urinary tract infection        Past Surgical History:   Procedure Laterality Date    CARDIAC CATHETERIZATION N/A 7/15/2024    Procedure: Coronary angiography;  Surgeon: Brian Morrow MD;  Location: [facility] CATH INVASIVE LOCATION;  Service: Cardiology;  Laterality: N/A;    CATARACT EXTRACTION Right 01/10/2023    CERVICAL CONE BIOPSY      CHOLECYSTECTOMY  03/2010    COLONOSCOPY N/A 07/06/2018    Procedure: COLONOSCOPY;  Surgeon: Trenton Lyons MD;  Location: [facility] OR;  Service: Gastroenterology    DILATATION AND CURETTAGE      ENDOSCOPY N/A 07/06/2018    Procedure: ESOPHAGOGASTRODUODENOSCOPY;  Surgeon: Trenton Lyons MD;  Location: [facility] OR;  Service: Gastroenterology    MASTECTOMY Right 10/1998    MASTECTOMY Left 07/2013    TUBAL  ABDOMINAL LIGATION         Social History     Socioeconomic History    Marital status:    Tobacco Use    Smoking status: Never     Passive exposure: Never    Smokeless tobacco: Never   Vaping Use    Vaping status: Never Used   Substance and Sexual Activity    Alcohol use: No    Drug use: No    Sexual activity: Not Currently       Family History  family history includes Breast cancer in her mother; Cancer in her maternal aunt and mother; Diabetes in her mother; Ovarian cancer in her maternal aunt.    Immunization History   Administered Date(s) Administered    COVID-19 (PFIZER) Purple Cap Monovalent 09/09/2021, 09/30/2021        Allergies  Allergies   Allergen Reactions    Caffeine Other (See Comments)     No caffeine per doctors recommendation    Jardiance [Empagliflozin] Other (See Comments)     UTI       The medication list has been reviewed and updated.   Current Medications    Current Outpatient Medications:     albuterol sulfate  (90 Base) MCG/ACT inhaler, 2 puffs Every 6 (Six) Hours As Needed., Disp: , Rfl:     azelastine (ASTELIN) 0.1 % nasal spray, Administer 1 spray into the nostril(s) as directed by provider 2 (Two) Times a Day., Disp: , Rfl:     benzonatate (TESSALON) 200 MG capsule, TAKE 1 CAPSULE THREE TIMES DAILY AS NEEDED, Disp: , Rfl:     Blood Glucose Monitoring Suppl (OneTouch Verio Flex System) w/Device kit, See Admin Instructions. for testing, Disp: , Rfl:     Continuous Blood Gluc Sensor (FreeStyle Malina 2 Sensor) misc, CHANGE SENSOR EVERY 14 DAYS, Disp: , Rfl:     Continuous Glucose  (FreeStyle Malina 2 [place]) device, See Admin Instructions., Disp: , Rfl:     desloratadine (CLARINEX) 5 MG tablet, Take 1 tablet by mouth Daily., Disp: , Rfl:     ezetimibe (ZETIA) 10 MG tablet, Take 1 tablet by mouth Daily., Disp: , Rfl:     Flovent  MCG/ACT inhaler, Inhale 2 puffs 2 (Two) Times a Day As Needed., Disp: , Rfl:     furosemide (Lasix) 20 MG tablet, Take 1 tablet by mouth  Daily As Needed (For weight gain > 2-3 lbs overnight)., Disp: , Rfl:     ipratropium (ATROVENT) 0.03 % nasal spray, Administer 1 spray into the nostril(s) as directed by provider 2 (Two) Times a Day As Needed., Disp: , Rfl:     Januvia 25 MG tablet, Take 1 tablet by mouth Daily., Disp: , Rfl:     Lancets (OneTouch Delica Plus [id number]) misc, USE AS DIRECTED EVERY DAY, Disp: , Rfl:     meclizine (ANTIVERT) 12.5 MG tablet, Take 1 tablet by mouth 3 (Three) Times a Day As Needed., Disp: , Rfl:     metoprolol succinate XL (Toprol XL) 100 MG 24 hr tablet, Take 1.5 tablets by mouth Daily. (Patient taking differently: Take 2 tablets by mouth Daily.), Disp: 135 tablet, Rfl: 1    nitrofurantoin, macrocrystal-monohydrate, (Macrobid) 100 MG capsule, Take 1 capsule by mouth Every Night., Disp: 30 capsule, Rfl: 3    nitroglycerin (NITROSTAT) 0.4 MG SL tablet, Place 1 tablet under the tongue Every 5 (Five) Minutes As Needed for Chest Pain., Disp: , Rfl:     ondansetron ODT (ZOFRAN-ODT) 4 MG disintegrating tablet, Take 1 tablet by mouth As Needed., Disp: , Rfl:     OneTouch Verio test strip, Daily. for testing, Disp: , Rfl:     pantoprazole (PROTONIX) 40 MG EC tablet, Take 1 tablet by mouth Daily., Disp: , Rfl:     simethicone (MYLICON) 80 MG chewable tablet, Chew 1 tablet Every 6 (Six) Hours As Needed for Flatulence., Disp: , Rfl:     spironolactone (ALDACTONE) 25 MG tablet, Take 1 tablet by mouth Daily., Disp: 90 tablet, Rfl: 1    sulfamethoxazole-trimethoprim (Bactrim DS) 800-160 MG per tablet, Take 1 tablet by mouth 2 (Two) Times a Day for 7 days., Disp: 14 tablet, Rfl: 0    Vibegron (Gemtesa) 75 MG tablet, Take 1 tablet by mouth Daily., Disp: 30 tablet, Rfl: 11    Vitamins-Lipotropics (Lipoflavonoid) tablet, Take 2 tablets by mouth 3 times a day. For tinitus - per pt has not been taking x 2-3 wks (12/2/2024), she is getting ready to start them again, Disp: , Rfl:       Review of Systems    Review of Systems   Constitutional:   "Negative for activity change, appetite change, chills, diaphoresis, fatigue and fever.   HENT:  Negative for congestion, dental problem, drooling, ear discharge, ear pain, facial swelling, postnasal drip, sinus pressure, sore throat and swollen glands.    Eyes:  Negative for blurred vision, double vision, pain, discharge and itching.   Respiratory:  Negative for apnea, cough, choking, chest tightness and shortness of breath.    Cardiovascular:  Negative for chest pain, palpitations and leg swelling.   Gastrointestinal:  Negative for abdominal distention, abdominal pain, diarrhea, nausea, rectal pain and vomiting.   Genitourinary:  Positive for difficulty urinating, dysuria, frequency and pelvic pressure. Negative for flank pain, hematuria and pelvic pain.   Neurological:  Negative for dizziness, tremors, seizures, syncope, speech difficulty and confusion.   Psychiatric/Behavioral:  Negative for agitation and behavioral problems.         Objective     Vital Signs:  /75 (BP Location: Left arm, Patient Position: Sitting, Cuff Size: Adult)   Pulse 72   Resp 18   Ht 160 cm (63\")   Wt 73 kg (161 lb)   SpO2 96%   BMI 28.52 kg/m²   Estimated body mass index is 28.52 kg/m² as calculated from the following:    Height as of this encounter: 160 cm (63\").    Weight as of this encounter: 73 kg (161 lb).    Physical Exam  Constitutional:       General: She is not in acute distress.     Appearance: Normal appearance. She is not ill-appearing, toxic-appearing or diaphoretic.   HENT:      Head: Normocephalic and atraumatic.      Nose: No congestion or rhinorrhea.      Mouth/Throat:      Pharynx: Oropharynx is clear. No oropharyngeal exudate or posterior oropharyngeal erythema.   Cardiovascular:      Rate and Rhythm: Normal rate and regular rhythm.      Heart sounds: No murmur heard.  Pulmonary:      Effort: No respiratory distress.      Breath sounds: No stridor. No wheezing, rhonchi or rales.   Chest:      Chest wall: No " "tenderness.   Abdominal:      General: There is no distension.      Tenderness: There is no abdominal tenderness. There is no right CVA tenderness, left CVA tenderness, guarding or rebound.   Musculoskeletal:         General: No swelling, tenderness or signs of injury.      Cervical back: No rigidity or tenderness.   Skin:     Coloration: Skin is not jaundiced or pale.      Findings: No bruising, erythema or rash.   Neurological:      General: No focal deficit present.      Mental Status: She is alert. Mental status is at baseline.   Psychiatric:         Mood and Affect: Mood normal.         Behavior: Behavior normal.          Result Review :  The following data was reviewed by Elena Velazquez MD     Lab Results  Lab Results   Component Value Date    WBC 12.36 (H) 09/12/2024    HGB 13.4 09/12/2024    HCT 41.0 09/12/2024    MCV 94.5 09/12/2024     09/12/2024     Lab Results   Component Value Date    GLUCOSE 153 (H) 03/11/2025    BUN 20 03/11/2025    CREATININE 0.62 03/11/2025    EGFRIFNONA >60 05/18/2022    EGFRIFAFRI >60 05/18/2022    BCR 32.3 (H) 03/11/2025    K 4.2 03/11/2025    CO2 22.9 03/11/2025    CALCIUM 9.0 03/11/2025    CALCIUM 9.1 03/11/2025    ALBUMIN 4.5 09/12/2024    AST 18 09/12/2024    ALT 17 09/12/2024      No results found for: \"CRP\"     No results found for: \"ACANTHNAEG\", \"AFBCX\", \"BPERTUSSISCX\", \"BLOODCX\"  No results found for: \"BCIDPCR\", \"CXREFLEX\", \"CSFCX\", \"CULTURETIS\"  No results found for: \"CULTURES\", \"HSVCX\", \"URCX\"  No results found for: \"EYECULTURE\", \"GCCX\", \"HSVCULTURE\", \"LABHSV\"  No results found for: \"LEGIONELLA\", \"MRSACX\", \"MUMPSCX\", \"MYCOPLASCX\"  No results found for: \"NOCARDIACX\", \"STOOLCX\"  Urine Culture   Date Value Ref Range Status   04/01/2025 Final report (A)  Final     No results found for: \"VIRALCULTU\", \"WOUNDCX\"    Radiology Results  US Renal Bilateral  Result Date: 3/28/2025  Impression: 1. Nothing specifically identified to account for the patient symptoms.   " This report was finalized on 3/28/2025 12:58 PM by Dr. Jhony Reid MD.               Assessment / Plan        Diagnoses and all orders for this visit:    1. Recurrent UTI (Primary)  -     Urine Culture - Urine, Urine, Clean Catch; Future  -     Urinalysis With Microscopic - Urine, Clean Catch; Future    2. Right lower quadrant abdominal pain  -     Urine Culture - Urine, Urine, Clean Catch; Future  -     Urinalysis With Microscopic - Urine, Clean Catch; Future    3. UTI due to extended-spectrum beta lactamase (ESBL) producing Escherichia coli  -     Urine Culture - Urine, Urine, Clean Catch; Future  -     Urinalysis With Microscopic - Urine, Clean Catch; Future        I am mostly concerned about her resistance pattern and the presence of urinary symptoms associated with right flank pain.  Will proceed with a stat bilateral renal ultrasound as well as repeat UA and urine culture since patient has been on Bactrim and decide if patient could benefit from oral versus IV antibiotic therapy to clear her current UTI.  I do agree with Aliza Thao that patient is going to need long-term suppressive therapy after the acute treatment of her UTI especially if she has recurrent UTIs down the road.      Was initiated on Bactrim by PCP with CT scan (01/16/2025): 7 x 7.6 mm stone in right kidney, smaller stone in left kidney. Larger stone can be contributing to recurrent UTIs.    I agree with Aliza Thao that stones have to be removed with a stent or lithotripsy or both if her UA remains positive.  This could be the reason for her recurrent UTIs.    4/1/25       Component  Ref Range & Units (hover)    Urine Culture Final report Abnormal    Result 1 Comment Abnormal     Comment: Escherichia coli, identified by an automated biochemical system.  50,000-100,000 colony forming units per mL  Multi-Drug Resistant Organism  Susceptibility profile is consistent with a probable ESBL.   Susceptibility Testing Comment    Comment:        ** S = Susceptible; I = Intermediate; R = Resistant **                     P = Positive; N = Negative              MICS are expressed in micrograms per mL     Antibiotic                 RSLT#1    RSLT#2    RSLT#3    RSLT#4  Amoxicillin/Clavulanic Acid    S  Ampicillin                     R  Cefazolin                      R  Cefepime                       R  Cefoxitin                      S  Cefpodoxime                    R  Ceftriaxone                    R  Ciprofloxacin                  I  Ertapenem                      S  Gentamicin                     S  Levofloxacin                   I  Meropenem                      S  Nitrofurantoin                 S  Piperacillin/Tazobactam        S  Tetracycline                   R  Tobramycin                     S  Trimethoprim/Sulfa             S          Will go ahead recheck her UA and urine culture today and decide on antibiotic therapy.  If UA and urine culture remain positive will refer to urology for further management of remaining kidney stones.    Discussed with the patient high risk for resistance with her recurrent UTIs.          Follow Up   No follow-ups on file.    Visit Diagnoses:    ICD-10-CM ICD-9-CM   1. Recurrent UTI  N39.0 599.0   2. Right lower quadrant abdominal pain  R10.31 789.03   3. UTI due to extended-spectrum beta lactamase (ESBL) producing Escherichia coli  N39.0 599.0    B96.29 041.49    Z16.12 V09.1       Patient was given instructions and counseling regarding her condition or for health maintenance advice. Please see specific information pulled into the AVS if appropriate.     This document has been electronically signed by Elena Velazquez MD   April 7, 2025 09:10 EDT      No orders of the defined types were placed in this encounter.     Dictated Utilizing Dragon Dictation: Part of this note may be an electronic transcription/translation of spoken language to printed text using the Dragon Dictation System.

## 2025-04-09 LAB — BACTERIA SPEC AEROBE CULT: ABNORMAL

## 2025-04-10 ENCOUNTER — OFFICE VISIT (OUTPATIENT)
Dept: INFECTIOUS DISEASES | Facility: CLINIC | Age: 80
End: 2025-04-10
Payer: MEDICARE

## 2025-04-10 VITALS
OXYGEN SATURATION: 96 % | SYSTOLIC BLOOD PRESSURE: 131 MMHG | DIASTOLIC BLOOD PRESSURE: 77 MMHG | HEART RATE: 77 BPM | WEIGHT: 125.2 LBS | BODY MASS INDEX: 22.18 KG/M2

## 2025-04-10 DIAGNOSIS — N20.0 BILATERAL NEPHROLITHIASIS: ICD-10-CM

## 2025-04-10 DIAGNOSIS — E11.9 TYPE 2 DIABETES MELLITUS WITHOUT COMPLICATION, WITHOUT LONG-TERM CURRENT USE OF INSULIN: ICD-10-CM

## 2025-04-10 DIAGNOSIS — N39.0 UTI DUE TO EXTENDED-SPECTRUM BETA LACTAMASE (ESBL) PRODUCING ESCHERICHIA COLI: ICD-10-CM

## 2025-04-10 DIAGNOSIS — B96.29 UTI DUE TO EXTENDED-SPECTRUM BETA LACTAMASE (ESBL) PRODUCING ESCHERICHIA COLI: ICD-10-CM

## 2025-04-10 DIAGNOSIS — N39.0 RECURRENT UTI: Primary | ICD-10-CM

## 2025-04-10 DIAGNOSIS — Z16.12 UTI DUE TO EXTENDED-SPECTRUM BETA LACTAMASE (ESBL) PRODUCING ESCHERICHIA COLI: ICD-10-CM

## 2025-04-10 PROBLEM — K21.9 GERD (GASTROESOPHAGEAL REFLUX DISEASE): Status: RESOLVED | Noted: 2021-08-23 | Resolved: 2025-04-10

## 2025-04-10 PROBLEM — R07.89 CHEST PAIN, ATYPICAL: Status: RESOLVED | Noted: 2024-07-13 | Resolved: 2025-04-10

## 2025-04-10 PROBLEM — I48.91 A-FIB: Status: RESOLVED | Noted: 2021-08-23 | Resolved: 2025-04-10

## 2025-04-10 PROBLEM — K76.0 FATTY (CHANGE OF) LIVER, NOT ELSEWHERE CLASSIFIED: Status: RESOLVED | Noted: 2018-06-29 | Resolved: 2025-04-10

## 2025-04-10 PROBLEM — I50.30 (HFPEF) HEART FAILURE WITH PRESERVED EJECTION FRACTION: Chronic | Status: RESOLVED | Noted: 2024-07-02 | Resolved: 2025-04-10

## 2025-04-10 PROBLEM — I25.10 ATHEROSCLEROSIS OF NATIVE CORONARY ARTERY OF NATIVE HEART WITHOUT ANGINA PECTORIS: Status: RESOLVED | Noted: 2024-09-11 | Resolved: 2025-04-10

## 2025-04-10 PROBLEM — J30.9 ALLERGIC RHINITIS, UNSPECIFIED: Status: RESOLVED | Noted: 2024-07-16 | Resolved: 2025-04-10

## 2025-04-10 PROBLEM — R93.89 THICKENED ENDOMETRIUM: Status: RESOLVED | Noted: 2018-01-01 | Resolved: 2025-04-10

## 2025-04-10 PROBLEM — R10.31 RIGHT LOWER QUADRANT ABDOMINAL PAIN: Status: RESOLVED | Noted: 2018-06-29 | Resolved: 2025-04-10

## 2025-04-10 PROBLEM — E05.90 HYPERTHYROIDISM: Status: RESOLVED | Noted: 2022-05-19 | Resolved: 2025-04-10

## 2025-04-10 RX ORDER — SULFAMETHOXAZOLE AND TRIMETHOPRIM 800; 160 MG/1; MG/1
1 TABLET ORAL DAILY
Qty: 30 TABLET | Refills: 5 | Status: SHIPPED | OUTPATIENT
Start: 2025-04-10 | End: 2025-05-10

## 2025-04-10 RX ORDER — SULFAMETHOXAZOLE AND TRIMETHOPRIM 800; 160 MG/1; MG/1
1 TABLET ORAL 2 TIMES DAILY
COMMUNITY

## 2025-04-10 NOTE — PROGRESS NOTES
[facility]         Referring Provider: No referring provider defined for this encounter.    Subjective      Chief Complaint  Recurrent UTI    History of Present Illness  Aster Craft is a 79 y.o. female who presents today to [facility] for infectious disease evaluation and antibiotic management.      The patient is a 79-year-old female who presents for a follow-up regarding a urinary tract infection (UTI).    Reports daily intake of four 16-ounce bottles of water.    Was recently diagnosed with ESBL UTI and has previously being on long term suppressive therapy. This has been treated and symptoms resolved aside from occasional right lower quadrant pain off and on.  No dysuria or polyuria at this time.  No clinical sepsis.         Past Medical History:   Diagnosis Date    Arthritis     Atherosclerosis of native coronary artery of native heart without angina pectoris 9/11/2024    Breast cancer     RIGHT BREAST STAGE 3    Diabetes mellitus     Elevated cholesterol     GERD (gastroesophageal reflux disease) 08/23/2021    History of cancer chemotherapy     Hypertension     Hyperthyroidism 05/19/2022    Kidney stone     Lichen sclerosus     Shingles     Thickened endometrium 2018    Urinary incontinence     Urinary tract infection        Past Surgical History:   Procedure Laterality Date    CARDIAC CATHETERIZATION N/A 7/15/2024    Procedure: Coronary angiography;  Surgeon: Brian Morrow MD;  Location: [facility] CATH INVASIVE LOCATION;  Service: Cardiology;  Laterality: N/A;    CATARACT EXTRACTION Right 01/10/2023    CERVICAL CONE BIOPSY      CHOLECYSTECTOMY  03/2010    COLONOSCOPY N/A 07/06/2018    Procedure: COLONOSCOPY;  Surgeon: Trenton Lyons MD;  Location: [facility];  Service: Gastroenterology    DILATATION AND CURETTAGE      ENDOSCOPY N/A 07/06/2018    Procedure: ESOPHAGOGASTRODUODENOSCOPY;  Surgeon: Trenton Lyons MD;  Location: [facility] OR;  Service:  Gastroenterology    MASTECTOMY Right 10/1998    MASTECTOMY Left 07/2013    TUBAL ABDOMINAL LIGATION         Social History     Socioeconomic History    Marital status:    Tobacco Use    Smoking status: Never     Passive exposure: Never    Smokeless tobacco: Never   Vaping Use    Vaping status: Never Used   Substance and Sexual Activity    Alcohol use: No    Drug use: No    Sexual activity: Not Currently       Family History  family history includes Breast cancer in her mother; Cancer in her maternal aunt and mother; Diabetes in her mother; Ovarian cancer in her maternal aunt.    Immunization History   Administered Date(s) Administered    COVID-19 (PFIZER) Purple Cap Monovalent 09/09/2021, 09/30/2021        Allergies  Allergies   Allergen Reactions    Caffeine Other (See Comments)     No caffeine per doctors recommendation    Jardiance [Empagliflozin] Other (See Comments)     UTI       The medication list has been reviewed and updated.   Current Medications    Current Outpatient Medications:     albuterol sulfate  (90 Base) MCG/ACT inhaler, 2 puffs Every 6 (Six) Hours As Needed., Disp: , Rfl:     azelastine (ASTELIN) 0.1 % nasal spray, Administer 1 spray into the nostril(s) as directed by provider 2 (Two) Times a Day., Disp: , Rfl:     benzonatate (TESSALON) 200 MG capsule, TAKE 1 CAPSULE THREE TIMES DAILY AS NEEDED, Disp: , Rfl:     Blood Glucose Monitoring Suppl (OneTouch Verio Flex System) w/Device kit, See Admin Instructions. for testing, Disp: , Rfl:     Continuous Blood Gluc Sensor (FreeStyle Malina 2 Sensor) misc, CHANGE SENSOR EVERY 14 DAYS, Disp: , Rfl:     Continuous Glucose  (FreeStyle Malina 2 [place]) device, See Admin Instructions., Disp: , Rfl:     desloratadine (CLARINEX) 5 MG tablet, Take 1 tablet by mouth Daily., Disp: , Rfl:     ezetimibe (ZETIA) 10 MG tablet, Take 1 tablet by mouth Daily., Disp: , Rfl:     Flovent  MCG/ACT inhaler, Inhale 2 puffs 2 (Two) Times a Day As  Needed., Disp: , Rfl:     furosemide (Lasix) 20 MG tablet, Take 1 tablet by mouth Daily As Needed (For weight gain > 2-3 lbs overnight)., Disp: , Rfl:     ipratropium (ATROVENT) 0.03 % nasal spray, Administer 1 spray into the nostril(s) as directed by provider 2 (Two) Times a Day As Needed., Disp: , Rfl:     Januvia 25 MG tablet, Take 1 tablet by mouth Daily., Disp: , Rfl:     Lancets (OneTouch Delica Plus [id number]) misc, USE AS DIRECTED EVERY DAY, Disp: , Rfl:     meclizine (ANTIVERT) 12.5 MG tablet, Take 1 tablet by mouth 3 (Three) Times a Day As Needed., Disp: , Rfl:     metoprolol succinate XL (Toprol XL) 100 MG 24 hr tablet, Take 1.5 tablets by mouth Daily. (Patient taking differently: Take 2 tablets by mouth Daily.), Disp: 135 tablet, Rfl: 1    nitrofurantoin, macrocrystal-monohydrate, (Macrobid) 100 MG capsule, Take 1 capsule by mouth Every Night., Disp: 30 capsule, Rfl: 3    nitroglycerin (NITROSTAT) 0.4 MG SL tablet, Place 1 tablet under the tongue Every 5 (Five) Minutes As Needed for Chest Pain., Disp: , Rfl:     ondansetron ODT (ZOFRAN-ODT) 4 MG disintegrating tablet, Take 1 tablet by mouth As Needed., Disp: , Rfl:     OneTouch Verio test strip, Daily. for testing, Disp: , Rfl:     pantoprazole (PROTONIX) 40 MG EC tablet, Take 1 tablet by mouth Daily., Disp: , Rfl:     simethicone (MYLICON) 80 MG chewable tablet, Chew 1 tablet Every 6 (Six) Hours As Needed for Flatulence., Disp: , Rfl:     spironolactone (ALDACTONE) 25 MG tablet, Take 1 tablet by mouth Daily., Disp: 90 tablet, Rfl: 1    sulfamethoxazole-trimethoprim (BACTRIM DS,SEPTRA DS) 800-160 MG per tablet, Take 1 tablet by mouth 2 (Two) Times a Day., Disp: , Rfl:     Vibegron (Gemtesa) 75 MG tablet, Take 1 tablet by mouth Daily., Disp: 30 tablet, Rfl: 11    Vitamins-Lipotropics (Lipoflavonoid) tablet, Take 2 tablets by mouth 3 times a day. For tinitus - per pt has not been taking x 2-3 wks (12/2/2024), she is getting ready to start them again, Disp:  ", Rfl:     sulfamethoxazole-trimethoprim (BACTRIM DS,SEPTRA DS) 800-160 MG per tablet, Take 1 tablet by mouth Daily for 30 days., Disp: 30 tablet, Rfl: 5      Review of Systems    Review of Systems   Constitutional:  Negative for activity change, appetite change, chills, diaphoresis, fatigue and fever.   HENT:  Negative for congestion, dental problem, drooling, ear discharge, ear pain, facial swelling, postnasal drip, sinus pressure, sore throat and swollen glands.    Eyes:  Negative for blurred vision, double vision, pain, discharge and itching.   Respiratory:  Negative for apnea, cough, choking, chest tightness and shortness of breath.    Cardiovascular:  Negative for chest pain, palpitations and leg swelling.   Gastrointestinal:  Negative for abdominal distention, abdominal pain, diarrhea, nausea, rectal pain and vomiting.   Genitourinary:  Negative for difficulty urinating, dysuria, flank pain, frequency, hematuria, pelvic pain and pelvic pressure.   Neurological:  Negative for dizziness, tremors, seizures, syncope, speech difficulty and confusion.   Psychiatric/Behavioral:  Negative for agitation and behavioral problems.         Objective     Vital Signs:  /77 (BP Location: Right arm, Patient Position: Sitting, Cuff Size: Small Adult)   Pulse 77   Wt 56.8 kg (125 lb 3.2 oz)   SpO2 96%   BMI 22.18 kg/m²   Estimated body mass index is 22.18 kg/m² as calculated from the following:    Height as of 4/7/25: 160 cm (63\").    Weight as of this encounter: 56.8 kg (125 lb 3.2 oz).    Physical Exam  Constitutional:       General: She is not in acute distress.     Appearance: Normal appearance. She is not ill-appearing, toxic-appearing or diaphoretic.   HENT:      Head: Normocephalic and atraumatic.      Nose: No congestion or rhinorrhea.      Mouth/Throat:      Pharynx: Oropharynx is clear. No oropharyngeal exudate or posterior oropharyngeal erythema.   Cardiovascular:      Rate and Rhythm: Normal rate and " "regular rhythm.      Heart sounds: No murmur heard.  Pulmonary:      Effort: No respiratory distress.      Breath sounds: No stridor. No wheezing, rhonchi or rales.   Chest:      Chest wall: No tenderness.   Abdominal:      General: There is no distension.      Tenderness: There is no abdominal tenderness. There is no right CVA tenderness, left CVA tenderness, guarding or rebound.   Musculoskeletal:         General: No swelling, tenderness or signs of injury.      Cervical back: No rigidity or tenderness.   Skin:     Coloration: Skin is not jaundiced or pale.      Findings: No bruising, erythema or rash.   Neurological:      General: No focal deficit present.      Mental Status: She is alert. Mental status is at baseline.   Psychiatric:         Mood and Affect: Mood normal.         Behavior: Behavior normal.          Result Review :  The following data was reviewed by Elena Velazquez MD     Lab Results  Lab Results   Component Value Date    WBC 12.36 (H) 09/12/2024    HGB 13.4 09/12/2024    HCT 41.0 09/12/2024    MCV 94.5 09/12/2024     09/12/2024     Lab Results   Component Value Date    GLUCOSE 153 (H) 03/11/2025    BUN 20 03/11/2025    CREATININE 0.62 03/11/2025    EGFRIFNONA >60 05/18/2022    EGFRIFAFRI >60 05/18/2022    BCR 32.3 (H) 03/11/2025    K 4.2 03/11/2025    CO2 22.9 03/11/2025    CALCIUM 9.0 03/11/2025    CALCIUM 9.1 03/11/2025    ALBUMIN 4.5 09/12/2024    AST 18 09/12/2024    ALT 17 09/12/2024      No results found for: \"CRP\"     No results found for: \"ACANTHNAEG\", \"AFBCX\", \"BPERTUSSISCX\", \"BLOODCX\"  No results found for: \"BCIDPCR\", \"CXREFLEX\", \"CSFCX\", \"CULTURETIS\"  No results found for: \"CULTURES\", \"HSVCX\", \"URCX\"  No results found for: \"EYECULTURE\", \"GCCX\", \"HSVCULTURE\", \"LABHSV\"  No results found for: \"LEGIONELLA\", \"MRSACX\", \"MUMPSCX\", \"MYCOPLASCX\"  No results found for: \"NOCARDIACX\", \"STOOLCX\"  Urine Culture   Date Value Ref Range Status   04/07/2025 >100,000 CFU/mL Escherichia coli " "ESBL (A)  Final     No results found for: \"VIRALCULTU\", \"WOUNDCX\"    Radiology Results  US Renal Bilateral  Result Date: 3/28/2025  Impression: 1. Nothing specifically identified to account for the patient symptoms.   This report was finalized on 3/28/2025 12:58 PM by Dr. Jhony Reid MD.               Assessment / Plan        Diagnoses and all orders for this visit:    1. Recurrent UTI (Primary)    2. UTI due to extended-spectrum beta lactamase (ESBL) producing Escherichia coli    3. Type 2 diabetes mellitus without complication, without long-term current use of insulin    4. Bilateral nephrolithiasis    Other orders  -     sulfamethoxazole-trimethoprim (BACTRIM DS,SEPTRA DS) 800-160 MG per tablet; Take 1 tablet by mouth Daily for 30 days.  Dispense: 30 tablet; Refill: 5      Patient had a urine culture done on 4/7/2025 along with a urine analysis that was strongly positive with 21-30 WBCs and a urine culture showing growth of over 100,000 colonies of ESBL E. coli.    Bactrim DS 1 p.o. twice daily x 7 days initiated. Will follow up with symptoms and might have to escalate to IV antibiotic therapy if persistent symptoms or developing clinical sepsis.  Patient was told to try Moonstone supplement to help with her recurrent UTIs.    CT 2/16/25 Bilateral nephrolithiasis is noted, stable from prior exam,  greater on right side.    Recommend urology evaluation for stone removal, lithotripsy, stents to prevent further recurrent UTIs.    Would recommend to continue with oral suppressive therapy until stones are removed as patient is going to be at high risk for recurrent UTIs.  1 option is to do Bactrim DS 1 p.o. daily.        Follow Up   Return in about 4 weeks (around 5/8/2025) for Follow up, With Dr. Velazquez.    Visit Diagnoses:    ICD-10-CM ICD-9-CM   1. Recurrent UTI  N39.0 599.0   2. UTI due to extended-spectrum beta lactamase (ESBL) producing Escherichia coli  N39.0 599.0    B96.29 041.49    Z16.12 V09.1   3. Type 2 " diabetes mellitus without complication, without long-term current use of insulin  E11.9 250.00   4. Bilateral nephrolithiasis  N20.0 592.0       Patient was given instructions and counseling regarding her condition or for health maintenance advice. Please see specific information pulled into the AVS if appropriate.     This document has been electronically signed by Elena Velazquez MD   April 10, 2025 10:12 EDT      New Medications Ordered This Visit   Medications    sulfamethoxazole-trimethoprim (BACTRIM DS,SEPTRA DS) 800-160 MG per tablet     Sig: Take 1 tablet by mouth Daily for 30 days.     Dispense:  30 tablet     Refill:  5      Dictated Utilizing Dragon Dictation: Part of this note may be an electronic transcription/translation of spoken language to printed text using the Dragon Dictation System.

## 2025-04-23 ENCOUNTER — OFFICE VISIT (OUTPATIENT)
Dept: OBSTETRICS AND GYNECOLOGY | Facility: CLINIC | Age: 80
End: 2025-04-23
Payer: MEDICARE

## 2025-04-23 VITALS
DIASTOLIC BLOOD PRESSURE: 76 MMHG | WEIGHT: 124 LBS | SYSTOLIC BLOOD PRESSURE: 128 MMHG | HEIGHT: 65 IN | BODY MASS INDEX: 20.66 KG/M2

## 2025-04-23 DIAGNOSIS — R39.9 URINARY TRACT INFECTION SYMPTOMS: ICD-10-CM

## 2025-04-23 DIAGNOSIS — R39.15 URINARY URGENCY: ICD-10-CM

## 2025-04-23 DIAGNOSIS — R30.9 PAIN WITH URINATION: ICD-10-CM

## 2025-04-23 DIAGNOSIS — N39.0 FREQUENT UTI: ICD-10-CM

## 2025-04-23 DIAGNOSIS — N95.2 POSTMENOPAUSAL ATROPHIC VAGINITIS: ICD-10-CM

## 2025-04-23 DIAGNOSIS — R39.198 DIFFICULTY VOIDING: ICD-10-CM

## 2025-04-23 DIAGNOSIS — Z01.411 ENCOUNTER FOR GYNECOLOGICAL EXAMINATION (GENERAL) (ROUTINE) WITH ABNORMAL FINDINGS: Primary | ICD-10-CM

## 2025-04-23 DIAGNOSIS — N90.89 VULVAR IRRITATION: ICD-10-CM

## 2025-04-23 RX ORDER — VALACYCLOVIR HYDROCHLORIDE 1 G/1
1 TABLET, FILM COATED ORAL 3 TIMES DAILY
COMMUNITY
Start: 2025-04-10

## 2025-04-23 RX ORDER — CLOBETASOL PROPIONATE 0.5 MG/G
OINTMENT TOPICAL
Qty: 60 G | Refills: 6 | Status: SHIPPED | OUTPATIENT
Start: 2025-04-23

## 2025-04-23 RX ORDER — ESTRADIOL 0.1 MG/G
CREAM VAGINAL
Qty: 1 EACH | Refills: 11 | Status: SHIPPED | OUTPATIENT
Start: 2025-04-23

## 2025-04-23 NOTE — PROGRESS NOTES
Chief Complaint  Gynecologic Exam     History of Present Illness:  Patient is 79 y.o.  who presents to [facility] here for her annual examination.  Patient also here with complaints of urinary urgency and frequency.  She has been on Gemtesa.  She does report having some improvement in her symptoms.  She has been having occasional episodes however of difficulty voiding.  She has been having pain with urination.  She reports being treated for several UTIs over the last few months.  She reports being on antibiotic for 2 months.  Patient has done 2 rounds of Bactrim recently.  She is still having pain and discomfort.  She does have episodes of difficulty voiding as well.  She has not been using any estrogen cream.  She also reports having vulvar irritation.  She reports having 1 area of irritation.  She reports using an ointment in the past with some improvement in her symptoms.  She has been seen by urology as well.  Patient did have a bladder scan.  She also had a recent renal ultrasound.  She has also been seen by infectious disease.    History  Past Medical History:   Diagnosis Date    Arthritis     Atherosclerosis of native coronary artery of native heart without angina pectoris 2024    Breast cancer     RIGHT BREAST STAGE 3    Diabetes mellitus     Elevated cholesterol     GERD (gastroesophageal reflux disease) 2021    History of cancer chemotherapy     Hypertension     Hyperthyroidism 2022    Kidney stone     Lichen sclerosus     Shingles     Thickened endometrium 2018    Urinary incontinence     Urinary tract infection      Current Outpatient Medications on File Prior to Visit   Medication Sig Dispense Refill    valACYclovir (VALTREX) 1000 MG tablet Take 1 tablet by mouth 3 times a day.      albuterol sulfate  (90 Base) MCG/ACT inhaler 2 puffs Every 6 (Six) Hours As Needed.      azelastine (ASTELIN) 0.1 % nasal spray Administer 1 spray into the nostril(s) as  directed by provider 2 (Two) Times a Day.      benzonatate (TESSALON) 200 MG capsule TAKE 1 CAPSULE THREE TIMES DAILY AS NEEDED      Blood Glucose Monitoring Suppl (OneTouch Verio Flex System) w/Device kit See Admin Instructions. for testing      Continuous Blood Gluc Sensor (FreeStyle Malina 2 Sensor) [facility] CHANGE SENSOR EVERY 14 DAYS      Continuous Glucose  (FreeStyle Malina 2 [place]) device See Admin Instructions.      desloratadine (CLARINEX) 5 MG tablet Take 1 tablet by mouth Daily.      ezetimibe (ZETIA) 10 MG tablet Take 1 tablet by mouth Daily.      Flovent  MCG/ACT inhaler Inhale 2 puffs 2 (Two) Times a Day As Needed.      furosemide (Lasix) 20 MG tablet Take 1 tablet by mouth Daily As Needed (For weight gain > 2-3 lbs overnight).      ipratropium (ATROVENT) 0.03 % nasal spray Administer 1 spray into the nostril(s) as directed by provider 2 (Two) Times a Day As Needed.      Januvia 25 MG tablet Take 1 tablet by mouth Daily.      Lancets (OneTouch Delica Plus [id number]) misc USE AS DIRECTED EVERY DAY      meclizine (ANTIVERT) 12.5 MG tablet Take 1 tablet by mouth 3 (Three) Times a Day As Needed.      metoprolol succinate XL (Toprol XL) 100 MG 24 hr tablet Take 1.5 tablets by mouth Daily. (Patient taking differently: Take 2 tablets by mouth Daily.) 135 tablet 1    nitrofurantoin, macrocrystal-monohydrate, (Macrobid) 100 MG capsule Take 1 capsule by mouth Every Night. 30 capsule 3    nitroglycerin (NITROSTAT) 0.4 MG SL tablet Place 1 tablet under the tongue Every 5 (Five) Minutes As Needed for Chest Pain.      ondansetron ODT (ZOFRAN-ODT) 4 MG disintegrating tablet Take 1 tablet by mouth As Needed.      OneTouch Verio test strip Daily. for testing      pantoprazole (PROTONIX) 40 MG EC tablet Take 1 tablet by mouth Daily.      simethicone (MYLICON) 80 MG chewable tablet Chew 1 tablet Every 6 (Six) Hours As Needed for Flatulence.      spironolactone (ALDACTONE) 25 MG tablet Take 1 tablet by mouth  Daily. 90 tablet 1    sulfamethoxazole-trimethoprim (BACTRIM DS,SEPTRA DS) 800-160 MG per tablet Take 1 tablet by mouth 2 (Two) Times a Day.      sulfamethoxazole-trimethoprim (BACTRIM DS,SEPTRA DS) 800-160 MG per tablet Take 1 tablet by mouth Daily for 30 days. 30 tablet 5    Vibegron (Gemtesa) 75 MG tablet Take 1 tablet by mouth Daily. 30 tablet 11    Vitamins-Lipotropics (Lipoflavonoid) tablet Take 2 tablets by mouth 3 times a day. For tinitus - per pt has not been taking x 2-3 wks (12/2/2024), she is getting ready to start them again       No current facility-administered medications on file prior to visit.     Allergies   Allergen Reactions    Caffeine Other (See Comments)     No caffeine per doctors recommendation    Jardiance [Empagliflozin] Other (See Comments)     UTI     Past Surgical History:   Procedure Laterality Date    CARDIAC CATHETERIZATION N/A 7/15/2024    Procedure: Coronary angiography;  Surgeon: Brian Morrow MD;  Location: [facility] CATH INVASIVE LOCATION;  Service: Cardiology;  Laterality: N/A;    CATARACT EXTRACTION Right 01/10/2023    CERVICAL CONE BIOPSY      CHOLECYSTECTOMY  03/2010    COLONOSCOPY N/A 07/06/2018    Procedure: COLONOSCOPY;  Surgeon: Trenton Lyons MD;  Location: [facility] OR;  Service: Gastroenterology    DILATATION AND CURETTAGE      ENDOSCOPY N/A 07/06/2018    Procedure: ESOPHAGOGASTRODUODENOSCOPY;  Surgeon: Trenton Lyons MD;  Location: [facility] OR;  Service: Gastroenterology    MASTECTOMY Right 10/1998    MASTECTOMY Left 07/2013    TUBAL ABDOMINAL LIGATION       Family History   Problem Relation Age of Onset    Breast cancer Mother     Diabetes Mother     Cancer Mother     Ovarian cancer Maternal Aunt     Cancer Maternal Aunt      Social History     Socioeconomic History    Marital status:    Tobacco Use    Smoking status: Never     Passive exposure: Never    Smokeless tobacco: Never   Vaping Use    Vaping status: Never Used   Substance and Sexual Activity     "Alcohol use: No    Drug use: No    Sexual activity: Not Currently       Physical Examination:  Vital Signs: /76   Ht 165.1 cm (65\")   Wt 56.2 kg (124 lb)   BMI 20.63 kg/m²     General Appearance: alert, appears stated age, and cooperative  Breasts: Examined in supine position  Absent bilaterally; no palpable masses or LA  Abdomen: no masses, no hepatomegaly, no splenomegaly, soft non-tender, no guarding, and no rebound tenderness  Pelvic: Clinical staff was present for exam; pelvic examination required for evaluation.  External genitalia: Small approximately 1 cm area of discoloration of the left labia majora distally noted.  :  urethral meatus normal;  Vaginal: Marked atrophic changes noted with narrowing of the vaginal introitus  Cervix:  normal appearance.  Uterus:  normal size, shape and consistency.  Adnexa:  normal bimanual exam of the adnexa.  Pap smear done and specimen sent using Thin-Prep technique    Data Review:  The following data was reviewed by: Antonietta Sauceda MD on 04/23/2025:     Labs:  POC Urinalysis Dipstick, Automated (02/18/2025 14:42)  Litholink 24-Hour Urine, Kidney Stone - (02/18/2025 15:33)  Urine Culture - Urine, Urine, Clean Catch (03/11/2025 00:00)  Urinalysis With Microscopic - Urine, Clean Catch (03/11/2025 00:00)  NuSwab VG+ - , (03/11/2025 00:00)  POC Urinalysis Dipstick, Automated (03/11/2025 14:13)  NuSwab VG+ - Swab, Vagina (03/11/2025 14:37)  Vitamin D 1,25 Dihydroxy (03/11/2025 15:03)  Basic Metabolic Panel (03/11/2025 15:03)  PTH, Intact & Calcium (03/11/2025 15:03)  Uric Acid (03/11/2025 15:03)  Urine Culture - Urine, Urine, Clean Catch (03/28/2025 09:43)  Urinalysis With Microscopic - Urine, Clean Catch (03/28/2025 09:43)  Cameron Urine Culture Tube - Urine, Clean Catch (03/28/2025 09:43)  Urine Culture - Urine, Urine, Catheter In/Out (04/01/2025 00:00)  POC Urinalysis Dipstick, Automated (04/01/2025 13:33)  Urine Culture - Urine, Urine, Clean Catch (04/07/2025 " 09:25)  Urinalysis With Microscopic - Urine, Clean Catch (04/07/2025 09:25)  Imaging:  US Renal Bilateral (03/28/2025 12:14)   UROLOGY - SCAN by [facility], [place] (04/01/2025 00:00)   Medical Records:  Progress Notes by Aliza Thao APRN (04/01/2025 13:40)   Progress Notes by Elena Velazquez MD (04/10/2025 10:30)   Assessment and Plan   1. Encounter for gynecological examination (general) (routine) with abnormal findings  I explained to Aster that Pap smears are no longer recommended in patients after 65 years of age who have had adequate negative screening with three consecutive negative pap smears within the previous 10 years and the most recent test performed within the past 5 years. Women who have a history of TRACI 2, TRACI 3, or ACIS should continue routine screening for a total of 20 years after regression or treatment.   I stressed to Aster that she still should be seen yearly for a full physical including breast and pelvic exam.  I informed her that women aged 65 and older still do get cervical cancer representing 14.1% of the [place] female population and 19.6% of new cases of cervical cancer.  I also informed the patient regarding medicare guidelines on coverage for pap smear screening.  For this reason, Aster has elected pap smear screening this year.   - LIQUID-BASED PAP SMEAR WITH HPV GENOTYPING IF ASCUS ([facility]ANYA,COR,MAD)    2. Urinary tract infection symptoms  Urine for clean-catch UA culture and sensitivity.  Plan pending results.  - Urine Culture - Urine, Urine, Clean Catch  - Urinalysis With Microscopic - Urine, Clean Catch    3. Postmenopausal atrophic vaginitis  Discussed various options for relief of atrophic vaginal symptoms related to menopause. Discussed local therapy for treatment of vaginal symptoms only.  Discussed the different formulation options including cream, ring, and tablets.  Discussed the low risk of systemic absorption in postmenopausal women with atrophy using 25 mcgs of  estradiol on a daily basis.  Recommend low dose use 2-3x/wk for maintenance of treatment.  Other treatment options were discussed including the use of water-based and silicone-based vaginal lubricants and moisturizers.  Also discussed was the FDA approved treatment option of ospemifene for moderate to severe dyspareunia.  Patient instructed to take a small amount and apply to the periurethral area twice weekly.  - estradiol (ESTRACE VAGINAL) 0.1 MG/GM vaginal cream; May use 1 gram intravaginal qhs x 2 weeks then 1 gram 2x/week.  Dispense: 1 each; Refill: 11    4. Frequent UTI  Patient with frequent UTIs as noted.  Prescription was given for estrogen cream.  Patient to apply externally to the periurethral area as discussed.  - estradiol (ESTRACE VAGINAL) 0.1 MG/GM vaginal cream; May use 1 gram intravaginal qhs x 2 weeks then 1 gram 2x/week.  Dispense: 1 each; Refill: 11    5. Pain with urination  Patient with complaints of pain with urination.  Will send urine today for clean-catch UA culture and sensitivity as discussed.    6. Difficulty voiding  Patient did have PVR as noted.  She has also had renal ultrasound.  Prescription is given for estrogen cream to apply to the periurethral area.  - estradiol (ESTRACE VAGINAL) 0.1 MG/GM vaginal cream; May use 1 gram intravaginal qhs x 2 weeks then 1 gram 2x/week.  Dispense: 1 each; Refill: 11    7. Urinary urgency  Patient with urinary urgency as noted.  I have discussed with the patient cutting her Gemtesa in half.  Instructions and precautions have been given.  Patient to follow-up with urology as discussed.    8. Vulvar irritation  Prescription given for clobetasol ointment.  Patient instructed to apply sparingly.  Patient to call if no improvement and/or changes in her symptoms as discussed.  - clobetasol (TEMOVATE) 0.05 % ointment; Apply to affected area BID x 2 wks then daily x 2 wks then 2-3x/wk  Dispense: 60 g; Refill: 6    Follow Up/Instructions:  Follow up as  noted.  Patient was given instructions and counseling regarding her condition or for health maintenance advice. Please see specific information pulled into the AVS if appropriate.     Note: Speech recognition transcription software may have been used to dictate portions of this document.  An attempt at proofreading has been made though minor errors in transcription may still be present.    This note was electronically signed.  Antonietta Sauceda M.D.

## 2025-04-25 LAB — REF LAB TEST METHOD: NORMAL

## 2025-04-27 LAB
APPEARANCE UR: CLEAR
BACTERIA #/AREA URNS HPF: ABNORMAL /HPF
BACTERIA UR CULT: ABNORMAL
BACTERIA UR CULT: ABNORMAL
BILIRUB UR QL STRIP: NEGATIVE
CASTS URNS MICRO: ABNORMAL
COLOR UR: YELLOW
EPI CELLS #/AREA URNS HPF: ABNORMAL /HPF
GLUCOSE UR QL STRIP: NEGATIVE
HGB UR QL STRIP: ABNORMAL
KETONES UR QL STRIP: NEGATIVE
LEUKOCYTE ESTERASE UR QL STRIP: ABNORMAL
NITRITE UR QL STRIP: NEGATIVE
OTHER ANTIBIOTIC SUSC ISLT: ABNORMAL
PH UR STRIP: 5.5 [PH] (ref 5–8)
PROT UR QL STRIP: ABNORMAL
RBC #/AREA URNS HPF: ABNORMAL /HPF
SP GR UR STRIP: 1.03 (ref 1–1.03)
UROBILINOGEN UR STRIP-MCNC: ABNORMAL MG/DL
WBC #/AREA URNS HPF: ABNORMAL /HPF

## 2025-04-28 ENCOUNTER — TELEPHONE (OUTPATIENT)
Dept: UROLOGY | Facility: CLINIC | Age: 80
End: 2025-04-28

## 2025-04-28 ENCOUNTER — DISEASE STATE MANAGEMENT VISIT (OUTPATIENT)
Dept: PHARMACY | Facility: HOSPITAL | Age: 80
End: 2025-04-28
Payer: MEDICARE

## 2025-04-28 ENCOUNTER — TELEPHONE (OUTPATIENT)
Dept: OBSTETRICS AND GYNECOLOGY | Facility: CLINIC | Age: 80
End: 2025-04-28
Payer: MEDICARE

## 2025-04-28 VITALS
DIASTOLIC BLOOD PRESSURE: 71 MMHG | BODY MASS INDEX: 21.33 KG/M2 | WEIGHT: 128 LBS | SYSTOLIC BLOOD PRESSURE: 121 MMHG | HEART RATE: 84 BPM | RESPIRATION RATE: 16 BRPM | HEIGHT: 65 IN | OXYGEN SATURATION: 94 % | TEMPERATURE: 97.7 F

## 2025-04-28 PROCEDURE — G0463 HOSPITAL OUTPT CLINIC VISIT: HCPCS

## 2025-04-28 RX ORDER — SULFAMETHOXAZOLE AND TRIMETHOPRIM 800; 160 MG/1; MG/1
1 TABLET ORAL 2 TIMES DAILY
Qty: 14 TABLET | Refills: 0 | Status: SHIPPED | OUTPATIENT
Start: 2025-04-28 | End: 2025-05-05

## 2025-04-28 NOTE — TELEPHONE ENCOUNTER
I called the patient and she is not having symptoms but I informed her that she has been referred to [facility] for her UTI and she stated she knew that but she does not like the Dr. She sees there cause he does not know her by her name and he didn't know what was going on with her besides she has a UTI. I explained to her that Aliza is out of the office and she is wanting Aliza to write her something where she can go to the hospital and get IV ABX. I told her she will need to contact [facility] to see what they recommended for this cause you can not just get a IV ABX order.    Alec BLOOD

## 2025-04-28 NOTE — TELEPHONE ENCOUNTER
Patient called to check on results from UA and Culture. She says she is having a lot of back pain. Please Advise.

## 2025-04-28 NOTE — TELEPHONE ENCOUNTER
Caller: VANESSA AHUJA    Relationship to patient: SELF    Best call back number: [phone]    Chief complaint: PATIENT HAD CULTURE DONE BY GYN ON 04-23-25 CAME BACK ABNORMAL. CALLED ASKING IF THERE WOULD BE ANY WAY POSSIBLE TO SEE JOYCE TODAY. SHE HAS ANOTHER APPT AT 3 PM TODAY AND WAS HOPING TO BE WORKED IN.    HAVING BACK PAIN, WEAK IN LEGS. BEEN GOING ON FOR ABOUT WEEK    HUB AGENT UNABLE TO WARM TRANSFER CALL. PLEASE REACH OUT    Type of visit: FU
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
## 2025-04-28 NOTE — TELEPHONE ENCOUNTER
Patient stopped by office and wanted to be seen today but we didn't have any openings. Please advise

## 2025-04-30 ENCOUNTER — TELEPHONE (OUTPATIENT)
Dept: UROLOGY | Facility: CLINIC | Age: 80
End: 2025-04-30

## 2025-04-30 ENCOUNTER — OFFICE VISIT (OUTPATIENT)
Dept: INFECTIOUS DISEASES | Facility: CLINIC | Age: 80
End: 2025-04-30
Payer: MEDICARE

## 2025-04-30 VITALS
HEART RATE: 72 BPM | OXYGEN SATURATION: 94 % | SYSTOLIC BLOOD PRESSURE: 109 MMHG | BODY MASS INDEX: 21.86 KG/M2 | TEMPERATURE: 97.2 F | WEIGHT: 123.4 LBS | HEIGHT: 63 IN | RESPIRATION RATE: 16 BRPM | DIASTOLIC BLOOD PRESSURE: 62 MMHG

## 2025-04-30 DIAGNOSIS — B96.29 UTI DUE TO EXTENDED-SPECTRUM BETA LACTAMASE (ESBL) PRODUCING ESCHERICHIA COLI: ICD-10-CM

## 2025-04-30 DIAGNOSIS — N20.0 BILATERAL NEPHROLITHIASIS: Primary | ICD-10-CM

## 2025-04-30 DIAGNOSIS — N39.0 UTI DUE TO EXTENDED-SPECTRUM BETA LACTAMASE (ESBL) PRODUCING ESCHERICHIA COLI: ICD-10-CM

## 2025-04-30 DIAGNOSIS — N39.0 RECURRENT UTI: ICD-10-CM

## 2025-04-30 DIAGNOSIS — Z16.12 UTI DUE TO EXTENDED-SPECTRUM BETA LACTAMASE (ESBL) PRODUCING ESCHERICHIA COLI: ICD-10-CM

## 2025-04-30 NOTE — PROGRESS NOTES
[facility]         Referring Provider: No referring provider defined for this encounter.    Subjective      Chief Complaint  Follow-up (Recurrent UTI, complaint of ache/pain in legs and weakness in legs and back.)    History of Present Illness  Atser Craft is a 79 y.o. female who presents today to [facility] for infectious disease evaluation and antibiotic management.    The patient is a 79-year-old female who presents for a follow-up regarding a urinary tract infection (UTI).    Reports daily intake of four 16-ounce bottles of water.    Was recently diagnosed with ESBL UTI and has previously being on long term suppressive therapy. This has been treated and symptoms resolved aside from occasional right lower quadrant pain off and on.  No dysuria or polyuria at this time.  No clinical sepsis.       Past Medical History:   Diagnosis Date    Arthritis     Atherosclerosis of native coronary artery of native heart without angina pectoris 9/11/2024    Breast cancer     RIGHT BREAST STAGE 3    Diabetes mellitus     Elevated cholesterol     GERD (gastroesophageal reflux disease) 08/23/2021    History of cancer chemotherapy     Hypertension     Hyperthyroidism 05/19/2022    Kidney stone     Lichen sclerosus     Shingles     Thickened endometrium 2018    Urinary incontinence     Urinary tract infection        Past Surgical History:   Procedure Laterality Date    CARDIAC CATHETERIZATION N/A 7/15/2024    Procedure: Coronary angiography;  Surgeon: Brian Morrow MD;  Location:  [facility];  Service: Cardiology;  Laterality: N/A;    CATARACT EXTRACTION Right 01/10/2023    CERVICAL CONE BIOPSY      CHOLECYSTECTOMY  03/2010    COLONOSCOPY N/A 07/06/2018    Procedure: COLONOSCOPY;  Surgeon: Trenton Lyons MD;  Location: [facility] OR;  Service: Gastroenterology    DILATATION AND CURETTAGE      ENDOSCOPY N/A 07/06/2018    Procedure:  ESOPHAGOGASTRODUODENOSCOPY;  Surgeon: Trenton Lyons MD;  Location: [facility];  Service: Gastroenterology    MASTECTOMY Right 10/1998    MASTECTOMY Left 07/2013    TUBAL ABDOMINAL LIGATION         Social History     Socioeconomic History    Marital status:    Tobacco Use    Smoking status: Never     Passive exposure: Never    Smokeless tobacco: Never   Vaping Use    Vaping status: Never Used   Substance and Sexual Activity    Alcohol use: No    Drug use: No    Sexual activity: Not Currently       Family History  family history includes Breast cancer in her mother; Cancer in her maternal aunt and mother; Diabetes in her mother; Ovarian cancer in her maternal aunt.    Immunization History   Administered Date(s) Administered    COVID-19 (PFIZER) Purple Cap Monovalent 09/09/2021, 09/30/2021        Allergies  Allergies   Allergen Reactions    Caffeine Other (See Comments)     No caffeine per doctors recommendation    Jardiance [Empagliflozin] Other (See Comments)     UTI       The medication list has been reviewed and updated.   Current Medications    Current Outpatient Medications:     albuterol sulfate  (90 Base) MCG/ACT inhaler, 2 puffs Every 6 (Six) Hours As Needed., Disp: , Rfl:     azelastine (ASTELIN) 0.1 % nasal spray, Administer 1 spray into the nostril(s) as directed by provider 2 (Two) Times a Day., Disp: , Rfl:     benzonatate (TESSALON) 200 MG capsule, TAKE 1 CAPSULE THREE TIMES DAILY AS NEEDED, Disp: , Rfl:     Blood Glucose Monitoring Suppl (OneTouch Verio Flex System) w/Device kit, See Admin Instructions. for testing, Disp: , Rfl:     clobetasol (TEMOVATE) 0.05 % ointment, Apply to affected area BID x 2 wks then daily x 2 wks then 2-3x/wk, Disp: 60 g, Rfl: 6    Continuous Blood Gluc Sensor (FreeStyle Malina 2 Sensor) misc, CHANGE SENSOR EVERY 14 DAYS, Disp: , Rfl:     Continuous Glucose  (FreeStyle Malina 2 Walsh) device, See Admin Instructions., Disp: , Rfl:     desloratadine  (CLARINEX) 5 MG tablet, Take 1 tablet by mouth Daily., Disp: , Rfl:     estradiol (ESTRACE VAGINAL) 0.1 MG/GM vaginal cream, May use 1 gram intravaginal qhs x 2 weeks then 1 gram 2x/week., Disp: 1 each, Rfl: 11    ezetimibe (ZETIA) 10 MG tablet, Take 1 tablet by mouth Daily., Disp: , Rfl:     Flovent  MCG/ACT inhaler, Inhale 2 puffs 2 (Two) Times a Day As Needed., Disp: , Rfl:     furosemide (Lasix) 20 MG tablet, Take 1 tablet by mouth Daily As Needed (For weight gain > 2-3 lbs overnight)., Disp: , Rfl:     ipratropium (ATROVENT) 0.03 % nasal spray, Administer 1 spray into the nostril(s) as directed by provider 2 (Two) Times a Day As Needed., Disp: , Rfl:     Januvia 25 MG tablet, Take 1 tablet by mouth Daily., Disp: , Rfl:     Lancets (OneTouch Delica Plus [id number]) misc, USE AS DIRECTED EVERY DAY, Disp: , Rfl:     meclizine (ANTIVERT) 12.5 MG tablet, Take 1 tablet by mouth 3 (Three) Times a Day As Needed., Disp: , Rfl:     metoprolol succinate XL (Toprol XL) 100 MG 24 hr tablet, Take 1.5 tablets by mouth Daily., Disp: 135 tablet, Rfl: 1    nitrofurantoin, macrocrystal-monohydrate, (Macrobid) 100 MG capsule, Take 1 capsule by mouth Every Night., Disp: 30 capsule, Rfl: 3    nitroglycerin (NITROSTAT) 0.4 MG SL tablet, Place 1 tablet under the tongue Every 5 (Five) Minutes As Needed for Chest Pain., Disp: , Rfl:     ondansetron ODT (ZOFRAN-ODT) 4 MG disintegrating tablet, Take 1 tablet by mouth As Needed., Disp: , Rfl:     OneTouch Verio test strip, Daily. for testing, Disp: , Rfl:     pantoprazole (PROTONIX) 40 MG EC tablet, Take 1 tablet by mouth Daily., Disp: , Rfl:     simethicone (MYLICON) 80 MG chewable tablet, Chew 1 tablet Every 6 (Six) Hours As Needed for Flatulence., Disp: , Rfl:     spironolactone (ALDACTONE) 25 MG tablet, Take 1 tablet by mouth Daily., Disp: 90 tablet, Rfl: 1    sulfamethoxazole-trimethoprim (Bactrim DS) 800-160 MG per tablet, Take 1 tablet by mouth 2 (Two) Times a Day for 7 days.,  "Disp: 14 tablet, Rfl: 0    sulfamethoxazole-trimethoprim (BACTRIM DS,SEPTRA DS) 800-160 MG per tablet, Take 1 tablet by mouth 2 (Two) Times a Day., Disp: , Rfl:     sulfamethoxazole-trimethoprim (BACTRIM DS,SEPTRA DS) 800-160 MG per tablet, Take 1 tablet by mouth Daily for 30 days., Disp: 30 tablet, Rfl: 5    valACYclovir (VALTREX) 1000 MG tablet, Take 1 tablet by mouth 3 times a day., Disp: , Rfl:     Vitamins-Lipotropics (Lipoflavonoid) tablet, Take 2 tablets by mouth 3 times a day. For tinitus - per pt has not been taking x 2-3 wks (12/2/2024), she is getting ready to start them again, Disp: , Rfl:     Vibegron (Gemtesa) 75 MG tablet, Take 1 tablet by mouth Daily. (Patient not taking: Reported on 4/30/2025), Disp: 30 tablet, Rfl: 11      Review of Systems    Review of Systems   Constitutional:  Negative for activity change, appetite change, chills, diaphoresis, fatigue and fever.   HENT:  Negative for congestion, dental problem, drooling, ear discharge, ear pain, facial swelling, postnasal drip, sinus pressure, sore throat and swollen glands.    Eyes:  Negative for blurred vision, double vision, pain, discharge and itching.   Respiratory:  Negative for apnea, cough, choking, chest tightness and shortness of breath.    Cardiovascular:  Negative for chest pain, palpitations and leg swelling.   Gastrointestinal:  Negative for abdominal distention, abdominal pain, diarrhea, nausea, rectal pain and vomiting.   Genitourinary:  Negative for difficulty urinating, dysuria, flank pain, frequency, hematuria, pelvic pain and pelvic pressure.   Neurological:  Negative for dizziness, tremors, seizures, syncope, speech difficulty and confusion.   Psychiatric/Behavioral:  Negative for agitation and behavioral problems.         Objective     Vital Signs:  /62 (BP Location: Left arm, Patient Position: Sitting, Cuff Size: Adult)   Pulse 72   Temp 97.2 °F (36.2 °C) (Infrared)   Resp 16   Ht 160 cm (63\")   Wt 56 kg (123 lb " "6.4 oz)   SpO2 94%   BMI 21.86 kg/m²   Estimated body mass index is 21.86 kg/m² as calculated from the following:    Height as of this encounter: 160 cm (63\").    Weight as of this encounter: 56 kg (123 lb 6.4 oz).    Physical Exam  Constitutional:       General: She is not in acute distress.     Appearance: Normal appearance. She is not ill-appearing, toxic-appearing or diaphoretic.   HENT:      Head: Normocephalic and atraumatic.      Nose: No congestion or rhinorrhea.      Mouth/Throat:      Pharynx: Oropharynx is clear. No oropharyngeal exudate or posterior oropharyngeal erythema.   Cardiovascular:      Rate and Rhythm: Normal rate and regular rhythm.      Heart sounds: No murmur heard.  Pulmonary:      Effort: No respiratory distress.      Breath sounds: No stridor. No wheezing, rhonchi or rales.   Chest:      Chest wall: No tenderness.   Abdominal:      General: There is no distension.      Tenderness: There is no abdominal tenderness. There is no right CVA tenderness, left CVA tenderness, guarding or rebound.   Musculoskeletal:         General: No swelling, tenderness or signs of injury.      Cervical back: No rigidity or tenderness.   Skin:     Coloration: Skin is not jaundiced or pale.      Findings: No bruising, erythema or rash.   Neurological:      General: No focal deficit present.      Mental Status: She is alert. Mental status is at baseline.   Psychiatric:         Mood and Affect: Mood normal.         Behavior: Behavior normal.          Result Review :  The following data was reviewed by Elena Velazquez MD     Lab Results  Lab Results   Component Value Date    WBC 12.36 (H) 09/12/2024    HGB 13.4 09/12/2024    HCT 41.0 09/12/2024    MCV 94.5 09/12/2024     09/12/2024     Lab Results   Component Value Date    GLUCOSE 153 (H) 03/11/2025    BUN 20 03/11/2025    CREATININE 0.62 03/11/2025    EGFRIFNONA >60 05/18/2022    EGFRIFAFRI >60 05/18/2022    BCR 32.3 (H) 03/11/2025    K 4.2 03/11/2025 " "   CO2 22.9 03/11/2025    CALCIUM 9.0 03/11/2025    CALCIUM 9.1 03/11/2025    ALBUMIN 4.5 09/12/2024    AST 18 09/12/2024    ALT 17 09/12/2024      No results found for: \"CRP\"     No results found for: \"ACANTHNAEG\", \"AFBCX\", \"BPERTUSSISCX\", \"BLOODCX\"  No results found for: \"BCIDPCR\", \"CXREFLEX\", \"CSFCX\", \"CULTURETIS\"  No results found for: \"CULTURES\", \"HSVCX\", \"URCX\"  No results found for: \"EYECULTURE\", \"GCCX\", \"HSVCULTURE\", \"LABHSV\"  No results found for: \"LEGIONELLA\", \"MRSACX\", \"MUMPSCX\", \"MYCOPLASCX\"  No results found for: \"NOCARDIACX\", \"STOOLCX\"  Urine Culture   Date Value Ref Range Status   04/23/2025 Final report (A)  Final     No results found for: \"VIRALCULTU\", \"WOUNDCX\"    Radiology Results              Assessment / Plan        Diagnoses and all orders for this visit:    1. Bilateral nephrolithiasis (Primary)    2. Recurrent UTI    3. UTI due to extended-spectrum beta lactamase (ESBL) producing Escherichia coli      CT 2/16/25 Bilateral nephrolithiasis is noted, stable from prior exam,  greater on right side.     Recommend urology evaluation for stone removal, lithotripsy, stents to prevent further recurrent UTIs.     Would recommend to continue with oral suppressive therapy until stones are removed as patient is going to be at high risk for recurrent UTIs.  1 option is to do Bactrim DS 1 p.o. daily.    Most recent urine culture and urine analysis from 4/23/2025 show 21-50 WBCs and growth of over 100,000 colonies of E. coli.    Patient just started her Bactrim DS 1 p.o. twice daily last night and took already 2 pills.  It would be too early to recheck a UA urine culture but we will check in 1 week and referral for urology, Dr. Frias and Genoveva KO, for lithotripsy or stent placement.             Follow Up   Return in about 1 week (around 5/7/2025) for Follow up, With Dr. Velazquez.    Visit Diagnoses:    ICD-10-CM ICD-9-CM   1. Bilateral nephrolithiasis  N20.0 592.0   2. Recurrent UTI  N39.0 599.0 "   3. UTI due to extended-spectrum beta lactamase (ESBL) producing Escherichia coli  N39.0 599.0    B96.29 041.49    Z16.12 V09.1       Patient was given instructions and counseling regarding her condition or for health maintenance advice. Please see specific information pulled into the AVS if appropriate.     This document has been electronically signed by Elena Velazquez MD   April 30, 2025 10:30 EDT      No orders of the defined types were placed in this encounter.     Dictated Utilizing Dragon Dictation: Part of this note may be an electronic transcription/translation of spoken language to printed text using the Dragon Dictation System.

## 2025-04-30 NOTE — TELEPHONE ENCOUNTER
Hub staff attempted to follow warm transfer process and was unsuccessful     Caller: Aster Craft    Relationship to patient: Self    Best call back number:   Telephone Information:   Mobile [phone]        Additional notes:PT SAW INFECTIOUS DISEASE DR TODAY THAT SHE SAID SHE WAS REFERRED TO BY JOYCE BELTRÁN.  SHE STATED THAT DR WANTED HER TO HAVE A PROCEDURE DONE AS SOON AS POSSIBLE TO REMOVE THE KIDNEY STONES.  PLEASE CALL PT ASAP TO ADVISE/SCHEDULE.   THANK YOU.

## 2025-05-07 ENCOUNTER — OFFICE VISIT (OUTPATIENT)
Dept: UROLOGY | Facility: CLINIC | Age: 80
End: 2025-05-07
Payer: MEDICARE

## 2025-05-07 VITALS
WEIGHT: 123 LBS | BODY MASS INDEX: 21.79 KG/M2 | DIASTOLIC BLOOD PRESSURE: 74 MMHG | TEMPERATURE: 98 F | RESPIRATION RATE: 18 BRPM | SYSTOLIC BLOOD PRESSURE: 128 MMHG | HEIGHT: 63 IN | OXYGEN SATURATION: 99 %

## 2025-05-07 DIAGNOSIS — N20.0 KIDNEY STONE: Primary | ICD-10-CM

## 2025-05-07 DIAGNOSIS — N39.0 UTI DUE TO EXTENDED-SPECTRUM BETA LACTAMASE (ESBL) PRODUCING ESCHERICHIA COLI: ICD-10-CM

## 2025-05-07 DIAGNOSIS — B96.29 UTI DUE TO EXTENDED-SPECTRUM BETA LACTAMASE (ESBL) PRODUCING ESCHERICHIA COLI: ICD-10-CM

## 2025-05-07 DIAGNOSIS — Z16.12 UTI DUE TO EXTENDED-SPECTRUM BETA LACTAMASE (ESBL) PRODUCING ESCHERICHIA COLI: ICD-10-CM

## 2025-05-07 NOTE — PROGRESS NOTES
Office Visit     Patient Name: Aster Craft  : 1945   MRN: [id number]     Patient or patient representative verbalized consent for the use of Ambient Listening during the visit with  MAIKEL Carrasquillo for chart documentation. 2025  10:36 EDT    Chief Complaint:   Chief Complaint   Patient presents with    Discuss Surgery     Referring Provider: No ref. provider found    Primary Care Provider: Yolie Cotto MD     History of Present Illness  80-year-old female presents to discuss kidney stones.    Kidney Stones  - Reports pain upon movement attributed to kidney stone.  - Concerns about heart stress due to stent placement.  - Not on anticoagulant therapy  - having recurrent UTIs for the last year ESBL E.coli  - Saw Dr. Velazquez with ID in April    Cardiac Issues  - History of SVT and CHF with reduced ejection fraction.  - Under care of Dr. Teressa Garcia for cardiac issues.    Other Medical History  - History of breast cancer, hypertension, and type 2 diabetes.  - No history of COPD, MANJULA, or PONV.  - Not on weight loss or antidiabetic medications like Ozempic or Wegovy.  - Managing diabetes through dietary modifications.    Gastrointestinal Issues  - History of constipation and frequent gas.  - Underwent upper GI and colonoscopy; nine polyps identified.  - No follow-up appointment since procedures.      Supplemental information: Recently discontinued Januvia and Bactrim. Uses Mylicon as needed for gas. Takes valacyclovir as needed; recently had shingles. Takes Zofran and Nitrostat as needed. Takes albuterol, two nasal sprays, Zetia, Tessalon occasionally for cough, spironolactone, metoprolol 150 mg, Lasix as needed, meclizine as needed.      Subjective   Review of System:   As noted in HPI.    Past Medical History:   Diagnosis Date    Arthritis     Atherosclerosis of native coronary artery of native heart without angina pectoris 2024    Breast cancer     RIGHT BREAST STAGE 3     Diabetes mellitus     Elevated cholesterol     GERD (gastroesophageal reflux disease) 08/23/2021    History of cancer chemotherapy     Hypertension     Hyperthyroidism 05/19/2022    Kidney stone     Lichen sclerosus     Shingles     Thickened endometrium 2018    Urinary incontinence     Urinary tract infection      Past Surgical History:   Procedure Laterality Date    CARDIAC CATHETERIZATION N/A 7/15/2024    Procedure: Coronary angiography;  Surgeon: Brian Morrow MD;  Location:  [facility];  Service: Cardiology;  Laterality: N/A;    CATARACT EXTRACTION Right 01/10/2023    CERVICAL CONE BIOPSY      CHOLECYSTECTOMY  03/2010    COLONOSCOPY N/A 07/06/2018    Procedure: COLONOSCOPY;  Surgeon: Trenton Lyons MD;  Location:  [facility];  Service: Gastroenterology    DILATATION AND CURETTAGE      ENDOSCOPY N/A 07/06/2018    Procedure: ESOPHAGOGASTRODUODENOSCOPY;  Surgeon: Trenton Lyons MD;  Location: [facility] OR;  Service: Gastroenterology    MASTECTOMY Right 10/1998    MASTECTOMY Left 07/2013    TUBAL ABDOMINAL LIGATION       Family History   Problem Relation Age of Onset    Breast cancer Mother     Diabetes Mother     Cancer Mother     Ovarian cancer Maternal Aunt     Cancer Maternal Aunt      Social History     Socioeconomic History    Marital status:    Tobacco Use    Smoking status: Never     Passive exposure: Never    Smokeless tobacco: Never   Vaping Use    Vaping status: Never Used   Substance and Sexual Activity    Alcohol use: No    Drug use: No    Sexual activity: Not Currently       Current Outpatient Medications:     albuterol sulfate  (90 Base) MCG/ACT inhaler, 2 puffs Every 6 (Six) Hours As Needed., Disp: , Rfl:     azelastine (ASTELIN) 0.1 % nasal spray, Administer 1 spray into the nostril(s) as directed by provider 2 (Two) Times a Day., Disp: , Rfl:     benzonatate (TESSALON) 200 MG capsule, TAKE 1 CAPSULE THREE TIMES DAILY AS NEEDED, Disp: , Rfl:     Blood Glucose  Monitoring Suppl (OneTouch Verio Flex System) w/Device kit, See Admin Instructions. for testing, Disp: , Rfl:     clobetasol (TEMOVATE) 0.05 % ointment, Apply to affected area BID x 2 wks then daily x 2 wks then 2-3x/wk, Disp: 60 g, Rfl: 6    Continuous Blood Gluc Sensor (FreeStyle Malina 2 Sensor) misc, CHANGE SENSOR EVERY 14 DAYS, Disp: , Rfl:     Continuous Glucose  (FreeStyle Malina 2 [place]) device, See Admin Instructions., Disp: , Rfl:     desloratadine (CLARINEX) 5 MG tablet, Take 1 tablet by mouth Daily., Disp: , Rfl:     estradiol (ESTRACE VAGINAL) 0.1 MG/GM vaginal cream, May use 1 gram intravaginal qhs x 2 weeks then 1 gram 2x/week., Disp: 1 each, Rfl: 11    ezetimibe (ZETIA) 10 MG tablet, Take 1 tablet by mouth Daily., Disp: , Rfl:     Flovent  MCG/ACT inhaler, Inhale 2 puffs 2 (Two) Times a Day As Needed., Disp: , Rfl:     furosemide (Lasix) 20 MG tablet, Take 1 tablet by mouth Daily As Needed (For weight gain > 2-3 lbs overnight)., Disp: , Rfl:     ipratropium (ATROVENT) 0.03 % nasal spray, Administer 1 spray into the nostril(s) as directed by provider 2 (Two) Times a Day As Needed., Disp: , Rfl:     Januvia 25 MG tablet, Take 1 tablet by mouth Daily., Disp: , Rfl:     Lancets (OneTouch Delica Plus [id number]) misc, USE AS DIRECTED EVERY DAY, Disp: , Rfl:     meclizine (ANTIVERT) 12.5 MG tablet, Take 1 tablet by mouth 3 (Three) Times a Day As Needed., Disp: , Rfl:     metoprolol succinate XL (Toprol XL) 100 MG 24 hr tablet, Take 1.5 tablets by mouth Daily., Disp: 135 tablet, Rfl: 1    OneTouch Verio test strip, Daily. for testing, Disp: , Rfl:     pantoprazole (PROTONIX) 40 MG EC tablet, Take 1 tablet by mouth Daily., Disp: , Rfl:     simethicone (MYLICON) 80 MG chewable tablet, Chew 1 tablet Every 6 (Six) Hours As Needed for Flatulence., Disp: , Rfl:     spironolactone (ALDACTONE) 25 MG tablet, Take 1 tablet by mouth Daily., Disp: 90 tablet, Rfl: 1    valACYclovir (VALTREX) 1000 MG tablet,  "Take 1 tablet by mouth 3 times a day., Disp: , Rfl:     Vitamins-Lipotropics (Lipoflavonoid) tablet, Take 2 tablets by mouth 3 times a day. For tinitus - per pt has not been taking x 2-3 wks (12/2/2024), she is getting ready to start them again, Disp: , Rfl:     nitrofurantoin, macrocrystal-monohydrate, (Macrobid) 100 MG capsule, Take 1 capsule by mouth Every Night. (Patient not taking: Reported on 5/7/2025), Disp: 30 capsule, Rfl: 3    nitroglycerin (NITROSTAT) 0.4 MG SL tablet, Place 1 tablet under the tongue Every 5 (Five) Minutes As Needed for Chest Pain. (Patient not taking: Reported on 5/7/2025), Disp: , Rfl:     ondansetron ODT (ZOFRAN-ODT) 4 MG disintegrating tablet, Take 1 tablet by mouth As Needed. (Patient not taking: Reported on 5/7/2025), Disp: , Rfl:     sulfamethoxazole-trimethoprim (BACTRIM DS,SEPTRA DS) 800-160 MG per tablet, Take 1 tablet by mouth 2 (Two) Times a Day. (Patient not taking: Reported on 5/7/2025), Disp: , Rfl:     sulfamethoxazole-trimethoprim (BACTRIM DS,SEPTRA DS) 800-160 MG per tablet, Take 1 tablet by mouth Daily for 30 days. (Patient not taking: Reported on 5/7/2025), Disp: 30 tablet, Rfl: 5    Allergies   Allergen Reactions    Caffeine Other (See Comments)     No caffeine per doctors recommendation    Jardiance [Empagliflozin] Other (See Comments)     UTI     Objective   Visit Vitals  /74   Temp 98 °F (36.7 °C)   Resp 18   Ht 160 cm (63\")   Wt 55.8 kg (123 lb)   SpO2 99%   BMI 21.79 kg/m²        Body mass index is 21.79 kg/m².     Physical Exam  Vitals and nursing note reviewed.   Constitutional:       General: She is not in acute distress.     Appearance: Normal appearance. She is normal weight. She is not ill-appearing.   Pulmonary:      Effort: Pulmonary effort is normal. No respiratory distress.   Abdominal:      Tenderness: There is right CVA tenderness (mild).   Skin:     General: Skin is warm and dry.   Neurological:      General: No focal deficit present.      " "Mental Status: She is alert and oriented to person, place, and time.   Psychiatric:         Mood and Affect: Mood normal.         Behavior: Behavior normal.                Labs  Lab Results   Component Value Date    COLORU Yellow 04/23/2025    CLARITYU Clear 04/23/2025    SPECGRAV 1.030 04/01/2025    PHUR 6.0 04/07/2025    LEUKOCYTESUR Trace (A) 04/23/2025    NITRITE Positive (A) 04/01/2025    PROTEINPOCUA 30 mg/dL (A) 04/01/2025    GLUCOSEUR 500 mg/dL (A) 04/01/2025    KETONESU Negative 04/07/2025    UROBILINOGEN 1.0 E.U./dL 04/07/2025    BILIRUBINUR Negative 04/23/2025    RBCUR Large (A) 04/01/2025      Lab Results   Component Value Date    WBCUA 21-50 (A) 04/07/2025    WBCUA 21-50 (A) 03/28/2025    RBCUA 0-2 04/23/2025    RBCUA 0-2 04/07/2025    BACTERIA Comment 04/23/2025    BACTERIA 4+ (A) 04/07/2025    HYALCASTU None Seen 04/07/2025    HYALCASTU None Seen 03/28/2025    SQUAMEPIUA 0-2 04/07/2025    SQUAMEPIUA 0-2 03/28/2025      Urine Culture          4/1/2025    00:00 4/7/2025    09:25 4/23/2025    14:39   Urine Culture   Urine Culture Final report  >100,000 CFU/mL Escherichia coli ESBL  Final report      Lab Results   Component Value Date    WBC 12.36 (H) 09/12/2024    HGB 13.4 09/12/2024    HCT 41.0 09/12/2024    MCV 94.5 09/12/2024     09/12/2024     Lab Results   Component Value Date    GLUCOSE 153 (H) 03/11/2025    CALCIUM 9.0 03/11/2025    CALCIUM 9.1 03/11/2025     03/11/2025    K 4.2 03/11/2025    CO2 22.9 03/11/2025     03/11/2025    BUN 20 03/11/2025    BUN 20 09/12/2024    CREATININE 0.62 03/11/2025    CREATININE 0.63 09/12/2024    EGFR 91.9 03/11/2025    EGFR 91.5 09/12/2024    BCR 32.3 (H) 03/11/2025    ANIONGAP 12.1 03/11/2025    ALT 17 09/12/2024    AST 18 09/12/2024     Lab Results   Component Value Date    HGBA1C 8.90 (H) 07/01/2024     No results found for: \"URICACIDSTN\", \"UEPN8MOIUXI\", \"DITG7ZTIGS\", \"LABMAGN\"  Lab Results   Component Value Date    PTH 29.6 03/11/2025    " URICACID 2.5 03/11/2025     Lab Results   Component Value Date    LABPH 5.5 04/23/2025       Lab Results   Component Value Date    ATOPOBIUMV Low - 0 03/11/2025    BVAB2 Low - 0 03/11/2025    MEGASPHAER Low - 0 03/11/2025    CALBICANSN Negative 03/11/2025    CGLABRATAN Negative 03/11/2025    CPARAPSILOS Negative 02/18/2022    CLUSITANIAE Negative 02/18/2022    CKRUSEI Negative 02/18/2022    TRICHVAG Negative 03/11/2025    CHLAMNAA Negative 03/11/2025    NGONORRHON Negative 03/11/2025       Lab Results   Component Value Date    TSH 0.146 (L) 07/13/2024    FREET4 1 04/21/2025         Radiographic Studies  US Renal Bilateral  Result Date: 3/28/2025  1. Nothing specifically identified to account for the patient symptoms.   This report was finalized on 3/28/2025 12:58 PM by Dr. Jhony Reid MD.        I have reviewed the above labs and imaging.       Assessment / Plan      There are no diagnoses linked to this encounter.     Assessment & Plan  1. Right renal calculus:  - Large stone can be an underlying cause of recurrent UTIs  - Measures 8.5 x 6.3 cm in renal pelvis, not obstructing  - Smaller left stone may pass naturally  - Medications: oxybutynin, Pyridium, Flomax, Tylenol for stent pain prescribed after surgery and recommend she take them as prescribed to minimize stent pain  - Discussed surgery risks: infection, bleeding, anesthesia complications, organ damage, possible secondary surgery  - Cardiology clearance needed will place order with Teressa Garcia  - will collaborate with Dr. Velazquez for prophylactic antibiotic recommendation leading up to surgery  - Consented for right ureteroscopy with laser lithotripsy and stent placement for a week    2. Recurrent UTIs:  - Likely due to kidney stone harboring bacteria  - Consider pre-surgery antibiotics based on previous cultures    3. Constipation:  - Can contribute to UTIs  - Recommend daily fiber intake for bowel regularity    We discussed the risks, benefits, and  alternatives to Right URS/LL, including but not limited to bleeding, infection, damage nearby structures, need for further surgeries, and complications with anesthesia.  Patient consented to Right URS/LL.      Surgical Concerns: COPD/asthma, DM, HTN, CHF, SVT, BMI 21    Aliza Thao, MSN, APRN, FNP-C  [facility]

## 2025-05-08 ENCOUNTER — OFFICE VISIT (OUTPATIENT)
Dept: INFECTIOUS DISEASES | Facility: CLINIC | Age: 80
End: 2025-05-08
Payer: MEDICARE

## 2025-05-08 VITALS
BODY MASS INDEX: 22.14 KG/M2 | HEART RATE: 78 BPM | WEIGHT: 125 LBS | SYSTOLIC BLOOD PRESSURE: 116 MMHG | DIASTOLIC BLOOD PRESSURE: 69 MMHG | OXYGEN SATURATION: 96 %

## 2025-05-08 DIAGNOSIS — N39.0 UTI DUE TO EXTENDED-SPECTRUM BETA LACTAMASE (ESBL) PRODUCING ESCHERICHIA COLI: ICD-10-CM

## 2025-05-08 DIAGNOSIS — Z16.12 UTI DUE TO EXTENDED-SPECTRUM BETA LACTAMASE (ESBL) PRODUCING ESCHERICHIA COLI: ICD-10-CM

## 2025-05-08 DIAGNOSIS — B96.29 UTI DUE TO EXTENDED-SPECTRUM BETA LACTAMASE (ESBL) PRODUCING ESCHERICHIA COLI: ICD-10-CM

## 2025-05-08 DIAGNOSIS — N20.0 BILATERAL NEPHROLITHIASIS: Primary | ICD-10-CM

## 2025-05-08 DIAGNOSIS — N39.0 RECURRENT UTI: ICD-10-CM

## 2025-05-08 LAB
BACTERIA UR QL AUTO: NORMAL /HPF
BILIRUB UR QL STRIP: NEGATIVE
CLARITY UR: CLEAR
COLOR UR: YELLOW
GLUCOSE UR STRIP-MCNC: ABNORMAL MG/DL
HGB UR QL STRIP.AUTO: ABNORMAL
HYALINE CASTS UR QL AUTO: NORMAL /LPF
KETONES UR QL STRIP: NEGATIVE
LEUKOCYTE ESTERASE UR QL STRIP.AUTO: NEGATIVE
NITRITE UR QL STRIP: NEGATIVE
PH UR STRIP.AUTO: 5.5 [PH] (ref 5–8)
PROT UR QL STRIP: NEGATIVE
RBC # UR STRIP: NORMAL /HPF
REF LAB TEST METHOD: NORMAL
SP GR UR STRIP: 1.02 (ref 1–1.03)
SQUAMOUS #/AREA URNS HPF: NORMAL /HPF
UROBILINOGEN UR QL STRIP: ABNORMAL
WBC # UR STRIP: NORMAL /HPF

## 2025-05-08 PROCEDURE — 87086 URINE CULTURE/COLONY COUNT: CPT | Performed by: INTERNAL MEDICINE

## 2025-05-08 PROCEDURE — 81001 URINALYSIS AUTO W/SCOPE: CPT | Performed by: INTERNAL MEDICINE

## 2025-05-08 NOTE — PROGRESS NOTES
[facility]         Referring Provider: No referring provider defined for this encounter.    Subjective      Chief Complaint  Follow-up (Recurrent UTI)    History of Present Illness  Aster Craft is a 80 y.o. female who presents today to [facility] for infectious disease evaluation and antibiotic management.    The patient is a 79-year-old female who presents for a follow-up regarding a urinary tract infection (UTI).    Reports daily intake of four 16-ounce bottles of water.    Was recently diagnosed with ESBL UTI and has previously being on long term suppressive therapy. This has been treated and symptoms resolved aside from occasional right lower quadrant pain off and on. No dysuria or polyuria at this time. No clinical sepsis.     Feeling very tired lately.      Past Medical History:   Diagnosis Date    Arthritis     Atherosclerosis of native coronary artery of native heart without angina pectoris 9/11/2024    Breast cancer     RIGHT BREAST STAGE 3    Diabetes mellitus     Elevated cholesterol     GERD (gastroesophageal reflux disease) 08/23/2021    History of cancer chemotherapy     Hypertension     Hyperthyroidism 05/19/2022    Kidney stone     Lichen sclerosus     Shingles     Thickened endometrium 2018    Urinary incontinence     Urinary tract infection        Past Surgical History:   Procedure Laterality Date    CARDIAC CATHETERIZATION N/A 7/15/2024    Procedure: Coronary angiography;  Surgeon: Brian Morrow MD;  Location:  [facility];  Service: Cardiology;  Laterality: N/A;    CATARACT EXTRACTION Right 01/10/2023    CERVICAL CONE BIOPSY      CHOLECYSTECTOMY  03/2010    COLONOSCOPY N/A 07/06/2018    Procedure: COLONOSCOPY;  Surgeon: Trenton Lyons MD;  Location: [facility];  Service: Gastroenterology    DILATATION AND CURETTAGE      ENDOSCOPY N/A 07/06/2018    Procedure: ESOPHAGOGASTRODUODENOSCOPY;  Surgeon: Trenton Lyons  MD;  Location: [facility];  Service: Gastroenterology    MASTECTOMY Right 10/1998    MASTECTOMY Left 07/2013    TUBAL ABDOMINAL LIGATION         Social History     Socioeconomic History    Marital status:    Tobacco Use    Smoking status: Never     Passive exposure: Never    Smokeless tobacco: Never   Vaping Use    Vaping status: Never Used   Substance and Sexual Activity    Alcohol use: No    Drug use: No    Sexual activity: Not Currently       Family History  family history includes Breast cancer in her mother; Cancer in her maternal aunt and mother; Diabetes in her mother; Ovarian cancer in her maternal aunt.    Immunization History   Administered Date(s) Administered    COVID-19 (PFIZER) Purple Cap Monovalent 09/09/2021, 09/30/2021        Allergies  Allergies   Allergen Reactions    Caffeine Other (See Comments)     No caffeine per doctors recommendation    Jardiance [Empagliflozin] Other (See Comments)     UTI       The medication list has been reviewed and updated.   Current Medications    Current Outpatient Medications:     albuterol sulfate  (90 Base) MCG/ACT inhaler, 2 puffs Every 6 (Six) Hours As Needed., Disp: , Rfl:     azelastine (ASTELIN) 0.1 % nasal spray, Administer 1 spray into the nostril(s) as directed by provider 2 (Two) Times a Day., Disp: , Rfl:     benzonatate (TESSALON) 200 MG capsule, TAKE 1 CAPSULE THREE TIMES DAILY AS NEEDED, Disp: , Rfl:     Blood Glucose Monitoring Suppl (OneTouch Verio Flex System) w/Device kit, See Admin Instructions. for testing, Disp: , Rfl:     clobetasol (TEMOVATE) 0.05 % ointment, Apply to affected area BID x 2 wks then daily x 2 wks then 2-3x/wk, Disp: 60 g, Rfl: 6    Continuous Blood Gluc Sensor (FreeStyle Malina 2 Sensor) misc, CHANGE SENSOR EVERY 14 DAYS, Disp: , Rfl:     Continuous Glucose  (FreeStyle Malina 2 [place]) device, See Admin Instructions., Disp: , Rfl:     desloratadine (CLARINEX) 5 MG tablet, Take 1 tablet by mouth Daily., Disp:  , Rfl:     estradiol (ESTRACE VAGINAL) 0.1 MG/GM vaginal cream, May use 1 gram intravaginal qhs x 2 weeks then 1 gram 2x/week., Disp: 1 each, Rfl: 11    ezetimibe (ZETIA) 10 MG tablet, Take 1 tablet by mouth Daily., Disp: , Rfl:     Flovent  MCG/ACT inhaler, Inhale 2 puffs 2 (Two) Times a Day As Needed., Disp: , Rfl:     furosemide (Lasix) 20 MG tablet, Take 1 tablet by mouth Daily As Needed (For weight gain > 2-3 lbs overnight)., Disp: , Rfl:     Lancets (OneTouch Delica Plus [id number]) misc, USE AS DIRECTED EVERY DAY, Disp: , Rfl:     meclizine (ANTIVERT) 12.5 MG tablet, Take 1 tablet by mouth 3 (Three) Times a Day As Needed., Disp: , Rfl:     metoprolol succinate XL (Toprol XL) 100 MG 24 hr tablet, Take 1.5 tablets by mouth Daily., Disp: 135 tablet, Rfl: 1    nitrofurantoin, macrocrystal-monohydrate, (Macrobid) 100 MG capsule, Take 1 capsule by mouth Every Night., Disp: 30 capsule, Rfl: 3    nitroglycerin (NITROSTAT) 0.4 MG SL tablet, Place 1 tablet under the tongue Every 5 (Five) Minutes As Needed for Chest Pain., Disp: , Rfl:     ondansetron ODT (ZOFRAN-ODT) 4 MG disintegrating tablet, Take 1 tablet by mouth As Needed., Disp: , Rfl:     OneTouch Verio test strip, Daily. for testing, Disp: , Rfl:     pantoprazole (PROTONIX) 40 MG EC tablet, Take 1 tablet by mouth Daily., Disp: , Rfl:     simethicone (MYLICON) 80 MG chewable tablet, Chew 1 tablet Every 6 (Six) Hours As Needed for Flatulence., Disp: , Rfl:     spironolactone (ALDACTONE) 25 MG tablet, Take 1 tablet by mouth Daily., Disp: 90 tablet, Rfl: 1    valACYclovir (VALTREX) 1000 MG tablet, Take 1 tablet by mouth 3 times a day., Disp: , Rfl:     Vitamins-Lipotropics (Lipoflavonoid) tablet, Take 2 tablets by mouth 3 times a day. For tinitus - per pt has not been taking x 2-3 wks (12/2/2024), she is getting ready to start them again, Disp: , Rfl:       Review of Systems    Review of Systems   Constitutional:  Positive for fatigue. Negative for activity  "change, appetite change, chills, diaphoresis and fever.   HENT:  Negative for congestion, dental problem, drooling, ear discharge, ear pain, facial swelling, postnasal drip, sinus pressure, sore throat and swollen glands.    Eyes:  Negative for blurred vision, double vision, pain, discharge and itching.   Respiratory:  Negative for apnea, cough, choking, chest tightness and shortness of breath.    Cardiovascular:  Negative for chest pain, palpitations and leg swelling.   Gastrointestinal:  Negative for abdominal distention, abdominal pain, diarrhea, nausea, rectal pain and vomiting.   Genitourinary:  Negative for difficulty urinating, dysuria, flank pain, frequency, hematuria, pelvic pain and pelvic pressure.   Neurological:  Negative for dizziness, tremors, seizures, syncope, speech difficulty and confusion.   Psychiatric/Behavioral:  Negative for agitation and behavioral problems.         Objective     Vital Signs:  /69 (BP Location: Right arm, Patient Position: Sitting, Cuff Size: Small Adult)   Pulse 78   Wt 56.7 kg (125 lb)   SpO2 96%   BMI 22.14 kg/m²   Estimated body mass index is 22.14 kg/m² as calculated from the following:    Height as of 5/7/25: 160 cm (63\").    Weight as of this encounter: 56.7 kg (125 lb).    Physical Exam  Constitutional:       General: She is not in acute distress.     Appearance: Normal appearance. She is not ill-appearing, toxic-appearing or diaphoretic.   HENT:      Head: Normocephalic and atraumatic.      Nose: No congestion or rhinorrhea.      Mouth/Throat:      Pharynx: Oropharynx is clear. No oropharyngeal exudate or posterior oropharyngeal erythema.   Cardiovascular:      Rate and Rhythm: Normal rate and regular rhythm.      Heart sounds: No murmur heard.  Pulmonary:      Effort: No respiratory distress.      Breath sounds: No stridor. No wheezing, rhonchi or rales.   Chest:      Chest wall: No tenderness.   Abdominal:      General: There is no distension.      " "Tenderness: There is no abdominal tenderness. There is no right CVA tenderness, left CVA tenderness, guarding or rebound.   Musculoskeletal:         General: No swelling, tenderness or signs of injury.      Cervical back: No rigidity or tenderness.   Skin:     Coloration: Skin is not jaundiced or pale.      Findings: No bruising, erythema or rash.   Neurological:      General: No focal deficit present.      Mental Status: She is alert. Mental status is at baseline.   Psychiatric:         Mood and Affect: Mood normal.         Behavior: Behavior normal.          Result Review :  The following data was reviewed by Elena Velazquez MD     Lab Results  Lab Results   Component Value Date    WBC 12.36 (H) 09/12/2024    HGB 13.4 09/12/2024    HCT 41.0 09/12/2024    MCV 94.5 09/12/2024     09/12/2024     Lab Results   Component Value Date    GLUCOSE 153 (H) 03/11/2025    BUN 20 03/11/2025    CREATININE 0.62 03/11/2025    EGFRIFNONA >60 05/18/2022    EGFRIFAFRI >60 05/18/2022    BCR 32.3 (H) 03/11/2025    K 4.2 03/11/2025    CO2 22.9 03/11/2025    CALCIUM 9.0 03/11/2025    CALCIUM 9.1 03/11/2025    ALBUMIN 4.5 09/12/2024    AST 18 09/12/2024    ALT 17 09/12/2024      No results found for: \"CRP\"     No results found for: \"ACANTHNAEG\", \"AFBCX\", \"BPERTUSSISCX\", \"BLOODCX\"  No results found for: \"BCIDPCR\", \"CXREFLEX\", \"CSFCX\", \"CULTURETIS\"  No results found for: \"CULTURES\", \"HSVCX\", \"URCX\"  No results found for: \"EYECULTURE\", \"GCCX\", \"HSVCULTURE\", \"LABHSV\"  No results found for: \"LEGIONELLA\", \"MRSACX\", \"MUMPSCX\", \"MYCOPLASCX\"  No results found for: \"NOCARDIACX\", \"STOOLCX\"  No results found for: \"THROATCX\", \"UNSTIMCULT\", \"URINECX\", \"CULTURE\", \"VZVCULTUR\"  No results found for: \"VIRALCULTU\", \"WOUNDCX\"    Radiology Results              Assessment / Plan        Diagnoses and all orders for this visit:    1. Bilateral nephrolithiasis (Primary)  -     Urine Culture - Urine, Urine, Clean Catch; Future  -     Urinalysis With " Microscopic - Urine, Clean Catch; Future    2. Recurrent UTI  -     Urine Culture - Urine, Urine, Clean Catch; Future  -     Urinalysis With Microscopic - Urine, Clean Catch; Future    3. UTI due to extended-spectrum beta lactamase (ESBL) producing Escherichia coli  -     Urine Culture - Urine, Urine, Clean Catch; Future  -     Urinalysis With Microscopic - Urine, Clean Catch; Future      CT 2/16/25 Bilateral nephrolithiasis is noted, stable from prior exam,  greater on right side.     Recommend urology evaluation for stone removal, lithotripsy, stents to prevent further recurrent UTIs.     Would recommend to continue with oral suppressive therapy until stones are removed as patient is going to be at high risk for recurrent UTIs.  1 option is to do Bactrim DS 1 p.o. daily.     Most recent urine culture and urine analysis from 4/23/2025 show 21-50 WBCs and growth of over 100,000 colonies of E. coli.     Patient finished her course of Bactrim DS 1 p.o. twice daily.  Recheck a UA urine culture and followed up for urology, Dr. Frias and Genoveva KO, for lithotripsy or stent placement.     Will check UA and Urine culture today while awaiting for lithotripsy or stent placement.             Follow Up   No follow-ups on file.    Visit Diagnoses:    ICD-10-CM ICD-9-CM   1. Bilateral nephrolithiasis  N20.0 592.0   2. Recurrent UTI  N39.0 599.0   3. UTI due to extended-spectrum beta lactamase (ESBL) producing Escherichia coli  N39.0 599.0    B96.29 041.49    Z16.12 V09.1       Patient was given instructions and counseling regarding her condition or for health maintenance advice. Please see specific information pulled into the AVS if appropriate.     This document has been electronically signed by Elena Velazquez MD   May 8, 2025 10:15 EDT      No orders of the defined types were placed in this encounter.     Dictated Utilizing Dragon Dictation: Part of this note may be an electronic transcription/translation of  spoken language to printed text using the Dragon Dictation System.

## 2025-05-09 LAB — BACTERIA SPEC AEROBE CULT: NO GROWTH

## 2025-05-12 DIAGNOSIS — N20.0 KIDNEY STONE: Primary | ICD-10-CM

## 2025-05-12 RX ORDER — SULFAMETHOXAZOLE AND TRIMETHOPRIM 800; 160 MG/1; MG/1
1 TABLET ORAL 2 TIMES DAILY
Qty: 14 TABLET | Refills: 0 | Status: SHIPPED | OUTPATIENT
Start: 2025-05-12 | End: 2025-05-19

## 2025-05-16 ENCOUNTER — TELEPHONE (OUTPATIENT)
Dept: CARDIOLOGY | Facility: CLINIC | Age: 80
End: 2025-05-16
Payer: MEDICARE

## 2025-05-16 NOTE — TELEPHONE ENCOUNTER
Patient is scheduled for Ureteroscopy, Laser Lithotripsy & stent placement 05/27/2025 and the surgery center is requesting cardiac clearance. She has an Appointment for cardiac clearance 5/20/25 but was seen 3/2025. Is Pt clear for surgery or does she need to come in next week for clearance?

## 2025-05-16 NOTE — TELEPHONE ENCOUNTER
Of note, she does have increased risk for CVA inside or outside of the operative setting based on history of PAF without DOAC therapy.  Patient has previously declined DOAC therapy on multiple occasions.    Patient can proceed with elective surgery without further workup.  She can hold aspirin 5 to 7 days per MD preference and restart postop day 1.  No visit next week is required for her.

## 2025-05-21 ENCOUNTER — OFFICE VISIT (OUTPATIENT)
Dept: INFECTIOUS DISEASES | Facility: CLINIC | Age: 80
End: 2025-05-21
Payer: MEDICARE

## 2025-05-21 VITALS
DIASTOLIC BLOOD PRESSURE: 76 MMHG | HEIGHT: 63 IN | BODY MASS INDEX: 22.32 KG/M2 | TEMPERATURE: 97.3 F | WEIGHT: 126 LBS | HEART RATE: 78 BPM | RESPIRATION RATE: 18 BRPM | OXYGEN SATURATION: 94 % | SYSTOLIC BLOOD PRESSURE: 129 MMHG

## 2025-05-21 DIAGNOSIS — N39.0 RECURRENT UTI: ICD-10-CM

## 2025-05-21 DIAGNOSIS — N20.0 BILATERAL NEPHROLITHIASIS: Primary | ICD-10-CM

## 2025-05-21 DIAGNOSIS — B96.29 UTI DUE TO EXTENDED-SPECTRUM BETA LACTAMASE (ESBL) PRODUCING ESCHERICHIA COLI: ICD-10-CM

## 2025-05-21 DIAGNOSIS — E11.9 TYPE 2 DIABETES MELLITUS WITHOUT COMPLICATION, WITHOUT LONG-TERM CURRENT USE OF INSULIN: ICD-10-CM

## 2025-05-21 DIAGNOSIS — Z16.12 UTI DUE TO EXTENDED-SPECTRUM BETA LACTAMASE (ESBL) PRODUCING ESCHERICHIA COLI: ICD-10-CM

## 2025-05-21 DIAGNOSIS — N39.0 UTI DUE TO EXTENDED-SPECTRUM BETA LACTAMASE (ESBL) PRODUCING ESCHERICHIA COLI: ICD-10-CM

## 2025-05-21 NOTE — PROGRESS NOTES
[facility]         Referring Provider: No referring provider defined for this encounter.    Subjective      Chief Complaint  Follow-up (Bilateral Nephrolithiasis)    History of Present Illness  Aster Craft is a 80 y.o. female who presents today to [facility] for infectious disease evaluation and antibiotic management.    The patient is a 79-year-old female who presents for a follow-up regarding a urinary tract infection (UTI).    Reports daily intake of four 16-ounce bottles of water.    Was recently diagnosed with ESBL UTI and has previously being on long term suppressive therapy. This has been treated and symptoms resolved aside from occasional right lower quadrant pain off and on. No dysuria or polyuria at this time. No clinical sepsis.      Feeling very tired lately.      Past Medical History:   Diagnosis Date    Arthritis     Atherosclerosis of native coronary artery of native heart without angina pectoris 9/11/2024    Breast cancer     RIGHT BREAST STAGE 3    Diabetes mellitus     Elevated cholesterol     GERD (gastroesophageal reflux disease) 08/23/2021    History of cancer chemotherapy     Hypertension     Hyperthyroidism 05/19/2022    Kidney stone     Lichen sclerosus     Shingles     Thickened endometrium 2018    Urinary incontinence     Urinary tract infection        Past Surgical History:   Procedure Laterality Date    CARDIAC CATHETERIZATION N/A 7/15/2024    Procedure: Coronary angiography;  Surgeon: Brian Morrow MD;  Location:  [facility];  Service: Cardiology;  Laterality: N/A;    CATARACT EXTRACTION Right 01/10/2023    CERVICAL CONE BIOPSY      CHOLECYSTECTOMY  03/2010    COLONOSCOPY N/A 07/06/2018    Procedure: COLONOSCOPY;  Surgeon: Trenton Lyons MD;  Location: [facility] OR;  Service: Gastroenterology    DILATATION AND CURETTAGE      ENDOSCOPY N/A 07/06/2018    Procedure: ESOPHAGOGASTRODUODENOSCOPY;  Surgeon:  Trenton Lyons MD;  Location: [facility];  Service: Gastroenterology    MASTECTOMY Right 10/1998    MASTECTOMY Left 07/2013    TUBAL ABDOMINAL LIGATION         Social History     Socioeconomic History    Marital status:    Tobacco Use    Smoking status: Never     Passive exposure: Never    Smokeless tobacco: Never   Vaping Use    Vaping status: Never Used   Substance and Sexual Activity    Alcohol use: No    Drug use: No    Sexual activity: Not Currently       Family History  family history includes Breast cancer in her mother; Cancer in her maternal aunt and mother; Diabetes in her mother; Ovarian cancer in her maternal aunt.    Immunization History   Administered Date(s) Administered    COVID-19 (PFIZER) Purple Cap Monovalent 09/09/2021, 09/30/2021        Allergies  Allergies   Allergen Reactions    Caffeine Other (See Comments)     No caffeine per doctors recommendation    Jardiance [Empagliflozin] Other (See Comments)     UTI       The medication list has been reviewed and updated.   Current Medications    Current Outpatient Medications:     albuterol sulfate  (90 Base) MCG/ACT inhaler, 2 puffs Every 6 (Six) Hours As Needed., Disp: , Rfl:     azelastine (ASTELIN) 0.1 % nasal spray, Administer 1 spray into the nostril(s) as directed by provider 2 (Two) Times a Day., Disp: , Rfl:     benzonatate (TESSALON) 200 MG capsule, TAKE 1 CAPSULE THREE TIMES DAILY AS NEEDED, Disp: , Rfl:     Blood Glucose Monitoring Suppl (OneTouch Verio Flex System) w/Device kit, See Admin Instructions. for testing, Disp: , Rfl:     clobetasol (TEMOVATE) 0.05 % ointment, Apply to affected area BID x 2 wks then daily x 2 wks then 2-3x/wk, Disp: 60 g, Rfl: 6    Continuous Blood Gluc Sensor (FreeStyle Malina 2 Sensor) misc, CHANGE SENSOR EVERY 14 DAYS, Disp: , Rfl:     Continuous Glucose  (FreeStyle Malina 2 [place]) device, See Admin Instructions., Disp: , Rfl:     desloratadine (CLARINEX) 5 MG tablet, Take 1 tablet by  mouth Daily., Disp: , Rfl:     estradiol (ESTRACE VAGINAL) 0.1 MG/GM vaginal cream, May use 1 gram intravaginal qhs x 2 weeks then 1 gram 2x/week., Disp: 1 each, Rfl: 11    ezetimibe (ZETIA) 10 MG tablet, Take 1 tablet by mouth Daily., Disp: , Rfl:     Flovent  MCG/ACT inhaler, Inhale 2 puffs 2 (Two) Times a Day As Needed., Disp: , Rfl:     furosemide (Lasix) 20 MG tablet, Take 1 tablet by mouth Daily As Needed (For weight gain > 2-3 lbs overnight)., Disp: , Rfl:     Lancets (OneTouch Delica Plus [id number]) misc, USE AS DIRECTED EVERY DAY, Disp: , Rfl:     meclizine (ANTIVERT) 12.5 MG tablet, Take 1 tablet by mouth 3 (Three) Times a Day As Needed., Disp: , Rfl:     metoprolol succinate XL (Toprol XL) 100 MG 24 hr tablet, Take 1.5 tablets by mouth Daily., Disp: 135 tablet, Rfl: 1    nitrofurantoin, macrocrystal-monohydrate, (Macrobid) 100 MG capsule, Take 1 capsule by mouth Every Night., Disp: 30 capsule, Rfl: 3    nitroglycerin (NITROSTAT) 0.4 MG SL tablet, Place 1 tablet under the tongue Every 5 (Five) Minutes As Needed for Chest Pain., Disp: , Rfl:     ondansetron ODT (ZOFRAN-ODT) 4 MG disintegrating tablet, Take 1 tablet by mouth As Needed., Disp: , Rfl:     OneTouch Verio test strip, Daily. for testing, Disp: , Rfl:     pantoprazole (PROTONIX) 40 MG EC tablet, Take 1 tablet by mouth Daily., Disp: , Rfl:     simethicone (MYLICON) 80 MG chewable tablet, Chew 1 tablet Every 6 (Six) Hours As Needed for Flatulence., Disp: , Rfl:     spironolactone (ALDACTONE) 25 MG tablet, Take 1 tablet by mouth Daily., Disp: 90 tablet, Rfl: 1    valACYclovir (VALTREX) 1000 MG tablet, Take 1 tablet by mouth 3 times a day., Disp: , Rfl:     Vitamins-Lipotropics (Lipoflavonoid) tablet, Take 2 tablets by mouth 3 times a day. For tinitus - per pt has not been taking x 2-3 wks (12/2/2024), she is getting ready to start them again, Disp: , Rfl:       Review of Systems    Review of Systems   Constitutional:  Negative for activity  "change, appetite change, chills, diaphoresis, fatigue and fever.   HENT:  Negative for congestion, dental problem, drooling, ear discharge, ear pain, facial swelling, postnasal drip, sinus pressure, sore throat and swollen glands.    Eyes:  Negative for blurred vision, double vision, pain, discharge and itching.   Respiratory:  Negative for apnea, cough, choking, chest tightness and shortness of breath.    Cardiovascular:  Negative for chest pain, palpitations and leg swelling.   Gastrointestinal:  Negative for abdominal distention, abdominal pain, diarrhea, nausea, rectal pain and vomiting.   Genitourinary:  Negative for difficulty urinating, dysuria, flank pain, frequency, hematuria, pelvic pain and pelvic pressure.   Neurological:  Negative for dizziness, tremors, seizures, syncope, speech difficulty and confusion.   Psychiatric/Behavioral:  Negative for agitation and behavioral problems.         Objective     Vital Signs:  /76 (BP Location: Left arm, Patient Position: Sitting, Cuff Size: Adult)   Pulse 78   Temp 97.3 °F (36.3 °C) (Infrared)   Resp 18   Ht 160 cm (63\")   Wt 57.2 kg (126 lb)   SpO2 94%   BMI 22.32 kg/m²   Estimated body mass index is 22.32 kg/m² as calculated from the following:    Height as of this encounter: 160 cm (63\").    Weight as of this encounter: 57.2 kg (126 lb).    Physical Exam  Constitutional:       General: She is not in acute distress.     Appearance: Normal appearance. She is not ill-appearing, toxic-appearing or diaphoretic.   HENT:      Head: Normocephalic and atraumatic.      Nose: No congestion or rhinorrhea.      Mouth/Throat:      Pharynx: Oropharynx is clear. No oropharyngeal exudate or posterior oropharyngeal erythema.   Cardiovascular:      Rate and Rhythm: Normal rate and regular rhythm.      Heart sounds: No murmur heard.  Pulmonary:      Effort: No respiratory distress.      Breath sounds: No stridor. No wheezing, rhonchi or rales.   Chest:      Chest wall: " "No tenderness.   Abdominal:      General: There is no distension.      Tenderness: There is no abdominal tenderness. There is no right CVA tenderness, left CVA tenderness, guarding or rebound.   Musculoskeletal:         General: No swelling, tenderness or signs of injury.      Cervical back: No rigidity or tenderness.   Skin:     Coloration: Skin is not jaundiced or pale.      Findings: No bruising, erythema or rash.   Neurological:      General: No focal deficit present.      Mental Status: She is alert. Mental status is at baseline.   Psychiatric:         Mood and Affect: Mood normal.         Behavior: Behavior normal.          Result Review :  The following data was reviewed by Elena Velazquez MD     Lab Results  Lab Results   Component Value Date    WBC 12.36 (H) 09/12/2024    HGB 13.4 09/12/2024    HCT 41.0 09/12/2024    MCV 94.5 09/12/2024     09/12/2024     Lab Results   Component Value Date    GLUCOSE 153 (H) 03/11/2025    BUN 20 03/11/2025    CREATININE 0.62 03/11/2025    EGFRIFNONA >60 05/18/2022    EGFRIFAFRI >60 05/18/2022    BCR 32.3 (H) 03/11/2025    K 4.2 03/11/2025    CO2 22.9 03/11/2025    CALCIUM 9.0 03/11/2025    CALCIUM 9.1 03/11/2025    ALBUMIN 4.5 09/12/2024    AST 18 09/12/2024    ALT 17 09/12/2024      No results found for: \"CRP\"     No results found for: \"ACANTHNAEG\", \"AFBCX\", \"BPERTUSSISCX\", \"BLOODCX\"  No results found for: \"BCIDPCR\", \"CXREFLEX\", \"CSFCX\", \"CULTURETIS\"  No results found for: \"CULTURES\", \"HSVCX\", \"URCX\"  No results found for: \"EYECULTURE\", \"GCCX\", \"HSVCULTURE\", \"LABHSV\"  No results found for: \"LEGIONELLA\", \"MRSACX\", \"MUMPSCX\", \"MYCOPLASCX\"  No results found for: \"NOCARDIACX\", \"STOOLCX\"  No results found for: \"THROATCX\", \"UNSTIMCULT\", \"URINECX\", \"CULTURE\", \"VZVCULTUR\"  No results found for: \"VIRALCULTU\", \"WOUNDCX\"    Radiology Results              Assessment / Plan        Diagnoses and all orders for this visit:    1. Bilateral nephrolithiasis (Primary)  -     " Ambulatory Referral to Endocrinology    2. Recurrent UTI  -     Ambulatory Referral to Endocrinology    3. UTI due to extended-spectrum beta lactamase (ESBL) producing Escherichia coli  -     Ambulatory Referral to Endocrinology    4. Type 2 diabetes mellitus without complication, without long-term current use of insulin  -     Ambulatory Referral to Endocrinology      CT 2/16/25 Bilateral nephrolithiasis is noted, stable from prior exam,  greater on right side.     Recommend urology evaluation for stone removal, lithotripsy, stents to prevent further recurrent UTIs.     Would recommend to continue with oral suppressive therapy until stones are removed as patient is going to be at high risk for recurrent UTIs.  1 option is to do Bactrim DS 1 p.o. daily.     Most recent urine culture and urine analysis from 4/23/2025 show 21-50 WBCs and growth of over 100,000 colonies of E. coli.     Patient finished her course of Bactrim DS 1 p.o. twice daily.  Recheck a UA urine culture and followed up for urology, Dr. Frias and Genoveva KO, for lithotripsy or stent placement.     5/8/25  Urine analysis with over 1000 glucose, trace blood, negative leukocyte Estrace and negative nitrite and urine culture showed no growth.     Her uncontrolled diabetes and such high sugar in her urine is a very high risk for future UTIs. I had a long discussion with patient regarding diet and follow up with endocrinology. Patient has stopped her metformin.    Patient is scheduled for stone removal on 5/27/2025 and follow-up with her afterwards.        Follow Up   Return in about 2 weeks (around 6/4/2025) for Follow up, With Dr. Velazquez.    Visit Diagnoses:    ICD-10-CM ICD-9-CM   1. Bilateral nephrolithiasis  N20.0 592.0   2. Recurrent UTI  N39.0 599.0   3. UTI due to extended-spectrum beta lactamase (ESBL) producing Escherichia coli  N39.0 599.0    B96.29 041.49    Z16.12 V09.1   4. Type 2 diabetes mellitus without complication, without  long-term current use of insulin  E11.9 250.00       Patient was given instructions and counseling regarding her condition or for health maintenance advice. Please see specific information pulled into the AVS if appropriate.     This document has been electronically signed by Elena Velazquez MD   May 21, 2025 09:52 EDT      No orders of the defined types were placed in this encounter.     Dictated Utilizing Dragon Dictation: Part of this note may be an electronic transcription/translation of spoken language to printed text using the Dragon Dictation System.

## 2025-05-27 ENCOUNTER — OUTSIDE FACILITY SERVICE (OUTPATIENT)
Dept: UROLOGY | Facility: CLINIC | Age: 80
End: 2025-05-27
Payer: MEDICARE

## 2025-05-27 ENCOUNTER — LAB REQUISITION (OUTPATIENT)
Dept: LAB | Facility: HOSPITAL | Age: 80
End: 2025-05-27
Payer: MEDICARE

## 2025-05-27 DIAGNOSIS — N20.0 CALCULUS OF KIDNEY: ICD-10-CM

## 2025-05-27 DIAGNOSIS — N20.0 KIDNEY STONE: Primary | ICD-10-CM

## 2025-05-27 PROCEDURE — 82365 CALCULUS SPECTROSCOPY: CPT | Performed by: UROLOGY

## 2025-05-27 RX ORDER — CEFDINIR 300 MG/1
300 CAPSULE ORAL 2 TIMES DAILY
Qty: 6 CAPSULE | Refills: 0 | Status: SHIPPED | OUTPATIENT
Start: 2025-05-27 | End: 2025-05-30

## 2025-05-27 RX ORDER — PHENAZOPYRIDINE HYDROCHLORIDE 100 MG/1
100 TABLET, FILM COATED ORAL DAILY PRN
Qty: 5 TABLET | Refills: 0 | Status: SHIPPED | OUTPATIENT
Start: 2025-05-27

## 2025-05-27 RX ORDER — ACETAMINOPHEN 500 MG
1000 TABLET ORAL EVERY 6 HOURS
Qty: 30 TABLET | Refills: 0 | Status: SHIPPED | OUTPATIENT
Start: 2025-05-27 | End: 2025-05-30

## 2025-05-27 RX ORDER — TAMSULOSIN HYDROCHLORIDE 0.4 MG/1
1 CAPSULE ORAL NIGHTLY
Qty: 10 CAPSULE | Refills: 0 | Status: SHIPPED | OUTPATIENT
Start: 2025-05-27

## 2025-05-27 RX ORDER — OXYBUTYNIN CHLORIDE 10 MG/1
10 TABLET, EXTENDED RELEASE ORAL DAILY PRN
Qty: 10 TABLET | Refills: 0 | Status: SHIPPED | OUTPATIENT
Start: 2025-05-27

## 2025-05-27 NOTE — H&P
Preoperative diagnosis  right kidney stone    Postoperative diagnosis  right kidney stone    Procedure performed  1.  Flexible cystoscopy, right retrograde pyelogram with ureteral stent placement   2.  right ureteroscopy with holmium-YAG laser lithotripsy (19899)   3.  Fluoroscopy time < 1 hour with interpretation of images  4.  Active suction and clot / stone evacuation with clear ander suction unit (39050, 34486, ,)     Surgeon  Osmin Frias MD    Anesthesia  General    Complications  None     Specimen  Stone fragments for biochemical analysis    EBL  Minimal    Findings  Urethroscopy revealed no strictures or other abnormalities.  Cystoscopy revealed no tumors, stones or other mucosal abnormalities.  Retrograde pyelogram revealed a delicate system with identification of the stone seen on pre-op imaging.  No other filling defects and caliber of the ureter was smooth and normal.  Ureteroscopy revealed stone compatible with preoperative image.     Indications  80 y.o. female agreed to undergo the above named procedure after discussion of the alternatives, risks and benefits.  Informed consent was obtained.      Procedure  The patient was taken to the operating room, identified by name and medical record number, and placed supine on the operating table.  Pre-operative antibiotics were administered.  Bilateral lower extremity SCDs were placed.  After induction of general anesthesia the patient was positioned in dorsal lithotomy, prepped and draped in a sterile fashion.  A time-out was performed.      A 14-Polish flexible cystoscope was passed carefully via urethra into the bladder.  The ureteral orifice was identified and a Sensor wire was passed retrograde to the level of the kidney and confirmed by fluoroscopy.  The flexible scope was off-loaded and the bladder emptied with a straight catheter.  A dual lumen open-ended catheter was passed over the wire. A retrograde pyelogram was performed by slowly  injecting 5 mL of 50% Omnipaque contrast via the catheter with findings described above.  An Amplatz super-stiff was placed to the level of the renal pelvis and confirmed by fluoroscopy. The dual lumen was removed. The Sensor wire was clipped to the drape as a safety wire.  A ureteral access sheath was advanced under direct fluoroscopic guidance. The kidney stone was identified and fragmented with a 200-micron dolly laser fiber at laser settings of 0.4 joules and a frequency of 80 Hz. We then used the clear Pati suction sheath unit to suction all the clots and pieces of stone visually out of the kidney.  At the completion of the procedure, all clinically significant fragments were removed. The access sheath was removed under direct vision.  Ureteral edema but no obvious obstruction was present. A retrograde pyelogram was performed and an appropriately sized ureteral stent was positioned with the upper end in the kidney and the lower in the bladder confirmed by fluoroscopy. The bladder was emptied and the procedure was complete. The patient tolerated the procedure well and was stable throughout.    The patient will follow up with me next week for stent removal.

## 2025-05-28 ENCOUNTER — TELEPHONE (OUTPATIENT)
Dept: UROLOGY | Facility: CLINIC | Age: 80
End: 2025-05-28

## 2025-05-28 NOTE — TELEPHONE ENCOUNTER
Called patient and LVM to return my call.  Patient had Kidney stone surgery yesterday.    Alec BLOOD

## 2025-05-28 NOTE — TELEPHONE ENCOUNTER
Hub staff attempted to follow warm transfer process and was unsuccessful     Caller: Aster Craft    Relationship to patient: Self    Best call back number: [phone]    Patient is needing: PT HAD A PROCEDURE ON 05.27.2025. WHEN SHE GOES TO URINATE SHE HAS PAIN ALL THE WAY UP TO HER CHEST. PLEASE CALL HER TO DISCUSS. THANK  YOU

## 2025-05-28 NOTE — TELEPHONE ENCOUNTER
I spoke to the patient and she is stating that when she goes urinate she has a weird pain into her chest and that she gets chills.    Alec BLOOD    never used

## 2025-05-28 NOTE — TELEPHONE ENCOUNTER
Caller: Aster Craft    Relationship: Self    Best call back number: [phone]    What is the best time to reach you: ANYTIME    Who are you requesting to speak with (clinical staff, provider,  specific staff member): JOYCE BELTRÁN    What was the call regarding: PT CALLED REGARDING QUESTION ON ANTIBIOTICS.  PT STATES SHE RECEIVED BACTRIM FROM THE OFFICE AND HOSPITAL GAVE HER CEFDINIR POSTOP. SHE WANTS TO KNOW IF SHE SHOULD BE TAKING BOTH? ALSO, COMPLAINED OF PAIN WHEN URINATING AND INTERMITTENT CHILLS.

## 2025-06-04 LAB
CALCIUM OXALATE DIHYDRATE MFR STONE IR: 20 %
COLOR STONE: NORMAL
COM MFR STONE: 80 %
LABORATORY COMMENT REPORT: NORMAL
SIZE STONE: NORMAL MM
SPEC SOURCE SUBJ: NORMAL
WT STONE: 17 MG

## 2025-06-05 ENCOUNTER — PROCEDURE VISIT (OUTPATIENT)
Dept: UROLOGY | Facility: CLINIC | Age: 80
End: 2025-06-05
Payer: MEDICAID

## 2025-06-05 DIAGNOSIS — B96.29 UTI DUE TO EXTENDED-SPECTRUM BETA LACTAMASE (ESBL) PRODUCING ESCHERICHIA COLI: Primary | ICD-10-CM

## 2025-06-05 DIAGNOSIS — Z16.12 UTI DUE TO EXTENDED-SPECTRUM BETA LACTAMASE (ESBL) PRODUCING ESCHERICHIA COLI: Primary | ICD-10-CM

## 2025-06-05 DIAGNOSIS — N20.0 KIDNEY STONE: ICD-10-CM

## 2025-06-05 DIAGNOSIS — N39.0 UTI DUE TO EXTENDED-SPECTRUM BETA LACTAMASE (ESBL) PRODUCING ESCHERICHIA COLI: Primary | ICD-10-CM

## 2025-06-05 RX ADMIN — CEFTRIAXONE 1 G: 1 INJECTION, POWDER, FOR SOLUTION INTRAMUSCULAR; INTRAVENOUS at 08:13

## 2025-06-05 NOTE — PROGRESS NOTES
Preoperative diagnosis  Foreign body in genitourinary tract    Postoperative diagnosis  Foreign body in genitourinary tract    Procedure  Flexible cystourethroscopy with stent removal    Attending surgeon  Osmin Frias MD    Anesthesia  2% lidocaine jelly intraurethrally    Complications  None    Indications  80 y.o. female who is status post ureteroscopy with laser lithotripsy who presents for stent removal.    Procedure  Detailed information of all possible complications and side effects were discussed with the patient.  Informed consent was obtained. Patient was given one dose of antibiotics. The patient was placed in supine position and a timeout was performed. The patient was prepped and draped in sterile fashion.  Next, 2% lidocaine jelly was bluntly injected per urethra without difficulty. The 14 Turkmen flexible cystoscope was passed through the urethra and into the bladder.  The stent was visualized, grasped and removed in its entirety.  The patient tolerated the procedure well.    Plan  1. Provided education regarding water intake of at least 2 liters per day  2. F/u in 8 weeks with a renal ultrasound with PATRICIA

## 2025-06-06 RX ORDER — CEFTRIAXONE 1 G/1
1 INJECTION, POWDER, FOR SOLUTION INTRAMUSCULAR; INTRAVENOUS EVERY 24 HOURS
Status: COMPLETED | OUTPATIENT
Start: 2025-06-05 | End: 2025-06-05

## 2025-06-06 NOTE — PROGRESS NOTES
PER PROVIDER ORDER TO GIVE PATIENT ROCEPHIN INJECTION DUE TO COMPLAINT OF UTI SYMPTOMS   NO ISSUES WITH INJECTION PATIENT TOLERATED WELL HAD HER WAIT IN THE LOBBY FOR 20 MINUTES AFTER TO MAKE SURE SHE DID NOT HAVE A REACTION.    Asha,CMA

## 2025-06-10 LAB
BACTERIA UR CULT: ABNORMAL
BACTERIA UR CULT: ABNORMAL
OTHER ANTIBIOTIC SUSC ISLT: ABNORMAL

## 2025-06-11 ENCOUNTER — APPOINTMENT (OUTPATIENT)
Dept: CT IMAGING | Facility: HOSPITAL | Age: 80
End: 2025-06-11
Payer: MEDICARE

## 2025-06-11 ENCOUNTER — HOSPITAL ENCOUNTER (EMERGENCY)
Facility: HOSPITAL | Age: 80
Discharge: HOME | End: 2025-06-11
Attending: EMERGENCY MEDICINE
Payer: MEDICARE

## 2025-06-11 ENCOUNTER — TELEPHONE (OUTPATIENT)
Dept: UROLOGY | Facility: CLINIC | Age: 80
End: 2025-06-11

## 2025-06-11 VITALS
HEIGHT: 63 IN | SYSTOLIC BLOOD PRESSURE: 126 MMHG | RESPIRATION RATE: 18 BRPM | OXYGEN SATURATION: 94 % | BODY MASS INDEX: 22.34 KG/M2 | TEMPERATURE: 97.9 F | HEART RATE: 77 BPM | WEIGHT: 126.1 LBS | DIASTOLIC BLOOD PRESSURE: 74 MMHG

## 2025-06-11 DIAGNOSIS — R93.5 ABNORMAL COMPUTED TOMOGRAPHY ANGIOGRAPHY (CTA) OF ABDOMEN AND PELVIS: ICD-10-CM

## 2025-06-11 DIAGNOSIS — R10.30 LOWER ABDOMINAL PAIN: Primary | ICD-10-CM

## 2025-06-11 LAB
ALBUMIN SERPL-MCNC: 4.2 G/DL (ref 3.5–5.2)
ALBUMIN/GLOB SERPL: 1.2 G/DL
ALP SERPL-CCNC: 89 U/L (ref 39–117)
ALT SERPL W P-5'-P-CCNC: 20 U/L (ref 1–33)
ANION GAP SERPL CALCULATED.3IONS-SCNC: 8.9 MMOL/L (ref 5–15)
AST SERPL-CCNC: 21 U/L (ref 1–32)
BACTERIA UR QL AUTO: ABNORMAL /HPF
BASOPHILS # BLD AUTO: 0.03 10*3/MM3 (ref 0–0.2)
BASOPHILS NFR BLD AUTO: 0.4 % (ref 0–1.5)
BILIRUB SERPL-MCNC: 0.4 MG/DL (ref 0–1.2)
BILIRUB UR QL STRIP: NEGATIVE
BUN SERPL-MCNC: 18 MG/DL (ref 8–23)
BUN/CREAT SERPL: 28.1 (ref 7–25)
CALCIUM SPEC-SCNC: 9.5 MG/DL (ref 8.6–10.5)
CHLORIDE SERPL-SCNC: 103 MMOL/L (ref 98–107)
CLARITY UR: CLEAR
CO2 SERPL-SCNC: 25.1 MMOL/L (ref 22–29)
COLOR UR: YELLOW
CREAT SERPL-MCNC: 0.64 MG/DL (ref 0.57–1)
D-LACTATE SERPL-SCNC: 1.2 MMOL/L (ref 0.5–2)
DEPRECATED RDW RBC AUTO: 43.6 FL (ref 37–54)
EGFRCR SERPLBLD CKD-EPI 2021: 90.6 ML/MIN/1.73
EOSINOPHIL # BLD AUTO: 0.1 10*3/MM3 (ref 0–0.4)
EOSINOPHIL NFR BLD AUTO: 1.5 % (ref 0.3–6.2)
ERYTHROCYTE [DISTWIDTH] IN BLOOD BY AUTOMATED COUNT: 12.7 % (ref 12.3–15.4)
GLOBULIN UR ELPH-MCNC: 3.4 GM/DL
GLUCOSE SERPL-MCNC: 134 MG/DL (ref 65–99)
GLUCOSE UR STRIP-MCNC: NEGATIVE MG/DL
HCT VFR BLD AUTO: 40.6 % (ref 34–46.6)
HGB BLD-MCNC: 13.6 G/DL (ref 12–15.9)
HGB UR QL STRIP.AUTO: NEGATIVE
HOLD SPECIMEN: NORMAL
HOLD SPECIMEN: NORMAL
HYALINE CASTS UR QL AUTO: ABNORMAL /LPF
IMM GRANULOCYTES # BLD AUTO: 0.01 10*3/MM3 (ref 0–0.05)
IMM GRANULOCYTES NFR BLD AUTO: 0.1 % (ref 0–0.5)
KETONES UR QL STRIP: NEGATIVE
LEUKOCYTE ESTERASE UR QL STRIP.AUTO: ABNORMAL
LIPASE SERPL-CCNC: 56 U/L (ref 13–60)
LYMPHOCYTES # BLD AUTO: 2.64 10*3/MM3 (ref 0.7–3.1)
LYMPHOCYTES NFR BLD AUTO: 38.7 % (ref 19.6–45.3)
MCH RBC QN AUTO: 31.1 PG (ref 26.6–33)
MCHC RBC AUTO-ENTMCNC: 33.5 G/DL (ref 31.5–35.7)
MCV RBC AUTO: 92.9 FL (ref 79–97)
MONOCYTES # BLD AUTO: 0.64 10*3/MM3 (ref 0.1–0.9)
MONOCYTES NFR BLD AUTO: 9.4 % (ref 5–12)
NEUTROPHILS NFR BLD AUTO: 3.41 10*3/MM3 (ref 1.7–7)
NEUTROPHILS NFR BLD AUTO: 49.9 % (ref 42.7–76)
NITRITE UR QL STRIP: NEGATIVE
NRBC BLD AUTO-RTO: 0 /100 WBC (ref 0–0.2)
PH UR STRIP.AUTO: 5.5 [PH] (ref 5–8)
PLATELET # BLD AUTO: 271 10*3/MM3 (ref 140–450)
PMV BLD AUTO: 9.1 FL (ref 6–12)
POTASSIUM SERPL-SCNC: 4.2 MMOL/L (ref 3.5–5.2)
PROT SERPL-MCNC: 7.6 G/DL (ref 6–8.5)
PROT UR QL STRIP: NEGATIVE
RBC # BLD AUTO: 4.37 10*6/MM3 (ref 3.77–5.28)
RBC # UR STRIP: ABNORMAL /HPF
REF LAB TEST METHOD: ABNORMAL
SODIUM SERPL-SCNC: 137 MMOL/L (ref 136–145)
SP GR UR STRIP: 1.02 (ref 1–1.03)
SQUAMOUS #/AREA URNS HPF: ABNORMAL /HPF
UROBILINOGEN UR QL STRIP: ABNORMAL
WBC # UR STRIP: ABNORMAL /HPF
WBC NRBC COR # BLD AUTO: 6.83 10*3/MM3 (ref 3.4–10.8)
WHOLE BLOOD HOLD COAG: NORMAL
WHOLE BLOOD HOLD SPECIMEN: NORMAL

## 2025-06-11 PROCEDURE — 99285 EMERGENCY DEPT VISIT HI MDM: CPT | Performed by: EMERGENCY MEDICINE

## 2025-06-11 PROCEDURE — 25510000001 IOPAMIDOL 61 % SOLUTION: Performed by: EMERGENCY MEDICINE

## 2025-06-11 PROCEDURE — 85025 COMPLETE CBC W/AUTO DIFF WBC: CPT | Performed by: EMERGENCY MEDICINE

## 2025-06-11 PROCEDURE — 81001 URINALYSIS AUTO W/SCOPE: CPT | Performed by: EMERGENCY MEDICINE

## 2025-06-11 PROCEDURE — 80053 COMPREHEN METABOLIC PANEL: CPT | Performed by: EMERGENCY MEDICINE

## 2025-06-11 PROCEDURE — 83690 ASSAY OF LIPASE: CPT | Performed by: EMERGENCY MEDICINE

## 2025-06-11 PROCEDURE — 74177 CT ABD & PELVIS W/CONTRAST: CPT

## 2025-06-11 PROCEDURE — 83605 ASSAY OF LACTIC ACID: CPT | Performed by: EMERGENCY MEDICINE

## 2025-06-11 RX ORDER — IOPAMIDOL 612 MG/ML
100 INJECTION, SOLUTION INTRAVASCULAR
Status: COMPLETED | OUTPATIENT
Start: 2025-06-11 | End: 2025-06-11

## 2025-06-11 RX ORDER — SODIUM CHLORIDE 0.9 % (FLUSH) 0.9 %
10 SYRINGE (ML) INJECTION AS NEEDED
Status: DISCONTINUED | OUTPATIENT
Start: 2025-06-11 | End: 2025-06-11 | Stop reason: HOSPADM

## 2025-06-11 RX ADMIN — IOPAMIDOL 100 ML: 612 INJECTION, SOLUTION INTRAVENOUS at 15:21

## 2025-06-11 NOTE — ED PROVIDER NOTES
Subjective   History of Present Illness  Patient is a pleasant 80-year-old female history of diabetes, hypertension, recurrent UTIs.  Sees Dr. Frias.  She has had multiple recent recurrent E. coli UTIs and on the 27th of this month saw Dr. Frias in the urology clinic where he placed a stent.  She states that she had a numerous kidney stones and this is why the stent was placed.  It was removed last Thursday.   Tried to urine out urinalysis at the time of the appointment last Thursday and the patient checked on MyChart and noticed that it grew out E. coli.  She attempted to call Dr. Frias's office yesterday but was not successful in speaking with Dr. Frias or other representative about the culture result.  Given the fact she is continuing to have these abdominal cramps and could not consult with Dr. Frias she presents to the emergency department today.  Denies fever.      Over the past 2 to 3 days she has noticed these sharp brief tinges of sharp abdominal pain.  It comes on immediately and resolves immediately.  On review of systems, she also has notes that she has had some vague indigestion type chest discomfort over the past 2 to 3 days, corresponding with the abdominal symptoms.        Review of Systems   All other systems reviewed and are negative.      Past Medical History:   Diagnosis Date    Arthritis     Atherosclerosis of native coronary artery of native heart without angina pectoris 9/11/2024    Breast cancer     RIGHT BREAST STAGE 3    Diabetes mellitus     Elevated cholesterol     GERD (gastroesophageal reflux disease) 08/23/2021    History of cancer chemotherapy     Hypertension     Hyperthyroidism 05/19/2022    Kidney stone     Lichen sclerosus     Shingles     Thickened endometrium 2018    Urinary incontinence     Urinary tract infection        Allergies   Allergen Reactions    Caffeine Other (See Comments)     No caffeine per doctors recommendation    Jardiance [Empagliflozin] Other (See Comments)      UTI       Past Surgical History:   Procedure Laterality Date    CARDIAC CATHETERIZATION N/A 7/15/2024    Procedure: Coronary angiography;  Surgeon: Brian Morrow MD;  Location:  [facility];  Service: Cardiology;  Laterality: N/A;    CATARACT EXTRACTION Right 01/10/2023    CERVICAL CONE BIOPSY      CHOLECYSTECTOMY  03/2010    COLONOSCOPY N/A 07/06/2018    Procedure: COLONOSCOPY;  Surgeon: Trenton Lyons MD;  Location:  [facility];  Service: Gastroenterology    DILATATION AND CURETTAGE      ENDOSCOPY N/A 07/06/2018    Procedure: ESOPHAGOGASTRODUODENOSCOPY;  Surgeon: Trenton Lyons MD;  Location:  [facility];  Service: Gastroenterology    MASTECTOMY Right 10/1998    MASTECTOMY Left 07/2013    TUBAL ABDOMINAL LIGATION         Family History   Problem Relation Age of Onset    Breast cancer Mother     Diabetes Mother     Cancer Mother     Ovarian cancer Maternal Aunt     Cancer Maternal Aunt        Social History     Socioeconomic History    Marital status:    Tobacco Use    Smoking status: Never     Passive exposure: Never    Smokeless tobacco: Never   Vaping Use    Vaping status: Never Used   Substance and Sexual Activity    Alcohol use: No    Drug use: No    Sexual activity: Not Currently           Objective   Physical Exam  Vitals and nursing note reviewed.   Constitutional:       General: She is not in acute distress.  HENT:      Head: Normocephalic and atraumatic.   Eyes:      Conjunctiva/sclera: Conjunctivae normal.      Pupils: Pupils are equal, round, and reactive to light.   Cardiovascular:      Rate and Rhythm: Normal rate and regular rhythm.      Heart sounds: Normal heart sounds.   Pulmonary:      Effort: Pulmonary effort is normal. No respiratory distress.      Breath sounds: Normal breath sounds.   Abdominal:      General: Bowel sounds are normal. There is no distension.      Palpations: Abdomen is soft. There is no mass.      Tenderness: There is abdominal tenderness (Mild lower  abdomen and suprapubic TTP) in the right lower quadrant, periumbilical area, suprapubic area and left lower quadrant. There is no rebound.   Musculoskeletal:         General: Normal range of motion.      Cervical back: Normal range of motion and neck supple.   Skin:     General: Skin is warm and dry.   Neurological:      Mental Status: She is alert and oriented to person, place, and time.         Procedures           ED Course  ED Course as of 06/12/25 1357   Wed Jun 11, 2025   7929 Case discussed with Dr. Frias, urology.  He is made with the patient.  He is going to review the imaging and labs from today and call back. [CP]   0206 Dr. Frias, urology, has reviewed the CT imaging along with today's laboratory results.  Workup is reassuring.  He believes this inflammation is simply from the catheter that was recently placed.  He states that there is no need for antibiotics at this time.  Patient will follow-up with him in the office.  Patient understands to monitor her symptoms and have a low threshold to return to the emergency department if symptoms persist, worsen, or other concerns arise. [CP]      ED Course User Index  [CP] Virgilio Tang, DO      Recent Results (from the past 24 hours)   Urinalysis With Culture If Indicated - Urine, Clean Catch    Collection Time: 06/11/25  1:59 PM    Specimen: Urine, Clean Catch   Result Value Ref Range    Color, UA Yellow Yellow, Straw    Appearance, UA Clear Clear    pH, UA 5.5 5.0 - 8.0    Specific Gravity, UA 1.017 1.005 - 1.030    Glucose, UA Negative Negative    Ketones, UA Negative Negative    Bilirubin, UA Negative Negative    Blood, UA Negative Negative    Protein, UA Negative Negative    Leuk Esterase, UA Trace (A) Negative    Nitrite, UA Negative Negative    Urobilinogen, UA 0.2 E.U./dL 0.2 - 1.0 E.U./dL   Urinalysis, Microscopic Only - Urine, Clean Catch    Collection Time: 06/11/25  1:59 PM    Specimen: Urine, Clean Catch   Result Value Ref Range    RBC, UA None Seen  None Seen, 0-2 /HPF    WBC, UA 0-2 None Seen, 0-2 /HPF    Bacteria, UA Trace (A) None Seen /HPF    Squamous Epithelial Cells, UA 0-2 None Seen, 0-2 /HPF    Hyaline Casts, UA None Seen None Seen /LPF    Methodology Manual Light Microscopy    Comprehensive Metabolic Panel    Collection Time: 06/11/25  2:00 PM    Specimen: Blood   Result Value Ref Range    Glucose 134 (H) 65 - 99 mg/dL    BUN 18.0 8.0 - 23.0 mg/dL    Creatinine 0.64 0.57 - 1.00 mg/dL    Sodium 137 136 - 145 mmol/L    Potassium 4.2 3.5 - 5.2 mmol/L    Chloride 103 98 - 107 mmol/L    CO2 25.1 22.0 - 29.0 mmol/L    Calcium 9.5 8.6 - 10.5 mg/dL    Total Protein 7.6 6.0 - 8.5 g/dL    Albumin 4.2 3.5 - 5.2 g/dL    ALT (SGPT) 20 1 - 33 U/L    AST (SGOT) 21 1 - 32 U/L    Alkaline Phosphatase 89 39 - 117 U/L    Total Bilirubin 0.4 0.0 - 1.2 mg/dL    Globulin 3.4 gm/dL    A/G Ratio 1.2 g/dL    BUN/Creatinine Ratio 28.1 (H) 7.0 - 25.0    Anion Gap 8.9 5.0 - 15.0 mmol/L    eGFR 90.6 >60.0 mL/min/1.73   Lipase    Collection Time: 06/11/25  2:00 PM    Specimen: Blood   Result Value Ref Range    Lipase 56 13 - 60 U/L   Lactic Acid, Plasma    Collection Time: 06/11/25  2:00 PM    Specimen: Blood   Result Value Ref Range    Lactate 1.2 0.5 - 2.0 mmol/L   Green Top (Gel)    Collection Time: 06/11/25  2:00 PM   Result Value Ref Range    Extra Tube Hold for add-ons.    Lavender Top    Collection Time: 06/11/25  2:00 PM   Result Value Ref Range    Extra Tube hold for add-on    Gold Top - SST    Collection Time: 06/11/25  2:00 PM   Result Value Ref Range    Extra Tube Hold for add-ons.    Light Blue Top    Collection Time: 06/11/25  2:00 PM   Result Value Ref Range    Extra Tube Hold for add-ons.    CBC Auto Differential    Collection Time: 06/11/25  2:00 PM    Specimen: Blood   Result Value Ref Range    WBC 6.83 3.40 - 10.80 10*3/mm3    RBC 4.37 3.77 - 5.28 10*6/mm3    Hemoglobin 13.6 12.0 - 15.9 g/dL    Hematocrit 40.6 34.0 - 46.6 %    MCV 92.9 79.0 - 97.0 fL    MCH 31.1  "26.6 - 33.0 pg    MCHC 33.5 31.5 - 35.7 g/dL    RDW 12.7 12.3 - 15.4 %    RDW-SD 43.6 37.0 - 54.0 fl    MPV 9.1 6.0 - 12.0 fL    Platelets 271 140 - 450 10*3/mm3    Neutrophil % 49.9 42.7 - 76.0 %    Lymphocyte % 38.7 19.6 - 45.3 %    Monocyte % 9.4 5.0 - 12.0 %    Eosinophil % 1.5 0.3 - 6.2 %    Basophil % 0.4 0.0 - 1.5 %    Immature Grans % 0.1 0.0 - 0.5 %    Neutrophils, Absolute 3.41 1.70 - 7.00 10*3/mm3    Lymphocytes, Absolute 2.64 0.70 - 3.10 10*3/mm3    Monocytes, Absolute 0.64 0.10 - 0.90 10*3/mm3    Eosinophils, Absolute 0.10 0.00 - 0.40 10*3/mm3    Basophils, Absolute 0.03 0.00 - 0.20 10*3/mm3    Immature Grans, Absolute 0.01 0.00 - 0.05 10*3/mm3    nRBC 0.0 0.0 - 0.2 /100 WBC     Note: In addition to lab results from this visit, the labs listed above may include labs taken at another facility or during a different encounter within the last 24 hours. Please correlate lab times with ED admission and discharge times for further clarification of the services performed during this visit.    CT Abdomen Pelvis With Contrast   Final Result   New enhancement of the wall of the right renal pelvis,   proximal right ureter and posterior wall of the bladder suggesting right   pyelitis/ureteritis and cystitis.       Previously described right nephrolithiasis not identified       Stable left nephrolithiasis.       CTDI: 3.94 mGy   DLP:182.94 mGy.cm       This report was signed and finalized on 6/11/2025 4:01 PM by Iraida Kruse MD.            Vitals:    06/11/25 1340 06/11/25 1458 06/11/25 1501   BP: 142/80 126/74 126/74   BP Location: Left arm     Patient Position: Sitting     Pulse: 83  77   Resp: 18  18   Temp: 97.9 °F (36.6 °C)     TempSrc: Oral     SpO2: 93%  94%   Weight: 57.2 kg (126 lb 1.7 oz)     Height: 160 cm (62.99\")       Medications   iopamidol (ISOVUE-300) 61 % injection 100 mL (100 mL Intravenous Given 6/11/25 1521)     ECG/EMG Results (last 24 hours)       ** No results found for the last 24 hours. ** "          No orders to display                                                        Medical Decision Making  Problems Addressed:  Abnormal computed tomography angiography (CTA) of abdomen and pelvis: complicated acute illness or injury  Lower abdominal pain: complicated acute illness or injury    Amount and/or Complexity of Data Reviewed  External Data Reviewed: notes.  Labs: ordered. Decision-making details documented in ED Course.  Radiology: ordered and independent interpretation performed. Decision-making details documented in ED Course.    Risk  Prescription drug management.        Final diagnoses:   Lower abdominal pain   Abnormal computed tomography angiography (CTA) of abdomen and pelvis       ED Disposition  ED Disposition       ED Disposition   Discharge    Condition   Stable    Comment   --             DISCHARGE    Patient discharged in stable condition.    Reviewed implications of results, diagnosis, meds, responsibility to follow up, warning signs and symptoms of possible worsening, potential complications and reasons to return to ER.    Patient/Family voiced understanding of above instructions.    Discussed plan for discharge, as there is no emergent indication for admission.  Pt/family is agreeable and understands need for follow up and possible repeat testing.  Pt/family is aware that discharge does not mean that nothing is wrong but that it indicates no emergency is currently present that requires admission and they must continue care with follow-up as given below or with a physician of their choice.     FOLLOW-UP  Yolie Cotto MD  [address] [facility] [address] [phone]    Schedule an appointment as soon as possible for a visit       Osmin Frias MD  [address] [address] [phone]    Schedule an appointment as soon as possible for a visit       [facility] EMERGENCY DEPARTMENT  [address] [facility]  [address]  [phone]    If symptoms worsen         Medication List      No changes were made to your prescriptions during this visit.            Virgilio Tang,   06/12/25 6425

## 2025-06-12 ENCOUNTER — TELEPHONE (OUTPATIENT)
Dept: UROLOGY | Facility: CLINIC | Age: 80
End: 2025-06-12
Payer: MEDICARE

## 2025-06-12 NOTE — TELEPHONE ENCOUNTER
Pt was seen in ER & looks like Dr Frias spoke with ER doctor on this patient.  Can you advise what he wants me to schedule with him or NP..  Please advise

## 2025-06-16 ENCOUNTER — OFFICE VISIT (OUTPATIENT)
Dept: UROLOGY | Facility: CLINIC | Age: 80
End: 2025-06-16
Payer: MEDICARE

## 2025-06-16 VITALS
TEMPERATURE: 97.7 F | HEART RATE: 78 BPM | BODY MASS INDEX: 22.32 KG/M2 | HEIGHT: 63 IN | SYSTOLIC BLOOD PRESSURE: 126 MMHG | WEIGHT: 126 LBS | DIASTOLIC BLOOD PRESSURE: 72 MMHG | OXYGEN SATURATION: 96 %

## 2025-06-16 DIAGNOSIS — N20.0 KIDNEY STONE: Primary | ICD-10-CM

## 2025-06-16 LAB
BILIRUB BLD-MCNC: NEGATIVE MG/DL
CLARITY, POC: ABNORMAL
COLOR UR: YELLOW
EXPIRATION DATE: ABNORMAL
GLUCOSE UR STRIP-MCNC: NEGATIVE MG/DL
KETONES UR QL: NEGATIVE
LEUKOCYTE EST, POC: ABNORMAL
Lab: ABNORMAL
NITRITE UR-MCNC: NEGATIVE MG/ML
PH UR: 6 [PH] (ref 5–8)
PROT UR STRIP-MCNC: NEGATIVE MG/DL
RBC # UR STRIP: ABNORMAL /UL
SP GR UR: 1.02 (ref 1–1.03)
UROBILINOGEN UR QL: ABNORMAL

## 2025-06-16 RX ORDER — IPRATROPIUM BROMIDE 42 UG/1
2 SPRAY, METERED NASAL 2 TIMES DAILY
COMMUNITY
Start: 2025-06-03

## 2025-06-16 RX ORDER — METFORMIN HYDROCHLORIDE 500 MG/1
TABLET, EXTENDED RELEASE ORAL
COMMUNITY
Start: 2025-06-03

## 2025-06-16 RX ORDER — SITAGLIPTIN 25 MG/1
25 TABLET, FILM COATED ORAL DAILY
COMMUNITY
Start: 2025-06-03

## 2025-06-16 RX ORDER — VIBEGRON 75 MG/1
1 TABLET, FILM COATED ORAL DAILY
COMMUNITY
Start: 2025-06-03 | End: 2025-06-16

## 2025-06-16 NOTE — PROGRESS NOTES
Office Visit     Patient Name: Aster Craft  : 1945   MRN: [id number]     Chief Complaint  Chief Complaint   Patient presents with    Urinary Tract Infection       Referring Provider: No ref. provider found    Primary Care Provider: Yolie Cotto MD     Subjective     History of Present Illness  The patient presents for evaluation of ureteral colic and overactive bladder.    Ureteral Colic  - Reports improvement but continues to experience frequent urination  - Concerned about potential abnormalities on the left side, as previous issues were right-sided  - Still in the recovery phase with occasional discomfort  - Previously diagnosed with E. coli  - Sought medical attention due to pain and received a Rocephin injection and oral medication on 2025, the same day her stent was removed    Overactive Bladder  - Has a history of urge incontinence and overactive bladder  - Previously prescribed Gemtesa by Brissa, a nurse practitioner, but discontinued its use for unknown reasons  - Requests a new prescription for Gemtesa    Supplemental information: None        Past Medical History:   Diagnosis Date    Arthritis     Atherosclerosis of native coronary artery of native heart without angina pectoris 2024    Breast cancer     RIGHT BREAST STAGE 3    Diabetes mellitus     Elevated cholesterol     GERD (gastroesophageal reflux disease) 2021    History of cancer chemotherapy     Hypertension     Hyperthyroidism 2022    Kidney stone     Lichen sclerosus     Shingles     Thickened endometrium 2018    Urinary incontinence     Urinary tract infection      Past Surgical History:   Procedure Laterality Date    CARDIAC CATHETERIZATION N/A 7/15/2024    Procedure: Coronary angiography;  Surgeon: Brian Morrow MD;  Location: [facility] CATH INVASIVE LOC[facility];  Service: Cardiology;  Laterality: N/A;    CATARACT EXTRACTION Right 01/10/2023    CERVICAL CONE BIOPSY      CHOLECYSTECTOMY  2010     COLONOSCOPY N/A 07/06/2018    Procedure: COLONOSCOPY;  Surgeon: Trenton Lyons MD;  Location:  [facility];  Service: Gastroenterology    DILATATION AND CURETTAGE      ENDOSCOPY N/A 07/06/2018    Procedure: ESOPHAGOGASTRODUODENOSCOPY;  Surgeon: Trenton Lyons MD;  Location:  [facility];  Service: Gastroenterology    MASTECTOMY Right 10/1998    MASTECTOMY Left 07/2013    TUBAL ABDOMINAL LIGATION       Family History   Problem Relation Age of Onset    Breast cancer Mother     Diabetes Mother     Cancer Mother     Ovarian cancer Maternal Aunt     Cancer Maternal Aunt      Social History     Socioeconomic History    Marital status:    Tobacco Use    Smoking status: Never     Passive exposure: Never    Smokeless tobacco: Never   Vaping Use    Vaping status: Never Used   Substance and Sexual Activity    Alcohol use: No    Drug use: No    Sexual activity: Not Currently       Current Outpatient Medications:     albuterol sulfate  (90 Base) MCG/ACT inhaler, 2 puffs Every 6 (Six) Hours As Needed., Disp: , Rfl:     azelastine (ASTELIN) 0.1 % nasal spray, Administer 1 spray into the nostril(s) as directed by provider 2 (Two) Times a Day., Disp: , Rfl:     benzonatate (TESSALON) 200 MG capsule, TAKE 1 CAPSULE THREE TIMES DAILY AS NEEDED, Disp: , Rfl:     Blood Glucose Monitoring Suppl (OneTouch Verio Flex System) w/Device kit, See Admin Instructions. for testing, Disp: , Rfl:     clobetasol (TEMOVATE) 0.05 % ointment, Apply to affected area BID x 2 wks then daily x 2 wks then 2-3x/wk, Disp: 60 g, Rfl: 6    Continuous Blood Gluc Sensor (FreeStyle Malina 2 Sensor) misc, CHANGE SENSOR EVERY 14 DAYS, Disp: , Rfl:     Continuous Glucose  (FreeStyle Malina 2 [place]) device, See Admin Instructions., Disp: , Rfl:     desloratadine (CLARINEX) 5 MG tablet, Take 1 tablet by mouth Daily., Disp: , Rfl:     estradiol (ESTRACE VAGINAL) 0.1 MG/GM vaginal cream, May use 1 gram intravaginal qhs x 2 weeks then 1 gram 2x/week.,  Disp: 1 each, Rfl: 11    ezetimibe (ZETIA) 10 MG tablet, Take 1 tablet by mouth Daily., Disp: , Rfl:     Flovent  MCG/ACT inhaler, Inhale 2 puffs 2 (Two) Times a Day As Needed., Disp: , Rfl:     furosemide (Lasix) 20 MG tablet, Take 1 tablet by mouth Daily As Needed (For weight gain > 2-3 lbs overnight)., Disp: , Rfl:     ipratropium (ATROVENT) 0.06 % nasal spray, 2 sprays 2 (Two) Times a Day., Disp: , Rfl:     Januvia 25 MG tablet, Take 1 tablet by mouth Daily., Disp: , Rfl:     Lancets (OneTouch Delica Plus [id number]) misc, USE AS DIRECTED EVERY DAY, Disp: , Rfl:     meclizine (ANTIVERT) 12.5 MG tablet, Take 1 tablet by mouth 3 (Three) Times a Day As Needed., Disp: , Rfl:     metFORMIN ER (GLUCOPHAGE-XR) 500 MG 24 hr tablet, TAKE ONE TABLET WITH EVENING MEAL ONCE A DAY, Disp: , Rfl:     metoprolol succinate XL (Toprol XL) 100 MG 24 hr tablet, Take 1.5 tablets by mouth Daily., Disp: 135 tablet, Rfl: 1    nitroglycerin (NITROSTAT) 0.4 MG SL tablet, Place 1 tablet under the tongue Every 5 (Five) Minutes As Needed for Chest Pain., Disp: , Rfl:     ondansetron ODT (ZOFRAN-ODT) 4 MG disintegrating tablet, Take 1 tablet by mouth As Needed., Disp: , Rfl:     OneTouch Verio test strip, Daily. for testing, Disp: , Rfl:     oxybutynin XL (Ditropan XL) 10 MG 24 hr tablet, Take 1 tablet by mouth Daily As Needed (bladder spasm)., Disp: 10 tablet, Rfl: 0    pantoprazole (PROTONIX) 40 MG EC tablet, Take 1 tablet by mouth Daily., Disp: , Rfl:     phenazopyridine (PYRIDIUM) 100 MG tablet, Take 1 tablet by mouth Daily As Needed (urinary burning)., Disp: 5 tablet, Rfl: 0    simethicone (MYLICON) 80 MG chewable tablet, Chew 1 tablet Every 6 (Six) Hours As Needed for Flatulence., Disp: , Rfl:     spironolactone (ALDACTONE) 25 MG tablet, Take 1 tablet by mouth Daily., Disp: 90 tablet, Rfl: 1    tamsulosin (FLOMAX) 0.4 MG capsule 24 hr capsule, Take 1 capsule by mouth Every Night., Disp: 10 capsule, Rfl: 0    valACYclovir  "(VALTREX) 1000 MG tablet, Take 1 tablet by mouth 3 times a day., Disp: , Rfl:     Vitamins-Lipotropics (Lipoflavonoid) tablet, Take 2 tablets by mouth 3 times a day. For tinitus - per pt has not been taking x 2-3 wks (12/2/2024), she is getting ready to start them again, Disp: , Rfl:     nitrofurantoin, macrocrystal-monohydrate, (Macrobid) 100 MG capsule, Take 1 capsule by mouth Every Night. (Patient not taking: Reported on 6/16/2025), Disp: 30 capsule, Rfl: 3    Vibegron 75 MG tablet, Take 1 tablet by mouth Daily., Disp: 30 tablet, Rfl: 11  Allergies   Allergen Reactions    Caffeine Other (See Comments)     No caffeine per doctors recommendation    Jardiance [Empagliflozin] Other (See Comments)     UTI     Objective   Visit Vitals  /72   Pulse 78   Temp 97.7 °F (36.5 °C)   Ht 160 cm (62.99\")   Wt 57.2 kg (126 lb)   SpO2 96%   BMI 22.33 kg/m²        Body mass index is 22.33 kg/m².     Physical exam was notable for: Normal habitus    Labs  Lab Results   Component Value Date    COLORU Yellow 06/16/2025    CLARITYU Slightly Cloudy (A) 06/16/2025    SPECGRAV 1.025 06/16/2025    PHUR 6.0 06/16/2025    LEUKOCYTESUR Trace (A) 06/16/2025    NITRITE Negative 06/16/2025    PROTEINPOCUA Negative 06/16/2025    GLUCOSEUR Negative 06/16/2025    KETONESU Negative 06/16/2025    UROBILINOGEN 0.2 E.U./dL 06/16/2025    BILIRUBINUR Negative 06/16/2025    RBCUR Trace (A) 06/16/2025      Lab Results   Component Value Date    WBCUA 0-2 06/11/2025    WBCUA 0-2 05/08/2025    RBCUA None Seen 06/11/2025    RBCUA 0-2 05/08/2025    BACTERIA Trace (A) 06/11/2025    BACTERIA None Seen 05/08/2025    HYALCASTU None Seen 06/11/2025    HYALCASTU 0-2 05/08/2025    SQUAMEPIUA 0-2 06/11/2025    SQUAMEPIUA 0-2 05/08/2025      Urine Culture          4/23/2025    14:39 5/8/2025    10:20 6/5/2025    00:00   Urine Culture   Urine Culture Final report  No growth  Final report      Lab Results   Component Value Date    WBC 6.83 06/11/2025    HGB 13.6 " 06/11/2025    HCT 40.6 06/11/2025    MCV 92.9 06/11/2025     06/11/2025     Lab Results   Component Value Date    GLUCOSE 134 (H) 06/11/2025    CALCIUM 9.5 06/11/2025     06/11/2025    K 4.2 06/11/2025    CO2 25.1 06/11/2025     06/11/2025    BUN 18.0 06/11/2025    BUN 20 03/11/2025    CREATININE 0.64 06/11/2025    CREATININE 0.62 03/11/2025    EGFR 90.6 06/11/2025    EGFR 91.9 03/11/2025    BCR 28.1 (H) 06/11/2025    ANIONGAP 8.9 06/11/2025    ALT 20 06/11/2025    AST 21 06/11/2025       Lab Results   Component Value Date    HGBA1C 8.90 (H) 07/01/2024        Lab Results   Component Value Date    DBTD9XCMSII 80 05/27/2025    GOSK1FDTDF 20 05/27/2025     Lab Results   Component Value Date    PTH 29.6 03/11/2025    URICACID 2.5 03/11/2025     Lab Results   Component Value Date    LABPH 5.5 04/23/2025       Lab Results   Component Value Date    ATOPOBIUMV Low - 0 03/11/2025    BVAB2 Low - 0 03/11/2025    MEGASPHAER Low - 0 03/11/2025    CALBICANSN Negative 03/11/2025    CGLABRATAN Negative 03/11/2025    CPARAPSILOS Negative 02/18/2022    CLUSITANIAE Negative 02/18/2022    CKRUSEI Negative 02/18/2022    TRICHVAG Negative 03/11/2025    CHLAMNAA Negative 03/11/2025    NGONORRHON Negative 03/11/2025       Lab Results   Component Value Date    TSH 0.146 (L) 07/13/2024    FREET4 1 04/21/2025         Radiographic Studies  CT Abdomen Pelvis With Contrast  Result Date: 6/11/2025  New enhancement of the wall of the right renal pelvis, proximal right ureter and posterior wall of the bladder suggesting right pyelitis/ureteritis and cystitis.  Previously described right nephrolithiasis not identified  Stable left nephrolithiasis.  CTDI: 3.94 mGy DLP:182.94 mGy.cm  This report was signed and finalized on 6/11/2025 4:01 PM by Iraida Kruse MD.      US Renal Bilateral  Result Date: 3/28/2025  1. Nothing specifically identified to account for the patient symptoms.   This report was finalized on 3/28/2025 12:58 PM by  Dr. Jhony Reid MD.        I have reviewed the above labs and imaging.      Assessment / Plan      Diagnoses and all orders for this visit:    1. Kidney stone (Primary)  -     Vibegron 75 MG tablet; Take 1 tablet by mouth Daily.  Dispense: 30 tablet; Refill: 11  -     US Renal Bilateral; Future  -     POC Urinalysis Dipstick, Automated         Assessment & Plan  1. Ureteral colic  - Labs within normal limits, no current UTI  - Trace leukocytes and trace blood in urine not concerning  - Tiny renal calculus on the left side to be monitored  - Rocephin injection administered on 06/05/2025 for E. coli infection  - Follow-up with Ms. Brumfield scheduled for renal ultrasound    2. Overactive bladder  - History of urge incontinence and overactive bladder  - Previously prescribed Gemtesa but not taking it recently  - New prescription for Gemtesa will be sent to the pharmacy    PROCEDURE  Stent removal performed today.         Patient or patient representative verbalized consent for the use of Ambient Listening during the visit with  Osmin Frias MD for chart documentation. 6/16/2025  16:16 EDT     Osmin Frias MD  Urologic Surgery  [facility]  [address] [facility] [address] [facility] [id number]   [place] [address]

## 2025-06-23 ENCOUNTER — OFFICE VISIT (OUTPATIENT)
Dept: INFECTIOUS DISEASES | Facility: CLINIC | Age: 80
End: 2025-06-23
Payer: MEDICARE

## 2025-06-23 VITALS
DIASTOLIC BLOOD PRESSURE: 68 MMHG | OXYGEN SATURATION: 94 % | BODY MASS INDEX: 21.97 KG/M2 | HEART RATE: 81 BPM | WEIGHT: 124 LBS | SYSTOLIC BLOOD PRESSURE: 120 MMHG

## 2025-06-23 DIAGNOSIS — E11.9 TYPE 2 DIABETES MELLITUS WITHOUT COMPLICATION, WITHOUT LONG-TERM CURRENT USE OF INSULIN: ICD-10-CM

## 2025-06-23 DIAGNOSIS — N20.0 BILATERAL NEPHROLITHIASIS: Primary | ICD-10-CM

## 2025-06-23 DIAGNOSIS — Z16.12 UTI DUE TO EXTENDED-SPECTRUM BETA LACTAMASE (ESBL) PRODUCING ESCHERICHIA COLI: ICD-10-CM

## 2025-06-23 DIAGNOSIS — N39.0 RECURRENT UTI: ICD-10-CM

## 2025-06-23 DIAGNOSIS — N39.0 UTI DUE TO EXTENDED-SPECTRUM BETA LACTAMASE (ESBL) PRODUCING ESCHERICHIA COLI: ICD-10-CM

## 2025-06-23 DIAGNOSIS — B96.29 UTI DUE TO EXTENDED-SPECTRUM BETA LACTAMASE (ESBL) PRODUCING ESCHERICHIA COLI: ICD-10-CM

## 2025-06-23 PROCEDURE — 87086 URINE CULTURE/COLONY COUNT: CPT | Performed by: INTERNAL MEDICINE

## 2025-06-23 PROCEDURE — 87186 SC STD MICRODIL/AGAR DIL: CPT | Performed by: INTERNAL MEDICINE

## 2025-06-23 PROCEDURE — 99214 OFFICE O/P EST MOD 30 MIN: CPT | Performed by: INTERNAL MEDICINE

## 2025-06-23 PROCEDURE — 3074F SYST BP LT 130 MM HG: CPT | Performed by: INTERNAL MEDICINE

## 2025-06-23 PROCEDURE — 3078F DIAST BP <80 MM HG: CPT | Performed by: INTERNAL MEDICINE

## 2025-06-23 PROCEDURE — 81001 URINALYSIS AUTO W/SCOPE: CPT | Performed by: INTERNAL MEDICINE

## 2025-06-23 NOTE — PROGRESS NOTES
[facility]         Referring Provider: No referring provider defined for this encounter.    Subjective      Chief Complaint  Follow-up (Bilateral nephrolithiasis//)    History of Present Illness  Aster Craft is a 80 y.o. female who presents today to [facility] for infectious disease evaluation and antibiotic management.    The patient is a 79-year-old female who presents for a follow-up regarding a urinary tract infection (UTI).    Reports daily intake of four 16-ounce bottles of water.    Was recently diagnosed with ESBL UTI and has previously being on long term suppressive therapy. This has been treated and symptoms resolved aside from occasional right lower quadrant pain off and on. No dysuria or polyuria at this time. No clinical sepsis.      Patient is following up with Dr. Frias urologist and performs stent removal on 6/16/2025 with persistent small left kidney stone.  Continues to have some discomfort to the right flank.      Past Medical History:   Diagnosis Date    Arthritis     Atherosclerosis of native coronary artery of native heart without angina pectoris 9/11/2024    Breast cancer     RIGHT BREAST STAGE 3    Diabetes mellitus     Elevated cholesterol     GERD (gastroesophageal reflux disease) 08/23/2021    History of cancer chemotherapy     Hypertension     Hyperthyroidism 05/19/2022    Kidney stone     Lichen sclerosus     Shingles     Thickened endometrium 2018    Urinary incontinence     Urinary tract infection        Past Surgical History:   Procedure Laterality Date    CARDIAC CATHETERIZATION N/A 7/15/2024    Procedure: Coronary angiography;  Surgeon: Brian Morrow MD;  Location: [facility] INVASIVE LOCATION;  Service: Cardiology;  Laterality: N/A;    CATARACT EXTRACTION Right 01/10/2023    CERVICAL CONE BIOPSY      CHOLECYSTECTOMY  03/2010    COLONOSCOPY N/A 07/06/2018    Procedure: COLONOSCOPY;  Surgeon: Trenton Lyons MD;   Location: [facility] OR;  Service: Gastroenterology    DILATATION AND CURETTAGE      ENDOSCOPY N/A 07/06/2018    Procedure: ESOPHAGOGASTRODUODENOSCOPY;  Surgeon: Trenton Lyons MD;  Location:  [facility] OR;  Service: Gastroenterology    MASTECTOMY Right 10/1998    MASTECTOMY Left 07/2013    TUBAL ABDOMINAL LIGATION         Social History     Socioeconomic History    Marital status:    Tobacco Use    Smoking status: Never     Passive exposure: Never    Smokeless tobacco: Never   Vaping Use    Vaping status: Never Used   Substance and Sexual Activity    Alcohol use: No    Drug use: No    Sexual activity: Not Currently       Family History  family history includes Breast cancer in her mother; Cancer in her maternal aunt and mother; Diabetes in her mother; Ovarian cancer in her maternal aunt.    Immunization History   Administered Date(s) Administered    COVID-19 (PFIZER) Purple Cap Monovalent 09/09/2021, 09/30/2021        Allergies  Allergies   Allergen Reactions    Caffeine Other (See Comments)     No caffeine per doctors recommendation    Jardiance [Empagliflozin] Other (See Comments)     UTI       The medication list has been reviewed and updated.   Current Medications    Current Outpatient Medications:     albuterol sulfate  (90 Base) MCG/ACT inhaler, 2 puffs Every 6 (Six) Hours As Needed., Disp: , Rfl:     azelastine (ASTELIN) 0.1 % nasal spray, Administer 1 spray into the nostril(s) as directed by provider 2 (Two) Times a Day., Disp: , Rfl:     benzonatate (TESSALON) 200 MG capsule, TAKE 1 CAPSULE THREE TIMES DAILY AS NEEDED, Disp: , Rfl:     Blood Glucose Monitoring Suppl (OneTouch Verio Flex System) w/Device kit, See Admin Instructions. for testing, Disp: , Rfl:     clobetasol (TEMOVATE) 0.05 % ointment, Apply to affected area BID x 2 wks then daily x 2 wks then 2-3x/wk, Disp: 60 g, Rfl: 6    Continuous Blood Gluc Sensor (FreeStyle Malina 2 Sensor) misc, CHANGE SENSOR EVERY 14 DAYS, Disp: , Rfl:      Continuous Glucose  (FreeStyle Malina 2 [place]) device, See Admin Instructions., Disp: , Rfl:     desloratadine (CLARINEX) 5 MG tablet, Take 1 tablet by mouth Daily., Disp: , Rfl:     estradiol (ESTRACE VAGINAL) 0.1 MG/GM vaginal cream, May use 1 gram intravaginal qhs x 2 weeks then 1 gram 2x/week., Disp: 1 each, Rfl: 11    ezetimibe (ZETIA) 10 MG tablet, Take 1 tablet by mouth Daily., Disp: , Rfl:     Flovent  MCG/ACT inhaler, Inhale 2 puffs 2 (Two) Times a Day As Needed., Disp: , Rfl:     furosemide (Lasix) 20 MG tablet, Take 1 tablet by mouth Daily As Needed (For weight gain > 2-3 lbs overnight)., Disp: , Rfl:     ipratropium (ATROVENT) 0.06 % nasal spray, 2 sprays 2 (Two) Times a Day., Disp: , Rfl:     Januvia 25 MG tablet, Take 1 tablet by mouth Daily., Disp: , Rfl:     Lancets (OneTouch Delica Plus [id number]) misc, USE AS DIRECTED EVERY DAY, Disp: , Rfl:     meclizine (ANTIVERT) 12.5 MG tablet, Take 1 tablet by mouth 3 (Three) Times a Day As Needed., Disp: , Rfl:     metFORMIN ER (GLUCOPHAGE-XR) 500 MG 24 hr tablet, TAKE ONE TABLET WITH EVENING MEAL ONCE A DAY, Disp: , Rfl:     metoprolol succinate XL (Toprol XL) 100 MG 24 hr tablet, Take 1.5 tablets by mouth Daily., Disp: 135 tablet, Rfl: 1    nitrofurantoin, macrocrystal-monohydrate, (Macrobid) 100 MG capsule, Take 1 capsule by mouth Every Night., Disp: 30 capsule, Rfl: 3    nitroglycerin (NITROSTAT) 0.4 MG SL tablet, Place 1 tablet under the tongue Every 5 (Five) Minutes As Needed for Chest Pain., Disp: , Rfl:     ondansetron ODT (ZOFRAN-ODT) 4 MG disintegrating tablet, Take 1 tablet by mouth As Needed., Disp: , Rfl:     OneTouch Verio test strip, Daily. for testing, Disp: , Rfl:     oxybutynin XL (Ditropan XL) 10 MG 24 hr tablet, Take 1 tablet by mouth Daily As Needed (bladder spasm)., Disp: 10 tablet, Rfl: 0    pantoprazole (PROTONIX) 40 MG EC tablet, Take 1 tablet by mouth Daily., Disp: , Rfl:     phenazopyridine (PYRIDIUM) 100 MG tablet,  Take 1 tablet by mouth Daily As Needed (urinary burning)., Disp: 5 tablet, Rfl: 0    simethicone (MYLICON) 80 MG chewable tablet, Chew 1 tablet Every 6 (Six) Hours As Needed for Flatulence., Disp: , Rfl:     spironolactone (ALDACTONE) 25 MG tablet, Take 1 tablet by mouth Daily., Disp: 90 tablet, Rfl: 1    tamsulosin (FLOMAX) 0.4 MG capsule 24 hr capsule, Take 1 capsule by mouth Every Night., Disp: 10 capsule, Rfl: 0    valACYclovir (VALTREX) 1000 MG tablet, Take 1 tablet by mouth 3 times a day., Disp: , Rfl:     Vibegron 75 MG tablet, Take 1 tablet by mouth Daily., Disp: 30 tablet, Rfl: 11    Vitamins-Lipotropics (Lipoflavonoid) tablet, Take 2 tablets by mouth 3 times a day. For tinitus - per pt has not been taking x 2-3 wks (12/2/2024), she is getting ready to start them again, Disp: , Rfl:       Review of Systems    Review of Systems   Constitutional:  Negative for activity change, appetite change, chills, diaphoresis, fatigue and fever.   HENT:  Negative for congestion, dental problem, drooling, ear discharge, ear pain, facial swelling, postnasal drip, sinus pressure, sore throat and swollen glands.    Eyes:  Negative for blurred vision, double vision, pain, discharge and itching.   Respiratory:  Negative for apnea, cough, choking, chest tightness and shortness of breath.    Cardiovascular:  Negative for chest pain, palpitations and leg swelling.   Gastrointestinal:  Positive for abdominal pain. Negative for abdominal distention, diarrhea, nausea, rectal pain and vomiting.   Genitourinary:  Negative for difficulty urinating, dysuria, flank pain, frequency, hematuria, pelvic pain and pelvic pressure.   Neurological:  Negative for dizziness, tremors, seizures, syncope, speech difficulty and confusion.   Psychiatric/Behavioral:  Negative for agitation and behavioral problems.         Objective     Vital Signs:  /68 (BP Location: Right arm, Patient Position: Sitting, Cuff Size: Small Adult)   Pulse 81   Wt  "56.2 kg (124 lb)   SpO2 94%   BMI 21.97 kg/m²   Estimated body mass index is 21.97 kg/m² as calculated from the following:    Height as of 6/16/25: 160 cm (62.99\").    Weight as of this encounter: 56.2 kg (124 lb).    Physical Exam  Constitutional:       General: She is not in acute distress.     Appearance: Normal appearance. She is not ill-appearing, toxic-appearing or diaphoretic.   HENT:      Head: Normocephalic and atraumatic.      Nose: No congestion or rhinorrhea.      Mouth/Throat:      Pharynx: Oropharynx is clear. No oropharyngeal exudate or posterior oropharyngeal erythema.   Cardiovascular:      Rate and Rhythm: Normal rate and regular rhythm.      Heart sounds: No murmur heard.  Pulmonary:      Effort: No respiratory distress.      Breath sounds: No stridor. No wheezing, rhonchi or rales.   Chest:      Chest wall: No tenderness.   Abdominal:      General: There is no distension.      Tenderness: There is no abdominal tenderness. There is no right CVA tenderness, left CVA tenderness, guarding or rebound.   Musculoskeletal:         General: No swelling, tenderness or signs of injury.      Cervical back: No rigidity or tenderness.   Skin:     Coloration: Skin is not jaundiced or pale.      Findings: No bruising, erythema or rash.   Neurological:      General: No focal deficit present.      Mental Status: She is alert. Mental status is at baseline.   Psychiatric:         Mood and Affect: Mood normal.         Behavior: Behavior normal.          Result Review :  The following data was reviewed by Elena Velazquez MD     Lab Results  Lab Results   Component Value Date    WBC 6.83 06/11/2025    HGB 13.6 06/11/2025    HCT 40.6 06/11/2025    MCV 92.9 06/11/2025     06/11/2025     Lab Results   Component Value Date    GLUCOSE 134 (H) 06/11/2025    BUN 18.0 06/11/2025    CREATININE 0.64 06/11/2025    EGFRIFNONA >60 05/18/2022    EGFRIFAFRI >60 05/18/2022    BCR 28.1 (H) 06/11/2025    K 4.2 06/11/2025    " "CO2 25.1 06/11/2025    CALCIUM 9.5 06/11/2025    ALBUMIN 4.2 06/11/2025    AST 21 06/11/2025    ALT 20 06/11/2025      No results found for: \"CRP\"     No results found for: \"ACANTHNAEG\", \"AFBCX\", \"BPERTUSSISCX\", \"BLOODCX\"  No results found for: \"BCIDPCR\", \"CXREFLEX\", \"CSFCX\", \"CULTURETIS\"  No results found for: \"CULTURES\", \"HSVCX\", \"URCX\"  No results found for: \"EYECULTURE\", \"GCCX\", \"HSVCULTURE\", \"LABHSV\"  No results found for: \"LEGIONELLA\", \"MRSACX\", \"MUMPSCX\", \"MYCOPLASCX\"  No results found for: \"NOCARDIACX\", \"STOOLCX\"  No results found for: \"THROATCX\", \"UNSTIMCULT\", \"URINECX\", \"CULTURE\", \"VZVCULTUR\"  No results found for: \"VIRALCULTU\", \"WOUNDCX\"    Radiology Results  CT Abdomen Pelvis With Contrast  Result Date: 6/11/2025  Impression: New enhancement of the wall of the right renal pelvis, proximal right ureter and posterior wall of the bladder suggesting right pyelitis/ureteritis and cystitis.  Previously described right nephrolithiasis not identified  Stable left nephrolithiasis.  CTDI: 3.94 mGy DLP:182.94 mGy.cm  This report was signed and finalized on 6/11/2025 4:01 PM by Iraida Kruse MD.               Assessment / Plan        Diagnoses and all orders for this visit:    1. Bilateral nephrolithiasis (Primary)  -     Urine Culture - Urine, Urine, Clean Catch; Future  -     Urinalysis With Microscopic - Urine, Clean Catch; Future    2. Type 2 diabetes mellitus without complication, without long-term current use of insulin  -     Urine Culture - Urine, Urine, Clean Catch; Future  -     Urinalysis With Microscopic - Urine, Clean Catch; Future    3. UTI due to extended-spectrum beta lactamase (ESBL) producing Escherichia coli  -     Urine Culture - Urine, Urine, Clean Catch; Future  -     Urinalysis With Microscopic - Urine, Clean Catch; Future    4. Recurrent UTI  -     Urine Culture - Urine, Urine, Clean Catch; Future  -     Urinalysis With Microscopic - Urine, Clean Catch; Future      CT 2/16/25 Bilateral " nephrolithiasis is noted, stable from prior exam,  greater on right side.     Most recent urine culture and urine analysis from 4/23/2025 show 21-50 WBCs and growth of over 100,000 colonies of E. coli.     Patient finished her course of Bactrim DS 1 p.o. twice daily.  Recheck a UA urine culture and followed up for urology, Dr. Frias and Genoveva KO, for lithotripsy or stent placement.     5/8/25  Urine analysis with over 1000 glucose, trace blood, negative leukocyte Estrace and negative nitrite and urine culture showed no growth.     Her uncontrolled diabetes and such high sugar in her urine is a very high risk for future UTIs. I had a long discussion with patient regarding diet and follow up with endocrinology. Patient has stopped her metformin.    Patient is scheduled for stone removal on 5/27/2025 and stent removal on 6/16/2025 by Dr. Frias urologist.    Patient continues to have some discomfort to the right side and we will proceed with a UA and urine culture today.        Follow Up   Return in about 3 weeks (around 7/14/2025) for Follow up, With Dr. Velazquez.    Visit Diagnoses:    ICD-10-CM ICD-9-CM   1. Bilateral nephrolithiasis  N20.0 592.0   2. Type 2 diabetes mellitus without complication, without long-term current use of insulin  E11.9 250.00   3. UTI due to extended-spectrum beta lactamase (ESBL) producing Escherichia coli  N39.0 599.0    B96.29 041.49    Z16.12 V09.1   4. Recurrent UTI  N39.0 599.0       Patient was given instructions and counseling regarding her condition or for health maintenance advice. Please see specific information pulled into the AVS if appropriate.     This document has been electronically signed by Elena Velazquez MD   June 23, 2025 10:56 EDT      No orders of the defined types were placed in this encounter.     Dictated Utilizing Dragon Dictation: Part of this note may be an electronic transcription/translation of spoken language to printed text using the Dragon  Dictation System.

## 2025-06-24 LAB
APPEARANCE UR: CLEAR
BACTERIA #/AREA URNS HPF: ABNORMAL /[HPF]
BILIRUB UR QL STRIP: NEGATIVE
CASTS URNS QL MICRO: ABNORMAL /LPF
COLOR UR: YELLOW
CRYSTALS URNS MICRO: ABNORMAL
EPI CELLS #/AREA URNS HPF: ABNORMAL /HPF (ref 0–10)
GLUCOSE UR QL STRIP: NEGATIVE
HGB UR QL STRIP: NEGATIVE
KETONES UR QL STRIP: NEGATIVE
LEUKOCYTE ESTERASE UR QL STRIP: ABNORMAL
NITRITE UR QL STRIP: NEGATIVE
PH UR STRIP: 6 [PH] (ref 5–7.5)
PROT UR QL STRIP: ABNORMAL
RBC #/AREA URNS HPF: ABNORMAL /HPF (ref 0–2)
SP GR UR STRIP: 1.02 (ref 1–1.03)
UNIDENT CRYS URNS QL MICRO: PRESENT
UROBILINOGEN UR STRIP-MCNC: 0.2 MG/DL (ref 0.2–1)
WBC #/AREA URNS HPF: ABNORMAL /HPF (ref 0–5)

## 2025-06-26 ENCOUNTER — TELEPHONE (OUTPATIENT)
Dept: UROLOGY | Facility: CLINIC | Age: 80
End: 2025-06-26

## 2025-06-26 NOTE — TELEPHONE ENCOUNTER
Caller: VANESSA AHUJA    Relationship: SELF    Best call back number: [phone]    What is the best time to reach you: ANYTIME    Who are you requesting to speak with (clinical staff, provider,  specific staff member): JOYCE    Do you know the name of the person who called: JOYCE    What was the call regarding: SAYS PT STILL HAS NOT HEARD BACK FROM INFECTIOUS DISEASE CLINIC. PT SAYS SHE HAS CALLED MULTIPLE TIMES AND NO ONE HAS ANSWERED NOR CALLED HER BACK.    Is it okay if the provider responds through [employer]: NO

## 2025-06-26 NOTE — TELEPHONE ENCOUNTER
Left voicemail for patient that she will have to reach out to them they have taken over her for urinary tract infections

## 2025-06-27 LAB
BACTERIA UR CULT: ABNORMAL
BACTERIA UR CULT: ABNORMAL
OTHER ANTIBIOTIC SUSC ISLT: ABNORMAL

## 2025-06-27 NOTE — TELEPHONE ENCOUNTER
PATIENT IS CALLING BACK WANTING TO SPEAK WITH SOMEONE ABOUT WHAT IS GOING ON WITH HER. SHE DID NOT GET THE MESSAGE OR VOICEMAIL. SHE IS REQUESTING SOMEONE REACH BACK OUT TODAY.   BEST NUMBER IS HER CELL

## 2025-06-30 ENCOUNTER — APPOINTMENT (OUTPATIENT)
Dept: CT IMAGING | Facility: HOSPITAL | Age: 80
End: 2025-06-30
Payer: MEDICARE

## 2025-06-30 ENCOUNTER — TELEPHONE (OUTPATIENT)
Dept: UROLOGY | Facility: CLINIC | Age: 80
End: 2025-06-30
Payer: MEDICARE

## 2025-06-30 ENCOUNTER — HOSPITAL ENCOUNTER (EMERGENCY)
Facility: HOSPITAL | Age: 80
Discharge: HOME | End: 2025-06-30
Attending: EMERGENCY MEDICINE | Admitting: EMERGENCY MEDICINE
Payer: MEDICARE

## 2025-06-30 VITALS
TEMPERATURE: 98 F | SYSTOLIC BLOOD PRESSURE: 117 MMHG | RESPIRATION RATE: 16 BRPM | HEART RATE: 79 BPM | DIASTOLIC BLOOD PRESSURE: 76 MMHG | BODY MASS INDEX: 21.44 KG/M2 | HEIGHT: 63 IN | OXYGEN SATURATION: 96 % | WEIGHT: 121 LBS

## 2025-06-30 DIAGNOSIS — N39.0 URINARY TRACT INFECTION WITHOUT HEMATURIA, SITE UNSPECIFIED: ICD-10-CM

## 2025-06-30 DIAGNOSIS — N13.30 HYDRONEPHROSIS, UNSPECIFIED HYDRONEPHROSIS TYPE: ICD-10-CM

## 2025-06-30 DIAGNOSIS — R10.9 FLANK PAIN: Primary | ICD-10-CM

## 2025-06-30 LAB
ALBUMIN SERPL-MCNC: 4.1 G/DL (ref 3.5–5.2)
ALBUMIN/GLOB SERPL: 1.3 G/DL
ALP SERPL-CCNC: 94 U/L (ref 39–117)
ALT SERPL W P-5'-P-CCNC: 15 U/L (ref 1–33)
ANION GAP SERPL CALCULATED.3IONS-SCNC: 11.3 MMOL/L (ref 5–15)
AST SERPL-CCNC: 25 U/L (ref 1–32)
BACTERIA UR QL AUTO: ABNORMAL /HPF
BASOPHILS # BLD AUTO: 0.01 10*3/MM3 (ref 0–0.2)
BASOPHILS NFR BLD AUTO: 0.2 % (ref 0–1.5)
BILIRUB SERPL-MCNC: 0.7 MG/DL (ref 0–1.2)
BILIRUB UR QL STRIP: NEGATIVE
BUN SERPL-MCNC: 15 MG/DL (ref 8–23)
BUN/CREAT SERPL: 25 (ref 7–25)
CALCIUM SPEC-SCNC: 9.1 MG/DL (ref 8.6–10.5)
CHLORIDE SERPL-SCNC: 100 MMOL/L (ref 98–107)
CLARITY UR: CLEAR
CO2 SERPL-SCNC: 25.7 MMOL/L (ref 22–29)
COLOR UR: YELLOW
CREAT SERPL-MCNC: 0.6 MG/DL (ref 0.57–1)
D-LACTATE SERPL-SCNC: 1.1 MMOL/L (ref 0.5–2)
DEPRECATED RDW RBC AUTO: 41.3 FL (ref 37–54)
EGFRCR SERPLBLD CKD-EPI 2021: 92 ML/MIN/1.73
EOSINOPHIL # BLD AUTO: 0.08 10*3/MM3 (ref 0–0.4)
EOSINOPHIL NFR BLD AUTO: 1.2 % (ref 0.3–6.2)
ERYTHROCYTE [DISTWIDTH] IN BLOOD BY AUTOMATED COUNT: 12.2 % (ref 12.3–15.4)
GLOBULIN UR ELPH-MCNC: 3.2 GM/DL
GLUCOSE SERPL-MCNC: 152 MG/DL (ref 65–99)
GLUCOSE UR STRIP-MCNC: NEGATIVE MG/DL
HCT VFR BLD AUTO: 39 % (ref 34–46.6)
HGB BLD-MCNC: 13.2 G/DL (ref 12–15.9)
HGB UR QL STRIP.AUTO: NEGATIVE
HOLD SPECIMEN: NORMAL
HOLD SPECIMEN: NORMAL
HYALINE CASTS UR QL AUTO: ABNORMAL /LPF
IMM GRANULOCYTES # BLD AUTO: 0.01 10*3/MM3 (ref 0–0.05)
IMM GRANULOCYTES NFR BLD AUTO: 0.2 % (ref 0–0.5)
KETONES UR QL STRIP: NEGATIVE
LEUKOCYTE ESTERASE UR QL STRIP.AUTO: ABNORMAL
LIPASE SERPL-CCNC: 41 U/L (ref 13–60)
LYMPHOCYTES # BLD AUTO: 2.43 10*3/MM3 (ref 0.7–3.1)
LYMPHOCYTES NFR BLD AUTO: 37 % (ref 19.6–45.3)
MCH RBC QN AUTO: 31.2 PG (ref 26.6–33)
MCHC RBC AUTO-ENTMCNC: 33.8 G/DL (ref 31.5–35.7)
MCV RBC AUTO: 92.2 FL (ref 79–97)
MONOCYTES # BLD AUTO: 0.63 10*3/MM3 (ref 0.1–0.9)
MONOCYTES NFR BLD AUTO: 9.6 % (ref 5–12)
NEUTROPHILS NFR BLD AUTO: 3.4 10*3/MM3 (ref 1.7–7)
NEUTROPHILS NFR BLD AUTO: 51.8 % (ref 42.7–76)
NITRITE UR QL STRIP: NEGATIVE
NRBC BLD AUTO-RTO: 0 /100 WBC (ref 0–0.2)
PH UR STRIP.AUTO: 5.5 [PH] (ref 5–8)
PLATELET # BLD AUTO: 232 10*3/MM3 (ref 140–450)
PMV BLD AUTO: 9.5 FL (ref 6–12)
POTASSIUM SERPL-SCNC: 4.1 MMOL/L (ref 3.5–5.2)
PROT SERPL-MCNC: 7.3 G/DL (ref 6–8.5)
PROT UR QL STRIP: NEGATIVE
RBC # BLD AUTO: 4.23 10*6/MM3 (ref 3.77–5.28)
RBC # UR STRIP: ABNORMAL /HPF
REF LAB TEST METHOD: ABNORMAL
SODIUM SERPL-SCNC: 137 MMOL/L (ref 136–145)
SP GR UR STRIP: 1.02 (ref 1–1.03)
SQUAMOUS #/AREA URNS HPF: ABNORMAL /HPF
UROBILINOGEN UR QL STRIP: ABNORMAL
WBC # UR STRIP: ABNORMAL /HPF
WBC NRBC COR # BLD AUTO: 6.56 10*3/MM3 (ref 3.4–10.8)
WHOLE BLOOD HOLD COAG: NORMAL
WHOLE BLOOD HOLD SPECIMEN: NORMAL

## 2025-06-30 PROCEDURE — 74177 CT ABD & PELVIS W/CONTRAST: CPT

## 2025-06-30 PROCEDURE — 83690 ASSAY OF LIPASE: CPT | Performed by: EMERGENCY MEDICINE

## 2025-06-30 PROCEDURE — 25010000002 ONDANSETRON PER 1 MG

## 2025-06-30 PROCEDURE — 25810000003 SODIUM CHLORIDE 0.9 % SOLUTION

## 2025-06-30 PROCEDURE — 83605 ASSAY OF LACTIC ACID: CPT | Performed by: EMERGENCY MEDICINE

## 2025-06-30 PROCEDURE — 81001 URINALYSIS AUTO W/SCOPE: CPT | Performed by: EMERGENCY MEDICINE

## 2025-06-30 PROCEDURE — 87186 SC STD MICRODIL/AGAR DIL: CPT

## 2025-06-30 PROCEDURE — 96374 THER/PROPH/DIAG INJ IV PUSH: CPT

## 2025-06-30 PROCEDURE — 25010000002 MORPHINE PER 10 MG: Performed by: EMERGENCY MEDICINE

## 2025-06-30 PROCEDURE — 99285 EMERGENCY DEPT VISIT HI MDM: CPT | Performed by: EMERGENCY MEDICINE

## 2025-06-30 PROCEDURE — 96361 HYDRATE IV INFUSION ADD-ON: CPT

## 2025-06-30 PROCEDURE — 87077 CULTURE AEROBIC IDENTIFY: CPT

## 2025-06-30 PROCEDURE — 87086 URINE CULTURE/COLONY COUNT: CPT

## 2025-06-30 PROCEDURE — 96375 TX/PRO/DX INJ NEW DRUG ADDON: CPT

## 2025-06-30 PROCEDURE — 25510000001 IOPAMIDOL 61 % SOLUTION: Performed by: EMERGENCY MEDICINE

## 2025-06-30 PROCEDURE — 85025 COMPLETE CBC W/AUTO DIFF WBC: CPT | Performed by: EMERGENCY MEDICINE

## 2025-06-30 PROCEDURE — 80053 COMPREHEN METABOLIC PANEL: CPT | Performed by: EMERGENCY MEDICINE

## 2025-06-30 RX ORDER — ONDANSETRON 2 MG/ML
4 INJECTION INTRAMUSCULAR; INTRAVENOUS ONCE
Status: COMPLETED | OUTPATIENT
Start: 2025-06-30 | End: 2025-06-30

## 2025-06-30 RX ORDER — IOPAMIDOL 612 MG/ML
100 INJECTION, SOLUTION INTRAVASCULAR
Status: COMPLETED | OUTPATIENT
Start: 2025-06-30 | End: 2025-06-30

## 2025-06-30 RX ORDER — SODIUM CHLORIDE 0.9 % (FLUSH) 0.9 %
10 SYRINGE (ML) INJECTION AS NEEDED
Status: DISCONTINUED | OUTPATIENT
Start: 2025-06-30 | End: 2025-06-30 | Stop reason: HOSPADM

## 2025-06-30 RX ORDER — SULFAMETHOXAZOLE AND TRIMETHOPRIM 800; 160 MG/1; MG/1
1 TABLET ORAL ONCE
Status: COMPLETED | OUTPATIENT
Start: 2025-06-30 | End: 2025-06-30

## 2025-06-30 RX ADMIN — SODIUM CHLORIDE 500 ML: 900 INJECTION, SOLUTION INTRAVENOUS at 12:46

## 2025-06-30 RX ADMIN — IOPAMIDOL 100 ML: 612 INJECTION, SOLUTION INTRAVENOUS at 13:38

## 2025-06-30 RX ADMIN — SULFAMETHOXAZOLE AND TRIMETHOPRIM 1 TABLET: 800; 160 TABLET ORAL at 15:36

## 2025-06-30 RX ADMIN — MORPHINE SULFATE 4 MG: 4 INJECTION, SOLUTION INTRAMUSCULAR; INTRAVENOUS at 12:45

## 2025-06-30 RX ADMIN — ONDANSETRON 4 MG: 2 INJECTION INTRAMUSCULAR; INTRAVENOUS at 12:45

## 2025-06-30 NOTE — TELEPHONE ENCOUNTER
Called Aster. She reports she is on her way to the ER in [place] due to her symptoms. She is having abdominal pain, difficulty with her bowels and some right back pain. She had called us last week and did not get a returned call. There is a message in our system that says we left her a voice message but she states she did not receive it. We discussed her urine culture was ordered by ID and they are the one that are managing her cultures and UTI.

## 2025-06-30 NOTE — DISCHARGE INSTRUCTIONS
Please follow-up with urology, Dr. Frias, and your primary care physician, it appears that your primary care started you on Bactrim, please pick it up and take as prescribed.  If this is not at your pharmacy you can call back here and we will prescribe it for you.  If you develop significant worsening symptoms please return for reevaluation or develop fevers despite antibiotic use.

## 2025-06-30 NOTE — TELEPHONE ENCOUNTER
Pt is calling to say that she has called numerous times and hasn't gotten a call back for her issue. Still having pain in her back & stomach..asking to be called

## 2025-06-30 NOTE — ED PROVIDER NOTES
Subjective  History of Present Illness:    Patient is a 80-year-old female, history of GERD, hypertension, kidney stones, urine incontinence, UTI, presented for evaluation of flank pain, patient reports that she recently had a stent removed for a prior kidney stone performed by Dr. Frias.  Was seen in the emergency department, had CT scans concerning for possible pyelitis, however urology thought likely secondary to recent inflammation of stent removal, and no antibiotics prescribed at discharge secondary to this.  Patient reports since then she has still had some urinary frequency, developing worsening right-sided flank pain.  No nausea no vomiting, she denies any abdominal pain though does report that the pain wraps around to the right side slightly, no chest pain or shortness of breath.  No traumatic injuries or falls.  She reports that she was informed of her recent urine culture suggesting E. coli but reports that she was not put any on antibiotics for this.  Prior to her last stent she does report that she was on Bactrim for approximately 2 months.      Nurses Notes reviewed and agree, including vitals, allergies, social history and prior medical history.     REVIEW OF SYSTEMS: All systems reviewed and not pertinent unless noted.  Review of Systems   Constitutional:  Positive for chills. Negative for fever.   Respiratory:  Negative for shortness of breath.    Cardiovascular:  Negative for chest pain.   Gastrointestinal:  Negative for abdominal pain, nausea and vomiting.   Genitourinary:  Positive for flank pain and frequency. Negative for dysuria, hematuria and urgency.   Musculoskeletal:  Negative for back pain.   All other systems reviewed and are negative.      Past Medical History:   Diagnosis Date    Arthritis     Atherosclerosis of native coronary artery of native heart without angina pectoris 9/11/2024    Breast cancer     RIGHT BREAST STAGE 3    Diabetes mellitus     Elevated cholesterol     GERD  "(gastroesophageal reflux disease) 08/23/2021    History of cancer chemotherapy     Hypertension     Hyperthyroidism 05/19/2022    Kidney stone     Lichen sclerosus     Shingles     Thickened endometrium 2018    Urinary incontinence     Urinary tract infection        Allergies:    Caffeine and Jardiance [empagliflozin]      Past Surgical History:   Procedure Laterality Date    CARDIAC CATHETERIZATION N/A 7/15/2024    Procedure: Coronary angiography;  Surgeon: Brian Morrow MD;  Location:  [facility];  Service: Cardiology;  Laterality: N/A;    CATARACT EXTRACTION Right 01/10/2023    CERVICAL CONE BIOPSY      CHOLECYSTECTOMY  03/2010    COLONOSCOPY N/A 07/06/2018    Procedure: COLONOSCOPY;  Surgeon: Trenton Lyons MD;  Location: [facility] OR;  Service: Gastroenterology    DILATATION AND CURETTAGE      ENDOSCOPY N/A 07/06/2018    Procedure: ESOPHAGOGASTRODUODENOSCOPY;  Surgeon: Trenton Lyons MD;  Location: [facility] OR;  Service: Gastroenterology    MASTECTOMY Right 10/1998    MASTECTOMY Left 07/2013    TUBAL ABDOMINAL LIGATION           Social History     Socioeconomic History    Marital status:    Tobacco Use    Smoking status: Never     Passive exposure: Never    Smokeless tobacco: Never   Vaping Use    Vaping status: Never Used   Substance and Sexual Activity    Alcohol use: No    Drug use: No    Sexual activity: Not Currently         Family History   Problem Relation Age of Onset    Breast cancer Mother     Diabetes Mother     Cancer Mother     Ovarian cancer Maternal Aunt     Cancer Maternal Aunt        Objective  Physical Exam:  /76   Pulse 73   Temp 98 °F (36.7 °C) (Oral)   Resp 18   Ht 160 cm (63\")   Wt 54.9 kg (121 lb)   SpO2 98%   BMI 21.43 kg/m²      Physical Exam  Vitals and nursing note reviewed.   Constitutional:       General: She is not in acute distress.     Appearance: She is not ill-appearing, toxic-appearing or diaphoretic.      Comments: Elderly appearing "   HENT:      Head: Normocephalic and atraumatic.      Mouth/Throat:      Mouth: Mucous membranes are moist.      Pharynx: Oropharynx is clear.   Cardiovascular:      Rate and Rhythm: Normal rate and regular rhythm.      Heart sounds: Normal heart sounds.   Pulmonary:      Effort: Pulmonary effort is normal. No respiratory distress.      Breath sounds: Normal breath sounds. No stridor. No wheezing, rhonchi or rales.   Abdominal:      General: Abdomen is flat.      Palpations: Abdomen is soft.      Tenderness: There is no abdominal tenderness. There is right CVA tenderness. There is no left CVA tenderness, guarding or rebound.   Skin:     General: Skin is warm and dry.      Capillary Refill: Capillary refill takes less than 2 seconds.   Neurological:      General: No focal deficit present.      Mental Status: She is alert and oriented to person, place, and time.   Psychiatric:         Mood and Affect: Mood normal.         Behavior: Behavior normal.               Procedures    ED Course:    ED Course as of 06/30/25 1656 Mon Jun 30, 2025 1653 Patient had transient episode of hypoxia secondary to morphine, she was removed from her oxygen, maintain normal oxygen saturations above 90% afterwards, patient was also observed for further period emergency department as the morphine did cause her to be drowsy, she is now without drowsiness, no hypoxia and appropriate for outpatient follow-up and management. [JR]      ED Course User Index  [JR] Shaw Hugo PA-C       Lab Results (last 24 hours)       Procedure Component Value Units Date/Time    Urinalysis With Microscopic If Indicated (No Culture) - Urine, Clean Catch [[id number]]  (Abnormal) Collected: 06/30/25 1154    Specimen: Urine, Clean Catch Updated: 06/30/25 1208     Color, UA Yellow     Appearance, UA Clear     pH, UA 5.5     Specific Gravity, UA 1.018     Glucose, UA Negative     Ketones, UA Negative     Bilirubin, UA Negative     Blood, UA Negative      Protein, UA Negative     Leuk Esterase, UA Small (1+)     Nitrite, UA Negative     Urobilinogen, UA 1.0 E.U./dL    Urinalysis, Microscopic Only - Urine, Clean Catch [[id number]]  (Abnormal) Collected: 06/30/25 1154    Specimen: Urine, Clean Catch Updated: 06/30/25 1231     RBC, UA 0-2 /HPF      WBC, UA 6-10 /HPF      Bacteria, UA Trace /HPF      Squamous Epithelial Cells, UA 0-2 /HPF      Hyaline Casts, UA None Seen /LPF      Methodology Manual Light Microscopy    Urine Culture - Urine, Urine, Clean Catch [[id number]] Collected: 06/30/25 1154    Specimen: Urine, Clean Catch Updated: 06/30/25 1217    CBC & Differential [[id number]]  (Abnormal) Collected: 06/30/25 1212    Specimen: Blood Updated: 06/30/25 1224    Narrative:      The following orders were created for panel order CBC & Differential.  Procedure                               Abnormality         Status                     ---------                               -----------         ------                     CBC Auto Differential[[id number]]        Abnormal            Final result                 Please view results for these tests on the individual orders.    Comprehensive Metabolic Panel [[id number]]  (Abnormal) Collected: 06/30/25 1212    Specimen: Blood Updated: 06/30/25 1235     Glucose 152 mg/dL      BUN 15.0 mg/dL      Creatinine 0.60 mg/dL      Sodium 137 mmol/L      Potassium 4.1 mmol/L      Chloride 100 mmol/L      CO2 25.7 mmol/L      Calcium 9.1 mg/dL      Total Protein 7.3 g/dL      Albumin 4.1 g/dL      ALT (SGPT) 15 U/L      AST (SGOT) 25 U/L      Alkaline Phosphatase 94 U/L      Total Bilirubin 0.7 mg/dL      Globulin 3.2 gm/dL      A/G Ratio 1.3 g/dL      BUN/Creatinine Ratio 25.0     Anion Gap 11.3 mmol/L      eGFR 92.0 mL/min/1.73     Narrative:      GFR Categories in Chronic Kidney Disease (CKD)              GFR Category          GFR (mL/min/1.73)    Interpretation  G1                    90 or greater        Normal or high (1)  G2                     60-89                Mild decrease (1)  G3a                   45-59                Mild to moderate decrease  G3b                   30-44                Moderate to severe decrease  G4                    15-29                Severe decrease  G5                    14 or less           Kidney failure    (1)In the absence of evidence of kidney disease, neither GFR category G1 or G2 fulfill the criteria for CKD.    eGFR calculation 2021 CKD-EPI creatinine equation, which does not include race as a factor    Lipase [[id number]]  (Normal) Collected: 06/30/25 1212    Specimen: Blood Updated: 06/30/25 1235     Lipase 41 U/L     Lactic Acid, Plasma [[id number]]  (Normal) Collected: 06/30/25 1212    Specimen: Blood Updated: 06/30/25 1233     Lactate 1.1 mmol/L     CBC Auto Differential [[id number]]  (Abnormal) Collected: 06/30/25 1212    Specimen: Blood Updated: 06/30/25 1224     WBC 6.56 10*3/mm3      RBC 4.23 10*6/mm3      Hemoglobin 13.2 g/dL      Hematocrit 39.0 %      MCV 92.2 fL      MCH 31.2 pg      MCHC 33.8 g/dL      RDW 12.2 %      RDW-SD 41.3 fl      MPV 9.5 fL      Platelets 232 10*3/mm3      Neutrophil % 51.8 %      Lymphocyte % 37.0 %      Monocyte % 9.6 %      Eosinophil % 1.2 %      Basophil % 0.2 %      Immature Grans % 0.2 %      Neutrophils, Absolute 3.40 10*3/mm3      Lymphocytes, Absolute 2.43 10*3/mm3      Monocytes, Absolute 0.63 10*3/mm3      Eosinophils, Absolute 0.08 10*3/mm3      Basophils, Absolute 0.01 10*3/mm3      Immature Grans, Absolute 0.01 10*3/mm3      nRBC 0.0 /100 WBC              CT Abdomen Pelvis With Contrast  Result Date: 6/30/2025  PROCEDURE: CT ABDOMEN PELVIS W CONTRAST-  HISTORY:  Right flank pain, recent stent removal, eval pyelonephritis or stone  COMPARISON: June 11, 2025.  TECHNIQUE: Multiple axial CT images were obtained from the lung bases through the pubic symphysis following the administration of Isovue 300 contrast.  FINDINGS:  ABDOMEN: There is a stable right lower  lobe nodule. There is minimal right hydronephrosis without hydroureter. There is mild right urothelial thickening of the renal collecting system which may represent underlying infectious/inflammatory process. The left kidney is unremarkable. Remaining solid abdominal organs are unremarkable. The gallbladder is surgically absent. There is stable mild biliary ductal dilatation likely due to prior cholecystectomy. The aorta is normal in caliber. There is no free fluid or adenopathy. The abdominal portions of the GI tract are unremarkable with no evidence of obstruction.  PELVIS: The appendix is not identified. The uterus is retroverted. Uterus and ovaries are unremarkable. Distal ureters are unremarkable. There is no obstructing ureteral stone. The urinary bladder is unremarkable. There is no significant free fluid or adenopathy.      Impression: Mild right hydronephrosis without hydroureter. Mild right urothelial thickening of the renal collecting system may represent infectious/inflammatory process. There is no obstructing renal or ureteral stone.   CTDI: 3.81 mGy DLP: 173.9 mGy.cm   This study was performed with techniques to keep radiation doses as low as reasonably achievable (ALARA). Individualized dose reduction techniques using automated exposure control or adjustment of mA and/or kV according to the patient size were employed.      Images were reviewed, interpreted, and dictated by Dr. Isaiah Abbott MD Transcribed by Maryuri Enrique PA-C.  This report was signed and finalized on 6/30/2025 2:15 PM by Isaiah Abbott MD.           MDM     Amount and/or Complexity of Data Reviewed  Clinical lab tests: reviewed  Decide to obtain previous medical records or to obtain history from someone other than the patient: yes        Initial impression of presenting illness: 80-year-old female presented for evaluation of right-sided flank pain.    DDX: includes but is not limited to: Pyelonephritis, nephrolithiasis, ureteral  stone, hemorrhagic cystitis, cystitis, pyelitis, musculoskeletal strain or sprain, sepsis, others    Patient arrives hemodynamically stable afebrile nontachycardic nontachypneic and nonhypoxic on room air with vitals interpreted by myself.     Pertinent features from physical exam: Patient with right-sided CVA tenderness to exam, otherwise normal exam lungs grossly clear cardiac auscultation regular in rhythm and abdomen soft and completely nontender, no peritonitis.    Initial diagnostic plan: CBC, CMP, lipase, lactic, urinalysis, CT and pelvis with contrast, urine culture    Results from initial plan were reviewed and interpreted by me revealing CBC no leukocytosis, urinalysis small leukocytes, 6-10 white blood cells, trace bacteria.  Urine culture pending.  CMP with a glucose of 152 otherwise normal, lactic and lipase normal.  No evidence of sepsis, urine culture pending.  CT abdomen pelvis radiology    IMPRESSION:  Mild right hydronephrosis without hydroureter. Mild right  urothelial thickening of the renal collecting system may represent  infectious/inflammatory process. There is no obstructing renal or  ureteral stone.    Diagnostic information from other sources: Record reviewed    Interventions / Re-evaluation:   Medications   sodium chloride 0.9 % flush 10 mL (has no administration in time range)   ondansetron (ZOFRAN) injection 4 mg (4 mg Intravenous Given 6/30/25 1245)   sodium chloride 0.9 % bolus 500 mL (0 mL Intravenous Stopped 6/30/25 1529)   morphine injection 4 mg (4 mg Intravenous Given 6/30/25 1245)   iopamidol (ISOVUE-300) 61 % injection 100 mL (100 mL Intravenous Given 6/30/25 1338)   sulfamethoxazole-trimethoprim (BACTRIM DS,SEPTRA DS) 800-160 MG per tablet 1 tablet (1 tablet Oral Given 6/30/25 1536)       Results/clinical rationale were discussed with patient at the bedside.  Given that she does still have some evidence of UTI with recent culture growing E. coli, will have her continue on  Bactrim as prescribed by her primary care physician today given prior susceptibilities.    Consultations/Discussion of results with other physicians: Discussed with ED attending physician    Disposition plan: Discharge, follow-up with urology, primary care physician.  Primary care physician, sent Bactrim twice daily, patient received a call in the emergency department stating that they had prescribed her an Rx for Bactrim twice daily for 7 days given her positive urine culture.  She was given return precautions for development of fevers or worsening symptoms despite antibiotic use.  -----    Final diagnoses:   Flank pain   Urinary tract infection without hematuria, site unspecified   Hydronephrosis, unspecified hydronephrosis type          Shaw Hugo, PA-C  06/30/25 1508

## 2025-07-02 LAB — BACTERIA SPEC AEROBE CULT: ABNORMAL

## 2025-07-03 ENCOUNTER — TELEPHONE (OUTPATIENT)
Dept: UROLOGY | Facility: CLINIC | Age: 80
End: 2025-07-03

## 2025-07-03 NOTE — TELEPHONE ENCOUNTER
Caller: VANESSA AHUJA    Relationship: SELF    Best call back number: [phone]    What is the best time to reach you: ANY     Who are you requesting to speak with (clinical staff, provider,  specific staff member): JOYCE    Do you know the name of the person who called: PT    What was the call regarding: PT RIGHT SIDE AND BACK ARE HURTING REALLY BAD. PT WANTS TO KNOW IF SHE SHOULD COME IN OR GO TO THE EMERGENCY ROOM. PCP TOLD PT TO CALL TO SHE WHICH SHE SHOULD DO.  PLEASE CALL BACK ASAP. THANK YOU.

## 2025-07-07 NOTE — TELEPHONE ENCOUNTER
She went to the ER and they done a CT W contrast and she is on bactrim. She has a US scheduled for July 31st   She is stating that she is better but still feels like something is still there but feels better she thinks she may have a stone and they missed it.    Alec BLOOD

## 2025-07-14 ENCOUNTER — OFFICE VISIT (OUTPATIENT)
Dept: INFECTIOUS DISEASES | Facility: CLINIC | Age: 80
End: 2025-07-14
Payer: MEDICARE

## 2025-07-14 VITALS
SYSTOLIC BLOOD PRESSURE: 120 MMHG | RESPIRATION RATE: 18 BRPM | HEIGHT: 63 IN | HEART RATE: 75 BPM | OXYGEN SATURATION: 95 % | WEIGHT: 123 LBS | TEMPERATURE: 97.3 F | BODY MASS INDEX: 21.79 KG/M2 | DIASTOLIC BLOOD PRESSURE: 66 MMHG

## 2025-07-14 DIAGNOSIS — N39.0 UTI DUE TO EXTENDED-SPECTRUM BETA LACTAMASE (ESBL) PRODUCING ESCHERICHIA COLI: ICD-10-CM

## 2025-07-14 DIAGNOSIS — B96.29 UTI DUE TO EXTENDED-SPECTRUM BETA LACTAMASE (ESBL) PRODUCING ESCHERICHIA COLI: ICD-10-CM

## 2025-07-14 DIAGNOSIS — N39.0 RECURRENT UTI: ICD-10-CM

## 2025-07-14 DIAGNOSIS — E11.9 TYPE 2 DIABETES MELLITUS WITHOUT COMPLICATION, WITHOUT LONG-TERM CURRENT USE OF INSULIN: ICD-10-CM

## 2025-07-14 DIAGNOSIS — Z16.12 UTI DUE TO EXTENDED-SPECTRUM BETA LACTAMASE (ESBL) PRODUCING ESCHERICHIA COLI: ICD-10-CM

## 2025-07-14 DIAGNOSIS — N20.0 BILATERAL NEPHROLITHIASIS: Primary | ICD-10-CM

## 2025-07-14 LAB
BACTERIA UR QL AUTO: ABNORMAL /HPF
BILIRUB UR QL STRIP: NEGATIVE
CLARITY UR: CLEAR
COLOR UR: YELLOW
GLUCOSE UR STRIP-MCNC: ABNORMAL MG/DL
HGB UR QL STRIP.AUTO: NEGATIVE
HYALINE CASTS UR QL AUTO: ABNORMAL /LPF
KETONES UR QL STRIP: ABNORMAL
LEUKOCYTE ESTERASE UR QL STRIP.AUTO: NEGATIVE
NITRITE UR QL STRIP: NEGATIVE
PH UR STRIP.AUTO: 5.5 [PH] (ref 5–8)
PROT UR QL STRIP: NEGATIVE
RBC # UR STRIP: ABNORMAL /HPF
REF LAB TEST METHOD: ABNORMAL
SP GR UR STRIP: 1.02 (ref 1–1.03)
SQUAMOUS #/AREA URNS HPF: ABNORMAL /HPF
UROBILINOGEN UR QL STRIP: ABNORMAL
WBC # UR STRIP: ABNORMAL /HPF

## 2025-07-14 PROCEDURE — 81001 URINALYSIS AUTO W/SCOPE: CPT | Performed by: INTERNAL MEDICINE

## 2025-07-14 NOTE — PROGRESS NOTES
[facility]         Referring Provider: No referring provider defined for this encounter.    Subjective      Chief Complaint  Follow-up (Bilateral nephrolithiasis)    History of Present Illness  Aster Craft is a 80 y.o. female who presents today to [facility] for infectious disease evaluation and antibiotic management.    The patient is a 79-year-old female who presents for a follow-up regarding a urinary tract infection (UTI).    Reports daily intake of four 16-ounce bottles of water.    Was recently diagnosed with ESBL UTI and has previously being on long term suppressive therapy. This has been treated and symptoms resolved aside from occasional right lower quadrant pain off and on. No dysuria or polyuria at this time. No clinical sepsis.      Patient is following up with Dr. Frias urologist and sp stent removal on 6/16/2025 with persistent small left kidney stone.  Continues to have some discomfort to the right flank.    Went to [facility] where she was diagnosed with another UTI when was given Bactrim on discharge.      Past Medical History:   Diagnosis Date    Arthritis     Atherosclerosis of native coronary artery of native heart without angina pectoris 9/11/2024    Breast cancer     RIGHT BREAST STAGE 3    Diabetes mellitus     Elevated cholesterol     GERD (gastroesophageal reflux disease) 08/23/2021    History of cancer chemotherapy     Hypertension     Hyperthyroidism 05/19/2022    Kidney stone     Lichen sclerosus     Shingles     Thickened endometrium 2018    Urinary incontinence     Urinary tract infection        Past Surgical History:   Procedure Laterality Date    CARDIAC CATHETERIZATION N/A 7/15/2024    Procedure: Coronary angiography;  Surgeon: Brian Morrow MD;  Location: [facility] CATH INVASIVE LOCATION;  Service: Cardiology;  Laterality: N/A;    CATARACT EXTRACTION Right 01/10/2023    CERVICAL CONE BIOPSY      CHOLECYSTECTOMY  03/2010     COLONOSCOPY N/A 07/06/2018    Procedure: COLONOSCOPY;  Surgeon: Trenton Lyons MD;  Location: [facility] OR;  Service: Gastroenterology    DILATATION AND CURETTAGE      ENDOSCOPY N/A 07/06/2018    Procedure: ESOPHAGOGASTRODUODENOSCOPY;  Surgeon: Trenton Lyons MD;  Location: [facility] OR;  Service: Gastroenterology    MASTECTOMY Right 10/1998    MASTECTOMY Left 07/2013    TUBAL ABDOMINAL LIGATION         Social History     Socioeconomic History    Marital status:    Tobacco Use    Smoking status: Never     Passive exposure: Never    Smokeless tobacco: Never   Vaping Use    Vaping status: Never Used   Substance and Sexual Activity    Alcohol use: No    Drug use: No    Sexual activity: Not Currently       Family History  family history includes Breast cancer in her mother; Cancer in her maternal aunt and mother; Diabetes in her mother; Ovarian cancer in her maternal aunt.    Immunization History   Administered Date(s) Administered    COVID-19 (PFIZER) Purple Cap Monovalent 09/09/2021, 09/30/2021        Allergies  Allergies   Allergen Reactions    Caffeine Other (See Comments)     No caffeine per doctors recommendation    Jardiance [Empagliflozin] Other (See Comments)     UTI       The medication list has been reviewed and updated.   Current Medications    Current Outpatient Medications:     albuterol sulfate  (90 Base) MCG/ACT inhaler, 2 puffs Every 6 (Six) Hours As Needed., Disp: , Rfl:     azelastine (ASTELIN) 0.1 % nasal spray, Administer 1 spray into the nostril(s) as directed by provider 2 (Two) Times a Day., Disp: , Rfl:     benzonatate (TESSALON) 200 MG capsule, TAKE 1 CAPSULE THREE TIMES DAILY AS NEEDED, Disp: , Rfl:     Blood Glucose Monitoring Suppl (OneTouch Verio Flex System) w/Device kit, See Admin Instructions. for testing, Disp: , Rfl:     clobetasol (TEMOVATE) 0.05 % ointment, Apply to affected area BID x 2 wks then daily x 2 wks then 2-3x/wk, Disp: 60 g, Rfl: 6    Continuous Blood Gluc  Sensor (FreeStyle Malina 2 Sensor) misc, CHANGE SENSOR EVERY 14 DAYS, Disp: , Rfl:     Continuous Glucose  (FreeStyle Malina 2 [place]) device, See Admin Instructions., Disp: , Rfl:     desloratadine (CLARINEX) 5 MG tablet, Take 1 tablet by mouth Daily., Disp: , Rfl:     estradiol (ESTRACE VAGINAL) 0.1 MG/GM vaginal cream, May use 1 gram intravaginal qhs x 2 weeks then 1 gram 2x/week., Disp: 1 each, Rfl: 11    ezetimibe (ZETIA) 10 MG tablet, Take 1 tablet by mouth Daily., Disp: , Rfl:     Flovent  MCG/ACT inhaler, Inhale 2 puffs 2 (Two) Times a Day As Needed., Disp: , Rfl:     furosemide (Lasix) 20 MG tablet, Take 1 tablet by mouth Daily As Needed (For weight gain > 2-3 lbs overnight)., Disp: , Rfl:     ipratropium (ATROVENT) 0.06 % nasal spray, 2 sprays 2 (Two) Times a Day., Disp: , Rfl:     Januvia 25 MG tablet, Take 1 tablet by mouth Daily., Disp: , Rfl:     Lancets (OneTouch Delica Plus [id number]) misc, USE AS DIRECTED EVERY DAY, Disp: , Rfl:     meclizine (ANTIVERT) 12.5 MG tablet, Take 1 tablet by mouth 3 (Three) Times a Day As Needed., Disp: , Rfl:     metFORMIN ER (GLUCOPHAGE-XR) 500 MG 24 hr tablet, TAKE ONE TABLET WITH EVENING MEAL ONCE A DAY, Disp: , Rfl:     metoprolol succinate XL (Toprol XL) 100 MG 24 hr tablet, Take 1.5 tablets by mouth Daily., Disp: 135 tablet, Rfl: 1    nitrofurantoin, macrocrystal-monohydrate, (Macrobid) 100 MG capsule, Take 1 capsule by mouth Every Night., Disp: 30 capsule, Rfl: 3    nitroglycerin (NITROSTAT) 0.4 MG SL tablet, Place 1 tablet under the tongue Every 5 (Five) Minutes As Needed for Chest Pain., Disp: , Rfl:     ondansetron ODT (ZOFRAN-ODT) 4 MG disintegrating tablet, Take 1 tablet by mouth As Needed., Disp: , Rfl:     OneTouch Verio test strip, Daily. for testing, Disp: , Rfl:     oxybutynin XL (Ditropan XL) 10 MG 24 hr tablet, Take 1 tablet by mouth Daily As Needed (bladder spasm)., Disp: 10 tablet, Rfl: 0    pantoprazole (PROTONIX) 40 MG EC tablet, Take  1 tablet by mouth Daily., Disp: , Rfl:     phenazopyridine (PYRIDIUM) 100 MG tablet, Take 1 tablet by mouth Daily As Needed (urinary burning)., Disp: 5 tablet, Rfl: 0    simethicone (MYLICON) 80 MG chewable tablet, Chew 1 tablet Every 6 (Six) Hours As Needed for Flatulence., Disp: , Rfl:     spironolactone (ALDACTONE) 25 MG tablet, Take 1 tablet by mouth Daily., Disp: 90 tablet, Rfl: 1    tamsulosin (FLOMAX) 0.4 MG capsule 24 hr capsule, Take 1 capsule by mouth Every Night., Disp: 10 capsule, Rfl: 0    valACYclovir (VALTREX) 1000 MG tablet, Take 1 tablet by mouth 3 times a day., Disp: , Rfl:     Vibegron 75 MG tablet, Take 1 tablet by mouth Daily., Disp: 30 tablet, Rfl: 11    Vitamins-Lipotropics (Lipoflavonoid) tablet, Take 2 tablets by mouth 3 times a day. For tinitus - per pt has not been taking x 2-3 wks (12/2/2024), she is getting ready to start them again, Disp: , Rfl:       Review of Systems    Review of Systems   Constitutional:  Negative for activity change, appetite change, chills, diaphoresis, fatigue and fever.   HENT:  Negative for congestion, dental problem, drooling, ear discharge, ear pain, facial swelling, postnasal drip, sinus pressure, sore throat and swollen glands.    Eyes:  Negative for blurred vision, double vision, pain, discharge and itching.   Respiratory:  Negative for apnea, cough, choking, chest tightness and shortness of breath.    Cardiovascular:  Negative for chest pain, palpitations and leg swelling.   Gastrointestinal:  Positive for abdominal pain. Negative for abdominal distention, diarrhea, nausea, rectal pain and vomiting.   Genitourinary:  Negative for difficulty urinating, dysuria, flank pain, frequency, hematuria, pelvic pain and pelvic pressure.   Neurological:  Negative for dizziness, tremors, seizures, syncope, speech difficulty and confusion.   Psychiatric/Behavioral:  Negative for agitation and behavioral problems.         Objective     Vital Signs:  /66 (BP  "Location: Right arm, Patient Position: Sitting, Cuff Size: Adult)   Pulse 75   Temp 97.3 °F (36.3 °C) (Infrared)   Resp 18   Ht 160 cm (63\")   Wt 55.8 kg (123 lb)   SpO2 95%   BMI 21.79 kg/m²   Estimated body mass index is 21.79 kg/m² as calculated from the following:    Height as of this encounter: 160 cm (63\").    Weight as of this encounter: 55.8 kg (123 lb).    Physical Exam  Constitutional:       General: She is not in acute distress.     Appearance: Normal appearance. She is not ill-appearing, toxic-appearing or diaphoretic.   HENT:      Head: Normocephalic and atraumatic.      Nose: No congestion or rhinorrhea.      Mouth/Throat:      Pharynx: Oropharynx is clear. No oropharyngeal exudate or posterior oropharyngeal erythema.   Cardiovascular:      Rate and Rhythm: Normal rate and regular rhythm.      Heart sounds: No murmur heard.  Pulmonary:      Effort: No respiratory distress.      Breath sounds: No stridor. No wheezing, rhonchi or rales.   Chest:      Chest wall: No tenderness.   Abdominal:      General: There is no distension.      Tenderness: There is no abdominal tenderness. There is no right CVA tenderness, left CVA tenderness, guarding or rebound.   Musculoskeletal:         General: No swelling, tenderness or signs of injury.      Cervical back: No rigidity or tenderness.   Skin:     Coloration: Skin is not jaundiced or pale.      Findings: No bruising, erythema or rash.   Neurological:      General: No focal deficit present.      Mental Status: She is alert. Mental status is at baseline.   Psychiatric:         Mood and Affect: Mood normal.         Behavior: Behavior normal.          Result Review :  The following data was reviewed by Elena Velazquez MD     Lab Results  Lab Results   Component Value Date    WBC 6.56 06/30/2025    HGB 13.2 06/30/2025    HCT 39.0 06/30/2025    MCV 92.2 06/30/2025     06/30/2025     Lab Results   Component Value Date    GLUCOSE 152 (H) 06/30/2025    " "BUN 15.0 06/30/2025    CREATININE 0.60 06/30/2025    EGFRIFNONA >60 05/18/2022    EGFRIFAFRI >60 05/18/2022    BCR 25.0 06/30/2025    K 4.1 06/30/2025    CO2 25.7 06/30/2025    CALCIUM 9.1 06/30/2025    ALBUMIN 4.1 06/30/2025    AST 25 06/30/2025    ALT 15 06/30/2025      No results found for: \"CRP\"     No results found for: \"ACANTHNAEG\", \"AFBCX\", \"BPERTUSSISCX\", \"BLOODCX\"  No results found for: \"BCIDPCR\", \"CXREFLEX\", \"CSFCX\", \"CULTURETIS\"  No results found for: \"CULTURES\", \"HSVCX\", \"URCX\"  No results found for: \"EYECULTURE\", \"GCCX\", \"HSVCULTURE\", \"LABHSV\"  No results found for: \"LEGIONELLA\", \"MRSACX\", \"MUMPSCX\", \"MYCOPLASCX\"  No results found for: \"NOCARDIACX\", \"STOOLCX\"  No results found for: \"THROATCX\", \"UNSTIMCULT\", \"URINECX\", \"CULTURE\", \"VZVCULTUR\"  No results found for: \"VIRALCULTU\", \"WOUNDCX\"    Radiology Results  CT Abdomen Pelvis With Contrast  Result Date: 6/30/2025  Impression: Mild right hydronephrosis without hydroureter. Mild right urothelial thickening of the renal collecting system may represent infectious/inflammatory process. There is no obstructing renal or ureteral stone.   CTDI: 3.81 mGy DLP: 173.9 mGy.cm   This study was performed with techniques to keep radiation doses as low as reasonably achievable (ALARA). Individualized dose reduction techniques using automated exposure control or adjustment of mA and/or kV according to the patient size were employed.      Images were reviewed, interpreted, and dictated by Dr. Isaiah Abbott MD Transcribed by Maryuri Enrique PA-C.  This report was signed and finalized on 6/30/2025 2:15 PM by Isaiah Abbott MD.               Assessment / Plan        Diagnoses and all orders for this visit:    1. Bilateral nephrolithiasis (Primary)  -     Urine Culture - Urine, Urine, Clean Catch; Future  -     Urinalysis With Microscopic - Urine, Clean Catch; Future    2. Recurrent UTI  -     Urine Culture - Urine, Urine, Clean Catch; Future  -     Urinalysis With " Microscopic - Urine, Clean Catch; Future    3. UTI due to extended-spectrum beta lactamase (ESBL) producing Escherichia coli  -     Urine Culture - Urine, Urine, Clean Catch; Future  -     Urinalysis With Microscopic - Urine, Clean Catch; Future    4. Type 2 diabetes mellitus without complication, without long-term current use of insulin  -     Urine Culture - Urine, Urine, Clean Catch; Future  -     Urinalysis With Microscopic - Urine, Clean Catch; Future      CT 2/16/25 Bilateral nephrolithiasis is noted, stable from prior exam,  greater on right side.     Most recent urine culture and urine analysis from 4/23/2025 show 21-50 WBCs and growth of over 100,000 colonies of E. coli.     Patient finished her course of Bactrim DS 1 p.o. twice daily.  Recheck a UA urine culture and followed up for urology, Dr. Frias and Genoveva KO, for lithotripsy or stent placement.     5/8/25  Urine analysis with over 1000 glucose, trace blood, negative leukocyte Estrace and negative nitrite and urine culture showed no growth.     Her uncontrolled diabetes and such high sugar in her urine is a very high risk for future UTIs. I had a long discussion with patient regarding diet and follow up with endocrinology. Patient has stopped her metformin.    Patient is scheduled for stone removal on 5/27/2025 and stent removal on 6/16/2025 by Dr. Frias urologist.    6/30/25 IMPRESSION:  Mild right hydronephrosis without hydroureter. Mild right  urothelial thickening of the renal collecting system may represent  infectious/inflammatory process. There is no obstructing renal or  ureteral stone.    6/30/25   Urine Culture >100,000 CFU/mL Escherichia coli ESBL Abnormal       Colonization of the urinary tract without infection is common. Treatment is discouraged unless the patient is symptomatic, pregnant, or undergoing an invasive urologic procedure.  Recent outcomes data supports the use of pip/tazo in the treatment of susceptible ESBL  infections for uncomplicated UTI. Consider use of pip/tazo as a carbapenem-sparing regimen in applicable patients.        Resulting Agency: [facility]     Susceptibility     Escherichia coli ESBL     BRANDIE     Ciprofloxacin 1 ug/ml Resistant     Ertapenem <=0.12 ug/ml Susceptible     Gentamicin <=1 ug/ml Susceptible     Levofloxacin 1 ug/ml Intermediate     Meropenem <=0.25 ug/ml Susceptible     Nitrofurantoin <=16 ug/ml Susceptible     Piperacillin + Tazobactam <=4 ug/ml Susceptible     Trimethoprim + Sulfamethoxazole <=20 ug/ml Susceptible                   Treated with Bactrim. Patient is requesting another UA and Urine culture today with right flank pain.        Follow Up   No follow-ups on file.    Visit Diagnoses:    ICD-10-CM ICD-9-CM   1. Bilateral nephrolithiasis  N20.0 592.0   2. Recurrent UTI  N39.0 599.0   3. UTI due to extended-spectrum beta lactamase (ESBL) producing Escherichia coli  N39.0 599.0    B96.29 041.49    Z16.12 V09.1   4. Type 2 diabetes mellitus without complication, without long-term current use of insulin  E11.9 250.00       Patient was given instructions and counseling regarding her condition or for health maintenance advice. Please see specific information pulled into the AVS if appropriate.     This document has been electronically signed by Elena Velazquez MD   July 14, 2025 11:30 EDT      No orders of the defined types were placed in this encounter.     Dictated Utilizing Dragon Dictation: Part of this note may be an electronic transcription/translation of spoken language to printed text using the Dragon Dictation System.

## 2025-07-15 ENCOUNTER — TELEPHONE (OUTPATIENT)
Dept: UROLOGY | Facility: CLINIC | Age: 80
End: 2025-07-15
Payer: MEDICARE

## 2025-07-15 DIAGNOSIS — N20.0 KIDNEY STONE: Primary | ICD-10-CM

## 2025-07-15 NOTE — TELEPHONE ENCOUNTER
Do you want me to get her in sooner with MAIKEL? She is scheduled with elizabeth on 8/12 or I can get her in with you.     Asha,CMA

## 2025-07-15 NOTE — TELEPHONE ENCOUNTER
Could do the 4th also if she did not want to wait that long but ang will have to schedule it it wouldn't let me     Asha,CMA

## 2025-07-15 NOTE — TELEPHONE ENCOUNTER
I called patient and she is stating the pain in early morning starts in her back then it moves to her side as the day goes.  She said the pain has been ash deysi for a couple of weeks but has been getting worse  She has not taken any meds for level right now is a 3 but its more of a 5 when she is moving around    HZev MA

## 2025-07-15 NOTE — TELEPHONE ENCOUNTER
"  Hub staff attempted to follow warm transfer process and was unsuccessful     Caller: VANESSA AHUJA    Relationship to patient: SELF    Best call back number: [phone]  [facility] TO [facility], CALL ANY TIME    Patient is needing: PT IS CONCERNED ABOUT PAIN IN HER RIGHT SIDE THAT SHE HAS BEEN EXPERIENCING AFTER HER SURGERY ON 6/16/25 WITH DR DEGROOT. PT STATES THAT PAIN COMES AND GOES AND OCCURS WITH MOVEMENT, EX \"LIKE WHEN YOU ARE JOSTLED IN THE CAR\". PAIN IS ABOUT A 5. PT STATES SHE HAS NOT BEEN ABLE TO REACH A NURSE TO DISCUSS, WOULD LIKE A CALL BACK TO ADVISE WHAT TO DO ABOUT THE NEW PAIN ISSUE.   "

## 2025-07-22 ENCOUNTER — HOSPITAL ENCOUNTER (OUTPATIENT)
Dept: CT IMAGING | Facility: HOSPITAL | Age: 80
Discharge: HOME | End: 2025-07-22
Admitting: NURSE PRACTITIONER
Payer: MEDICARE

## 2025-07-22 DIAGNOSIS — N20.0 KIDNEY STONE: ICD-10-CM

## 2025-07-22 PROCEDURE — 74176 CT ABD & PELVIS W/O CONTRAST: CPT

## 2025-08-07 ENCOUNTER — HOSPITAL ENCOUNTER (OUTPATIENT)
Dept: ULTRASOUND IMAGING | Facility: HOSPITAL | Age: 80
Discharge: HOME | End: 2025-08-07
Admitting: UROLOGY
Payer: MEDICARE

## 2025-08-07 DIAGNOSIS — N20.0 KIDNEY STONE: ICD-10-CM

## 2025-08-07 PROCEDURE — 76775 US EXAM ABDO BACK WALL LIM: CPT

## 2025-08-11 ENCOUNTER — OFFICE VISIT (OUTPATIENT)
Dept: UROLOGY | Facility: CLINIC | Age: 80
End: 2025-08-11
Payer: MEDICARE

## 2025-08-11 VITALS
TEMPERATURE: 98.2 F | RESPIRATION RATE: 16 BRPM | HEIGHT: 63 IN | DIASTOLIC BLOOD PRESSURE: 76 MMHG | HEART RATE: 76 BPM | OXYGEN SATURATION: 95 % | SYSTOLIC BLOOD PRESSURE: 120 MMHG | BODY MASS INDEX: 21.8 KG/M2 | WEIGHT: 123.02 LBS

## 2025-08-11 DIAGNOSIS — Z16.12 UTI DUE TO EXTENDED-SPECTRUM BETA LACTAMASE (ESBL) PRODUCING ESCHERICHIA COLI: ICD-10-CM

## 2025-08-11 DIAGNOSIS — B96.29 UTI DUE TO EXTENDED-SPECTRUM BETA LACTAMASE (ESBL) PRODUCING ESCHERICHIA COLI: ICD-10-CM

## 2025-08-11 DIAGNOSIS — R39.9 LOWER URINARY TRACT SYMPTOMS (LUTS): Primary | ICD-10-CM

## 2025-08-11 DIAGNOSIS — N39.0 UTI DUE TO EXTENDED-SPECTRUM BETA LACTAMASE (ESBL) PRODUCING ESCHERICHIA COLI: ICD-10-CM

## 2025-08-11 LAB
BILIRUB BLD-MCNC: NEGATIVE MG/DL
CLARITY, POC: CLEAR
COLOR UR: ABNORMAL
EXPIRATION DATE: ABNORMAL
GLUCOSE UR STRIP-MCNC: ABNORMAL MG/DL
KETONES UR QL: NEGATIVE
LEUKOCYTE EST, POC: ABNORMAL
Lab: ABNORMAL
NITRITE UR-MCNC: POSITIVE MG/ML
PH UR: 5.5 [PH] (ref 5–8)
PROT UR STRIP-MCNC: ABNORMAL MG/DL
RBC # UR STRIP: NEGATIVE /UL
SP GR UR: 1.02 (ref 1–1.03)
UROBILINOGEN UR QL: ABNORMAL

## 2025-08-11 PROCEDURE — 3074F SYST BP LT 130 MM HG: CPT | Performed by: UROLOGY

## 2025-08-11 PROCEDURE — 99214 OFFICE O/P EST MOD 30 MIN: CPT | Performed by: UROLOGY

## 2025-08-11 PROCEDURE — G2211 COMPLEX E/M VISIT ADD ON: HCPCS | Performed by: UROLOGY

## 2025-08-11 PROCEDURE — 1160F RVW MEDS BY RX/DR IN RCRD: CPT | Performed by: UROLOGY

## 2025-08-11 PROCEDURE — 3078F DIAST BP <80 MM HG: CPT | Performed by: UROLOGY

## 2025-08-11 PROCEDURE — 1159F MED LIST DOCD IN RCRD: CPT | Performed by: UROLOGY

## 2025-08-11 PROCEDURE — 81003 URINALYSIS AUTO W/O SCOPE: CPT | Performed by: UROLOGY

## 2025-08-11 RX ORDER — SULFAMETHOXAZOLE AND TRIMETHOPRIM 800; 160 MG/1; MG/1
1 TABLET ORAL 2 TIMES DAILY
Qty: 14 TABLET | Refills: 0 | Status: SHIPPED | OUTPATIENT
Start: 2025-08-11 | End: 2025-08-18

## 2025-08-15 ENCOUNTER — TELEPHONE (OUTPATIENT)
Dept: UROLOGY | Facility: CLINIC | Age: 80
End: 2025-08-15
Payer: MEDICARE

## 2025-08-19 ENCOUNTER — OFFICE VISIT (OUTPATIENT)
Dept: ENDOCRINOLOGY | Facility: CLINIC | Age: 80
End: 2025-08-19
Payer: MEDICARE

## 2025-08-19 ENCOUNTER — OFFICE VISIT (OUTPATIENT)
Dept: UROLOGY | Facility: CLINIC | Age: 80
End: 2025-08-19
Payer: MEDICARE

## 2025-08-19 VITALS
WEIGHT: 121 LBS | BODY MASS INDEX: 21.44 KG/M2 | HEART RATE: 69 BPM | OXYGEN SATURATION: 96 % | DIASTOLIC BLOOD PRESSURE: 70 MMHG | SYSTOLIC BLOOD PRESSURE: 108 MMHG

## 2025-08-19 VITALS
HEART RATE: 78 BPM | BODY MASS INDEX: 21.44 KG/M2 | HEIGHT: 63 IN | SYSTOLIC BLOOD PRESSURE: 122 MMHG | OXYGEN SATURATION: 99 % | TEMPERATURE: 97.4 F | DIASTOLIC BLOOD PRESSURE: 74 MMHG | WEIGHT: 121 LBS

## 2025-08-19 DIAGNOSIS — E11.65 TYPE 2 DIABETES MELLITUS WITH HYPERGLYCEMIA, WITHOUT LONG-TERM CURRENT USE OF INSULIN: Primary | ICD-10-CM

## 2025-08-19 DIAGNOSIS — R32 URINARY INCONTINENCE, UNSPECIFIED TYPE: Primary | ICD-10-CM

## 2025-08-19 DIAGNOSIS — N39.0 RECURRENT UTI: ICD-10-CM

## 2025-08-19 DIAGNOSIS — K76.0 FATTY LIVER: ICD-10-CM

## 2025-08-19 DIAGNOSIS — E04.9 GOITER: ICD-10-CM

## 2025-08-19 DIAGNOSIS — I10 ESSENTIAL HYPERTENSION: ICD-10-CM

## 2025-08-19 PROBLEM — E11.9 TYPE 2 DIABETES MELLITUS WITHOUT COMPLICATION: Status: ACTIVE | Noted: 2024-07-16

## 2025-08-19 PROBLEM — E11.9 TYPE 2 DIABETES MELLITUS WITHOUT COMPLICATION, WITHOUT LONG-TERM CURRENT USE OF INSULIN: Status: RESOLVED | Noted: 2024-07-13 | Resolved: 2025-08-19

## 2025-08-19 LAB
BILIRUB BLD-MCNC: NEGATIVE MG/DL
CLARITY, POC: ABNORMAL
COLOR UR: YELLOW
EXPIRATION DATE: ABNORMAL
EXPIRATION DATE: ABNORMAL
GLUCOSE UR STRIP-MCNC: ABNORMAL MG/DL
HBA1C MFR BLD: 7.9 % (ref 4.5–5.7)
KETONES UR QL: NEGATIVE
LEUKOCYTE EST, POC: NEGATIVE
Lab: ABNORMAL
Lab: ABNORMAL
NITRITE UR-MCNC: NEGATIVE MG/ML
PH UR: 6 [PH] (ref 5–8)
PROT UR STRIP-MCNC: NEGATIVE MG/DL
RBC # UR STRIP: NEGATIVE /UL
SP GR UR: 1.03 (ref 1–1.03)
UROBILINOGEN UR QL: NORMAL

## 2025-08-19 PROCEDURE — 84443 ASSAY THYROID STIM HORMONE: CPT

## 2025-08-19 PROCEDURE — 82043 UR ALBUMIN QUANTITATIVE: CPT

## 2025-08-19 PROCEDURE — 82570 ASSAY OF URINE CREATININE: CPT

## 2025-08-19 PROCEDURE — 80053 COMPREHEN METABOLIC PANEL: CPT

## 2025-08-19 PROCEDURE — 80061 LIPID PANEL: CPT

## 2025-08-19 RX ORDER — BLOOD-GLUCOSE SENSOR
1 EACH MISCELLANEOUS
Qty: 2 EACH | Refills: 2 | Status: SHIPPED | OUTPATIENT
Start: 2025-08-19

## 2025-08-19 RX ORDER — METFORMIN HYDROCHLORIDE 500 MG/1
1000 TABLET, EXTENDED RELEASE ORAL
Qty: 120 TABLET | Refills: 2 | Status: SHIPPED | OUTPATIENT
Start: 2025-08-19

## 2025-08-20 ENCOUNTER — RESULTS FOLLOW-UP (OUTPATIENT)
Dept: ENDOCRINOLOGY | Facility: CLINIC | Age: 80
End: 2025-08-20
Payer: MEDICARE

## 2025-08-20 LAB
ALBUMIN SERPL-MCNC: 4.6 G/DL (ref 3.8–4.8)
ALBUMIN/CREAT UR: 13 MG/G CREAT (ref 0–29)
ALP SERPL-CCNC: 107 IU/L (ref 44–121)
ALT SERPL-CCNC: 48 IU/L (ref 0–32)
AST SERPL-CCNC: 37 IU/L (ref 0–40)
BILIRUB SERPL-MCNC: 0.5 MG/DL (ref 0–1.2)
BUN SERPL-MCNC: 17 MG/DL (ref 8–27)
BUN/CREAT SERPL: 27 (ref 12–28)
CALCIUM SERPL-MCNC: 9.3 MG/DL (ref 8.7–10.3)
CHLORIDE SERPL-SCNC: 99 MMOL/L (ref 96–106)
CHOLEST SERPL-MCNC: 179 MG/DL (ref 100–199)
CO2 SERPL-SCNC: 22 MMOL/L (ref 20–29)
CREAT SERPL-MCNC: 0.63 MG/DL (ref 0.57–1)
CREAT UR-MCNC: 182 MG/DL
EGFRCR SERPLBLD CKD-EPI 2021: 91 ML/MIN/1.73
GLOBULIN SER CALC-MCNC: 2.9 G/DL (ref 1.5–4.5)
GLUCOSE SERPL-MCNC: 146 MG/DL (ref 70–99)
HDLC SERPL-MCNC: 61 MG/DL
LDLC SERPL CALC-MCNC: 100 MG/DL (ref 0–99)
MICROALBUMIN UR-MCNC: 23.6 UG/ML
POTASSIUM SERPL-SCNC: 4.6 MMOL/L (ref 3.5–5.2)
PROT SERPL-MCNC: 7.5 G/DL (ref 6–8.5)
SODIUM SERPL-SCNC: 136 MMOL/L (ref 134–144)
TRIGL SERPL-MCNC: 100 MG/DL (ref 0–149)
TSH SERPL DL<=0.005 MIU/L-ACNC: 0.15 UIU/ML (ref 0.45–4.5)
VLDLC SERPL CALC-MCNC: 18 MG/DL (ref 5–40)

## (undated) DEVICE — GW ZIPWIRE STD ANGL .035IN 260CM

## (undated) DEVICE — FRCP BX RADJAW4 NDL 2.8 240CM LG OG BX40

## (undated) DEVICE — TR BAND RADIAL ARTERY COMPRESSION DEVICE: Brand: TR BAND

## (undated) DEVICE — GW INQW FIX/CORE PTFE J/3MM .035 260CM

## (undated) DEVICE — Device

## (undated) DEVICE — THE BITE BLOCK MAXI, LATEX FREE STRAP IS USED TO PROTECT THE ENDOSCOPE INSERTION TUBE FROM BEING BITTEN BY THE PATIENT.

## (undated) DEVICE — CATH F6 ST JR 4 100CM: Brand: SUPERTORQUE

## (undated) DEVICE — SUCTION CANISTER, 1500CC, RIGID: Brand: DEROYAL

## (undated) DEVICE — Device: Brand: DEFENDO AIR/WATER/SUCTION AND BIOPSY VALVE

## (undated) DEVICE — GLIDESHEATH SLENDER STAINLESS STEEL KIT: Brand: GLIDESHEATH SLENDER

## (undated) DEVICE — CONN Y IRR DISP 1P/U

## (undated) DEVICE — TUBING, SUCTION, 1/4" X 20', STRAIGHT: Brand: MEDLINE INDUSTRIES, INC.

## (undated) DEVICE — RADIFOCUS OPTITORQUE ANGIOGRAPHIC CATHETER: Brand: OPTITORQUE

## (undated) DEVICE — SINGLE PORT MANIFOLD: Brand: NEPTUNE 2

## (undated) DEVICE — SYR LUERLOK 30CC

## (undated) DEVICE — GOWN,REINF,POLY,ECL,PP SLV,XL: Brand: MEDLINE

## (undated) DEVICE — DGW .035 FC J3MM 260CM TEF: Brand: EMERALD

## (undated) DEVICE — Device: Brand: ENDOGATOR